# Patient Record
Sex: MALE | Race: WHITE | NOT HISPANIC OR LATINO | Employment: UNEMPLOYED | URBAN - METROPOLITAN AREA
[De-identification: names, ages, dates, MRNs, and addresses within clinical notes are randomized per-mention and may not be internally consistent; named-entity substitution may affect disease eponyms.]

---

## 2017-01-04 ENCOUNTER — OFFICE VISIT (OUTPATIENT)
Dept: HEMATOLOGY/ONCOLOGY | Facility: CLINIC | Age: 67
End: 2017-01-04
Payer: MEDICARE

## 2017-01-04 ENCOUNTER — INFUSION (OUTPATIENT)
Dept: INFUSION THERAPY | Facility: HOSPITAL | Age: 67
End: 2017-01-04
Attending: INTERNAL MEDICINE
Payer: MEDICARE

## 2017-01-04 ENCOUNTER — LAB VISIT (OUTPATIENT)
Dept: LAB | Facility: HOSPITAL | Age: 67
End: 2017-01-04
Attending: INTERNAL MEDICINE
Payer: MEDICARE

## 2017-01-04 VITALS
BODY MASS INDEX: 20.21 KG/M2 | TEMPERATURE: 97 F | OXYGEN SATURATION: 99 % | WEIGHT: 109.81 LBS | DIASTOLIC BLOOD PRESSURE: 88 MMHG | SYSTOLIC BLOOD PRESSURE: 110 MMHG | RESPIRATION RATE: 22 BRPM | HEIGHT: 62 IN | HEART RATE: 110 BPM

## 2017-01-04 VITALS — BODY MASS INDEX: 20.21 KG/M2 | HEIGHT: 62 IN | HEART RATE: 96 BPM | WEIGHT: 109.81 LBS | OXYGEN SATURATION: 98 %

## 2017-01-04 DIAGNOSIS — C34.01 MALIGNANT NEOPLASM OF HILUS OF RIGHT LUNG: ICD-10-CM

## 2017-01-04 DIAGNOSIS — C79.51 BONE METASTASES: Primary | ICD-10-CM

## 2017-01-04 DIAGNOSIS — G47.00 INSOMNIA, UNSPECIFIED TYPE: ICD-10-CM

## 2017-01-04 DIAGNOSIS — R64 CACHEXIA: ICD-10-CM

## 2017-01-04 DIAGNOSIS — C79.51 BONE METASTASES: ICD-10-CM

## 2017-01-04 DIAGNOSIS — C79.9 METASTATIC CANCER: ICD-10-CM

## 2017-01-04 DIAGNOSIS — C34.90 LUNG CANCER: ICD-10-CM

## 2017-01-04 DIAGNOSIS — C34.31 MALIGNANT NEOPLASM OF LOWER LOBE OF RIGHT LUNG: ICD-10-CM

## 2017-01-04 DIAGNOSIS — C34.01 MALIGNANT NEOPLASM OF HILUS OF RIGHT LUNG: Primary | ICD-10-CM

## 2017-01-04 DIAGNOSIS — C34.02 MALIGNANT NEOPLASM OF HILUS OF LEFT LUNG: ICD-10-CM

## 2017-01-04 LAB
ALBUMIN SERPL BCP-MCNC: 3.4 G/DL
ALP SERPL-CCNC: 106 U/L
ALT SERPL W/O P-5'-P-CCNC: 17 U/L
ANION GAP SERPL CALC-SCNC: 8 MMOL/L
AST SERPL-CCNC: 15 U/L
BASOPHILS # BLD AUTO: 0.01 K/UL
BASOPHILS NFR BLD: 0.3 %
BILIRUB SERPL-MCNC: 0.3 MG/DL
BUN SERPL-MCNC: 14 MG/DL
CALCIUM SERPL-MCNC: 9.5 MG/DL
CHLORIDE SERPL-SCNC: 104 MMOL/L
CO2 SERPL-SCNC: 28 MMOL/L
CREAT SERPL-MCNC: 0.8 MG/DL
DIFFERENTIAL METHOD: ABNORMAL
EOSINOPHIL # BLD AUTO: 0 K/UL
EOSINOPHIL NFR BLD: 1.1 %
ERYTHROCYTE [DISTWIDTH] IN BLOOD BY AUTOMATED COUNT: 18.9 %
EST. GFR  (AFRICAN AMERICAN): >60 ML/MIN/1.73 M^2
EST. GFR  (NON AFRICAN AMERICAN): >60 ML/MIN/1.73 M^2
GLUCOSE SERPL-MCNC: 124 MG/DL
HCT VFR BLD AUTO: 31.3 %
HGB BLD-MCNC: 10.2 G/DL
LYMPHOCYTES # BLD AUTO: 0.6 K/UL
LYMPHOCYTES NFR BLD: 15.6 %
MCH RBC QN AUTO: 33.1 PG
MCHC RBC AUTO-ENTMCNC: 32.6 %
MCV RBC AUTO: 102 FL
MONOCYTES # BLD AUTO: 0.6 K/UL
MONOCYTES NFR BLD: 15.9 %
NEUTROPHILS # BLD AUTO: 2.5 K/UL
NEUTROPHILS NFR BLD: 67.1 %
PLATELET # BLD AUTO: 294 K/UL
PMV BLD AUTO: 9.1 FL
POTASSIUM SERPL-SCNC: 4.1 MMOL/L
PROT SERPL-MCNC: 7 G/DL
RBC # BLD AUTO: 3.08 M/UL
SODIUM SERPL-SCNC: 140 MMOL/L
WBC # BLD AUTO: 3.72 K/UL

## 2017-01-04 PROCEDURE — 96367 TX/PROPH/DG ADDL SEQ IV INF: CPT

## 2017-01-04 PROCEDURE — 36415 COLL VENOUS BLD VENIPUNCTURE: CPT

## 2017-01-04 PROCEDURE — 96413 CHEMO IV INFUSION 1 HR: CPT

## 2017-01-04 PROCEDURE — 63600175 PHARM REV CODE 636 W HCPCS: Performed by: INTERNAL MEDICINE

## 2017-01-04 PROCEDURE — 85025 COMPLETE CBC W/AUTO DIFF WBC: CPT

## 2017-01-04 PROCEDURE — 80053 COMPREHEN METABOLIC PANEL: CPT

## 2017-01-04 PROCEDURE — 99999 PR PBB SHADOW E&M-EST. PATIENT-LVL IV: CPT | Mod: PBBFAC,,, | Performed by: INTERNAL MEDICINE

## 2017-01-04 PROCEDURE — 99215 OFFICE O/P EST HI 40 MIN: CPT | Mod: 25,S$PBB,, | Performed by: INTERNAL MEDICINE

## 2017-01-04 PROCEDURE — 96417 CHEMO IV INFUS EACH ADDL SEQ: CPT

## 2017-01-04 PROCEDURE — 25000003 PHARM REV CODE 250: Performed by: INTERNAL MEDICINE

## 2017-01-04 RX ORDER — ALPRAZOLAM 0.25 MG/1
0.5 TABLET ORAL 2 TIMES DAILY PRN
Qty: 60 TABLET | Refills: 2 | Status: ON HOLD | OUTPATIENT
Start: 2017-01-04 | End: 2017-03-17

## 2017-01-04 RX ORDER — FAMOTIDINE 20 MG/50ML
20 INJECTION, SOLUTION INTRAVENOUS
Status: COMPLETED | OUTPATIENT
Start: 2017-01-04 | End: 2017-01-04

## 2017-01-04 RX ORDER — FAMOTIDINE 20 MG/50ML
20 INJECTION, SOLUTION INTRAVENOUS
Status: CANCELLED
Start: 2017-01-04

## 2017-01-04 RX ORDER — SODIUM CHLORIDE 0.9 % (FLUSH) 0.9 %
10 SYRINGE (ML) INJECTION
Status: CANCELLED | OUTPATIENT
Start: 2017-01-04

## 2017-01-04 RX ORDER — HEPARIN 100 UNIT/ML
500 SYRINGE INTRAVENOUS
Status: CANCELLED | OUTPATIENT
Start: 2017-01-04

## 2017-01-04 RX ORDER — HEPARIN 100 UNIT/ML
500 SYRINGE INTRAVENOUS
Status: DISCONTINUED | OUTPATIENT
Start: 2017-01-04 | End: 2017-01-04 | Stop reason: HOSPADM

## 2017-01-04 RX ORDER — PALONOSETRON 0.05 MG/ML
0.25 INJECTION, SOLUTION INTRAVENOUS
Status: DISCONTINUED | OUTPATIENT
Start: 2017-01-04 | End: 2017-01-04

## 2017-01-04 RX ORDER — PALONOSETRON 0.05 MG/ML
0.25 INJECTION, SOLUTION INTRAVENOUS
Status: CANCELLED
Start: 2017-01-04

## 2017-01-04 RX ADMIN — FAMOTIDINE 20 MG: 20 INJECTION, SOLUTION INTRAVENOUS at 09:01

## 2017-01-04 RX ADMIN — CARBOPLATIN 180 MG: 10 INJECTION, SOLUTION INTRAVENOUS at 12:01

## 2017-01-04 RX ADMIN — DEXAMETHASONE SODIUM PHOSPHATE: 4 INJECTION, SOLUTION INTRAMUSCULAR; INTRAVENOUS at 10:01

## 2017-01-04 RX ADMIN — HEPARIN 500 UNITS: 100 SYRINGE at 12:01

## 2017-01-04 RX ADMIN — SODIUM CHLORIDE 1000 ML: 0.9 INJECTION, SOLUTION INTRAVENOUS at 09:01

## 2017-01-04 RX ADMIN — PACLITAXEL 66 MG: 6 INJECTION, SOLUTION INTRAVENOUS at 11:01

## 2017-01-04 RX ADMIN — SODIUM CHLORIDE: 9 INJECTION, SOLUTION INTRAVENOUS at 09:01

## 2017-01-04 RX ADMIN — DIPHENHYDRAMINE HYDROCHLORIDE: 50 INJECTION, SOLUTION INTRAMUSCULAR; INTRAVENOUS at 10:01

## 2017-01-04 NOTE — PLAN OF CARE
Problem: Patient Care Overview  Goal: Plan of Care Review  Outcome: Ongoing (interventions implemented as appropriate)  Pt states that he is feeling better today than he was last week.

## 2017-01-04 NOTE — PROGRESS NOTES
Subjective:       Patient ID: Frederick J Lejeune is a 66 y.o. male.    Chief Complaint: Results; Lung Cancer; and Chemotherapy    HPI 66-year-old male with metastatic non-small cell squamous subtype PD1 +70% expression presents for cycle 6 day 1 of carboplatinum Taxol weekly ECOG status 2    Past Medical History   Diagnosis Date    Cancer      lung    COPD (chronic obstructive pulmonary disease)     GERD (gastroesophageal reflux disease)     Lumbar vertebral fracture      Family History   Problem Relation Age of Onset    Emphysema Mother     Cancer Father      mesothelioma     Social History     Social History    Marital status:      Spouse name: ganesh    Number of children: 2    Years of education: N/A     Occupational History     [employer]     Social History Main Topics    Smoking status: Former Smoker     Packs/day: 1.00     Years: 50.00     Types: Cigarettes     Quit date: 5/10/2016    Smokeless tobacco: Former User     Types: Snuff     Quit date: 5/10/2016    Alcohol use No    Drug use: No    Sexual activity: Yes     Partners: Female     Other Topics Concern    Not on file     Social History Narrative     Past Surgical History   Procedure Laterality Date    Appendectomy      Abscess drainage      Hand surgery Left     Back surgery         Labs:  Lab Results   Component Value Date    WBC 3.72 (L) 01/04/2017    HGB 10.2 (L) 01/04/2017    HCT 31.3 (L) 01/04/2017     (H) 01/04/2017     01/04/2017     BMP  Lab Results   Component Value Date     12/28/2016    K 4.2 12/28/2016     12/28/2016    CO2 28 12/28/2016    BUN 15 12/28/2016    CREATININE 0.7 12/28/2016    CALCIUM 9.6 12/28/2016    ANIONGAP 9 12/28/2016    ESTGFRAFRICA >60 12/28/2016    EGFRNONAA >60 12/28/2016     Lab Results   Component Value Date    ALT 15 12/28/2016    AST 14 12/28/2016    ALKPHOS 91 12/28/2016    BILITOT 0.3 12/28/2016       No results found for: IRON, TIBC, FERRITIN,  SATURATEDIRO  Lab Results   Component Value Date    OBGPJWDW57 256 12/12/2016     No results found for: FOLATE  Lab Results   Component Value Date    TSH 0.236 (L) 09/28/2016         Review of Systems   Constitutional: Positive for fatigue and unexpected weight change. Negative for activity change, appetite change, chills, diaphoresis and fever.   HENT: Negative for congestion, dental problem, drooling, ear discharge, ear pain, facial swelling, hearing loss, mouth sores, nosebleeds, postnasal drip, rhinorrhea, sinus pressure, sneezing, sore throat, tinnitus, trouble swallowing and voice change.    Eyes: Negative for photophobia, pain, discharge, redness, itching and visual disturbance.   Respiratory: Positive for shortness of breath. Negative for apnea, cough, choking, chest tightness, wheezing and stridor.    Cardiovascular: Negative for chest pain, palpitations and leg swelling.   Gastrointestinal: Negative for abdominal distention, abdominal pain, anal bleeding, blood in stool, constipation, diarrhea, nausea, rectal pain and vomiting.   Endocrine: Negative for cold intolerance, heat intolerance, polydipsia, polyphagia and polyuria.   Genitourinary: Negative for decreased urine volume, difficulty urinating, discharge, dysuria, enuresis, flank pain, frequency, genital sores, hematuria, penile pain, penile swelling, scrotal swelling, testicular pain and urgency.   Musculoskeletal: Negative for arthralgias, back pain, gait problem, joint swelling, myalgias, neck pain and neck stiffness.   Skin: Negative for color change, pallor, rash and wound.   Allergic/Immunologic: Negative for environmental allergies, food allergies and immunocompromised state.   Neurological: Positive for weakness. Negative for dizziness, tremors, seizures, syncope, facial asymmetry, speech difficulty, light-headedness, numbness and headaches.   Hematological: Negative for adenopathy. Does not bruise/bleed easily.   Psychiatric/Behavioral:  Positive for dysphoric mood. Negative for agitation, behavioral problems, confusion, decreased concentration, hallucinations, self-injury, sleep disturbance and suicidal ideas. The patient is nervous/anxious. The patient is not hyperactive.        Objective:      Physical Exam   Constitutional: He is oriented to person, place, and time. He appears well-developed and well-nourished. No distress.   HENT:   Head: Normocephalic.   Right Ear: External ear normal.   Left Ear: External ear normal.   Nose: Nose normal. Right sinus exhibits no maxillary sinus tenderness and no frontal sinus tenderness. Left sinus exhibits no maxillary sinus tenderness and no frontal sinus tenderness.   Mouth/Throat: Oropharynx is clear and moist. No oropharyngeal exudate.   Nasal O2 in place   Eyes: EOM and lids are normal. Pupils are equal, round, and reactive to light. Right eye exhibits no discharge. Left eye exhibits no discharge. Right conjunctiva is not injected. Right conjunctiva has no hemorrhage. Left conjunctiva is not injected. Left conjunctiva has no hemorrhage. No scleral icterus. Right eye exhibits normal extraocular motion. Left eye exhibits normal extraocular motion.   Neck: Normal range of motion. Neck supple. No JVD present. No tracheal deviation present. No thyromegaly present.   Cardiovascular: Normal rate, regular rhythm and normal heart sounds.    Pulmonary/Chest: Effort normal and breath sounds normal. No stridor. No respiratory distress.   Abdominal: Soft. Bowel sounds are normal. He exhibits no mass. There is no hepatosplenomegaly, splenomegaly or hepatomegaly. There is no tenderness.   Musculoskeletal: Normal range of motion. He exhibits no edema or tenderness.   Lymphadenopathy:        Head (right side): No posterior auricular and no occipital adenopathy present.        Head (left side): No posterior auricular and no occipital adenopathy present.     He has no cervical adenopathy.        Right cervical: No  superficial cervical, no deep cervical and no posterior cervical adenopathy present.       Left cervical: No superficial cervical, no deep cervical and no posterior cervical adenopathy present.     He has no axillary adenopathy.        Right: No supraclavicular adenopathy present.        Left: No supraclavicular adenopathy present.   Neurological: He is alert and oriented to person, place, and time. He has normal strength. No cranial nerve deficit. Coordination normal.   Skin: Skin is dry. No rash noted. He is not diaphoretic. No cyanosis or erythema. Nails show no clubbing.   Psychiatric: He has a normal mood and affect. His behavior is normal. Judgment and thought content normal. Cognition and memory are normal.   Vitals reviewed.          Assessment:       1. Malignant neoplasm of hilus of right lung    2. Malignant neoplasm of hilus of left lung    3. Bone metastases    4. Metastatic cancer    5. Cachexia    6. Insomnia, unspecified type            Plan:     extensive conversation with patient at this point will treat with cycle 6 day 1 of weekly carboplatin and Taxol ECOG status 2 patient will have repeat PET scan compared to 10/31/16 PET scan for response to therapy and treatment recommendations CBC except overtreatment today orders have been written for treatment with infusional chemotherapy

## 2017-01-04 NOTE — MR AVS SNAPSHOT
Patient Information     Patient Name Sex DOB Lejeune, Frederick J Male 1950      Visit Information        Provider Department Dept Phone Center    2017 9:00 AM [facility] [phone] [facility]      Patient Instructions      [facility]  [address]  [address] [address]  [phone] phone     [phone] fax  Hours of Operation: Monday- Friday 8:00am- 5:00pm  After hours phone  [phone]  Hematology / Oncology Physicians on call      Dr. Madi Espinosa    Please call with any concerns regarding your appointment today.    FALL PREVENTION   Falls often occur due to slipping, tripping or losing your balance. Here are ways to reduce your risk of falling again.   Was there anything that caused your fall that can be fixed, removed or replaced?   Make your home safe by keeping walkways clear of objects you may trip over.   Use non-slip pads under rugs.   Do not walk in poorly lit areas.   Do not stand on chairs or wobbly ladders.   Use caution when reaching overhead or looking upward. This position can cause a loss of balance.   Be sure your shoes fit properly, have non-slip bottoms and are in good condition.   Be cautious when going up and down stairs, curbs, and when walking on uneven sidewalks.   If your balance is poor, consider using a cane or walker.   If your fall was related to alcohol use, stop or limit alcohol intake.   If your fall was related to use of sleeping medicines, talk to your doctor about this. You may need to reduce your dosage at bedtime if you awaken during the night to go to the bathroom.   To reduce the need for nighttime bathroom trips:   Avoid drinking fluids for several hours before going to bed   Empty your bladder before going to bed   Men can keep a urinal at the bedside   © 9319-3285 Alban Calvillo, [address] [place] [address], [place] [address]. All  rights reserved. This information is not intended as a substitute for professional medical care. Always follow your healthcare professional's instructions.    HOME CARE AFTER CHEMOTHERAPY   Meals   Many patients feel sick and lose their appetites during treatment. Eat small meals several times a day. Choose bland foods with little taste or smell if you have problems with nausea. Be sure to cook all food thoroughly. This kills bacteria and helps you avoid intestinal infection. Soft foods are easier to swallow and digest.   Activity   Exercise keeps you strong and keeps your heart and lungs active. Talk to your doctor about an appropriate exercise program for you.   Skin Care   To prevent a skin infection, bathe or shower once a day. Use a moisturizing soap and wash with warm water. Avoid very hot or cold water. Chemotherapy can make your skin dry . Apply moisturizing lotion to help relieve dry skin. Some drugs used in high doses can cause slight burns to appear (like sunburn). Ask for a special cream to help relieve the burn and protect your skin.   Prevent Mouth Sores   During chemotherapy, many people get mouth sores. Do the following to help prevent mouth sores or to ease discomfort.   Brush your teeth with a soft-bristle toothbrush after every meal.  Don't use dental floss if your platelet count is below 50,000. Your doctor or nurse will tell you if this is the case.  Use an oral swab or special soft toothbrush if your gums bleed during regular brushing.  Use mouthwash as directed. If you can't tolerate commercial mouthwash, use salt and baking soda to clean your mouth. Mix 1 teaspoon of salt and 1 teaspoon of baking soda into a glass of water. Swish and spit.  Call your doctor or return to this facility if you develop any of the following:   Sore throat   White patches in the mouth or throat   Fever of 100.4ºF (38ºC) or higher, or as directed by your healthcare provider  © 0683-9449 [employer] Calvillo, [address] [place] [address]  [place] [address]. All rights reserved. This information is not intended as a substitute for professional medical care. Always follow your healthcare professional's     YOU HAVE STARTED ON CHEMOTHERAPY. IF YOU EXPERIENCE ANY OF THE FOLLOWING PROBLEMS, CALL THE OFFICE IMMEDIATELY.    *FEVER .0 OR GREATER    *CHILLS, ESPECIALLY SHAKING CHILLS, OR SWEATING    *A SEVERE COUGH OR SORE THROAT, OR SINUS PAIN/     PRESSURE    *REDNESS, SWELLING, OR TENDERNESS AROUND A WOUND,     SORE, PIMPLE, RECTAL AREA, OR IV SITE    *SORES OR ULCERS IN THE MOUTH    *BLISTERS ON THE LIPS OR SKIN    *FREQUENT URGENCY TO URINATE OR A BURNING FEELING   WHEN YOU URINATE    *BLOOD IN THE URINE OR STOOL    *ANY UNEXPLAINED BRUISING OR PROLONGED BLEEDING,     (NOSEBLEEDS OR BLEEDING GUMS)    *LOOSE BOWEL MOVEMENTS THAT DO NOT RESPOND TO     IMODIUM OR MORE THAN THREE TIMES A DAY    *VOMITING UNRESPONSIVE TO ANTINAUSEA MEDICINE    *ANY UNUSUAL PHYSICAL SYMPTOMS THAT BEGAN AFTER     CHEMOTHERAPY    DURING WEEKDAYS, CALL AND ASK TO SPEAK DIRECTLY TO A NURSE.  AT OTHER TIMES, CALL THE OFFICE PHONE NUMBER; THE ANSWERING SERVICE WILL CONTACT THE ON-CALL PHYSICIAN.  SOMEONE IS AVAILABLE 24 HOURS A DAY, SEVEN DAYS A WEEK.       Your Current Medications Are     albuterol-ipratropium 2.5mg-0.5mg/3mL (DUO-NEB) 0.5 mg-3 mg(2.5 mg base)/3 mL nebulizer solution    alprazolam (XANAX) 0.25 MG tablet    atorvastatin (LIPITOR) 40 MG tablet    benzonatate (TESSALON) 100 MG capsule    docusate sodium (COLACE) 100 MG capsule    esomeprazole (NEXIUM) 40 MG capsule    fish oil-omega-3 fatty acids 300-1,000 mg capsule    hydrocodone-acetaminophen 5-325mg (NORCO) 5-325 mg per tablet    meclizine (ANTIVERT) 25 mg tablet    metoprolol tartrate (LOPRESSOR) 25 MG tablet    ondansetron (ZOFRAN-ODT) 4 MG TbDL    oxycodone (ROXICODONE) 5 MG immediate release tablet    oxycodone-acetaminophen (PERCOCET)  mg per tablet    OXYGEN-AIR DELIVERY SYSTEMS [facility]     polyethylene glycol (GLYCOLAX) 17 gram PwPk    prochlorperazine (COMPAZINE) 10 MG tablet    sucralfate (CARAFATE) 1 gram tablet    alprazolam (XANAX) 0.25 MG tablet (Discontinued)      Facility-Administered Medications     carboplatin (PARAPLATIN) 180 mg in sodium chloride 0.9% 268 mL chemo infusion    dexamethasone (DECADRON) 20 mg, ondansetron 16 mg in sodium chloride 0.9% IVPB    diphenhydrAMINE (BENADRYL) 50 mg in sodium chloride 0.9% 100 mL IVPB    famotidine IVPB 20 mg    heparin, porcine (PF) 100 unit/mL injection flush 500 Units    paclitaxel (TAXOL) 45 mg/m2 = 66 mg in sodium chloride 0.9% 220 mL chemo infusion    sodium chloride 0.9% 250 mL flush bag    sodium chloride 0.9% bolus 1,000 mL    ondansetron 16 mg in sodium chloride 0.9% 50 mL IVPB (Discontinued)    palonosetron injection 0.25 mg (Discontinued)      Appointments for Next Year     1/6/2017  8:15 AM INFUSION 060 MIN (60 min.) [facility] CHAIR 01 ONLH    Arrive at check-in approximately 15 minutes before your scheduled appointment time. Bring all outside medical records and imaging, along with a list of your current medications and insurance card.    (off [facility]) 1st floor    1/11/2017  7:30 AM PET CT  (30 min.) [facility] PETCT1 LIMIT 400 LBS    You have been scheduled for a PET scan. Do not eat or drink anything (except non-flavored  water) 6 hours prior to your appointment. After you receive an intravenous injection of a PET tracer, you will sit in a quiet area for one hour. This gives time for the tracer to circulate. The actual scan take approximately 45 minutes.    (off [facility]) 2nd floor    1/11/2017  9:20 AM ESTABLISHED PATIENT (20 min.) [facility] Oncology Zoila Espinosa MD    Arrive at check-in approximately 15 minutes before your scheduled appointment time. Bring all outside medical records and imaging, along with a list of your current medications and insurance card.     "(off [address]) 3rd Floor    5/2/2017  9:30 AM DIAGNOSTIC XRAY (15 min.) [facility] XR1-DR    Arrive at check-in approximately 15 minutes before your scheduled appointment time. Bring all outside medical records and imaging, along with a list of your current medications and insurance card.    (off [facility]) 1st floor    5/2/2017 10:00 AM ESTABLISHED PATIENT (20 min.) [facility] Zack Taylor MD    Arrive at check-in approximately 15 minutes before your scheduled appointment time. Bring all outside medical records and imaging, along with a list of your current medications and insurance card.    (off [facility]) 2nd floor         Default Flowsheet Data (last 24 hours)      Amb Complex Vitals Sebastian        01/04/17 0902 01/04/17 0838 01/04/17 0832          Measurements    Weight 49.8 kg (109 lb 12.6 oz)  49.8 kg (109 lb 12.6 oz)      Height 5' 2" (1.575 m)  5' 2" (1.575 m)      BSA (Calculated - sq m) 1.48 sq meters  1.48 sq meters      BMI (Calculated) 20.1  20.1      BP  110/88 110/88      Temp   97 °F (36.1 °C)      Pulse  110 (!)  170      Resp   (!)  22      SpO2  99 %   on 3 liters of 02 N/C (!)  82 %   patient is on o@ but did not bring to clinic .      Pain Assessment    Pain Score Zero  Zero              Allergies     Ambien [Zolpidem] Other (See Comments)    Makes me extremely hyped up like im going crazy.    Zoloft [Sertraline] Other (See Comments)    unknown      Medications You Received from 01/03/2017 1223 to 01/04/2017 1223        Date/Time Order Dose Route Action     01/04/2017 1218 carboplatin (PARAPLATIN) 180 mg in sodium chloride 0.9% 268 mL chemo infusion 180 mg Intravenous New Bag     01/04/2017 1003 dexamethasone (DECADRON) 20 mg, ondansetron 16 mg in sodium chloride 0.9% IVPB   Intravenous New Bag     01/04/2017 1035 diphenhydrAMINE (BENADRYL) 50 mg in sodium chloride 0.9% 100 mL IVPB   Intravenous New Bag     01/04/2017 0925 famotidine IVPB 20 mg 20 mg " Intravenous New Bag     01/04/2017 1109 paclitaxel (TAXOL) 45 mg/m2 = 66 mg in sodium chloride 0.9% 220 mL chemo infusion 66 mg Intravenous New Bag     01/04/2017 0915 sodium chloride 0.9% 250 mL flush bag   Intravenous New Bag     01/04/2017 0918 sodium chloride 0.9% bolus 1,000 mL 1,000 mL Intravenous New Bag      Current Discharge Medication List     Cannot display discharge medications since this is not an admission.

## 2017-01-04 NOTE — PATIENT INSTRUCTIONS
[facility]  [address]  [address] [address]  [phone] phone     [phone] fax  Hours of Operation: Monday- Friday 8:00am- 5:00pm  After hours phone  [phone]  Hematology / Oncology Physicians on call      Dr. Madi Espinosa    Please call with any concerns regarding your appointment today.    FALL PREVENTION   Falls often occur due to slipping, tripping or losing your balance. Here are ways to reduce your risk of falling again.   Was there anything that caused your fall that can be fixed, removed or replaced?   Make your home safe by keeping walkways clear of objects you may trip over.   Use non-slip pads under rugs.   Do not walk in poorly lit areas.   Do not stand on chairs or wobbly ladders.   Use caution when reaching overhead or looking upward. This position can cause a loss of balance.   Be sure your shoes fit properly, have non-slip bottoms and are in good condition.   Be cautious when going up and down stairs, curbs, and when walking on uneven sidewalks.   If your balance is poor, consider using a cane or walker.   If your fall was related to alcohol use, stop or limit alcohol intake.   If your fall was related to use of sleeping medicines, talk to your doctor about this. You may need to reduce your dosage at bedtime if you awaken during the night to go to the bathroom.   To reduce the need for nighttime bathroom trips:   Avoid drinking fluids for several hours before going to bed   Empty your bladder before going to bed   Men can keep a urinal at the bedside   © 6638-8832 Alban Calvillo, [address] [place] [address]. All rights reserved. This information is not intended as a substitute for professional medical care. Always follow your healthcare professional's instructions.    HOME CARE AFTER CHEMOTHERAPY   Meals   Many patients feel sick and lose their appetites during treatment. Eat small meals  several times a day. Choose bland foods with little taste or smell if you have problems with nausea. Be sure to cook all food thoroughly. This kills bacteria and helps you avoid intestinal infection. Soft foods are easier to swallow and digest.   Activity   Exercise keeps you strong and keeps your heart and lungs active. Talk to your doctor about an appropriate exercise program for you.   Skin Care   To prevent a skin infection, bathe or shower once a day. Use a moisturizing soap and wash with warm water. Avoid very hot or cold water. Chemotherapy can make your skin dry . Apply moisturizing lotion to help relieve dry skin. Some drugs used in high doses can cause slight burns to appear (like sunburn). Ask for a special cream to help relieve the burn and protect your skin.   Prevent Mouth Sores   During chemotherapy, many people get mouth sores. Do the following to help prevent mouth sores or to ease discomfort.   Brush your teeth with a soft-bristle toothbrush after every meal.  Don't use dental floss if your platelet count is below 50,000. Your doctor or nurse will tell you if this is the case.  Use an oral swab or special soft toothbrush if your gums bleed during regular brushing.  Use mouthwash as directed. If you can't tolerate commercial mouthwash, use salt and baking soda to clean your mouth. Mix 1 teaspoon of salt and 1 teaspoon of baking soda into a glass of water. Swish and spit.  Call your doctor or return to this facility if you develop any of the following:   Sore throat   White patches in the mouth or throat   Fever of 100.4ºF (38ºC) or higher, or as directed by your healthcare provider  © 1240-9810 Alban Calvillo, [address] [place] [address], [place] [address]. All rights reserved. This information is not intended as a substitute for professional medical care. Always follow your healthcare professional's     YOU HAVE STARTED ON CHEMOTHERAPY. IF YOU EXPERIENCE ANY OF THE FOLLOWING PROBLEMS, CALL THE OFFICE  IMMEDIATELY.    *FEVER .0 OR GREATER    *CHILLS, ESPECIALLY SHAKING CHILLS, OR SWEATING    *A SEVERE COUGH OR SORE THROAT, OR SINUS PAIN/     PRESSURE    *REDNESS, SWELLING, OR TENDERNESS AROUND A WOUND,     SORE, PIMPLE, RECTAL AREA, OR IV SITE    *SORES OR ULCERS IN THE MOUTH    *BLISTERS ON THE LIPS OR SKIN    *FREQUENT URGENCY TO URINATE OR A BURNING FEELING   WHEN YOU URINATE    *BLOOD IN THE URINE OR STOOL    *ANY UNEXPLAINED BRUISING OR PROLONGED BLEEDING,     (NOSEBLEEDS OR BLEEDING GUMS)    *LOOSE BOWEL MOVEMENTS THAT DO NOT RESPOND TO     IMODIUM OR MORE THAN THREE TIMES A DAY    *VOMITING UNRESPONSIVE TO ANTINAUSEA MEDICINE    *ANY UNUSUAL PHYSICAL SYMPTOMS THAT BEGAN AFTER     CHEMOTHERAPY    DURING WEEKDAYS, CALL AND ASK TO SPEAK DIRECTLY TO A NURSE.  AT OTHER TIMES, CALL THE OFFICE PHONE NUMBER; THE ANSWERING SERVICE WILL CONTACT THE ON-CALL PHYSICIAN.  SOMEONE IS AVAILABLE 24 HOURS A DAY, SEVEN DAYS A WEEK.

## 2017-01-06 ENCOUNTER — INFUSION (OUTPATIENT)
Dept: INFUSION THERAPY | Facility: HOSPITAL | Age: 67
End: 2017-01-06
Attending: INTERNAL MEDICINE
Payer: MEDICARE

## 2017-01-06 VITALS
SYSTOLIC BLOOD PRESSURE: 105 MMHG | TEMPERATURE: 97 F | HEART RATE: 99 BPM | RESPIRATION RATE: 18 BRPM | DIASTOLIC BLOOD PRESSURE: 74 MMHG

## 2017-01-06 DIAGNOSIS — C79.9 METASTATIC CANCER: ICD-10-CM

## 2017-01-06 DIAGNOSIS — C79.51 BONE METASTASES: Primary | ICD-10-CM

## 2017-01-06 DIAGNOSIS — C34.01 MALIGNANT NEOPLASM OF HILUS OF RIGHT LUNG: ICD-10-CM

## 2017-01-06 PROCEDURE — 25000003 PHARM REV CODE 250: Performed by: INTERNAL MEDICINE

## 2017-01-06 PROCEDURE — 63600175 PHARM REV CODE 636 W HCPCS: Performed by: INTERNAL MEDICINE

## 2017-01-06 PROCEDURE — 96375 TX/PRO/DX INJ NEW DRUG ADDON: CPT

## 2017-01-06 PROCEDURE — 96360 HYDRATION IV INFUSION INIT: CPT

## 2017-01-06 RX ORDER — SODIUM CHLORIDE 0.9 % (FLUSH) 0.9 %
10 SYRINGE (ML) INJECTION
Status: CANCELLED | OUTPATIENT
Start: 2017-01-06

## 2017-01-06 RX ORDER — ONDANSETRON 2 MG/ML
8 INJECTION INTRAMUSCULAR; INTRAVENOUS ONCE
Status: COMPLETED | OUTPATIENT
Start: 2017-01-06 | End: 2017-01-06

## 2017-01-06 RX ORDER — SODIUM CHLORIDE 0.9 % (FLUSH) 0.9 %
10 SYRINGE (ML) INJECTION
Status: DISCONTINUED | OUTPATIENT
Start: 2017-01-06 | End: 2017-01-06 | Stop reason: HOSPADM

## 2017-01-06 RX ORDER — HEPARIN 100 UNIT/ML
500 SYRINGE INTRAVENOUS
Status: COMPLETED | OUTPATIENT
Start: 2017-01-06 | End: 2017-01-06

## 2017-01-06 RX ORDER — HEPARIN 100 UNIT/ML
500 SYRINGE INTRAVENOUS
Status: CANCELLED | OUTPATIENT
Start: 2017-01-06

## 2017-01-06 RX ADMIN — ONDANSETRON 8 MG: 2 INJECTION INTRAMUSCULAR; INTRAVENOUS at 09:01

## 2017-01-06 RX ADMIN — HEPARIN 500 UNITS: 100 SYRINGE at 09:01

## 2017-01-06 RX ADMIN — SODIUM CHLORIDE 1000 ML: 0.9 INJECTION, SOLUTION INTRAVENOUS at 08:01

## 2017-01-06 NOTE — MR AVS SNAPSHOT
Patient Information     Patient Name Sex     Lejeune, Frederick J Male 1950      Visit Information        Provider Department Dept Phone Center    2017 8:15 AM [facility] [phone] [facility]      Patient Instructions      [facility]  [address]  [address]  [phone] phone     [phone] fax  Hours of Operation: Monday- Friday 8:00am- 5:00pm  After hours phone  [phone]  Hematology / Oncology Physicians on call      Dr. Madi Espinosa    Please call with any concerns regarding your appointment today.      HOME CARE AFTER CHEMOTHERAPY   Meals   Many patients feel sick and lose their appetites during treatment. Eat small meals several times a day. Choose bland foods with little taste or smell if you have problems with nausea. Be sure to cook all food thoroughly. This kills bacteria and helps you avoid intestinal infection. Soft foods are easier to swallow and digest.   Activity   Exercise keeps you strong and keeps your heart and lungs active. Talk to your doctor about an appropriate exercise program for you.   Skin Care   To prevent a skin infection, bathe or shower once a day. Use a moisturizing soap and wash with warm water. Avoid very hot or cold water. Chemotherapy can make your skin dry . Apply moisturizing lotion to help relieve dry skin. Some drugs used in high doses can cause slight burns to appear (like sunburn). Ask for a special cream to help relieve the burn and protect your skin.   Prevent Mouth Sores   During chemotherapy, many people get mouth sores. Do the following to help prevent mouth sores or to ease discomfort.   Brush your teeth with a soft-bristle toothbrush after every meal.  Don't use dental floss if your platelet count is below 50,000. Your doctor or nurse will tell you if this is the case.  Use an oral swab or special soft toothbrush  if your gums bleed during regular brushing.  Use mouthwash as directed. If you can't tolerate commercial mouthwash, use salt and baking soda to clean your mouth. Mix 1 teaspoon of salt and 1 teaspoon of baking soda into a glass of water. Swish and spit.  Call your doctor or return to this facility if you develop any of the following:   Sore throat   White patches in the mouth or throat   Fever of 100.4ºF (38ºC) or higher, or as directed by your healthcare provider  © 7996-2741 [employer] Calvillo, [address] [place] [address]. All rights reserved. This information is not intended as a substitute for professional medical care. Always follow your healthcare professional's          Your Current Medications Are     albuterol-ipratropium 2.5mg-0.5mg/3mL (DUO-NEB) 0.5 mg-3 mg(2.5 mg base)/3 mL nebulizer solution    alprazolam (XANAX) 0.25 MG tablet    atorvastatin (LIPITOR) 40 MG tablet    benzonatate (TESSALON) 100 MG capsule    docusate sodium (COLACE) 100 MG capsule    esomeprazole (NEXIUM) 40 MG capsule    fish oil-omega-3 fatty acids 300-1,000 mg capsule    hydrocodone-acetaminophen 5-325mg (NORCO) 5-325 mg per tablet    meclizine (ANTIVERT) 25 mg tablet    metoprolol tartrate (LOPRESSOR) 25 MG tablet    ondansetron (ZOFRAN-ODT) 4 MG TbDL    oxycodone (ROXICODONE) 5 MG immediate release tablet    oxycodone-acetaminophen (PERCOCET)  mg per tablet    OXYGEN-AIR DELIVERY SYSTEMS MISC    polyethylene glycol (GLYCOLAX) 17 gram PwPk    prochlorperazine (COMPAZINE) 10 MG tablet    sucralfate (CARAFATE) 1 gram tablet (Discontinued)      Facility-Administered Medications     heparin, porcine (PF) 100 unit/mL injection flush 500 Units    ondansetron injection 8 mg    sodium chloride 0.9% bolus 1,000 mL    sodium chloride 0.9% flush 10 mL    ondansetron 8 mg in sodium chloride 0.9% 50 mL IVPB (Discontinued)      Appointments for Next Year     1/11/2017  7:30 AM PET CT  (30 min.) [facility]  PETCT1 LIMIT 400 LBS    You have been scheduled for a PET scan. Do not eat or drink anything (except non-flavored  water) 6 hours prior to your appointment. After you receive an intravenous injection of a PET tracer, you will sit in a quiet area for one hour. This gives time for the tracer to circulate. The actual scan take approximately 45 minutes.    (off [address]) 2nd floor    1/11/2017  9:20 AM ESTABLISHED PATIENT (20 min.) [facility] Hemotology Oncology Zoila Espinosa MD    Arrive at check-in approximately 15 minutes before your scheduled appointment time. Bring all outside medical records and imaging, along with a list of your current medications and insurance card.    (off [address]) 3rd Floor    5/2/2017  9:30 AM DIAGNOSTIC XRAY (15 min.) [facility] [id number]    Arrive at check-in approximately 15 minutes before your scheduled appointment time. Bring all outside medical records and imaging, along with a list of your current medications and insurance card.    (off [facility]) 1st floor    5/2/2017 10:00 AM ESTABLISHED PATIENT (20 min.) [facility] Zack Taylor MD    Arrive at check-in approximately 15 minutes before your scheduled appointment time. Bring all outside medical records and imaging, along with a list of your current medications and insurance card.    (off [facility]) 2nd floor         Default Flowsheet Data (last 24 hours)      Amb Complex Vitals Sebastian        01/06/17 0826                Measurements    /74        Temp 97.2 °F (36.2 °C)        Pulse 99        Resp 18        Pain Assessment    Pain Score Five        Pain Frequency 2 Continuous        Pain Loc BACK                Allergies     Ambien [Zolpidem] Other (See Comments)    Makes me extremely hyped up like im going crazy.    Zoloft [Sertraline] Other (See Comments)    unknown      Medications You Received from 01/05/2017 0930 to 01/06/2017 0930        Date/Time Order Dose Route Action      01/06/2017 0902 ondansetron injection 8 mg 8 mg Intravenous Given     01/06/2017 0838 sodium chloride 0.9% bolus 1,000 mL 1,000 mL Intravenous New Bag      Current Discharge Medication List     Cannot display discharge medications since this is not an admission.      Follow-up and Disposition     Return in 5 days (on 1/11/2017).

## 2017-01-06 NOTE — PATIENT INSTRUCTIONS
[facility]  [address]  [address] [address]  [phone] phone     [phone] fax  Hours of Operation: Monday- Friday 8:00am- 5:00pm  After hours phone  [phone]  Hematology / Oncology Physicians on call      Dr. Madi Espinosa    Please call with any concerns regarding your appointment today.      HOME CARE AFTER CHEMOTHERAPY   Meals   Many patients feel sick and lose their appetites during treatment. Eat small meals several times a day. Choose bland foods with little taste or smell if you have problems with nausea. Be sure to cook all food thoroughly. This kills bacteria and helps you avoid intestinal infection. Soft foods are easier to swallow and digest.   Activity   Exercise keeps you strong and keeps your heart and lungs active. Talk to your doctor about an appropriate exercise program for you.   Skin Care   To prevent a skin infection, bathe or shower once a day. Use a moisturizing soap and wash with warm water. Avoid very hot or cold water. Chemotherapy can make your skin dry . Apply moisturizing lotion to help relieve dry skin. Some drugs used in high doses can cause slight burns to appear (like sunburn). Ask for a special cream to help relieve the burn and protect your skin.   Prevent Mouth Sores   During chemotherapy, many people get mouth sores. Do the following to help prevent mouth sores or to ease discomfort.   Brush your teeth with a soft-bristle toothbrush after every meal.  Don't use dental floss if your platelet count is below 50,000. Your doctor or nurse will tell you if this is the case.  Use an oral swab or special soft toothbrush if your gums bleed during regular brushing.  Use mouthwash as directed. If you can't tolerate commercial mouthwash, use salt and baking soda to clean your mouth. Mix 1 teaspoon of salt and 1 teaspoon of baking soda into a glass of water. Swish and spit.  Call your doctor or  return to this facility if you develop any of the following:   Sore throat   White patches in the mouth or throat   Fever of 100.4ºF (38ºC) or higher, or as directed by your healthcare provider  © 0902-9886 [employer] Calvillo, [address] [place] [address], [place] [address]. All rights reserved. This information is not intended as a substitute for professional medical care. Always follow your healthcare professional's

## 2017-01-06 NOTE — PLAN OF CARE
Problem: Patient Care Overview  Goal: Plan of Care Review  Outcome: Ongoing (interventions implemented as appropriate)  States he feels great today, no side effects.

## 2017-01-10 ENCOUNTER — TELEPHONE (OUTPATIENT)
Dept: RADIOLOGY | Facility: HOSPITAL | Age: 67
End: 2017-01-10

## 2017-01-11 ENCOUNTER — HOSPITAL ENCOUNTER (OUTPATIENT)
Dept: RADIOLOGY | Facility: HOSPITAL | Age: 67
Discharge: HOME | End: 2017-01-11
Attending: INTERNAL MEDICINE
Payer: MEDICARE

## 2017-01-11 ENCOUNTER — OFFICE VISIT (OUTPATIENT)
Dept: HEMATOLOGY/ONCOLOGY | Facility: CLINIC | Age: 67
End: 2017-01-11
Payer: MEDICARE

## 2017-01-11 VITALS
BODY MASS INDEX: 20.24 KG/M2 | SYSTOLIC BLOOD PRESSURE: 120 MMHG | RESPIRATION RATE: 18 BRPM | TEMPERATURE: 98 F | WEIGHT: 110 LBS | HEART RATE: 100 BPM | OXYGEN SATURATION: 95 % | HEIGHT: 62 IN | DIASTOLIC BLOOD PRESSURE: 70 MMHG

## 2017-01-11 DIAGNOSIS — G89.3 CHRONIC NEOPLASM-RELATED PAIN: ICD-10-CM

## 2017-01-11 DIAGNOSIS — G47.00 INSOMNIA, UNSPECIFIED TYPE: ICD-10-CM

## 2017-01-11 DIAGNOSIS — C79.9 METASTATIC CANCER: ICD-10-CM

## 2017-01-11 DIAGNOSIS — T45.1X5A ANTINEOPLASTIC CHEMOTHERAPY INDUCED ANEMIA: ICD-10-CM

## 2017-01-11 DIAGNOSIS — C34.01 MALIGNANT NEOPLASM OF HILUS OF RIGHT LUNG: Primary | ICD-10-CM

## 2017-01-11 DIAGNOSIS — D64.81 ANTINEOPLASTIC CHEMOTHERAPY INDUCED ANEMIA: ICD-10-CM

## 2017-01-11 DIAGNOSIS — C34.02 MALIGNANT NEOPLASM OF HILUS OF LEFT LUNG: ICD-10-CM

## 2017-01-11 DIAGNOSIS — C79.51 BONE METASTASES: ICD-10-CM

## 2017-01-11 DIAGNOSIS — R64 CACHEXIA: ICD-10-CM

## 2017-01-11 DIAGNOSIS — J44.9 COPD, VERY SEVERE: Chronic | ICD-10-CM

## 2017-01-11 PROCEDURE — 99214 OFFICE O/P EST MOD 30 MIN: CPT | Mod: PBBFAC,PO | Performed by: INTERNAL MEDICINE

## 2017-01-11 PROCEDURE — 99999 PR PBB SHADOW E&M-EST. PATIENT-LVL IV: CPT | Mod: PBBFAC,,, | Performed by: INTERNAL MEDICINE

## 2017-01-11 PROCEDURE — 99214 OFFICE O/P EST MOD 30 MIN: CPT | Mod: S$PBB,,, | Performed by: INTERNAL MEDICINE

## 2017-01-11 PROCEDURE — 78815 PET IMAGE W/CT SKULL-THIGH: CPT | Mod: 26,PS,, | Performed by: RADIOLOGY

## 2017-01-11 NOTE — PROGRESS NOTES
Hematology/Oncology Established Visit    [facility] Diagnosis: Squamous cell carcinoma of lung    Stage: Stage IV (bilateral pulmonary nodules, R supraclavic lad, R mainstem bronchus obstruction, LLL mass)    Pathology:  - EGD 10/19/16: 1. Gastric biopsy: Normal gastric mucosa. No Helicobacter identified on routine stain. 2. Irregular Z line, biopsy: Squamoglandular mucosa with mild chronic inflammation and intestinal metaplasia (Palma's esophagus). Negative for dysplasia.    - Bronchoscopy with biopsy of R mainstem bronchus mass 10/21/16: Non-small cell carcinoma, see note.  Immunohistochemical staining with satisfactory control material shows the tumor to be positive for p63 and CK 5/6 and negative for TTF-1 and CK 7. This immonophenotype is consistent with squamous cell carcinoma. PD-L1 expression 70%.    Prior Treatment: Palliative radiation to the R mainstem bronchus    Current Treatment: Carbo (AUC 2) -Taxol 45mg/m2 weekly    History of Present Illness:  Frederick J Lejeune is a 66 y.o. male with GERD p/w 1 month history of chest pain, dysphagia, odynophagia found to have esophageal narrowing and RLL lung mass. Patient states he has lost over 20-30 lbs in the past 2 months. He denies n/v/d, abdominal pain, but states he has had difficulty eating due to odynophagia for the past month. He also states he has L groin pain due to a hernia, although he states an ultrasound of this region in the past did not show a hernia. Patient underwent EGD 10/19, which was notable for extrinsic compression in middle third of the esophagus as well as findings suggestive of Palma's esophagus, which were biopsied. 10/20, patient underwent EUS notable for lymphadenopathy in the paratracheal, mediastinal, and subcarinal region. FNA performed. Patient underwent bronchoscopy on 10/21 which was notable for a mass in the right mainstem bronchus, biopsy revealed lung cancer, squamous cell carcinoma. Patient was also found to have  Pseudomonas PNA and has completed treatment with Ciprofloxacin. Patient also completed palliative radiation to relieve right mainstem bronchus obstruction.     Today, patient comes in for management of lung cancer. He has been feeling better after we gave him IVF twice last week. No more dizziness. He states the pain in his R neck is increasing and pain in left groin is decreased. No fevers, chills, cough.    ROS:  Constitutional: Negative for fever, chills, malaise/fatigue and diaphoresis. +weight loss  HENT: Negative for hearing loss, ear pain, nosebleeds, congestion, sore throat, neck pain, tinnitus and ear discharge.    Eyes: Negative for blurred vision, double vision, photophobia, pain, discharge and redness.    Respiratory: Negative for hemoptysis, sputum production, shortness of breath, wheezing and stridor.  Cardiovascular: Negative for CP, palpitations, orthopnea, claudication, leg swelling and PND.   Gastrointestinal: Negative for heartburn, vomiting, abdominal pain, diarrhea, constipation, blood in stool and melena.   Genitourinary: Negative for dysuria, urgency, frequency, hematuria and flank pain.    Musculoskeletal: Negative for myalgias, back pain, joint pain and falls.    Skin: Negative for itching and rash.    Neurological: Negative for tingling, tremors, sensory change, speech change, focal weakness, seizures, loss of consciousness, weakness and headaches.    Endo/Heme/Allergies: Negative for environmental allergies and polydipsia. Does not bruise/bleed easily.    Psychiatric/Behavioral: Negative for depression, suicidal ideas, hallucinations, memory loss and substance abuse. The patient is not nervous/anxious and does not have insomnia.    All 14 systems reviewed and negative except as noted above.      Past Medical History:  COPD, severe  GERD  Lumbar vertebral fracture s/p surgery in 1/2016 / Chronic low back pain  H/o intracranial hemorrhage    Social History:  Social History     Social History     Marital status:      Spouse name: ganesh    Number of children: 2    Years of education: N/A     Occupational History     [employer]     Social History Main Topics    Smoking status: Former Smoker     Packs/day: 1.00     Years: 50.00     Types: Cigarettes     Quit date: 5/10/2016    Smokeless tobacco: Former User     Types: Snuff     Quit date: 5/10/2016    Alcohol use No    Drug use: No    Sexual activity: Yes     Partners: Female     Other Topics Concern    Not on file     Social History Narrative       Family History: family history includes Cancer in his father; Emphysema in his mother.    Physical Exam:  Vitals:    01/11/17 1004   BP: 120/70   Pulse: 100   Resp: 18   Temp: 97.8 °F (36.6 °C)     Body mass index is 20.12 kg/(m^2).  General:  AAOx4, no acute distress, mildly cachectic  HEENT: EOMI. Normocephalic and atraumatic. No maxillary sinus tenderness. External auditory canals clear and TMs intact without lesions. Nasal and oral mucosal membranes moist. Normal dentition and gums.   Neck: no LAD, thyromegaly, normal ROM  Pulmonary: Bilaterally clear to auscultation, Normal effort with no accessory muscle use, no wheezes/rales/rhonchi  CV: Normal rate, regular rhythm, no murmurs/rubs/gallops, no edema  ABD:  Soft, nontender, nondistended, no mass, and without hepatosplenomegaly   Ext: No clubbing, cyanosis, or edema, normal ROM  Skin: No rashes, lesions, bruising or petechiae  Neurological: No focal deficits, CN II to XII grossly intact, normal coordination    Psychiatric:  Normal mood, affect and judgement  Hem/Lymph:  Left groin with 5x6 cm enlarged LN, TTP. Also R supraclavicular LAD present, now more firm and TTP. No submandibular, cervical, axillary LAD.    Labs:    Lab Results   Component Value Date    WBC 3.72 (L) 01/04/2017    HGB 10.2 (L) 01/04/2017    HCT 31.3 (L) 01/04/2017     (H) 01/04/2017     01/04/2017     Lab Results   Component Value Date      01/04/2017    K 4.1 01/04/2017     01/04/2017    CO2 28 01/04/2017    BUN 14 01/04/2017    CREATININE 0.8 01/04/2017    CALCIUM 9.5 01/04/2017    ANIONGAP 8 01/04/2017    ESTGFRAFRICA >60 01/04/2017    EGFRNONAA >60 01/04/2017     Lab Results   Component Value Date    ALT 17 01/04/2017    AST 15 01/04/2017    ALKPHOS 106 01/04/2017    BILITOT 0.3 01/04/2017       No results found for: IRON, TIBC, FERRITIN, SATURATEDIRO  Lab Results   Component Value Date    HDBLCDQS40 256 12/12/2016     No results found for: FOLATE  Lab Results   Component Value Date    TSH 0.236 (L) 09/28/2016       Imaging:  Chest CT 10/19/16:   7.2 cm in diameter necrotic right lower lobe mass with evidence of mediastinal and subcarinal adenopathy.   Subcarinal lymph node which is necrotic measuring up to 3.6 cm in diameter.  Extensive emphysematous changes.  Paratracheal adenopathy.  Bilateral supraclavicular lymphadenopathy larger on the right measuring up to 2.5 cm in diameter.  Diffuse mural thickening of the esophagus cannot exclude infiltration of the mass into the esophagus.  Diffuse pulmonary fibrotic disease.          Procedures:     EGD 10/19/16:    Extrinsic compression in the middle third of the esophagus. Z-line irregular, 35 cm from the incisors. Esophageal mucosal changes suspicious for short-segment Palma's esophagus. Biopsied. Medium-sized hiatus hernia. Normal stomach. Normal examined duodenum. Several biopsies were obtained in the gastric antrum.     EUS 10/20/16:    - A few lymph nodes were visualized and measured in the left lower paratracheal region (level 4L), anterior mediastinum (level 6) and subcarinal mediastinum (level 7). Fine needle aspiration performed.     Bronchoscopy 10/21/16:  - Right lower lobe mass. The left lung was normal. A bloody and circumferential mass was found in the right mainstem bronchus. This lesion is malignant. Brushings were obtained. An endobronchial biopsy was performed. Washings were  obtained. Lung cancer was identified in the right mainstem bronchus.    PET 10/31/16:  1. Large necrotic right lower lobe mass with multiple presumed metastatic bilateral pulmonary nodules along with extensive mediastinal bilateral hilar, right supraclavicular adenopathy and bilateral lower neck adenopathy.  Metastatic lesions involving the left humeral head and left inguinal canal as well.  2.  Remaining findings as discussed above.     Brain MRI 11/16/16:  No acute intracranial disease identified.  No evidence of intracranial metastases.    PET 1/11/17:  1.  Interval improvement in bibasilar posterior medial lung masses and decrease in FDG avid mediastinal lymphadenopathy consistent with positive therapy response to radiation therapy.  2.  Interval stability or increase in size and FDG avidity of bilateral peripheral lung nodules and worsening bilateral supraclavicular lymphadenopathy consistent with disease progression.  3.  Mild enlargement of FDG avid lytic intraosseous lesion in left humeral head.  4.  Neutral developed small FDG avid gastrohepatic lymph node.    Assessment / Plan:  Frederick J Lejeune is a 66 y.o. male who was diagnosed with lung cancer.     66-year-old gentleman admitted with right lower lobe lung mass and extrinsic compression of esophagus likely from metastatic disease. Status post EGD/EUS with biopsy and bronchoscopy with biopsy and awaiting pathology results. F/u with Dr Espinosa as outpatient to review pathology and treatment this week. Patient verbalizes understanding of plan to follow up in clinic to discuss pathology and treatment.     1. Non-small cell lung cancer, right: Bronchoscopy on 10/21 identified a circumferential mass in the right mainstem bronchus. PET demonstrated bilateral pulmonary nodules and adenopathy. Patient completed palliative radiation to the R mainstem bronchus. He has missed several follow up appointments with us, and eventually started systemic chemotherapy  after radiation was complete. PDL1 testing - high expression 70% and therefore will plan to use Pembrolizumab (Keytruda) in 2nd line.   -- Will discontinue Carbo-Taxol given mild disease progression including new PET avidity at gastrohepatic LN.  -- Refer for palliative radiation to R supraclavicular LAD given pain and discomfort with swallowing (5 fractions will be completed by 1/22/17)  -- Return in 1 week to start cycle 1 of Pembrolizumab, which will be given every 3 weeks.     2. Bone metastases: 2/2 lung cancer. No pain in left humeral head currently.     3. Palma's esophagus: Diagnosed on EGD 10/19/16. Repeat EGD recommended in 3 yrs, due approx 19/2019.    4. Neoplasm-related pain: Pt takes Norco prn for chronic back pain and he  sees Dr. Mayen for pain management. He has a pain contract with him. However, patient now has new neoplasm related pain due to large left inguinal LAD. I discussed with patient that he needs to discuss with pain management in order to not violate his contract, but I suggest Oxycodone 5-10mg twice a day prn for the left inguinal pain.   -- Norco prn for his chronic back pain and Oxycodone 5mg twice daily prn for left inguinal neoplasm pain. Pt to discuss further with his pain management doctor.     5 Chemotherapy induced anemia: No transfusions needed and should improve while on Pembrolizumab    6. Severe COPD: Using home O2.    7. Anxiety/insomnia: Xanax 0.5 prn. He will also try Ambien at times instead of Xanax to see if he has a response.      Zoila Espinosa M.D.  Hematology Oncology    Distress Screening Results: Psychosocial Distress screening score of   noted and reviewed. No intervention indicated.

## 2017-01-16 ENCOUNTER — TELEPHONE (OUTPATIENT)
Dept: HEMATOLOGY/ONCOLOGY | Facility: CLINIC | Age: 67
End: 2017-01-16

## 2017-01-16 NOTE — TELEPHONE ENCOUNTER
----- Message from Maureen Mosley sent at 1/15/2017  2:47 PM CST -----  Please enter in a chemotherapy treatment plan for the upcoming infusion scheduled 1/18/17.  The current plan is inactive .    Thanks

## 2017-01-18 ENCOUNTER — OFFICE VISIT (OUTPATIENT)
Dept: HEMATOLOGY/ONCOLOGY | Facility: CLINIC | Age: 67
End: 2017-01-18
Payer: MEDICARE

## 2017-01-18 ENCOUNTER — INFUSION (OUTPATIENT)
Dept: INFUSION THERAPY | Facility: HOSPITAL | Age: 67
End: 2017-01-18
Attending: INTERNAL MEDICINE
Payer: MEDICARE

## 2017-01-18 VITALS
SYSTOLIC BLOOD PRESSURE: 115 MMHG | WEIGHT: 118.06 LBS | RESPIRATION RATE: 20 BRPM | HEART RATE: 79 BPM | OXYGEN SATURATION: 96 % | HEIGHT: 62 IN | BODY MASS INDEX: 21.73 KG/M2 | DIASTOLIC BLOOD PRESSURE: 74 MMHG

## 2017-01-18 VITALS
WEIGHT: 118.06 LBS | RESPIRATION RATE: 20 BRPM | OXYGEN SATURATION: 85 % | HEART RATE: 63 BPM | SYSTOLIC BLOOD PRESSURE: 115 MMHG | DIASTOLIC BLOOD PRESSURE: 76 MMHG | TEMPERATURE: 96 F | BODY MASS INDEX: 21.73 KG/M2 | HEIGHT: 62 IN

## 2017-01-18 DIAGNOSIS — C79.51 BONE METASTASES: ICD-10-CM

## 2017-01-18 DIAGNOSIS — J44.9 COPD, VERY SEVERE: Chronic | ICD-10-CM

## 2017-01-18 DIAGNOSIS — C34.01 LUNG CANCER, MAIN BRONCHUS, RIGHT: Primary | ICD-10-CM

## 2017-01-18 DIAGNOSIS — Z51.11 CHEMOTHERAPY MANAGEMENT, ENCOUNTER FOR: ICD-10-CM

## 2017-01-18 DIAGNOSIS — C34.01 LUNG CANCER, MAIN BRONCHUS, RIGHT: ICD-10-CM

## 2017-01-18 DIAGNOSIS — G89.3 CHRONIC NEOPLASM-RELATED PAIN: ICD-10-CM

## 2017-01-18 DIAGNOSIS — C79.51 BONE METASTASES: Primary | ICD-10-CM

## 2017-01-18 PROCEDURE — 63600175 PHARM REV CODE 636 W HCPCS: Performed by: INTERNAL MEDICINE

## 2017-01-18 PROCEDURE — 96413 CHEMO IV INFUSION 1 HR: CPT

## 2017-01-18 PROCEDURE — 99999 PR PBB SHADOW E&M-EST. PATIENT-LVL IV: CPT | Mod: PBBFAC,,, | Performed by: INTERNAL MEDICINE

## 2017-01-18 PROCEDURE — 96375 TX/PRO/DX INJ NEW DRUG ADDON: CPT

## 2017-01-18 PROCEDURE — 99215 OFFICE O/P EST HI 40 MIN: CPT | Mod: S$PBB,,, | Performed by: INTERNAL MEDICINE

## 2017-01-18 PROCEDURE — 25000003 PHARM REV CODE 250: Performed by: INTERNAL MEDICINE

## 2017-01-18 RX ORDER — SODIUM CHLORIDE 0.9 % (FLUSH) 0.9 %
10 SYRINGE (ML) INJECTION
Status: CANCELLED | OUTPATIENT
Start: 2017-01-18

## 2017-01-18 RX ORDER — ONDANSETRON 2 MG/ML
8 INJECTION INTRAMUSCULAR; INTRAVENOUS
Status: COMPLETED | OUTPATIENT
Start: 2017-01-18 | End: 2017-01-18

## 2017-01-18 RX ORDER — HEPARIN 100 UNIT/ML
500 SYRINGE INTRAVENOUS
Status: CANCELLED | OUTPATIENT
Start: 2017-01-18

## 2017-01-18 RX ORDER — HEPARIN 100 UNIT/ML
500 SYRINGE INTRAVENOUS
Status: DISCONTINUED | OUTPATIENT
Start: 2017-01-18 | End: 2017-01-18 | Stop reason: HOSPADM

## 2017-01-18 RX ADMIN — HEPARIN 500 UNITS: 100 SYRINGE at 10:01

## 2017-01-18 RX ADMIN — ONDANSETRON 8 MG: 2 INJECTION INTRAMUSCULAR; INTRAVENOUS at 09:01

## 2017-01-18 RX ADMIN — SODIUM CHLORIDE: 0.9 INJECTION, SOLUTION INTRAVENOUS at 09:01

## 2017-01-18 RX ADMIN — SODIUM CHLORIDE 200 MG: 9 INJECTION, SOLUTION INTRAVENOUS at 09:01

## 2017-01-18 NOTE — PLAN OF CARE
Problem: Patient Care Overview  Goal: Plan of Care Review  Outcome: Ongoing (interventions implemented as appropriate)  Pt states that he is happy to be starting this new drug today.

## 2017-01-18 NOTE — PROGRESS NOTES
Hematology/Oncology Established Visit    [facility] Diagnosis: Squamous cell carcinoma of lung    Stage: Stage IV (bilateral pulmonary nodules, R supraclavic lad, R mainstem bronchus obstruction, LLL mass)    Pathology:  - EGD 10/19/16: 1. Gastric biopsy: Normal gastric mucosa. No Helicobacter identified on routine stain. 2. Irregular Z line, biopsy: Squamoglandular mucosa with mild chronic inflammation and intestinal metaplasia (Palma's esophagus). Negative for dysplasia.    - Bronchoscopy with biopsy of R mainstem bronchus mass 10/21/16: Non-small cell carcinoma, see note.  Immunohistochemical staining with satisfactory control material shows the tumor to be positive for p63 and CK 5/6 and negative for TTF-1 and CK 7. This immonophenotype is consistent with squamous cell carcinoma. PD-L1 expression 70%.    Prior Treatment: Palliative radiation to the R mainstem bronchus    Current Treatment: Carbo (AUC 2) -Taxol 45mg/m2 weekly    History of Present Illness:  Frederick J Lejeune is a 66 y.o. male with GERD p/w 1 month history of chest pain, dysphagia, odynophagia found to have esophageal narrowing and RLL lung mass. Patient states he has lost over 20-30 lbs in the past 2 months. He denies n/v/d, abdominal pain, but states he has had difficulty eating due to odynophagia for the past month. He also states he has L groin pain due to a hernia, although he states an ultrasound of this region in the past did not show a hernia. Patient underwent EGD 10/19, which was notable for extrinsic compression in middle third of the esophagus as well as findings suggestive of Palma's esophagus, which were biopsied. 10/20, patient underwent EUS notable for lymphadenopathy in the paratracheal, mediastinal, and subcarinal region. FNA performed. Patient underwent bronchoscopy on 10/21 which was notable for a mass in the right mainstem bronchus, biopsy revealed lung cancer, squamous cell carcinoma. Patient was also found to have  Pseudomonas PNA and has completed treatment with Ciprofloxacin. Patient also completed palliative radiation to relieve right mainstem bronchus obstruction.     Today, patient comes in for management of lung cancer. Today, he feels mildly nauseated but otherwise doing well. He continues to have pain in his R neck. He has received 3 days of radiation thus far out of 5 planned. The left groin mass does not cause pain any longer. No fevers, chills, cough. No dizziness.     ROS:  Constitutional: Negative for fever, chills, malaise/fatigue and diaphoresis.  HENT: Negative for hearing loss, ear pain, nosebleeds, congestion, sore throat, neck pain, tinnitus and ear discharge.    Eyes: Negative for blurred vision, double vision, photophobia, pain, discharge and redness.    Respiratory: Negative for hemoptysis, sputum production, shortness of breath, wheezing and stridor.  Cardiovascular: Negative for CP, palpitations, orthopnea, claudication, leg swelling and PND.   Gastrointestinal: Negative for heartburn, vomiting, abdominal pain, diarrhea, constipation, blood in stool and melena.   Genitourinary: Negative for dysuria, urgency, frequency, hematuria and flank pain.    Musculoskeletal: Negative for myalgias, back pain, joint pain and falls.    Skin: Negative for itching and rash.    Neurological: Negative for tingling, tremors, sensory change, speech change, focal weakness, seizures, loss of consciousness, weakness and headaches.    Endo/Heme/Allergies: Negative for environmental allergies and polydipsia. Does not bruise/bleed easily.    Psychiatric/Behavioral: Negative for depression, suicidal ideas, hallucinations, memory loss and substance abuse. The patient is not nervous/anxious and does not have insomnia.    All 14 systems reviewed and negative except as noted above.      Past Medical History:  COPD, severe  GERD  Lumbar vertebral fracture s/p surgery in 1/2016 / Chronic low back pain  H/o intracranial  hemorrhage    Social History:  Social History     Social History    Marital status:      Spouse name: ganesh    Number of children: 2    Years of education: N/A     Occupational History     [employer]     Social History Main Topics    Smoking status: Former Smoker     Packs/day: 1.00     Years: 50.00     Types: Cigarettes     Quit date: 5/10/2016    Smokeless tobacco: Former User     Types: Snuff     Quit date: 5/10/2016    Alcohol use No    Drug use: No    Sexual activity: Yes     Partners: Female     Other Topics Concern    None     Social History Narrative       Family History: family history includes Cancer in his father; Emphysema in his mother.    Physical Exam:  Vitals:    01/18/17 0821   BP: 115/76   Pulse: 63   Resp: 20   Temp: (!) 95.7 °F (35.4 °C)     Body mass index is 21.59 kg/(m^2).  General:  AAOx4, no acute distress, mildly cachectic  HEENT: EOMI. Normocephalic and atraumatic. No maxillary sinus tenderness. External auditory canals clear and TMs intact without lesions. Nasal and oral mucosal membranes moist. Normal dentition and gums.   Neck: no LAD, thyromegaly, normal ROM  Pulmonary: Bilaterally clear to auscultation, Normal effort with no accessory muscle use, no wheezes/rales/rhonchi  CV: Normal rate, regular rhythm, no murmurs/rubs/gallops, no edema  ABD:  Soft, nontender, nondistended, no mass, and without hepatosplenomegaly   Ext: No clubbing, cyanosis, or edema, normal ROM  Skin: No rashes, lesions, bruising or petechiae  Neurological: No focal deficits, CN II to XII grossly intact, normal coordination    Psychiatric:  Normal mood, affect and judgement  Hem/Lymph:  Left groin with 5x6 cm enlarged LN, TTP. Also R supraclavicular LAD present, now less firm, it is TTP. No submandibular, cervical, axillary LAD.    Labs:    Lab Results   Component Value Date    WBC 7.36 01/18/2017    HGB 9.5 (L) 01/18/2017    HCT 28.6 (L) 01/18/2017     (H) 01/18/2017      01/18/2017     Lab Results   Component Value Date     01/04/2017    K 4.1 01/04/2017     01/04/2017    CO2 28 01/04/2017    BUN 14 01/04/2017    CREATININE 0.8 01/04/2017    CALCIUM 9.5 01/04/2017    ANIONGAP 8 01/04/2017    ESTGFRAFRICA >60 01/04/2017    EGFRNONAA >60 01/04/2017     Lab Results   Component Value Date    ALT 17 01/04/2017    AST 15 01/04/2017    ALKPHOS 106 01/04/2017    BILITOT 0.3 01/04/2017       No results found for: IRON, TIBC, FERRITIN, SATURATEDIRO  Lab Results   Component Value Date    LMFPAUUF53 256 12/12/2016     No results found for: FOLATE  Lab Results   Component Value Date    TSH 0.236 (L) 09/28/2016       Imaging:  Chest CT 10/19/16:   7.2 cm in diameter necrotic right lower lobe mass with evidence of mediastinal and subcarinal adenopathy.   Subcarinal lymph node which is necrotic measuring up to 3.6 cm in diameter.  Extensive emphysematous changes.  Paratracheal adenopathy.  Bilateral supraclavicular lymphadenopathy larger on the right measuring up to 2.5 cm in diameter.  Diffuse mural thickening of the esophagus cannot exclude infiltration of the mass into the esophagus.  Diffuse pulmonary fibrotic disease.          Procedures:     EGD 10/19/16:    Extrinsic compression in the middle third of the esophagus. Z-line irregular, 35 cm from the incisors. Esophageal mucosal changes suspicious for short-segment Palma's esophagus. Biopsied. Medium-sized hiatus hernia. Normal stomach. Normal examined duodenum. Several biopsies were obtained in the gastric antrum.     EUS 10/20/16:    - A few lymph nodes were visualized and measured in the left lower paratracheal region (level 4L), anterior mediastinum (level 6) and subcarinal mediastinum (level 7). Fine needle aspiration performed.     Bronchoscopy 10/21/16:  - Right lower lobe mass. The left lung was normal. A bloody and circumferential mass was found in the right mainstem bronchus. This lesion is malignant. Brushings were  obtained. An endobronchial biopsy was performed. Washings were obtained. Lung cancer was identified in the right mainstem bronchus.    PET 10/31/16:  1. Large necrotic right lower lobe mass with multiple presumed metastatic bilateral pulmonary nodules along with extensive mediastinal bilateral hilar, right supraclavicular adenopathy and bilateral lower neck adenopathy.  Metastatic lesions involving the left humeral head and left inguinal canal as well.  2.  Remaining findings as discussed above.     Brain MRI 11/16/16:  No acute intracranial disease identified.  No evidence of intracranial metastases.    PET 1/11/17:  1.  Interval improvement in bibasilar posterior medial lung masses and decrease in FDG avid mediastinal lymphadenopathy consistent with positive therapy response to radiation therapy.  2.  Interval stability or increase in size and FDG avidity of bilateral peripheral lung nodules and worsening bilateral supraclavicular lymphadenopathy consistent with disease progression.  3.  Mild enlargement of FDG avid lytic intraosseous lesion in left humeral head.  4.  Neutral developed small FDG avid gastrohepatic lymph node.    Assessment / Plan:  Frederick J Lejeune is a 66 y.o. male who was diagnosed with lung cancer.     66-year-old gentleman admitted with right lower lobe lung mass and extrinsic compression of esophagus likely from metastatic disease. Status post EGD/EUS with biopsy and bronchoscopy with biopsy and awaiting pathology results. F/u with Dr Espinosa as outpatient to review pathology and treatment this week. Patient verbalizes understanding of plan to follow up in clinic to discuss pathology and treatment.     1. Non-small cell lung cancer, right: Bronchoscopy on 10/21 identified a circumferential mass in the right mainstem bronchus. PET demonstrated bilateral pulmonary nodules and adenopathy. Patient completed palliative radiation to the R mainstem bronchus. He has missed several follow up  appointments with us, and eventually started systemic chemotherapy after radiation was complete. PDL1 testing - high expression 70%.  -- Discontinued Carbo-Taxol given mild disease progression including new PET avidity at gastrohepatic LN.  -- Continue palliative radiation to R supraclavicular LAD given pain and discomfort with swallowing (5 fractions will be completed by 1/22/17)  -- Proceed with cycle 1 day 1 of Pembrolizumab, which will be given every 3 weeks.   -- Return in 1-2 weeks for follow up of hydration, n/v.  -- Return in 3 weeks for next treatment.    2. Bone metastases: 2/2 lung cancer. No pain in left humeral head currently.     3. Palma's esophagus: Diagnosed on EGD 10/19/16. Repeat EGD recommended in 3 yrs, due approx 19/2019.    4. Neoplasm-related pain: Pt takes Norco prn for chronic back pain and he  sees Dr. Mayen for pain management. He has a pain contract with him. However, patient now has new neoplasm related pain due to large left inguinal LAD. I discussed with patient that he needs to discuss with pain management in order to not violate his contract, but I suggest Oxycodone 5-10mg twice a day prn for the left inguinal pain.   -- Norco prn for his chronic back pain and Oxycodone 5mg twice daily prn for left inguinal neoplasm pain. Pt to discuss further with his pain management doctor.     5 Chemotherapy induced anemia: No transfusions needed and should improve while on Pembrolizumab    6. Severe COPD: Using home O2.    7. Anxiety/insomnia: Xanax 0.5 prn. He will also try Ambien at times instead of Xanax to see if he has a response.      Zoila Espinosa M.D.  Hematology Oncology    Distress Screening Results: Psychosocial Distress screening score of   noted and reviewed. No intervention indicated.

## 2017-01-18 NOTE — MR AVS SNAPSHOT
Patient Information     Patient Name Sex DOB Lejeune, Frederick J Male 1950      Visit Information        Provider Department Dept Phone Center    2017 8:30 AM [facility] On [facility] [phone] [place]      Patient Instructions      [facility]  [address]  [address] [address]  [phone] phone     [phone] fax  Hours of Operation: Monday- Friday 8:00am- 5:00pm  After hours phone  [phone]  Hematology / Oncology Physicians on call      Dr. Madi Espinosa    Please call with any concerns regarding your appointment today.    FALL PREVENTION   Falls often occur due to slipping, tripping or losing your balance. Here are ways to reduce your risk of falling again.   Was there anything that caused your fall that can be fixed, removed or replaced?   Make your home safe by keeping walkways clear of objects you may trip over.   Use non-slip pads under rugs.   Do not walk in poorly lit areas.   Do not stand on chairs or wobbly ladders.   Use caution when reaching overhead or looking upward. This position can cause a loss of balance.   Be sure your shoes fit properly, have non-slip bottoms and are in good condition.   Be cautious when going up and down stairs, curbs, and when walking on uneven sidewalks.   If your balance is poor, consider using a cane or walker.   If your fall was related to alcohol use, stop or limit alcohol intake.   If your fall was related to use of sleeping medicines, talk to your doctor about this. You may need to reduce your dosage at bedtime if you awaken during the night to go to the bathroom.   To reduce the need for nighttime bathroom trips:   Avoid drinking fluids for several hours before going to bed   Empty your bladder before going to bed   Men can keep a urinal at the bedside   © 5496-8326 [employer], [address] [place] [address], [place] [address]. All  rights reserved. This information is not intended as a substitute for professional medical care. Always follow your healthcare professional's instructions.    HOME CARE AFTER CHEMOTHERAPY   Meals   Many patients feel sick and lose their appetites during treatment. Eat small meals several times a day. Choose bland foods with little taste or smell if you have problems with nausea. Be sure to cook all food thoroughly. This kills bacteria and helps you avoid intestinal infection. Soft foods are easier to swallow and digest.   Activity   Exercise keeps you strong and keeps your heart and lungs active. Talk to your doctor about an appropriate exercise program for you.   Skin Care   To prevent a skin infection, bathe or shower once a day. Use a moisturizing soap and wash with warm water. Avoid very hot or cold water. Chemotherapy can make your skin dry . Apply moisturizing lotion to help relieve dry skin. Some drugs used in high doses can cause slight burns to appear (like sunburn). Ask for a special cream to help relieve the burn and protect your skin.   Prevent Mouth Sores   During chemotherapy, many people get mouth sores. Do the following to help prevent mouth sores or to ease discomfort.   Brush your teeth with a soft-bristle toothbrush after every meal.  Don't use dental floss if your platelet count is below 50,000. Your doctor or nurse will tell you if this is the case.  Use an oral swab or special soft toothbrush if your gums bleed during regular brushing.  Use mouthwash as directed. If you can't tolerate commercial mouthwash, use salt and baking soda to clean your mouth. Mix 1 teaspoon of salt and 1 teaspoon of baking soda into a glass of water. Swish and spit.  Call your doctor or return to this facility if you develop any of the following:   Sore throat   White patches in the mouth or throat   Fever of 100.4ºF (38ºC) or higher, or as directed by your healthcare provider  © 4391-8062 [employer], [address] [place] [address]  [place] [address]. All rights reserved. This information is not intended as a substitute for professional medical care. Always follow your healthcare professional's     YOU HAVE STARTED ON CHEMOTHERAPY. IF YOU EXPERIENCE ANY OF THE FOLLOWING PROBLEMS, CALL THE OFFICE IMMEDIATELY.    *FEVER .0 OR GREATER    *CHILLS, ESPECIALLY SHAKING CHILLS, OR SWEATING    *A SEVERE COUGH OR SORE THROAT, OR SINUS PAIN/     PRESSURE    *REDNESS, SWELLING, OR TENDERNESS AROUND A WOUND,     SORE, PIMPLE, RECTAL AREA, OR IV SITE    *SORES OR ULCERS IN THE MOUTH    *BLISTERS ON THE LIPS OR SKIN    *FREQUENT URGENCY TO URINATE OR A BURNING FEELING   WHEN YOU URINATE    *BLOOD IN THE URINE OR STOOL    *ANY UNEXPLAINED BRUISING OR PROLONGED BLEEDING,     (NOSEBLEEDS OR BLEEDING GUMS)    *LOOSE BOWEL MOVEMENTS THAT DO NOT RESPOND TO     IMODIUM OR MORE THAN THREE TIMES A DAY    *VOMITING UNRESPONSIVE TO ANTINAUSEA MEDICINE    *ANY UNUSUAL PHYSICAL SYMPTOMS THAT BEGAN AFTER     CHEMOTHERAPY    DURING WEEKDAYS, CALL AND ASK TO SPEAK DIRECTLY TO A NURSE.  AT OTHER TIMES, CALL THE OFFICE PHONE NUMBER; THE ANSWERING SERVICE WILL CONTACT THE ON-CALL PHYSICIAN.  SOMEONE IS AVAILABLE 24 HOURS A DAY, SEVEN DAYS A WEEK.       Your Current Medications Are     alprazolam (XANAX) 0.25 MG tablet    esomeprazole (NEXIUM) 40 MG capsule    fish oil-omega-3 fatty acids 300-1,000 mg capsule    hydrocodone-acetaminophen 5-325mg (NORCO) 5-325 mg per tablet    oxycodone-acetaminophen (PERCOCET)  mg per tablet    albuterol-ipratropium 2.5mg-0.5mg/3mL (DUO-NEB) 0.5 mg-3 mg(2.5 mg base)/3 mL nebulizer solution    atorvastatin (LIPITOR) 40 MG tablet    benzonatate (TESSALON) 100 MG capsule    docusate sodium (COLACE) 100 MG capsule    meclizine (ANTIVERT) 25 mg tablet    metoprolol tartrate (LOPRESSOR) 25 MG tablet    ondansetron (ZOFRAN-ODT) 4 MG TbDL    oxycodone (ROXICODONE) 5 MG immediate release tablet    OXYGEN-AIR DELIVERY SYSTEMS [facility]     polyethylene glycol (GLYCOLAX) 17 gram PwPk    prochlorperazine (COMPAZINE) 10 MG tablet      Facility-Administered Medications     heparin, porcine (PF) 100 unit/mL injection flush 500 Units    ondansetron injection 8 mg    pembrolizumab (KEYTRUDA) 200 mg in sodium chloride 0.9% 108 mL chemo infusion    sodium chloride 0.9% 100 mL flush bag    ondansetron 8 mg in sodium chloride 0.9% 50 mL IVPB (Discontinued)      Appointments for Next Year     2/1/2017 10:30 AM NON FASTING LAB (10 min.) [facility]    Arrive at check-in approximately 15 minutes before your scheduled appointment time. Bring all outside medical records and imaging, along with a list of your current medications and insurance card.    2/1/2017 11:00 AM ESTABLISHED PATIENT (20 min.) [facility] - Hematology Oncology Zoila Espinosa MD    Arrive at check-in approximately 15 minutes before your scheduled appointment time. Bring all outside medical records and imaging, along with a list of your current medications and insurance card.    (off [place]) 1st Floor Appointments with [address]    2/7/2017  8:10 AM NON FASTING LAB (10 min.) [facility]    Arrive at check-in approximately 15 minutes before your scheduled appointment time. Bring all outside medical records and imaging, along with a list of your current medications and insurance card.    2/7/2017  8:40 AM ESTABLISHED PATIENT (20 min.) [facility] - Hematology Oncology Adam Barraza MD    Arrive at check-in approximately 15 minutes before your scheduled appointment time. Bring all outside medical records and imaging, along with a list of your current medications and insurance card.    (off [place]) 1st Floor Appointments with [address]    2/7/2017  9:00 AM INFUSION 060 MIN (60 min.) [facility] CHAIR 02 ONLH    Arrive at check-in approximately 15 minutes before your  "scheduled appointment time. Bring all outside medical records and imaging, along with a list of your current medications and insurance card.    (off [place]) 1st floor    5/2/2017  9:30 AM DIAGNOSTIC XRAY (15 min.) [facility] ON XR1-DR    Arrive at check-in approximately 15 minutes before your scheduled appointment time. Bring all outside medical records and imaging, along with a list of your current medications and insurance card.    (off [place]) 1st floor    5/2/2017 10:00 AM ESTABLISHED PATIENT (20 min.) [facility] - Pulmonary Services Zack Taylor MD    Arrive at check-in approximately 15 minutes before your scheduled appointment time. Bring all outside medical records and imaging, along with a list of your current medications and insurance card.    (off [place]) 2nd floor         Default Flowsheet Data (last 24 hours)      Amb Complex Vitals Sebastian        01/18/17 0906 01/18/17 0821             Measurements    Weight 53.5 kg (118 lb 0.9 oz) 53.5 kg (118 lb 0.9 oz)       Height 5' 2" (1.575 m) 5' 2" (1.575 m)       BSA (Calculated - sq m) 1.53 sq meters 1.53 sq meters       BMI (Calculated) 21.6 21.6       BP  115/76       Temp  (!)  95.7 °F (35.4 °C)       Pulse  63       Resp  20       SpO2  (!)  85 %       Pain Assessment    Pain Score Six Zero       Pain Frequency 2 Continuous        Pain Loc NECK   right                Allergies     Ambien [Zolpidem] Other (See Comments)    Makes me extremely hyped up like im going crazy.    Zoloft [Sertraline] Other (See Comments)    unknown      Medications You Received from 01/17/2017 1016 to 01/18/2017 1016        Date/Time Order Dose Route Action     01/18/2017 0923 ondansetron injection 8 mg 8 mg Intravenous Given     01/18/2017 0946 pembrolizumab (KEYTRUDA) 200 mg in sodium chloride 0.9% 108 mL chemo infusion 200 mg Intravenous New Bag     01/18/2017 0916 sodium chloride 0.9% 100 mL flush bag   Intravenous New Bag      Current Discharge Medication List "     Cannot display discharge medications since this is not an admission.

## 2017-01-18 NOTE — PATIENT INSTRUCTIONS
[facility]  [address]  [address] [address]  [phone] phone     [phone] fax  Hours of Operation: Monday- Friday 8:00am- 5:00pm  After hours phone  [phone]  Hematology / Oncology Physicians on call      Dr. Madi Espinosa    Please call with any concerns regarding your appointment today.    FALL PREVENTION   Falls often occur due to slipping, tripping or losing your balance. Here are ways to reduce your risk of falling again.   Was there anything that caused your fall that can be fixed, removed or replaced?   Make your home safe by keeping walkways clear of objects you may trip over.   Use non-slip pads under rugs.   Do not walk in poorly lit areas.   Do not stand on chairs or wobbly ladders.   Use caution when reaching overhead or looking upward. This position can cause a loss of balance.   Be sure your shoes fit properly, have non-slip bottoms and are in good condition.   Be cautious when going up and down stairs, curbs, and when walking on uneven sidewalks.   If your balance is poor, consider using a cane or walker.   If your fall was related to alcohol use, stop or limit alcohol intake.   If your fall was related to use of sleeping medicines, talk to your doctor about this. You may need to reduce your dosage at bedtime if you awaken during the night to go to the bathroom.   To reduce the need for nighttime bathroom trips:   Avoid drinking fluids for several hours before going to bed   Empty your bladder before going to bed   Men can keep a urinal at the bedside   © 7730-1121 [employer] Calvillo, [address] [place] [address]. All rights reserved. This information is not intended as a substitute for professional medical care. Always follow your healthcare professional's instructions.    HOME CARE AFTER CHEMOTHERAPY   Meals   Many patients feel sick and lose their appetites during treatment. Eat small meals  several times a day. Choose bland foods with little taste or smell if you have problems with nausea. Be sure to cook all food thoroughly. This kills bacteria and helps you avoid intestinal infection. Soft foods are easier to swallow and digest.   Activity   Exercise keeps you strong and keeps your heart and lungs active. Talk to your doctor about an appropriate exercise program for you.   Skin Care   To prevent a skin infection, bathe or shower once a day. Use a moisturizing soap and wash with warm water. Avoid very hot or cold water. Chemotherapy can make your skin dry . Apply moisturizing lotion to help relieve dry skin. Some drugs used in high doses can cause slight burns to appear (like sunburn). Ask for a special cream to help relieve the burn and protect your skin.   Prevent Mouth Sores   During chemotherapy, many people get mouth sores. Do the following to help prevent mouth sores or to ease discomfort.   Brush your teeth with a soft-bristle toothbrush after every meal.  Don't use dental floss if your platelet count is below 50,000. Your doctor or nurse will tell you if this is the case.  Use an oral swab or special soft toothbrush if your gums bleed during regular brushing.  Use mouthwash as directed. If you can't tolerate commercial mouthwash, use salt and baking soda to clean your mouth. Mix 1 teaspoon of salt and 1 teaspoon of baking soda into a glass of water. Swish and spit.  Call your doctor or return to this facility if you develop any of the following:   Sore throat   White patches in the mouth or throat   Fever of 100.4ºF (38ºC) or higher, or as directed by your healthcare provider  © 2934-3488 Alban Calvillo, [address] [place] [address], [place] [address]. All rights reserved. This information is not intended as a substitute for professional medical care. Always follow your healthcare professional's     YOU HAVE STARTED ON CHEMOTHERAPY. IF YOU EXPERIENCE ANY OF THE FOLLOWING PROBLEMS, CALL THE OFFICE  IMMEDIATELY.    *FEVER .0 OR GREATER    *CHILLS, ESPECIALLY SHAKING CHILLS, OR SWEATING    *A SEVERE COUGH OR SORE THROAT, OR SINUS PAIN/     PRESSURE    *REDNESS, SWELLING, OR TENDERNESS AROUND A WOUND,     SORE, PIMPLE, RECTAL AREA, OR IV SITE    *SORES OR ULCERS IN THE MOUTH    *BLISTERS ON THE LIPS OR SKIN    *FREQUENT URGENCY TO URINATE OR A BURNING FEELING   WHEN YOU URINATE    *BLOOD IN THE URINE OR STOOL    *ANY UNEXPLAINED BRUISING OR PROLONGED BLEEDING,     (NOSEBLEEDS OR BLEEDING GUMS)    *LOOSE BOWEL MOVEMENTS THAT DO NOT RESPOND TO     IMODIUM OR MORE THAN THREE TIMES A DAY    *VOMITING UNRESPONSIVE TO ANTINAUSEA MEDICINE    *ANY UNUSUAL PHYSICAL SYMPTOMS THAT BEGAN AFTER     CHEMOTHERAPY    DURING WEEKDAYS, CALL AND ASK TO SPEAK DIRECTLY TO A NURSE.  AT OTHER TIMES, CALL THE OFFICE PHONE NUMBER; THE ANSWERING SERVICE WILL CONTACT THE ON-CALL PHYSICIAN.  SOMEONE IS AVAILABLE 24 HOURS A DAY, SEVEN DAYS A WEEK.

## 2017-01-18 NOTE — PROGRESS NOTES
Reviewed side effects with the patient and mechanism of action for Keytruda.  Encouraged patient to inform providers he is taking this medication if he goes to the ER or is hospitalized for any issues. Explained that some patients can have autoimmune issues (nephritis, hepatitis, uveitis, pneumonitis, changes in blood sugar or thyroid hormones, etc.) that need to be treated with corticosteroids, not typical treatments for those symptoms.    Estrellita Mccullough, ArielD, BCPS

## 2017-01-25 ENCOUNTER — TELEPHONE (OUTPATIENT)
Dept: HEMATOLOGY/ONCOLOGY | Facility: CLINIC | Age: 67
End: 2017-01-25

## 2017-01-25 ENCOUNTER — INFUSION (OUTPATIENT)
Dept: INFUSION THERAPY | Facility: HOSPITAL | Age: 67
End: 2017-01-25
Attending: INTERNAL MEDICINE
Payer: MEDICARE

## 2017-01-25 VITALS
TEMPERATURE: 99 F | SYSTOLIC BLOOD PRESSURE: 121 MMHG | HEART RATE: 103 BPM | DIASTOLIC BLOOD PRESSURE: 77 MMHG | RESPIRATION RATE: 18 BRPM

## 2017-01-25 DIAGNOSIS — C34.01 LUNG CANCER, MAIN BRONCHUS, RIGHT: ICD-10-CM

## 2017-01-25 DIAGNOSIS — E86.0 DEHYDRATION, MILD: Primary | ICD-10-CM

## 2017-01-25 PROCEDURE — 96361 HYDRATE IV INFUSION ADD-ON: CPT | Mod: PO

## 2017-01-25 PROCEDURE — 96360 HYDRATION IV INFUSION INIT: CPT | Mod: PO

## 2017-01-25 PROCEDURE — 25000003 PHARM REV CODE 250: Mod: PO | Performed by: INTERNAL MEDICINE

## 2017-01-25 RX ADMIN — SODIUM CHLORIDE: 900 INJECTION, SOLUTION INTRAVENOUS at 09:01

## 2017-01-25 NOTE — PATIENT INSTRUCTIONS
.  [facility]  [address]  [address] [address]  [phone] phone     [phone] fax  Hours of Operation: Monday- Friday 8:00am- 5:00pm  After hours phone  [phone]  Hematology / Oncology Physicians on call      Dr. Madi Espinosa    Please call with any concerns regarding your appointment today.

## 2017-01-25 NOTE — MR AVS SNAPSHOT
Patient Information     Patient Name Sex DOB Lejeune, Frederick J Male 1950      Visit Information        Provider Department Dept Phone Center    2017 8:30 AM [facility]fusion [facility] [phone] [facility]      Patient Instructions    .  [facility]  [address]  [address] [address]  [phone] phone     [phone] fax  Hours of Operation: Monday- Friday 8:00am- 5:00pm  After hours phone  [phone]  Hematology / Oncology Physicians on call      Dr. Madi Espinosa    Please call with any concerns regarding your appointment today.         Your Current Medications Are     albuterol-ipratropium 2.5mg-0.5mg/3mL (DUO-NEB) 0.5 mg-3 mg(2.5 mg base)/3 mL nebulizer solution    alprazolam (XANAX) 0.25 MG tablet    atorvastatin (LIPITOR) 40 MG tablet    benzonatate (TESSALON) 100 MG capsule    docusate sodium (COLACE) 100 MG capsule    esomeprazole (NEXIUM) 40 MG capsule    fish oil-omega-3 fatty acids 300-1,000 mg capsule    hydrocodone-acetaminophen 5-325mg (NORCO) 5-325 mg per tablet    meclizine (ANTIVERT) 25 mg tablet    metoprolol tartrate (LOPRESSOR) 25 MG tablet    ondansetron (ZOFRAN-ODT) 4 MG TbDL    oxycodone (ROXICODONE) 5 MG immediate release tablet    oxycodone-acetaminophen (PERCOCET)  mg per tablet    OXYGEN-AIR DELIVERY SYSTEMS MISC    polyethylene glycol (GLYCOLAX) 17 gram PwPk    prochlorperazine (COMPAZINE) 10 MG tablet      Facility-Administered Medications     sodium chloride 0.9% 1,000 mL infusion      Appointments for Next Year     2017 10:30 AM NON FASTING LAB (10 min.) [facility], [place]    Arrive at check-in approximately 15 minutes before your scheduled appointment time. Bring all outside medical records and imaging, along with a list of your current medications and insurance card.    2017 11:00 AM ESTABLISHED PATIENT (20 min.)  [place] - Hematology Oncology Zoila Espinosa MD    Arrive at check-in approximately 15 minutes before your scheduled appointment time. Bring all outside medical records and imaging, along with a list of your current medications and insurance card.    (off [place]) 1st Floor Appointments with [address]    2/7/2017  8:10 AM NON FASTING LAB (10 min.) [facility], [place]    Arrive at check-in approximately 15 minutes before your scheduled appointment time. Bring all outside medical records and imaging, along with a list of your current medications and insurance card.    2/7/2017  8:40 AM ESTABLISHED PATIENT (20 min.) [place] [facility] H[facility]ology Oncology Adam Barraza MD    Arrive at check-in approximately 15 minutes before your scheduled appointment time. Bring all outside medical records and imaging, along with a list of your current medications and insurance card.    (off [place]) 1st Floor Appointments with [address]    2/7/2017  9:00 AM INFUSION 060 MIN (60 min.) [facility] [place] [address] [facility]    Arrive at check-in approximately 15 minutes before your scheduled appointment time. Bring all outside medical records and imaging, along with a list of your current medications and insurance card.    (off [place]) [address] [place] [address]    5/2/2017  9:30 AM DIAGNOSTIC XRAY (15 min.) [facility]    Arrive at check-in approximately 15 minutes before your scheduled appointment time. Bring all outside medical records and imaging, along with a list of your current medications and insurance card.    (off [place]) [address] [place] [address]    5/2/2017 10:00 AM ESTABLISHED PATIENT (20 min.) [place] - Pulmonary Services Zack Taylor MD    Arrive at check-in approximately 15 minutes before your scheduled appointment time. Bring all outside medical records and imaging, along with a list of your current medications and insurance card.    (off [place]) [facility]  floor         Default Flowsheet Data (last 24 hours)      Amb Complex Vitals Sebastian        01/25/17 0922                Measurements    /77        Temp 98.7 °F (37.1 °C)        Pulse 103        Resp 18        Pain Assessment    Pain Score Five        Pain Loc BACK                Allergies     Ambien [Zolpidem] Other (See Comments)    Makes me extremely hyped up like im going crazy.    Zoloft [Sertraline] Other (See Comments)    unknown      Medications You Received from 01/24/2017 1139 to 01/25/2017 1139        Date/Time Order Dose Route Action     01/25/2017 0935 sodium chloride 0.9% 1,000 mL infusion   Intravenous New Bag      Current Discharge Medication List     Cannot display discharge medications since this is not an admission.

## 2017-01-25 NOTE — TELEPHONE ENCOUNTER
----- Message from Julisa San sent at 1/25/2017  6:47 AM CST -----  Contact: Pt  Pt request call from nurse regarding an apt he is suppose to have to be given some saline, please contact pt at [phone]

## 2017-01-25 NOTE — PLAN OF CARE
Problem: Patient Care Overview  Goal: Plan of Care Review  Outcome: Ongoing (interventions implemented as appropriate)  Pt reports feeling tired from radiation

## 2017-01-26 ENCOUNTER — TELEPHONE (OUTPATIENT)
Dept: INTERNAL MEDICINE | Facility: CLINIC | Age: 67
End: 2017-01-26

## 2017-01-26 NOTE — TELEPHONE ENCOUNTER
----- Message from Yoon Snyder LPN sent at 1/26/2017  1:23 PM CST -----  Contact: [facility]-[phone]       ----- Message -----     From: Jasmyn Pickett     Sent: 1/26/2017   1:18 PM       To: Rod [facility]EN[facility] Staff    Would like to verify a testing date on patient.  Please call back @[phone].  Thanks-[facility]

## 2017-01-27 ENCOUNTER — TELEPHONE (OUTPATIENT)
Dept: HEMATOLOGY/ONCOLOGY | Facility: CLINIC | Age: 67
End: 2017-01-27

## 2017-01-27 NOTE — TELEPHONE ENCOUNTER
----- Message from Meli Ruiz sent at 1/27/2017 12:09 PM CST -----  Call pt at  [phone]///pt say he having a little problems breathing but he is on oxygen it happen when he take it off/and want to get your opinion///keely bhagat

## 2017-01-27 NOTE — TELEPHONE ENCOUNTER
Patient verbalized to keep the oxygen on until reevaluation. Advised him the lung cancer and COPD will cause the SOB when he is not wearing the oxygen.

## 2017-02-01 ENCOUNTER — OFFICE VISIT (OUTPATIENT)
Dept: HEMATOLOGY/ONCOLOGY | Facility: CLINIC | Age: 67
End: 2017-02-01
Payer: MEDICARE

## 2017-02-01 ENCOUNTER — LAB VISIT (OUTPATIENT)
Dept: LAB | Facility: HOSPITAL | Age: 67
End: 2017-02-01
Attending: INTERNAL MEDICINE
Payer: MEDICARE

## 2017-02-01 VITALS
OXYGEN SATURATION: 96 % | SYSTOLIC BLOOD PRESSURE: 102 MMHG | BODY MASS INDEX: 21.34 KG/M2 | WEIGHT: 115.94 LBS | RESPIRATION RATE: 18 BRPM | HEIGHT: 62 IN | TEMPERATURE: 97 F | DIASTOLIC BLOOD PRESSURE: 62 MMHG | HEART RATE: 88 BPM

## 2017-02-01 DIAGNOSIS — C79.51 BONE METASTASES: ICD-10-CM

## 2017-02-01 DIAGNOSIS — C34.01 LUNG CANCER, MAIN BRONCHUS, RIGHT: Primary | ICD-10-CM

## 2017-02-01 DIAGNOSIS — J96.11 CHRONIC RESPIRATORY FAILURE WITH HYPOXIA: Chronic | ICD-10-CM

## 2017-02-01 DIAGNOSIS — J44.9 COPD, VERY SEVERE: Chronic | ICD-10-CM

## 2017-02-01 DIAGNOSIS — G89.3 CHRONIC NEOPLASM-RELATED PAIN: ICD-10-CM

## 2017-02-01 DIAGNOSIS — C34.01 LUNG CANCER, MAIN BRONCHUS, RIGHT: ICD-10-CM

## 2017-02-01 LAB
ALBUMIN SERPL BCP-MCNC: 3.3 G/DL
ALP SERPL-CCNC: 93 U/L
ALT SERPL W/O P-5'-P-CCNC: 9 U/L
ANION GAP SERPL CALC-SCNC: 10 MMOL/L
AST SERPL-CCNC: 10 U/L
BASOPHILS # BLD AUTO: 0.02 K/UL
BASOPHILS NFR BLD: 0.3 %
BILIRUB SERPL-MCNC: 0.2 MG/DL
BUN SERPL-MCNC: 11 MG/DL
CALCIUM SERPL-MCNC: 9.4 MG/DL
CHLORIDE SERPL-SCNC: 103 MMOL/L
CO2 SERPL-SCNC: 26 MMOL/L
CREAT SERPL-MCNC: 0.8 MG/DL
DIFFERENTIAL METHOD: ABNORMAL
EOSINOPHIL # BLD AUTO: 0.1 K/UL
EOSINOPHIL NFR BLD: 1 %
ERYTHROCYTE [DISTWIDTH] IN BLOOD BY AUTOMATED COUNT: 17.6 %
EST. GFR  (AFRICAN AMERICAN): >60 ML/MIN/1.73 M^2
EST. GFR  (NON AFRICAN AMERICAN): >60 ML/MIN/1.73 M^2
GLUCOSE SERPL-MCNC: 115 MG/DL
HCT VFR BLD AUTO: 30.3 %
HGB BLD-MCNC: 10.2 G/DL
LYMPHOCYTES # BLD AUTO: 0.5 K/UL
LYMPHOCYTES NFR BLD: 7.4 %
MCH RBC QN AUTO: 34.8 PG
MCHC RBC AUTO-ENTMCNC: 33.7 %
MCV RBC AUTO: 103 FL
MONOCYTES # BLD AUTO: 0.9 K/UL
MONOCYTES NFR BLD: 12.5 %
NEUTROPHILS # BLD AUTO: 5.5 K/UL
NEUTROPHILS NFR BLD: 78.8 %
PLATELET # BLD AUTO: 187 K/UL
PMV BLD AUTO: 9.8 FL
POTASSIUM SERPL-SCNC: 4.2 MMOL/L
PROT SERPL-MCNC: 7 G/DL
RBC # BLD AUTO: 2.93 M/UL
SODIUM SERPL-SCNC: 139 MMOL/L
WBC # BLD AUTO: 7.03 K/UL

## 2017-02-01 PROCEDURE — 99214 OFFICE O/P EST MOD 30 MIN: CPT | Mod: PBBFAC | Performed by: INTERNAL MEDICINE

## 2017-02-01 PROCEDURE — 99999 PR PBB SHADOW E&M-EST. PATIENT-LVL IV: CPT | Mod: PBBFAC,,, | Performed by: INTERNAL MEDICINE

## 2017-02-01 PROCEDURE — 99214 OFFICE O/P EST MOD 30 MIN: CPT | Mod: S$PBB,,, | Performed by: INTERNAL MEDICINE

## 2017-02-01 RX ORDER — SODIUM CHLORIDE 0.9 % (FLUSH) 0.9 %
10 SYRINGE (ML) INJECTION
Status: CANCELLED | OUTPATIENT
Start: 2017-02-08

## 2017-02-01 RX ORDER — HEPARIN 100 UNIT/ML
500 SYRINGE INTRAVENOUS
Status: CANCELLED | OUTPATIENT
Start: 2017-02-08

## 2017-02-01 NOTE — PROGRESS NOTES
Hematology/Oncology Established Visit    [facility] Diagnosis: Squamous cell carcinoma of lung    Stage: Stage IV (bilateral pulmonary nodules, R supraclavic lad, R mainstem bronchus obstruction, LLL mass)    Pathology:  - EGD 10/19/16: 1. Gastric biopsy: Normal gastric mucosa. No Helicobacter identified on routine stain. 2. Irregular Z line, biopsy: Squamoglandular mucosa with mild chronic inflammation and intestinal metaplasia (Palma's esophagus). Negative for dysplasia.    - Bronchoscopy with biopsy of R mainstem bronchus mass 10/21/16: Non-small cell carcinoma, see note.  Immunohistochemical staining with satisfactory control material shows the tumor to be positive for p63 and CK 5/6 and negative for TTF-1 and CK 7. This immonophenotype is consistent with squamous cell carcinoma. PD-L1 expression 70%.    Prior Treatment:   1. Palliative radiation to the R mainstem bronchus  2. Carbo (AUC 2) -Taxol 45mg/m2 weekly x 6-7 weeks discontinued early 1/2017 due to disease progression  3. Palliative radiation to R supraclavicular LAD given pain and discomfort with swallowing, 5 fractions will be completed 1/22/17    Current Treatment: Pembrolizumab started 1/2017    History of Present Illness:  Frederick J Lejeune is a 66 y.o. male with GERD p/w 1 month history of chest pain, dysphagia, odynophagia found to have esophageal narrowing and RLL lung mass. Patient states he has lost over 20-30 lbs in the past 2 months. He denies n/v/d, abdominal pain, but states he has had difficulty eating due to odynophagia for the past month. He also states he has L groin pain due to a hernia, although he states an ultrasound of this region in the past did not show a hernia. Patient underwent EGD 10/19, which was notable for extrinsic compression in middle third of the esophagus as well as findings suggestive of Palma's esophagus, which were biopsied. 10/20, patient underwent EUS notable for lymphadenopathy in the paratracheal,  mediastinal, and subcarinal region. FNA performed. Patient underwent bronchoscopy on 10/21 which was notable for a mass in the right mainstem bronchus, biopsy revealed lung cancer, squamous cell carcinoma. Patient was also found to have Pseudomonas PNA and has completed treatment with Ciprofloxacin. Patient also completed palliative radiation to relieve right mainstem bronchus obstruction.     Today, patient comes in for management of lung cancer. He recently progressed on Carbo-Taxol and started on 2nd line treatment with Pembrolizumab (Keytruda). He also received palliative radiation to his R supraclavicular region due to bulkly LAD and associated pain. He continues to have mild problems with swallowing some food. The left groin mass does not cause pain any longer. No fevers, chills, cough. No dizziness.     ROS:  Constitutional: Negative for fever, chills, malaise/fatigue and diaphoresis.  HENT: Negative for hearing loss, ear pain, nosebleeds, congestion, sore throat, neck pain, tinnitus and ear discharge.    Eyes: Negative for blurred vision, double vision, photophobia, pain, discharge and redness.    Respiratory: Negative for hemoptysis, sputum production, shortness of breath, wheezing and stridor.  Cardiovascular: Negative for CP, palpitations, orthopnea, claudication, leg swelling and PND.   Gastrointestinal: Negative for heartburn, vomiting, abdominal pain, diarrhea, constipation, blood in stool and melena.   Genitourinary: Negative for dysuria, urgency, frequency, hematuria and flank pain.    Musculoskeletal: Negative for myalgias, back pain, joint pain and falls.    Skin: Negative for itching and rash.    Neurological: Negative for tingling, tremors, sensory change, speech change, focal weakness, seizures, loss of consciousness, weakness and headaches.    Endo/Heme/Allergies: Negative for environmental allergies and polydipsia. Does not bruise/bleed easily.    Psychiatric/Behavioral: Negative for  depression, suicidal ideas, hallucinations, memory loss and substance abuse. The patient is not nervous/anxious and does not have insomnia.    All 14 systems reviewed and negative except as noted above.      Past Medical History:  COPD, severe  GERD  Lumbar vertebral fracture s/p surgery in 1/2016 / Chronic low back pain  H/o intracranial hemorrhage    Social History:  Social History     Social History    Marital status:      Spouse name: ganesh    Number of children: 2    Years of education: N/A     Occupational History     [employer]     Social History Main Topics    Smoking status: Former Smoker     Packs/day: 1.00     Years: 50.00     Types: Cigarettes     Quit date: 5/10/2016    Smokeless tobacco: Former User     Types: Snuff     Quit date: 5/10/2016    Alcohol use No    Drug use: No    Sexual activity: Yes     Partners: Female     Other Topics Concern    None     Social History Narrative       Family History: family history includes Cancer in his father; Emphysema in his mother.    Physical Exam:  Vitals:    02/01/17 0846   BP: 102/62   Pulse: 88   Resp: 18   Temp: 97.2 °F (36.2 °C)     Body mass index is 21.21 kg/(m^2).  General:  AAOx4, no acute distress, mildly cachectic  HEENT: EOMI. Normocephalic and atraumatic. No maxillary sinus tenderness. External auditory canals clear and TMs intact without lesions. Nasal and oral mucosal membranes moist. Normal dentition and gums.   Neck: no LAD, thyromegaly, normal ROM  Pulmonary: Bilaterally clear to auscultation, Normal effort with no accessory muscle use, no wheezes/rales/rhonchi  CV: Normal rate, regular rhythm, no murmurs/rubs/gallops, no edema  ABD:  Soft, nontender, nondistended, no mass, and without hepatosplenomegaly   Ext: No clubbing, cyanosis, or edema, normal ROM  Skin: No rashes, lesions, bruising or petechiae  Neurological: No focal deficits, CN II to XII grossly intact, normal coordination    Psychiatric:  Normal mood, affect  and judgement  Hem/Lymph:  Left groin with 5x6 cm enlarged LN, TTP. Also R supraclavicular LAD present, now less firm, it is TTP. No submandibular, cervical, axillary LAD.    Labs:    Lab Results   Component Value Date    WBC 7.03 02/01/2017    HGB 10.2 (L) 02/01/2017    HCT 30.3 (L) 02/01/2017     (H) 02/01/2017     02/01/2017     Lab Results   Component Value Date     02/01/2017    K 4.2 02/01/2017     02/01/2017    CO2 26 02/01/2017    BUN 11 02/01/2017    CREATININE 0.8 02/01/2017    CALCIUM 9.4 02/01/2017    ANIONGAP 10 02/01/2017    ESTGFRAFRICA >60 02/01/2017    EGFRNONAA >60 02/01/2017     Lab Results   Component Value Date    ALT 9 (L) 02/01/2017    AST 10 02/01/2017    ALKPHOS 93 02/01/2017    BILITOT 0.2 02/01/2017       No results found for: IRON, TIBC, FERRITIN, SATURATEDIRO  Lab Results   Component Value Date    QLXLRPRO83 256 12/12/2016     No results found for: FOLATE  Lab Results   Component Value Date    TSH 0.236 (L) 09/28/2016       Imaging:  Chest CT 10/19/16:   7.2 cm in diameter necrotic right lower lobe mass with evidence of mediastinal and subcarinal adenopathy.   Subcarinal lymph node which is necrotic measuring up to 3.6 cm in diameter.  Extensive emphysematous changes.  Paratracheal adenopathy.  Bilateral supraclavicular lymphadenopathy larger on the right measuring up to 2.5 cm in diameter.  Diffuse mural thickening of the esophagus cannot exclude infiltration of the mass into the esophagus.  Diffuse pulmonary fibrotic disease.          Procedures:     EGD 10/19/16:    Extrinsic compression in the middle third of the esophagus. Z-line irregular, 35 cm from the incisors. Esophageal mucosal changes suspicious for short-segment Palma's esophagus. Biopsied. Medium-sized hiatus hernia. Normal stomach. Normal examined duodenum. Several biopsies were obtained in the gastric antrum.     EUS 10/20/16:    - A few lymph nodes were visualized and measured in the left  lower paratracheal region (level 4L), anterior mediastinum (level 6) and subcarinal mediastinum (level 7). Fine needle aspiration performed.     Bronchoscopy 10/21/16:  - Right lower lobe mass. The left lung was normal. A bloody and circumferential mass was found in the right mainstem bronchus. This lesion is malignant. Brushings were obtained. An endobronchial biopsy was performed. Washings were obtained. Lung cancer was identified in the right mainstem bronchus.    PET 10/31/16:  1. Large necrotic right lower lobe mass with multiple presumed metastatic bilateral pulmonary nodules along with extensive mediastinal bilateral hilar, right supraclavicular adenopathy and bilateral lower neck adenopathy.  Metastatic lesions involving the left humeral head and left inguinal canal as well.  2.  Remaining findings as discussed above.     Brain MRI 11/16/16:  No acute intracranial disease identified.  No evidence of intracranial metastases.    PET 1/11/17:  1.  Interval improvement in bibasilar posterior medial lung masses and decrease in FDG avid mediastinal lymphadenopathy consistent with positive therapy response to radiation therapy.  2.  Interval stability or increase in size and FDG avidity of bilateral peripheral lung nodules and worsening bilateral supraclavicular lymphadenopathy consistent with disease progression.  3.  Mild enlargement of FDG avid lytic intraosseous lesion in left humeral head.  4.  Neutral developed small FDG avid gastrohepatic lymph node.    Assessment / Plan:  Frederick J Lejeune is a 66 y.o. male who was diagnosed with lung cancer.     66-year-old gentleman admitted with right lower lobe lung mass and extrinsic compression of esophagus likely from metastatic disease. Status post EGD/EUS with biopsy and bronchoscopy with biopsy and awaiting pathology results. F/u with Dr Espinosa as outpatient to review pathology and treatment this week. Patient verbalizes understanding of plan to follow up in  clinic to discuss pathology and treatment.     1. Non-small cell lung cancer, right: Bronchoscopy on 10/21 identified a circumferential mass in the right mainstem bronchus. PET demonstrated bilateral pulmonary nodules and adenopathy. Patient completed palliative radiation to the R mainstem bronchus. He has missed several follow up appointments with us, and eventually started systemic chemotherapy after radiation was complete. PDL1 testing - high expression 70%.  -- Discontinued Carbo-Taxol given mild disease progression including new PET avidity at gastrohepatic LN.  -- Will set patient up for IVF on Friday per his request.   -- Return on 2/7/17 for cycle 2, day 1 of Pembrolizumab, which will be given every 3 weeks.     2. Bone metastases: 2/2 lung cancer. No pain in left humeral head currently.     3. Palma's esophagus: Diagnosed on EGD 10/19/16. Repeat EGD recommended in 3 yrs, due approx 19/2019.    4. Neoplasm-related pain: Pt takes Norco prn for chronic back pain and he  sees Dr. Mayen for pain management. He has a pain contract with him. However, patient now has new neoplasm related pain due to large left inguinal LAD. I discussed with patient that he needs to discuss with pain management in order to not violate his contract, but I suggest Oxycodone 5-10mg twice a day prn for the left inguinal pain.   -- Norco prn for his chronic back pain and Oxycodone 5mg twice daily prn for left inguinal neoplasm pain. Pt to discuss further with his pain management doctor.     5 Chemotherapy induced anemia: No transfusions needed and should improve while on Pembrolizumab    6. Severe COPD: Using home O2 and nebulizers.    7. Anxiety/insomnia: Xanax 0.5 prn. He will also try Ambien at times instead of Xanax to see if he has a response.      Zoila Espinosa M.D.  Hematology Oncology

## 2017-02-02 ENCOUNTER — INFUSION (OUTPATIENT)
Dept: INFUSION THERAPY | Facility: HOSPITAL | Age: 67
End: 2017-02-02
Attending: INTERNAL MEDICINE
Payer: MEDICARE

## 2017-02-02 VITALS
HEART RATE: 95 BPM | RESPIRATION RATE: 18 BRPM | SYSTOLIC BLOOD PRESSURE: 107 MMHG | TEMPERATURE: 98 F | DIASTOLIC BLOOD PRESSURE: 70 MMHG | OXYGEN SATURATION: 99 %

## 2017-02-02 DIAGNOSIS — C79.9 METASTATIC CANCER: ICD-10-CM

## 2017-02-02 DIAGNOSIS — C34.01 LUNG CANCER, MAIN BRONCHUS, RIGHT: ICD-10-CM

## 2017-02-02 DIAGNOSIS — E86.0 DEHYDRATION, MILD: Primary | ICD-10-CM

## 2017-02-02 PROCEDURE — 96361 HYDRATE IV INFUSION ADD-ON: CPT

## 2017-02-02 PROCEDURE — 25000003 PHARM REV CODE 250: Performed by: INTERNAL MEDICINE

## 2017-02-02 PROCEDURE — 96360 HYDRATION IV INFUSION INIT: CPT

## 2017-02-02 RX ORDER — SODIUM CHLORIDE 0.9 % (FLUSH) 0.9 %
10 SYRINGE (ML) INJECTION
Status: CANCELLED | OUTPATIENT
Start: 2017-02-02

## 2017-02-02 RX ORDER — HEPARIN 100 UNIT/ML
500 SYRINGE INTRAVENOUS
Status: CANCELLED | OUTPATIENT
Start: 2017-02-02

## 2017-02-02 RX ORDER — HEPARIN 100 UNIT/ML
500 SYRINGE INTRAVENOUS
Status: COMPLETED | OUTPATIENT
Start: 2017-02-02 | End: 2017-02-02

## 2017-02-02 RX ADMIN — HEPARIN 500 UNITS: 100 SYRINGE at 10:02

## 2017-02-02 RX ADMIN — SODIUM CHLORIDE: 0.9 INJECTION, SOLUTION INTRAVENOUS at 08:02

## 2017-02-02 NOTE — PLAN OF CARE
Problem: Patient Care Overview  Goal: Plan of Care Review  Outcome: Ongoing (interventions implemented as appropriate)  i feel really good, she just wants me to have some fluids so i keep feeling good

## 2017-02-02 NOTE — PATIENT INSTRUCTIONS
[facility]  [address]  [address] [address]  [phone] phone     [phone] fax  Hours of Operation: Monday- Friday 8:00am- 5:00pm  After hours phone  [phone]  Hematology / Oncology Physicians on call      Dr. Madi Espinosa    Please call with any concerns regarding your appointment today.

## 2017-02-02 NOTE — MR AVS SNAPSHOT
Patient Information     Patient Name Sex DOB Lejeune, Frederick J Male 1950      Visit Information        Provider Department Dept Phone Center    2017 8:30 AM Bill Abdi [facility] Chem[facility] On [facility]fusion [phone] [place]      Patient Instructions      [facility]  [address]  [address]  [phone] phone     [phone] fax  Hours of Operation: Monday- Friday 8:00am- 5:00pm  After hours phone  [phone]  Hematology / Oncology Physicians on call      Dr. Madi Espinosa    Please call with any concerns regarding your appointment today.       Your Current Medications Are     albuterol-ipratropium 2.5mg-0.5mg/3mL (DUO-NEB) 0.5 mg-3 mg(2.5 mg base)/3 mL nebulizer solution    alprazolam (XANAX) 0.25 MG tablet    atorvastatin (LIPITOR) 40 MG tablet    benzonatate (TESSALON) 100 MG capsule    docusate sodium (COLACE) 100 MG capsule    esomeprazole (NEXIUM) 40 MG capsule    fish oil-omega-3 fatty acids 300-1,000 mg capsule    hydrocodone-acetaminophen 5-325mg (NORCO) 5-325 mg per tablet    meclizine (ANTIVERT) 25 mg tablet    metoprolol tartrate (LOPRESSOR) 25 MG tablet    ondansetron (ZOFRAN-ODT) 4 MG TbDL    oxycodone (ROXICODONE) 5 MG immediate release tablet    oxycodone-acetaminophen (PERCOCET)  mg per tablet    OXYGEN-AIR DELIVERY SYSTEMS MISC    polyethylene glycol (GLYCOLAX) 17 gram PwPk    prochlorperazine (COMPAZINE) 10 MG tablet      Facility-Administered Medications     sodium chloride 0.9% 1,000 mL infusion      Appointments for Next Year     2017  8:10 AM NON FASTING LAB (10 min.) [facility], [place] [facility]    Arrive at check-in approximately 15 minutes before your scheduled appointment time. Bring all outside medical records and imaging, along with a list of your current medications and insurance card.    2017  8:40 AM ESTABLISHED PATIENT (20  min.) [place] - Hematology Oncology Adam Barraza MD    Arrive at check-in approximately 15 minutes before your scheduled appointment time. Bring all outside medical records and imaging, along with a list of your current medications and insurance card.    (off [place]) 1st Floor Appointments with Jaky Blakely - 2nd Floor    2/7/2017  9:00 AM INFUSION 060 MIN (60 min.) [facility]    Arrive at check-in approximately 15 minutes before your scheduled appointment time. Bring all outside medical records and imaging, along with a list of your current medications and insurance card.    (off [place]) 1st floor    5/2/2017  9:30 AM DIAGNOSTIC XRAY (15 min.) [facility] ON XR1-DR    Arrive at check-in approximately 15 minutes before your scheduled appointment time. Bring all outside medical records and imaging, along with a list of your current medications and insurance card.    (off [place]) 1st floor    5/2/2017 10:00 AM ESTABLISHED PATIENT (20 min.) [place] - Pulmonary Services Zack Taylor MD    Arrive at check-in approximately 15 minutes before your scheduled appointment time. Bring all outside medical records and imaging, along with a list of your current medications and insurance card.    (off [place]) 2nd floor         Default Flowsheet Data (last 24 hours)      Amb Complex Vitals Sebastian        02/02/17 0826                Measurements    /70        Temp 97.9 °F (36.6 °C)        Pulse 95        Resp 18        SpO2 99 %   nasal o2 flowing at 3 liters/ min        Pain Assessment    Pain Score Zero                Allergies     Ambien [Zolpidem] Other (See Comments)    Makes me extremely hyped up like im going crazy.    Zoloft [Sertraline] Other (See Comments)    unknown      Medications You Received from 02/01/2017 1029 to 02/02/2017 1029        Date/Time Order Dose Route Action     02/02/2017 0839 sodium chloride 0.9% 1,000 mL infusion   Intravenous New Bag       Current Discharge Medication List     Cannot display discharge medications since this is not an admission.

## 2017-02-03 DIAGNOSIS — G89.3 CHRONIC NEOPLASM-RELATED PAIN: ICD-10-CM

## 2017-02-03 RX ORDER — OXYCODONE HYDROCHLORIDE 5 MG/1
5 TABLET ORAL EVERY 8 HOURS PRN
Qty: 60 TABLET | Refills: 0 | Status: ON HOLD | OUTPATIENT
Start: 2017-02-03 | End: 2017-02-28 | Stop reason: HOSPADM

## 2017-02-03 NOTE — TELEPHONE ENCOUNTER
----- Message from Yolette Ridley sent at 2/3/2017  9:37 AM CST -----  Patient needs a refill on Oxycodone HCL 10 mgs.  He would like to pick it up today because he is out.   Call him at [phone].                                                         [facility]

## 2017-02-07 ENCOUNTER — OFFICE VISIT (OUTPATIENT)
Dept: HEMATOLOGY/ONCOLOGY | Facility: CLINIC | Age: 67
End: 2017-02-07
Payer: MEDICARE

## 2017-02-07 ENCOUNTER — INFUSION (OUTPATIENT)
Dept: INFUSION THERAPY | Facility: HOSPITAL | Age: 67
End: 2017-02-07
Attending: INTERNAL MEDICINE
Payer: MEDICARE

## 2017-02-07 VITALS
SYSTOLIC BLOOD PRESSURE: 102 MMHG | RESPIRATION RATE: 18 BRPM | HEIGHT: 62 IN | DIASTOLIC BLOOD PRESSURE: 60 MMHG | OXYGEN SATURATION: 98 % | TEMPERATURE: 98 F | HEART RATE: 96 BPM | BODY MASS INDEX: 21.1 KG/M2 | WEIGHT: 114.63 LBS

## 2017-02-07 DIAGNOSIS — C79.51 BONE METASTASES: Primary | ICD-10-CM

## 2017-02-07 DIAGNOSIS — R64 CACHEXIA: ICD-10-CM

## 2017-02-07 DIAGNOSIS — K22.2 ESOPHAGEAL STRICTURE: ICD-10-CM

## 2017-02-07 DIAGNOSIS — C34.01 MALIGNANT NEOPLASM OF HILUS OF RIGHT LUNG: Primary | ICD-10-CM

## 2017-02-07 DIAGNOSIS — C34.01 LUNG CANCER, MAIN BRONCHUS, RIGHT: ICD-10-CM

## 2017-02-07 DIAGNOSIS — C34.90 LUNG CANCER: ICD-10-CM

## 2017-02-07 DIAGNOSIS — E03.9 ACQUIRED HYPOTHYROIDISM: ICD-10-CM

## 2017-02-07 DIAGNOSIS — C79.51 BONE METASTASES: ICD-10-CM

## 2017-02-07 PROCEDURE — 96413 CHEMO IV INFUSION 1 HR: CPT

## 2017-02-07 PROCEDURE — 63600175 PHARM REV CODE 636 W HCPCS: Performed by: INTERNAL MEDICINE

## 2017-02-07 PROCEDURE — 25000003 PHARM REV CODE 250: Performed by: INTERNAL MEDICINE

## 2017-02-07 PROCEDURE — 99999 PR PBB SHADOW E&M-EST. PATIENT-LVL III: CPT | Mod: PBBFAC,,, | Performed by: INTERNAL MEDICINE

## 2017-02-07 PROCEDURE — 99215 OFFICE O/P EST HI 40 MIN: CPT | Mod: 25,S$PBB,, | Performed by: INTERNAL MEDICINE

## 2017-02-07 RX ORDER — HEPARIN 100 UNIT/ML
500 SYRINGE INTRAVENOUS
Status: DISCONTINUED | OUTPATIENT
Start: 2017-02-07 | End: 2017-02-07 | Stop reason: HOSPADM

## 2017-02-07 RX ADMIN — SODIUM CHLORIDE 200 MG: 9 INJECTION, SOLUTION INTRAVENOUS at 10:02

## 2017-02-07 RX ADMIN — HEPARIN 500 UNITS: 100 SYRINGE at 10:02

## 2017-02-07 NOTE — PATIENT INSTRUCTIONS
[facility]  [address]  [address] [address]  [phone] phone     [phone] fax  Hours of Operation: Monday- Friday 8:00am- 5:00pm  After hours phone  [phone]  Hematology / Oncology Physicians on call      Dr. Madi Espinosa    Please call with any concerns regarding your appointment today.FALL PREVENTION   Falls often occur due to slipping, tripping or losing your balance. Here are ways to reduce your risk of falling again.   Was there anything that caused your fall that can be fixed, removed or replaced?   Make your home safe by keeping walkways clear of objects you may trip over.   Use non-slip pads under rugs.   Do not walk in poorly lit areas.   Do not stand on chairs or wobbly ladders.   Use caution when reaching overhead or looking upward. This position can cause a loss of balance.   Be sure your shoes fit properly, have non-slip bottoms and are in good condition.   Be cautious when going up and down stairs, curbs, and when walking on uneven sidewalks.   If your balance is poor, consider using a cane or walker.   If your fall was related to alcohol use, stop or limit alcohol intake.   If your fall was related to use of sleeping medicines, talk to your doctor about this. You may need to reduce your dosage at bedtime if you awaken during the night to go to the bathroom.   To reduce the need for nighttime bathroom trips:   Avoid drinking fluids for several hours before going to bed   Empty your bladder before going to bed   Men can keep a urinal at the bedside   © 2712-8824 Alban Calvillo, [address] [place] [address], [place] [address]. All rights reserved. This information is not intended as a substitute for professional medical care. Always follow your healthcare professional's instructions.  HOME CARE AFTER CHEMOTHERAPY   Meals   Many patients feel sick and lose their appetites during treatment. Eat small meals several  times a day. Choose bland foods with little taste or smell if you have problems with nausea. Be sure to cook all food thoroughly. This kills bacteria and helps you avoid intestinal infection. Soft foods are easier to swallow and digest.   Activity   Exercise keeps you strong and keeps your heart and lungs active. Talk to your doctor about an appropriate exercise program for you.   Skin Care   To prevent a skin infection, bathe or shower once a day. Use a moisturizing soap and wash with warm water. Avoid very hot or cold water. Chemotherapy can make your skin dry . Apply moisturizing lotion to help relieve dry skin. Some drugs used in high doses can cause slight burns to appear (like sunburn). Ask for a special cream to help relieve the burn and protect your skin.   Prevent Mouth Sores   During chemotherapy, many people get mouth sores. Do the following to help prevent mouth sores or to ease discomfort.   Brush your teeth with a soft-bristle toothbrush after every meal.  Don't use dental floss if your platelet count is below 50,000. Your doctor or nurse will tell you if this is the case.  Use an oral swab or special soft toothbrush if your gums bleed during regular brushing.  Use mouthwash as directed. If you can't tolerate commercial mouthwash, use salt and baking soda to clean your mouth. Mix 1 teaspoon of salt and 1 teaspoon of baking soda into a glass of water. Swish and spit.  Call your doctor or return to this facility if you develop any of the following:   Sore throat   White patches in the mouth or throat   Fever of 100.4ºF (38ºC) or higher, or as directed by your healthcare provider  © 7241-6038 Alban Calvillo, [address] [place] [address], [place] [address]. All rights reserved. This information is not intended as a substitute for professional medical care. Always follow your healthcare professional's   WAYS TO HELP PREVENT INFECTION         WASH YOUR HANDS OFTEN DURING THE DAY, ESPECIALLY BEFORE YOU EAT, AFTER  USING THE BATHROOM, AND AFTER TOUCHING ANIMALS     STAY AWAY FROM PEOPLE WHO HAVE ILLNESSES YOU CAN CATCH; SUCH AS COLDS, FLU, CHICKEN POX     TRY TO AVOID CROWDS     STAY AWAY FROM CHILDREN WHO RECENTLY HAVE RECEIVED LIVE VIRUS VACCINES     MAINTAIN GOOD MOUTH CARE     DO NOT SQUEEZE OR SCRATCH PIMPLES     CLEAN CUTS & SCRAPES RIGHT AWAY AND DAILY UNTIL HEALED WITH WARM WATER, SOAP & AN ANTISEPTIC     AVOID CONTACT WITH LITTER BOXES, BIRD CAGES, & FISH TANKS     AVOID STANDING WATER, IE., BIRD BATHS, FLOWER POTS/VASES, OR HUMIDIFIERS     WEAR GLOVES WHEN GARDENING OR CLEANING UP AFTER OTHERS, ESPECIALLY BABIES & SMALL CHILDREN     DO NOT EAT RAW FISH, SEAFOOD, MEAT, OR EGGS

## 2017-02-07 NOTE — MR AVS SNAPSHOT
Patient Information     Patient Name Sex DOB Lejeune, Frederick J Male 1950      Visit Information        Provider Department Dept Phone Center    2017 9:00 AM Bill Abdi I Chemo Infusion On [facility]fusion [phone] [place]      Patient Instructions      [facility]  [address]  [address] [address]  [phone] phone     [phone] fax  Hours of Operation: Monday- Friday 8:00am- 5:00pm  After hours phone  [phone]  Hematology / Oncology Physicians on call      Dr. Madi Espinosa    Please call with any concerns regarding your appointment today.FALL PREVENTION   Falls often occur due to slipping, tripping or losing your balance. Here are ways to reduce your risk of falling again.   Was there anything that caused your fall that can be fixed, removed or replaced?   Make your home safe by keeping walkways clear of objects you may trip over.   Use non-slip pads under rugs.   Do not walk in poorly lit areas.   Do not stand on chairs or wobbly ladders.   Use caution when reaching overhead or looking upward. This position can cause a loss of balance.   Be sure your shoes fit properly, have non-slip bottoms and are in good condition.   Be cautious when going up and down stairs, curbs, and when walking on uneven sidewalks.   If your balance is poor, consider using a cane or walker.   If your fall was related to alcohol use, stop or limit alcohol intake.   If your fall was related to use of sleeping medicines, talk to your doctor about this. You may need to reduce your dosage at bedtime if you awaken during the night to go to the bathroom.   To reduce the need for nighttime bathroom trips:   Avoid drinking fluids for several hours before going to bed   Empty your bladder before going to bed   Men can keep a urinal at the bedside   © 7395-0437 Alban Calvillo, [address] [place] [address], [place] [address]. All rights  reserved. This information is not intended as a substitute for professional medical care. Always follow your healthcare professional's instructions.  HOME CARE AFTER CHEMOTHERAPY   Meals   Many patients feel sick and lose their appetites during treatment. Eat small meals several times a day. Choose bland foods with little taste or smell if you have problems with nausea. Be sure to cook all food thoroughly. This kills bacteria and helps you avoid intestinal infection. Soft foods are easier to swallow and digest.   Activity   Exercise keeps you strong and keeps your heart and lungs active. Talk to your doctor about an appropriate exercise program for you.   Skin Care   To prevent a skin infection, bathe or shower once a day. Use a moisturizing soap and wash with warm water. Avoid very hot or cold water. Chemotherapy can make your skin dry . Apply moisturizing lotion to help relieve dry skin. Some drugs used in high doses can cause slight burns to appear (like sunburn). Ask for a special cream to help relieve the burn and protect your skin.   Prevent Mouth Sores   During chemotherapy, many people get mouth sores. Do the following to help prevent mouth sores or to ease discomfort.   Brush your teeth with a soft-bristle toothbrush after every meal.  Don't use dental floss if your platelet count is below 50,000. Your doctor or nurse will tell you if this is the case.  Use an oral swab or special soft toothbrush if your gums bleed during regular brushing.  Use mouthwash as directed. If you can't tolerate commercial mouthwash, use salt and baking soda to clean your mouth. Mix 1 teaspoon of salt and 1 teaspoon of baking soda into a glass of water. Swish and spit.  Call your doctor or return to this facility if you develop any of the following:   Sore throat   White patches in the mouth or throat   Fever of 100.4ºF (38ºC) or higher, or as directed by your healthcare provider  © 9014-4926 Alban Calvillo, [address] [place] [address]  Road, [place] [address]. All rights reserved. This information is not intended as a substitute for professional medical care. Always follow your healthcare professional's   WAYS TO HELP PREVENT INFECTION         WASH YOUR HANDS OFTEN DURING THE DAY, ESPECIALLY BEFORE YOU EAT, AFTER USING THE BATHROOM, AND AFTER TOUCHING ANIMALS     STAY AWAY FROM PEOPLE WHO HAVE ILLNESSES YOU CAN CATCH; SUCH AS COLDS, FLU, CHICKEN POX     TRY TO AVOID CROWDS     STAY AWAY FROM CHILDREN WHO RECENTLY HAVE RECEIVED LIVE VIRUS VACCINES     MAINTAIN GOOD MOUTH CARE     DO NOT SQUEEZE OR SCRATCH PIMPLES     CLEAN CUTS & SCRAPES RIGHT AWAY AND DAILY UNTIL HEALED WITH WARM WATER, SOAP & AN ANTISEPTIC     AVOID CONTACT WITH LITTER BOXES, BIRD CAGES, & FISH TANKS     AVOID STANDING WATER, IE., BIRD BATHS, FLOWER POTS/VASES, OR HUMIDIFIERS     WEAR GLOVES WHEN GARDENING OR CLEANING UP AFTER OTHERS, ESPECIALLY BABIES & SMALL CHILDREN     DO NOT EAT RAW FISH, SEAFOOD, MEAT, OR EGGS       Your Current Medications Are     albuterol-ipratropium 2.5mg-0.5mg/3mL (DUO-NEB) 0.5 mg-3 mg(2.5 mg base)/3 mL nebulizer solution    alprazolam (XANAX) 0.25 MG tablet    atorvastatin (LIPITOR) 40 MG tablet    benzonatate (TESSALON) 100 MG capsule    docusate sodium (COLACE) 100 MG capsule    esomeprazole (NEXIUM) 40 MG capsule    fish oil-omega-3 fatty acids 300-1,000 mg capsule    hydrocodone-acetaminophen 5-325mg (NORCO) 5-325 mg per tablet    meclizine (ANTIVERT) 25 mg tablet    metoprolol tartrate (LOPRESSOR) 25 MG tablet    ondansetron (ZOFRAN-ODT) 4 MG TbDL    oxycodone (ROXICODONE) 5 MG immediate release tablet    oxycodone-acetaminophen (PERCOCET)  mg per tablet    OXYGEN-AIR DELIVERY SYSTEMS MISC    polyethylene glycol (GLYCOLAX) 17 gram PwPk    prochlorperazine (COMPAZINE) 10 MG tablet      Facility-Administered Medications     heparin, porcine (PF) 100 unit/mL injection flush 500 Units    pembrolizumab (KEYTRUDA) 200 mg in sodium  chloride 0.9% 108 mL chemo infusion    sodium chloride 0.9% 100 mL flush bag      Appointments for Next Year     2/28/2017  8:00 AM NON FASTING LAB (10 min.) [facility], [facility]    Arrive at check-in approximately 15 minutes before your scheduled appointment time. Bring all outside medical records and imaging, along with a list of your current medications and insurance card.    2/28/2017  8:10 AM URINE (10 min.) [facility]    Arrive at check-in approximately 15 minutes before your scheduled appointment time. Bring all outside medical records and imaging, along with a list of your current medications and insurance card.    2/28/2017  8:20 AM ESTABLISHED PATIENT (20 min.) [facility] - Hematology Oncology Eren Willett Jr., MD    Arrive at check-in approximately 15 minutes before your scheduled appointment time. Bring all outside medical records and imaging, along with a list of your current medications and insurance card.    (off [place]) 1st Floor Appointments with Jaky Blakely - 2nd Floor    2/28/2017  8:45 AM INFUSION 060 MIN (60 min.) [facility] CHAIR 03 ONLH    Arrive at check-in approximately 15 minutes before your scheduled appointment time. Bring all outside medical records and imaging, along with a list of your current medications and insurance card.    (off [place]) 1st floor    5/2/2017  9:30 AM DIAGNOSTIC XRAY (15 min.) [facility] ON XR1-DR    Arrive at check-in approximately 15 minutes before your scheduled appointment time. Bring all outside medical records and imaging, along with a list of your current medications and insurance card.    (off [place]) 1st floor    5/2/2017 10:00 AM ESTABLISHED PATIENT (20 min.) [facility] - Pulmonary Services Zack Taylor MD    Arrive at check-in approximately 15 minutes before your scheduled appointment time. Bring all outside medical records and imaging, along  "with a list of your current medications and insurance card.    (off [place]) 2nd floor         Default Flowsheet Data (last 24 hours)      Amb Complex Vitals Sebastian        02/07/17 0834 02/07/17 0819             Measurements    Weight  52 kg (114 lb 10.2 oz)       Height  5' 2" (1.575 m)       BSA (Calculated - sq m)  1.51 sq meters       BMI (Calculated)  21       BP  102/60       Temp  98.2 °F (36.8 °C)       Pulse  96       Resp  18       SpO2  98 %       Pain Assessment    Pain Score Zero Zero               Allergies     Ambien [Zolpidem] Other (See Comments)    Makes me extremely hyped up like im going crazy.    Zoloft [Sertraline] Other (See Comments)    unknown      Medications You Received from 02/06/2017 1027 to 02/07/2017 1027        Date/Time Order Dose Route Action     02/07/2017 1002 pembrolizumab (KEYTRUDA) 200 mg in sodium chloride 0.9% 108 mL chemo infusion 200 mg Intravenous New Bag      Current Discharge Medication List     Cannot display discharge medications since this is not an admission.      "

## 2017-02-07 NOTE — PROGRESS NOTES
Subjective:       Patient ID: Frederick J Lejeune is a 66 y.o. male.    Chief Complaint: Follow-up; Lung Cancer; Results (labs); and Chemotherapy    HPI 66-year-old male history of metastatic non-small cell lung carcinoma PD1 positive greater than 70% cycle 2 of Keytruda.  Tolerating therapy well denies nausea vomiting fever chills ECOG status to give it does still have persistent problems with swallowing    Past Medical History   Diagnosis Date    Cancer      lung    COPD (chronic obstructive pulmonary disease)     GERD (gastroesophageal reflux disease)     Lumbar vertebral fracture      Family History   Problem Relation Age of Onset    Emphysema Mother     Cancer Father      mesothelioma     Social History     Social History    Marital status:      Spouse name: ganesh    Number of children: 2    Years of education: N/A     Occupational History     [employer]     Social History Main Topics    Smoking status: Former Smoker     Packs/day: 1.00     Years: 50.00     Types: Cigarettes     Quit date: 5/10/2016    Smokeless tobacco: Former User     Types: Snuff     Quit date: 5/10/2016    Alcohol use No    Drug use: No    Sexual activity: Yes     Partners: Female     Other Topics Concern    Not on file     Social History Narrative     Past Surgical History   Procedure Laterality Date    Appendectomy      Abscess drainage      Hand surgery Left     Back surgery         Labs:  Lab Results   Component Value Date    WBC 7.40 02/07/2017    HGB 10.8 (L) 02/07/2017    HCT 32.6 (L) 02/07/2017     (H) 02/07/2017     02/07/2017     BMP  Lab Results   Component Value Date     02/01/2017    K 4.2 02/01/2017     02/01/2017    CO2 26 02/01/2017    BUN 11 02/01/2017    CREATININE 0.8 02/01/2017    CALCIUM 9.4 02/01/2017    ANIONGAP 10 02/01/2017    ESTGFRAFRICA >60 02/01/2017    EGFRNONAA >60 02/01/2017     Lab Results   Component Value Date    ALT 9 (L) 02/01/2017    AST 10  02/01/2017    ALKPHOS 93 02/01/2017    BILITOT 0.2 02/01/2017       No results found for: IRON, TIBC, FERRITIN, SATURATEDIRO  Lab Results   Component Value Date    [id number] 256 12/12/2016     No results found for: FOLATE  Lab Results   Component Value Date    TSH 0.236 (L) 09/28/2016         Review of Systems   Constitutional: Positive for activity change, appetite change, fatigue and unexpected weight change. Negative for chills, diaphoresis and fever.   HENT: Negative for congestion, dental problem, drooling, ear discharge, ear pain, facial swelling, hearing loss, mouth sores, nosebleeds, postnasal drip, rhinorrhea, sinus pressure, sneezing, sore throat, tinnitus, trouble swallowing and voice change.    Eyes: Negative for photophobia, pain, discharge, redness, itching and visual disturbance.   Respiratory: Negative for apnea, cough, choking, chest tightness, shortness of breath, wheezing and stridor.    Cardiovascular: Negative for chest pain, palpitations and leg swelling.   Gastrointestinal: Negative for abdominal distention, abdominal pain, anal bleeding, blood in stool, constipation, diarrhea, nausea, rectal pain and vomiting.   Endocrine: Negative for cold intolerance, heat intolerance, polydipsia, polyphagia and polyuria.   Genitourinary: Negative for decreased urine volume, difficulty urinating, discharge, dysuria, enuresis, flank pain, frequency, genital sores, hematuria, penile pain, penile swelling, scrotal swelling, testicular pain and urgency.   Musculoskeletal: Negative for arthralgias, back pain, gait problem, joint swelling, myalgias, neck pain and neck stiffness.   Skin: Negative for color change, pallor, rash and wound.   Allergic/Immunologic: Negative for environmental allergies, food allergies and immunocompromised state.   Neurological: Positive for weakness. Negative for dizziness, tremors, seizures, syncope, facial asymmetry, speech difficulty, light-headedness, numbness and headaches.    Hematological: Negative for adenopathy. Does not bruise/bleed easily.   Psychiatric/Behavioral: Negative for agitation, behavioral problems, confusion, decreased concentration, dysphoric mood, hallucinations, self-injury, sleep disturbance and suicidal ideas. The patient is not nervous/anxious and is not hyperactive.        Objective:      Physical Exam   Constitutional: He is oriented to person, place, and time. He appears cachectic. No distress.   HENT:   Head: Normocephalic.   Right Ear: External ear normal.   Left Ear: External ear normal.   Nose: Nose normal. Right sinus exhibits no maxillary sinus tenderness and no frontal sinus tenderness. Left sinus exhibits no maxillary sinus tenderness and no frontal sinus tenderness.   Mouth/Throat: Oropharynx is clear and moist. No oropharyngeal exudate.   Eyes: EOM and lids are normal. Pupils are equal, round, and reactive to light. Right eye exhibits no discharge. Left eye exhibits no discharge. Right conjunctiva is not injected. Right conjunctiva has no hemorrhage. Left conjunctiva is not injected. Left conjunctiva has no hemorrhage. No scleral icterus. Right eye exhibits normal extraocular motion. Left eye exhibits normal extraocular motion.   Neck: Normal range of motion. Neck supple. No JVD present. No tracheal deviation present. No thyromegaly present.   Cardiovascular: Normal rate, regular rhythm and normal heart sounds.    Pulmonary/Chest: Effort normal and breath sounds normal. No stridor. No respiratory distress.   Abdominal: Soft. Bowel sounds are normal. He exhibits no mass. There is no hepatosplenomegaly, splenomegaly or hepatomegaly. There is no tenderness.   Musculoskeletal: Normal range of motion. He exhibits no edema or tenderness.   Lymphadenopathy:        Head (right side): No posterior auricular and no occipital adenopathy present.        Head (left side): No posterior auricular and no occipital adenopathy present.     He has no cervical  adenopathy.        Right cervical: No superficial cervical, no deep cervical and no posterior cervical adenopathy present.       Left cervical: No superficial cervical, no deep cervical and no posterior cervical adenopathy present.     He has no axillary adenopathy.        Right: No supraclavicular adenopathy present.        Left: No supraclavicular adenopathy present.   Neurological: He is alert and oriented to person, place, and time. He has normal strength. No cranial nerve deficit. Coordination normal.   Skin: Skin is dry. No rash noted. He is not diaphoretic. No cyanosis or erythema. Nails show no clubbing.   Psychiatric: He has a normal mood and affect. His behavior is normal. Judgment and thought content normal. Cognition and memory are normal.   Vitals reviewed.          Assessment:       1. Malignant neoplasm of hilus of right lung    2. Lung cancer, main bronchus, right    3. Bone metastases    4. Cachexia    5. Esophageal stricture            Plan:     reviewed information with him explaining the nature of immunomodulating drugs such as Keytruda need a longer period of time for response will proceed with treatment as outlined above; patient returns to return to clinic with CBC CMP TSH LDH and urinalysis in 3 weeks for next cycle of therapy

## 2017-02-07 NOTE — PLAN OF CARE
Problem: Patient Care Overview  Goal: Plan of Care Review  Outcome: Ongoing (interventions implemented as appropriate)  i feel pretty good today . i had a little nausea last night but im good

## 2017-02-08 ENCOUNTER — TELEPHONE (OUTPATIENT)
Dept: HEMATOLOGY/ONCOLOGY | Facility: CLINIC | Age: 67
End: 2017-02-08

## 2017-02-08 ENCOUNTER — HOSPITAL ENCOUNTER (EMERGENCY)
Facility: HOSPITAL | Age: 67
Discharge: HOME | End: 2017-02-08
Attending: EMERGENCY MEDICINE
Payer: MEDICARE

## 2017-02-08 VITALS
TEMPERATURE: 98 F | WEIGHT: 116 LBS | HEART RATE: 81 BPM | DIASTOLIC BLOOD PRESSURE: 88 MMHG | RESPIRATION RATE: 16 BRPM | OXYGEN SATURATION: 100 % | SYSTOLIC BLOOD PRESSURE: 137 MMHG | BODY MASS INDEX: 21.35 KG/M2 | HEIGHT: 62 IN

## 2017-02-08 DIAGNOSIS — R13.19 ESOPHAGEAL DYSPHAGIA: Primary | ICD-10-CM

## 2017-02-08 LAB
ALBUMIN SERPL BCP-MCNC: 3.3 G/DL
ALP SERPL-CCNC: 94 U/L
ALT SERPL W/O P-5'-P-CCNC: 8 U/L
ANION GAP SERPL CALC-SCNC: 9 MMOL/L
AST SERPL-CCNC: 12 U/L
BASOPHILS # BLD AUTO: 0.01 K/UL
BASOPHILS NFR BLD: 0.2 %
BILIRUB SERPL-MCNC: 0.2 MG/DL
BILIRUB UR QL STRIP: NEGATIVE
BUN SERPL-MCNC: 12 MG/DL
CALCIUM SERPL-MCNC: 9.7 MG/DL
CHLORIDE SERPL-SCNC: 106 MMOL/L
CLARITY UR: CLEAR
CO2 SERPL-SCNC: 26 MMOL/L
COLOR UR: YELLOW
CREAT SERPL-MCNC: 0.8 MG/DL
DIFFERENTIAL METHOD: ABNORMAL
EOSINOPHIL # BLD AUTO: 0.1 K/UL
EOSINOPHIL NFR BLD: 1.4 %
ERYTHROCYTE [DISTWIDTH] IN BLOOD BY AUTOMATED COUNT: 17.1 %
EST. GFR  (AFRICAN AMERICAN): >60 ML/MIN/1.73 M^2
EST. GFR  (NON AFRICAN AMERICAN): >60 ML/MIN/1.73 M^2
GLUCOSE SERPL-MCNC: 84 MG/DL
GLUCOSE UR QL STRIP: NEGATIVE
HCT VFR BLD AUTO: 32.1 %
HGB BLD-MCNC: 10.9 G/DL
HGB UR QL STRIP: NEGATIVE
KETONES UR QL STRIP: NEGATIVE
LEUKOCYTE ESTERASE UR QL STRIP: NEGATIVE
LYMPHOCYTES # BLD AUTO: 0.5 K/UL
LYMPHOCYTES NFR BLD: 7.8 %
MCH RBC QN AUTO: 35 PG
MCHC RBC AUTO-ENTMCNC: 34 %
MCV RBC AUTO: 103 FL
MONOCYTES # BLD AUTO: 0.8 K/UL
MONOCYTES NFR BLD: 11.7 %
NEUTROPHILS # BLD AUTO: 5.1 K/UL
NEUTROPHILS NFR BLD: 78.9 %
NITRITE UR QL STRIP: NEGATIVE
PH UR STRIP: 7 [PH] (ref 5–8)
PLATELET # BLD AUTO: 185 K/UL
PMV BLD AUTO: 9.6 FL
POTASSIUM SERPL-SCNC: 3.9 MMOL/L
PROT SERPL-MCNC: 7 G/DL
PROT UR QL STRIP: NEGATIVE
RBC # BLD AUTO: 3.11 M/UL
SODIUM SERPL-SCNC: 141 MMOL/L
SP GR UR STRIP: 1.01 (ref 1–1.03)
URN SPEC COLLECT METH UR: NORMAL
UROBILINOGEN UR STRIP-ACNC: NEGATIVE EU/DL
WBC # BLD AUTO: 6.4 K/UL

## 2017-02-08 PROCEDURE — 85025 COMPLETE CBC W/AUTO DIFF WBC: CPT

## 2017-02-08 PROCEDURE — 96374 THER/PROPH/DIAG INJ IV PUSH: CPT

## 2017-02-08 PROCEDURE — 99284 EMERGENCY DEPT VISIT MOD MDM: CPT | Mod: 25

## 2017-02-08 PROCEDURE — 80053 COMPREHEN METABOLIC PANEL: CPT

## 2017-02-08 PROCEDURE — 81003 URINALYSIS AUTO W/O SCOPE: CPT

## 2017-02-08 PROCEDURE — 25000003 PHARM REV CODE 250: Performed by: EMERGENCY MEDICINE

## 2017-02-08 PROCEDURE — 63600175 PHARM REV CODE 636 W HCPCS: Performed by: EMERGENCY MEDICINE

## 2017-02-08 RX ORDER — MORPHINE SULFATE 4 MG/ML
4 INJECTION, SOLUTION INTRAMUSCULAR; INTRAVENOUS
Status: COMPLETED | OUTPATIENT
Start: 2017-02-08 | End: 2017-02-08

## 2017-02-08 RX ORDER — ESOMEPRAZOLE MAGNESIUM 40 MG/1
40 CAPSULE, DELAYED RELEASE ORAL 2 TIMES DAILY
Qty: 60 CAPSULE | Refills: 0 | Status: SHIPPED | OUTPATIENT
Start: 2017-02-08

## 2017-02-08 RX ADMIN — MORPHINE SULFATE 4 MG: 4 INJECTION, SOLUTION INTRAMUSCULAR; INTRAVENOUS at 05:02

## 2017-02-08 RX ADMIN — DICYCLOMINE HYDROCHLORIDE 50 ML: 10 SOLUTION ORAL at 04:02

## 2017-02-08 NOTE — ED PROVIDER NOTES
"SCRIBE #1 NOTE: I, Inocencia Pedro Luis, am scribing for, and in the presence of, Keith Villeda MD. I have scribed the entire note.      History      Chief Complaint   Patient presents with    dizziness     pt reports weakness and dizziness. pt reports feeling like food is getting stuck in his chest and causing him to vomit.        Review of patient's allergies indicates:   Allergen Reactions    Ambien [zolpidem] Other (See Comments)     Makes me extremely hyped up like im going crazy.    Zoloft [sertraline] Other (See Comments)     unknown        HPI   HPI    2/8/2017, 3:28 PM   History obtained from the patient      History of Present Illness: Frederick J Lejeune is a 66 y.o. male patient, with a PMHx of GERD, who presents to the Emergency Department for trouble swallowing which onset gradually a few months ago, but began gradually worsening a few weeks ago. Pt states that he has been having trouble swallowing food and feels like it "gets stuck in the chest." Sx have been constant and moderate in severity. No modifying factors noted. Associated sx include epigastric pain, dizziness, and weight loss (20 lbs in 1 month) due to not being able to eat. Pt denies any fever, chills, diaphoresis, SOB, nausea, diarrhea, dysuria, or weakness/numbness. No further complaints at this time.       Arrival mode: Personal vehicle      PCP: Kenrick Muñoz MD       Past Medical History:  Past Medical History   Diagnosis Date    Cancer      lung    COPD (chronic obstructive pulmonary disease)     GERD (gastroesophageal reflux disease)     Lumbar vertebral fracture        Past Surgical History:  Past Surgical History   Procedure Laterality Date    Appendectomy      Abscess drainage      Hand surgery Left     Back surgery           Family History:  Family History   Problem Relation Age of Onset    Emphysema Mother     Cancer Father      mesothelioma       Social History:  Social History     Social History Main Topics    " Smoking status: Former Smoker     Packs/day: 1.00     Years: 50.00     Types: Cigarettes     Quit date: 5/10/2016    Smokeless tobacco: Former User     Types: Snuff     Quit date: 5/10/2016    Alcohol use No    Drug use: No    Sexual activity: Yes     Partners: Female       ROS   Review of Systems   Constitutional: Negative for chills, diaphoresis and fever.        (+) weight loss    HENT: Positive for trouble swallowing. Negative for sore throat.    Respiratory: Negative for shortness of breath.    Cardiovascular: Negative for chest pain.   Gastrointestinal: Positive for abdominal pain (epigastric). Negative for blood in stool, constipation, diarrhea, nausea and vomiting.   Genitourinary: Negative for dysuria.   Musculoskeletal: Negative for back pain.   Skin: Negative for rash.   Neurological: Positive for dizziness. Negative for weakness, light-headedness, numbness and headaches.   Hematological: Does not bruise/bleed easily.       Physical Exam    Initial Vitals   BP Pulse Resp Temp SpO2   02/08/17 1507 02/08/17 1507 02/08/17 1507 02/08/17 1507 02/08/17 1507   128/75 100 20 98.1 °F (36.7 °C) 96 %      Physical Exam  Nursing Notes and Vital Signs Reviewed.  Constitutional: Patient is in no acute distress. Awake and alert. Frail. Cachectic.   Head: Atraumatic. Normocephalic.  Eyes: PERRL. EOM intact. Conjunctivae are not pale. No scleral icterus.  ENT: Mucous membranes are moist. Oropharynx is clear and symmetric.    Neck: Supple. Full ROM. No lymphadenopathy.  Cardiovascular: Regular rate. Regular rhythm. No murmurs, rubs, or gallops. Distal pulses are 2+ and symmetric.  Pulmonary/Chest: No respiratory distress. Clear to auscultation bilaterally. No wheezing, rales, or rhonchi.  Abdominal: Soft and non-distended.  There is no tenderness.  No rebound, guarding, or rigidity.    Musculoskeletal: Moves all extremities. No obvious deformities. No edema. No calf tenderness.  Skin: Warm and dry.  Neurological:   "Alert, awake, and appropriate.  Normal speech.  No acute focal neurological deficits are appreciated.  Psychiatric: Normal affect. Good eye contact. Appropriate in content.    ED Course    Procedures  ED Vital Signs:  Vitals:    02/08/17 1507 02/08/17 1516 02/08/17 1517 02/08/17 1620   BP: 128/75  119/81 125/79   Pulse: 100  94 89   Resp: 20  18 18   Temp: 98.1 °F (36.7 °C)      TempSrc: Oral      SpO2: 96% 100% 100% 100%   Weight: 52.6 kg (116 lb)      Height: 5' 2" (1.575 m)       02/08/17 1635 02/08/17 1658 02/08/17 1659 02/08/17 1701   BP: 117/87 135/84 134/85 135/86   Pulse: 90 85 83 89   Resp: 18      Temp:       TempSrc:       SpO2: 100% 100% 100% 100%   Weight:       Height:        02/08/17 1755 02/08/17 1836   BP: 133/83 137/88   Pulse: 88 81   Resp: 18 16   Temp:     TempSrc:     SpO2: 100% 100%   Weight:     Height:         Abnormal Lab Results:  Labs Reviewed   CBC W/ AUTO DIFFERENTIAL - Abnormal; Notable for the following:        Result Value    RBC 3.11 (*)     Hemoglobin 10.9 (*)     Hematocrit 32.1 (*)      (*)     MCH 35.0 (*)     RDW 17.1 (*)     Lymph # 0.5 (*)     Gran% 78.9 (*)     Lymph% 7.8 (*)     All other components within normal limits   COMPREHENSIVE METABOLIC PANEL - Abnormal; Notable for the following:     Albumin 3.3 (*)     ALT 8 (*)     All other components within normal limits   URINALYSIS        All Lab Results:  Results for orders placed or performed during the hospital encounter of 02/08/17   CBC auto differential   Result Value Ref Range    WBC 6.40 3.90 - 12.70 K/uL    RBC 3.11 (L) 4.60 - 6.20 M/uL    Hemoglobin 10.9 (L) 14.0 - 18.0 g/dL    Hematocrit 32.1 (L) 40.0 - 54.0 %     (H) 82 - 98 fL    MCH 35.0 (H) 27.0 - 31.0 pg    MCHC 34.0 32.0 - 36.0 %    RDW 17.1 (H) 11.5 - 14.5 %    Platelets 185 150 - 350 K/uL    MPV 9.6 9.2 - 12.9 fL    Gran # 5.1 1.8 - 7.7 K/uL    Lymph # 0.5 (L) 1.0 - 4.8 K/uL    Mono # 0.8 0.3 - 1.0 K/uL    Eos # 0.1 0.0 - 0.5 K/uL    Baso # " 0.01 0.00 - 0.20 K/uL    Gran% 78.9 (H) 38.0 - 73.0 %    Lymph% 7.8 (L) 18.0 - 48.0 %    Mono% 11.7 4.0 - 15.0 %    Eosinophil% 1.4 0.0 - 8.0 %    Basophil% 0.2 0.0 - 1.9 %    Differential Method Automated    Comprehensive metabolic panel   Result Value Ref Range    Sodium 141 136 - 145 mmol/L    Potassium 3.9 3.5 - 5.1 mmol/L    Chloride 106 95 - 110 mmol/L    CO2 26 23 - 29 mmol/L    Glucose 84 70 - 110 mg/dL    BUN, Bld 12 8 - 23 mg/dL    Creatinine 0.8 0.5 - 1.4 mg/dL    Calcium 9.7 8.7 - 10.5 mg/dL    Total Protein 7.0 6.0 - 8.4 g/dL    Albumin 3.3 (L) 3.5 - 5.2 g/dL    Total Bilirubin 0.2 0.1 - 1.0 mg/dL    Alkaline Phosphatase 94 55 - 135 U/L    AST 12 10 - 40 U/L    ALT 8 (L) 10 - 44 U/L    Anion Gap 9 8 - 16 mmol/L    eGFR if African American >60 >60 mL/min/1.73 m^2    eGFR if non African American >60 >60 mL/min/1.73 m^2         Imaging Results:  Imaging Results     None                  The Emergency Provider reviewed the vital signs and test results, which are outlined above.    ED Discussion     6:12 PM: Reassessed pt at this time.  Discussed with pt all pertinent ED information and results. Discussed pt dx  and plan of tx. Gave pt all f/u and return to the ED instructions. Instructed to pt to f/u with GI for EGD because of his dysphagia. All questions and concerns were addressed at this time. Pt expresses understanding of information and instructions, and is comfortable with plan to discharge. Pt is stable for discharge.        ED Medication(s):  Medications   GI cocktail (mylanta 30 mL, lidocaine 2 % viscous 10 mL, dicyclomine 10 mL) 50 mL (50 mLs Oral Given 2/8/17 9786)   morphine injection 4 mg (4 mg Intravenous Given 2/8/17 1702)       New Prescriptions    No medications on file       Follow-up Information     Follow up with GI. Call in 1 day.    Contact information:    [phone]            Medical Decision Making    Medical Decision Making:   Clinical Tests:   Lab Tests: Ordered and Reviewed            Scribe Attestation:   Scribe #1: I performed the above scribed service and the documentation accurately describes the services I performed. I attest to the accuracy of the note.    Attending:   Physician Attestation Statement for Scribe #1: I, Keith Villeda MD, personally performed the services described in this documentation, as scribed by Inocencia Cloud, in my presence, and it is both accurate and complete.          Clinical Impression       ICD-10-CM ICD-9-CM   1. Esophageal dysphagia R13.14 787.24       Disposition:   Disposition: Discharged  Condition: Stable         Keith Villeda MD  02/08/17 1845

## 2017-02-08 NOTE — TELEPHONE ENCOUNTER
Offered patient fluids for tomorrow morning. Patient states he is weak and dizzy and has a horrible cough. Patient states he has never experienced such dizziness. Patient states he felt the ED is best for him. Patient is heading there now.

## 2017-02-08 NOTE — TELEPHONE ENCOUNTER
----- Message from Sadia Moon sent at 2/8/2017 12:47 PM CST -----  Contact: Patient   Patient stated that he is coughing and weak and very dizzy when he stands, Please call him at [phone].    Thanks  Td

## 2017-02-08 NOTE — ED AVS SNAPSHOT
[facility]  [address] [facility] [address]               Frederick J Lejeune   2017  3:10 PM   ED    Description:  Male : 1950   Department:  [facility]            Your Care was Coordinated By:     Provider Role From To    Keith Villeda MD Attending Provider 17 1527 --      Reason for Visit     dizziness           Diagnoses this Visit        Comments    Esophageal dysphagia    -  Primary       ED Disposition     ED Disposition Condition Comment    Discharge             To Do List           Follow-up Information     Follow up with GI. Call in 1 day.    Contact information:    [phone]       These Medications        Disp Refills Start End    esomeprazole (NEXIUM) 40 MG capsule 60 capsule 0 2017     Take 1 capsule (40 mg total) by mouth 2 (two) times daily. - Oral    Pharmacy: [facility] [address] [facility] #: [phone]         [facility]     [facility] -  Assistance  Registered nurses in the [facility] provide clinical advisement, health education, appointment booking, and other advisory services.  Call for this free service at [phone].             Medications           Message regarding Medications     Verify the changes and/or additions to your medication regime listed below are the same as discussed with your clinician today.  If any of these changes or additions are incorrect, please notify your healthcare provider.        These medications were administered today        Dose Freq    GI cocktail (mylanta 30 mL, lidocaine 2 % viscous 10 mL, dicyclomine 10 mL) 50 mL  ED 1 Time    Sig: Take by mouth ED 1 Time.    Class: Normal    Route: Oral    morphine injection 4 mg 4 mg ED 1 Time    Sig: Inject 1 mL (4 mg total) into the vein ED 1 Time.    Class: Normal    Route: Intravenous      CHANGE how you are taking these  medications     Start Taking Instead of    esomeprazole (NEXIUM) 40 MG capsule esomeprazole (NEXIUM) 40 MG capsule    Dosage:  Take 1 capsule (40 mg total) by mouth 2 (two) times daily. Dosage:  Take 40 mg by mouth before breakfast.           Verify that the below list of medications is an accurate representation of the medications you are currently taking.  If none reported, the list may be blank. If incorrect, please contact your healthcare provider. Carry this list with you in case of emergency.           Current Medications     albuterol-ipratropium 2.5mg-0.5mg/3mL (DUO-NEB) 0.5 mg-3 mg(2.5 mg base)/3 mL nebulizer solution Take 3 mLs by nebulization every 6 (six) hours as needed for Wheezing or Shortness of Breath.    alprazolam (XANAX) 0.25 MG tablet Take 2 tablets (0.5 mg total) by mouth 2 (two) times daily as needed for Insomnia or Anxiety.    atorvastatin (LIPITOR) 40 MG tablet Take 40 mg by mouth every evening.    benzonatate (TESSALON) 100 MG capsule Take 1 capsule (100 mg total) by mouth 3 (three) times daily as needed for Cough.    docusate sodium (COLACE) 100 MG capsule Take 1 capsule (100 mg total) by mouth 2 (two) times daily.    fish oil-omega-3 fatty acids 300-1,000 mg capsule Take 2 g by mouth once daily.    hydrocodone-acetaminophen 5-325mg (NORCO) 5-325 mg per tablet Take 1 tablet by mouth every 4 (four) hours as needed for Pain.    meclizine (ANTIVERT) 25 mg tablet Take 1 tablet (25 mg total) by mouth 3 (three) times daily as needed for Dizziness.    metoprolol tartrate (LOPRESSOR) 25 MG tablet Take 1 tablet (25 mg total) by mouth 2 (two) times daily.    ondansetron (ZOFRAN-ODT) 4 MG TbDL Take 2 tablets (8 mg total) by mouth every 8 (eight) hours as needed.    oxycodone-acetaminophen (PERCOCET)  mg per tablet Take 0.5-1 tablets by mouth every 4 (four) hours as needed for Pain.    OXYGEN-AIR DELIVERY SYSTEMS MISC by Misc.(Non-Drug; Combo Route) route.    polyethylene glycol (GLYCOLAX) 17  "gram PwPk Take 17 g by mouth once daily.    prochlorperazine (COMPAZINE) 10 MG tablet Take 1 tablet (10 mg total) by mouth every 6 (six) hours as needed.    esomeprazole (NEXIUM) 40 MG capsule Take 1 capsule (40 mg total) by mouth 2 (two) times daily.    oxycodone (ROXICODONE) 5 MG immediate release tablet Take 1 tablet (5 mg total) by mouth every 8 (eight) hours as needed for Pain.           Clinical Reference Information           Your Vitals Were     BP Pulse Temp Resp Height Weight    133/83 88 98.1 °F (36.7 °C) (Oral) 18 5' 2" (1.575 m) 52.6 kg (116 lb)    SpO2 BMI             100% 21.22 kg/m2         Allergies as of 2/8/2017        Reactions    Ambien [Zolpidem] Other (See Comments)    Makes me extremely hyped up like im going crazy.    Zoloft [Sertraline] Other (See Comments)    unknown      Immunizations Administered on Date of Encounter - 2/8/2017     None      ED Micro, Lab, POCT     Start Ordered       Status Ordering Provider    02/08/17 1533 02/08/17 1532  CBC auto differential  STAT      Final result     02/08/17 1533 02/08/17 1532  Comprehensive metabolic panel  STAT      Final result     02/08/17 1533 02/08/17 1532  Urinalysis  STAT      Acknowledged       ED Imaging Orders     None        Discharge Instructions         Soft Diet  Your healthcare provider has prescribed a soft diet (also called gastrointestinal soft diet). This means eating foods that are soft, low in fiber, and easy to digest. This diet is for people with digestive problems. A soft diet provides foods that are easy to chew and swallow. Foods should be bite-size and very soft or moist. Follow your healthcare providers specific instructions about what foods and drinks you may have. The general guidelines below can help you get started on this diet.       Beverages  OK: Milk, tea, coffee, fruit juices, carbonated beverages, nutrition shakes, and drinks (Note: Thin liquids may be hard to swallow. They may need to be thickened.)  Avoid: " None, unless they need to be thickened  Breads and crackers  OK: Refined white, wheat, or seedless rye bread; leo or soda crackers that have been moistened; plain rolls or bagels; very soft tortillas  Avoid: Whole-grain breads, rolls, or bagels with nuts, raisins, or seeds; crackers, croutons, taco shells  Cereals and grains  OK: Cooked cereals, plain dry cereals that have been moistened, plain macaroni, spaghetti, noodles, rice  Avoid: Whole-grain cereals and granola, or cereals containing bran, raisins, seeds or nuts; coconut; brown or wild rice  Desserts and sweets  OK: Moist cake; soft fruit pie with bottom crust only; soft cookies moistened in milk or other liquid; gelatin, custard, pudding, plain ice cream, plain sherbet, sugar, honey, clear jelly  Avoid: Pastries, desserts, and ice cream that have nuts, coconut, seeds, or dried fruit; popcorn; chips of any kind including potato chips and tortilla chips; jam, marmalade  Eggs and cheese  OK: Poached, soft boiled, or scrambled eggs; cottage cheese, ricotta cheese, cream cheese, cheese sauces, or cheese melted in other dishes  Avoid: Crisp fried eggs, cheese slices and cubes  Fruits  OK: Avocado, banana, baked peeled apple, applesauce, peeled ripe peaches or pears, canned fruit (apricots, cherries, peaches, pears), melons  Avoid: Raw apple, dried fruits, coconut, pineapple, grapes, fruit maria victoria, fruit snacks  Meat and fish  OK: All fresh meat, poultry, or fish that is cooked until tender  Avoid: Meat, fish, or poultry that is fried; tough or stringy meat including marx, sausage, bratwurst, jerky, corned beef  Other protein foods  OK: Tofu, baked beans, smooth peanut butter or other nut or seed butters  Avoid: Deep-fried tofu; crunchy peanut or other nut or seed butters; nuts or seeds that are whole or chopped  Soups  OK: All soups, but they may need to be thickened. Thin liquid may be too hard to swallow.  Avoid: Soups made with stringy meat pieces or  chunky vegetables  Vegetables  OK: Peeled and well-cooked potatoes or sweet potatoes; fresh, cooked, canned, or frozen vegetables without seeds, skin, or coarse fiber  Avoid: Raw vegetables, deep-fried vegetables (such as tempura), and corn  Date Last Reviewed: 8/1/2016  © 2909-8053 [employer]. [address] [place] [address]. All rights reserved. This information is not intended as a substitute for professional medical care. Always follow your healthcare professional's instructions.          Your Scheduled Appointments     Feb 28, 2017  8:00 AM CST   Non-Fasting Lab with LABORATORY, JIMY MORA   [facility]    [address] [place] [id number] [phone]            Feb 28, 2017  8:10 AM CST   Urine with SPECIMEN, JIMY   [facility]    [address] [place] [address]   [phone]            Feb 28, 2017  8:20 AM CST   Established Patient Visit with MD Jimy Chakraborty Jr. - Hematology Oncology ([facility])    [address] [place] [address]   [phone]            Feb 28, 2017  8:45 AM CST   Infusion 060 Min with CHAIR 03 ON[facility]    [address] [place] [address]   [phone]            May 02, 2017  9:30 AM CDT   Diagnostic Xray with [facility] XR1-   [facility]    [address] [place] [id number] [phone]              Smoking Cessation     If you would like to quit smoking:   You may be eligible for free services if you are a [place] resident and started smoking cigarettes before September 1, 1988.  Call the Smoking Cessation Trust (SCT) toll free at (448) 635-2986 or (843) 123-4527.   Call 1-800-QUIT-NOW if you do not meet the above criteria.             [facility]  complies with applicable Federal civil rights laws and does  not discriminate on the basis of race, color, national origin, age, disability, or sex.        Language Assistance Services     ATTENTION: Language assistance services are available, free of charge. Please call [phone].      ATENCIÓN: Si mike campbell, tiene a marsh disposición servicios gratuitos de asistencia lingüística. Llame al [phone].     CHÚ Ý: N?u b?n nói Ti?ng Vi?t, có các d?ch v? h? tr? ngôn ng? mi?n phí dành cho b?n. G?i s? [phone].

## 2017-02-08 NOTE — ED NOTES
Pt came in for dizziness, pt also states he feels like his food gets stuck when he eats - states feels like his food needs to be liquid. Also reports upset stomach.   Pt is on 3lpm - currently using oxygen at home and is being treated for lung cancer

## 2017-02-09 NOTE — ED NOTES
Pt resting in ER stretcher, aaox4, rr e/u, NAD noted. VSS. Bed low and locked, call light in reach, side rails up x2. Pt verbalized understanding of status and POC; denies further needs. Will continue to monitor.

## 2017-02-09 NOTE — DISCHARGE INSTRUCTIONS
Soft Diet  Your healthcare provider has prescribed a soft diet (also called gastrointestinal soft diet). This means eating foods that are soft, low in fiber, and easy to digest. This diet is for people with digestive problems. A soft diet provides foods that are easy to chew and swallow. Foods should be bite-size and very soft or moist. Follow your healthcare providers specific instructions about what foods and drinks you may have. The general guidelines below can help you get started on this diet.       Beverages  OK: Milk, tea, coffee, fruit juices, carbonated beverages, nutrition shakes, and drinks (Note: Thin liquids may be hard to swallow. They may need to be thickened.)  Avoid: None, unless they need to be thickened  Breads and crackers  OK: Refined white, wheat, or seedless rye bread; leo or soda crackers that have been moistened; plain rolls or bagels; very soft tortillas  Avoid: Whole-grain breads, rolls, or bagels with nuts, raisins, or seeds; crackers, croutons, taco shells  Cereals and grains  OK: Cooked cereals, plain dry cereals that have been moistened, plain macaroni, spaghetti, noodles, rice  Avoid: Whole-grain cereals and granola, or cereals containing bran, raisins, seeds or nuts; coconut; brown or wild rice  Desserts and sweets  OK: Moist cake; soft fruit pie with bottom crust only; soft cookies moistened in milk or other liquid; gelatin, custard, pudding, plain ice cream, plain sherbet, sugar, honey, clear jelly  Avoid: Pastries, desserts, and ice cream that have nuts, coconut, seeds, or dried fruit; popcorn; chips of any kind including potato chips and tortilla chips; jam, marmalade  Eggs and cheese  OK: Poached, soft boiled, or scrambled eggs; cottage cheese, ricotta cheese, cream cheese, cheese sauces, or cheese melted in other dishes  Avoid: Crisp fried eggs, cheese slices and cubes  Fruits  OK: Avocado, banana, baked peeled apple, applesauce, peeled ripe peaches or pears, canned fruit  (apricots, cherries, peaches, pears), melons  Avoid: Raw apple, dried fruits, coconut, pineapple, grapes, fruit maria victoria, fruit snacks  Meat and fish  OK: All fresh meat, poultry, or fish that is cooked until tender  Avoid: Meat, fish, or poultry that is fried; tough or stringy meat including marx, sausage, bratwurst, jerky, corned beef  Other protein foods  OK: Tofu, baked beans, smooth peanut butter or other nut or seed butters  Avoid: Deep-fried tofu; crunchy peanut or other nut or seed butters; nuts or seeds that are whole or chopped  Soups  OK: All soups, but they may need to be thickened. Thin liquid may be too hard to swallow.  Avoid: Soups made with stringy meat pieces or chunky vegetables  Vegetables  OK: Peeled and well-cooked potatoes or sweet potatoes; fresh, cooked, canned, or frozen vegetables without seeds, skin, or coarse fiber  Avoid: Raw vegetables, deep-fried vegetables (such as tempura), and corn  Date Last Reviewed: 8/1/2016  © 5174-4238 [employer]. [address] [place] [address], [place] [address]. All rights reserved. This information is not intended as a substitute for professional medical care. Always follow your healthcare professional's instructions.

## 2017-02-13 ENCOUNTER — OFFICE VISIT (OUTPATIENT)
Dept: GASTROENTEROLOGY | Facility: CLINIC | Age: 67
End: 2017-02-13
Payer: MEDICARE

## 2017-02-13 VITALS
WEIGHT: 118.81 LBS | SYSTOLIC BLOOD PRESSURE: 130 MMHG | HEIGHT: 62 IN | BODY MASS INDEX: 21.86 KG/M2 | DIASTOLIC BLOOD PRESSURE: 72 MMHG | HEART RATE: 100 BPM

## 2017-02-13 DIAGNOSIS — C34.91 SQUAMOUS CELL LUNG CANCER, RIGHT: ICD-10-CM

## 2017-02-13 DIAGNOSIS — R13.19 ESOPHAGEAL DYSPHAGIA: Primary | ICD-10-CM

## 2017-02-13 DIAGNOSIS — K22.70 BARRETT'S ESOPHAGUS WITHOUT DYSPLASIA: ICD-10-CM

## 2017-02-13 DIAGNOSIS — K22.2 ESOPHAGEAL STRICTURE: ICD-10-CM

## 2017-02-13 PROCEDURE — 99999 PR PBB SHADOW E&M-EST. PATIENT-LVL III: CPT | Mod: PBBFAC,,, | Performed by: INTERNAL MEDICINE

## 2017-02-13 PROCEDURE — 99213 OFFICE O/P EST LOW 20 MIN: CPT | Mod: PBBFAC | Performed by: INTERNAL MEDICINE

## 2017-02-13 PROCEDURE — 99214 OFFICE O/P EST MOD 30 MIN: CPT | Mod: S$PBB,,, | Performed by: INTERNAL MEDICINE

## 2017-02-13 RX ORDER — HYDROCODONE BITARTRATE AND ACETAMINOPHEN 10; 325 MG/1; MG/1
1 TABLET ORAL 4 TIMES DAILY
Refills: 0 | COMMUNITY
Start: 2017-02-01 | End: 2017-02-13 | Stop reason: SDUPTHER

## 2017-02-13 NOTE — MR AVS SNAPSHOT
[facility]  [address] [facility] [address]  Phone: [phone]  Fax: [phone]                  Frederick J Lejeune   2017 8:00 AM   Office Visit    Description:  Male : 1950   Provider:  Ashok Cheng III, MD   [facility]           Reason for Visit     Dysphagia           Diagnoses this Visit        Comments    Esophageal dysphagia    -  Primary     Esophageal stricture         Squamous cell lung cancer, right                To Do List           Future Appointments        Provider Department Dept Phone    2017 9:30 AM [facility] [id number] [facility] - [phone]    2017 8:00 AM LABORATORY, [address] [facility] [phone]    2017 8:10 AM SPECIMEN, [facility] [phone]    2017 8:20 AM Eren Willett Jr., MD [facility] Hematology Oncology [phone]    2017 8:45 AM CHAIR 03 ONLH [facility] [phone]      Goals (5 Years of Data)     None      [facility]     [facility] -  Assistance  Registered nurses in the [facility] provide clinical advisement, health education, appointment booking, and other advisory services.  Call for this free service at [phone].             Medications           Message regarding Medications     Verify the changes and/or additions to your medication regime listed below are the same as discussed with your clinician today.  If any of these changes or additions are incorrect, please notify your healthcare provider.        STOP taking these medications     polyethylene glycol (GLYCOLAX) 17 gram PwPk Take 17 g by mouth once daily.    hydrocodone-acetaminophen 10-325mg (NORCO)  mg Tab Take 1 tablet by mouth 4 (four) times daily.           Verify that the below list of medications is an accurate representation of the medications you are currently  taking.  If none reported, the list may be blank. If incorrect, please contact your healthcare provider. Carry this list with you in case of emergency.           Current Medications     albuterol-ipratropium 2.5mg-0.5mg/3mL (DUO-NEB) 0.5 mg-3 mg(2.5 mg base)/3 mL nebulizer solution Take 3 mLs by nebulization every 6 (six) hours as needed for Wheezing or Shortness of Breath.    alprazolam (XANAX) 0.25 MG tablet Take 2 tablets (0.5 mg total) by mouth 2 (two) times daily as needed for Insomnia or Anxiety.    atorvastatin (LIPITOR) 40 MG tablet Take 40 mg by mouth every evening.    benzonatate (TESSALON) 100 MG capsule Take 1 capsule (100 mg total) by mouth 3 (three) times daily as needed for Cough.    esomeprazole (NEXIUM) 40 MG capsule Take 1 capsule (40 mg total) by mouth 2 (two) times daily.    fish oil-omega-3 fatty acids 300-1,000 mg capsule Take 2 g by mouth once daily.    hydrocodone-acetaminophen 5-325mg (NORCO) 5-325 mg per tablet Take 1 tablet by mouth every 4 (four) hours as needed for Pain.    meclizine (ANTIVERT) 25 mg tablet Take 1 tablet (25 mg total) by mouth 3 (three) times daily as needed for Dizziness.    ondansetron (ZOFRAN-ODT) 4 MG TbDL Take 2 tablets (8 mg total) by mouth every 8 (eight) hours as needed.    OXYGEN-AIR DELIVERY SYSTEMS MISC by [facility].(Non-Drug; Combo Route) route.    prochlorperazine (COMPAZINE) 10 MG tablet Take 1 tablet (10 mg total) by mouth every 6 (six) hours as needed.    DICYCLOMINE HCL (GI COCKTAIL SIMPLE RECORD) Take 15 mLs by mouth daily as needed.    docusate sodium (COLACE) 100 MG capsule Take 1 capsule (100 mg total) by mouth 2 (two) times daily.    metoprolol tartrate (LOPRESSOR) 25 MG tablet Take 1 tablet (25 mg total) by mouth 2 (two) times daily.    oxycodone (ROXICODONE) 5 MG immediate release tablet Take 1 tablet (5 mg total) by mouth every 8 (eight) hours as needed for Pain.    oxycodone-acetaminophen (PERCOCET)  mg per tablet Take 0.5-1 tablets by mouth  "every 4 (four) hours as needed for Pain.           Clinical Reference Information           Your Vitals Were     BP Pulse Height Weight BMI    130/72 100 5' 2" (1.575 m) 53.9 kg (118 lb 13.3 oz) 21.73 kg/m2      Blood Pressure          Most Recent Value    BP  130/72      Allergies as of 2/13/2017     Ambien [Zolpidem]    Zoloft [Sertraline]      Immunizations Administered on Date of Encounter - 2/13/2017     None      Orders Placed During Today's Visit     Future Labs/Procedures Expected by Expires    FL Esophagram Complete  2/13/2017 2/13/2018      Language Assistance Services     ATTENTION: Language assistance services are available, free of charge. Please call [phone].      ATENCIÓN: Si habla adrian, tiene a marsh disposición servicios gratuitos de asistencia lingüística. Llame al [phone].     CHÚ Ý: N?u b?n nói Ti?ng Vi?t, có các d?ch v? h? tr? ngôn ng? mi?n phí dành cho b?n. G?i s? [phone].         [facility] complies with applicable Federal civil rights laws and does not discriminate on the basis of race, color, national origin, age, disability, or sex.        "

## 2017-02-13 NOTE — PROGRESS NOTES
Subjective:       Patient ID: Frederick J Lejeune is a 66 y.o. male.    Chief Complaint: Dysphagia    HPI Comments: Patient with history of Malignant neoplasm of the hilus of the right lung involving thr right main stem bronchus was noted on EGD in October of last year to have extrinsic compression form this lesion on EGD. He was suffering from dysphagia and weight loss at the time. He had been noted on imaging studies to have a mid to distal esophageal stricture and and EUS with biopsies following the EGD confirmed the diagnosis of malignancy. He has undergone radiation treatment and his Chemo therapy is still ongoing. EGD also documented Palma's Esophagus.    He has started to have worsening of his dysphagia over the last 2 months or so. He states he can eat Chicken Noodle soup, Jello, Nutritional supplements, but anything beyond that even purreed is still difficult. He also has some trouble with meds.      Review of Systems   Constitutional: Positive for unexpected weight change. Negative for activity change, appetite change, chills, diaphoresis, fatigue and fever.   HENT: Negative for congestion, ear discharge, facial swelling, hearing loss, nosebleeds, postnasal drip, sinus pressure, sneezing, tinnitus, trouble swallowing and voice change.    Eyes: Negative for photophobia, redness and visual disturbance.   Respiratory: Positive for cough. Negative for chest tightness, shortness of breath and wheezing.    Cardiovascular: Negative for chest pain and palpitations.   Gastrointestinal: Negative for abdominal distention, abdominal pain, blood in stool, constipation, diarrhea, nausea, rectal pain and vomiting.        Dysphagia   Genitourinary: Negative for difficulty urinating, discharge, dysuria, flank pain, frequency, hematuria, scrotal swelling, testicular pain and urgency.   Musculoskeletal: Positive for back pain. Negative for arthralgias, gait problem, joint swelling, myalgias and neck stiffness.   Skin:  Negative for color change, pallor, rash and wound.   Neurological: Negative for dizziness, tremors, seizures, syncope, facial asymmetry, speech difficulty, weakness, light-headedness, numbness and headaches.   Hematological: Negative for adenopathy. Does not bruise/bleed easily.   Psychiatric/Behavioral: Negative for agitation, confusion, hallucinations, sleep disturbance and suicidal ideas.       Objective:      Physical Exam   Constitutional: He is oriented to person, place, and time. He appears well-developed and well-nourished. No distress.   On portable oxygen   HENT:   Head: Normocephalic and atraumatic.   Nose: Nose normal.   Mouth/Throat: Oropharynx is clear and moist. No oropharyngeal exudate.   Eyes: Conjunctivae are normal. Pupils are equal, round, and reactive to light. No scleral icterus.   Neck: Normal range of motion. Neck supple. No thyromegaly present.   Area at base on neck on the right is tender where node said to be. I cannot clearly palpate.   Cardiovascular: Normal rate and regular rhythm.  Exam reveals no gallop and no friction rub.    No murmur heard.  Pulmonary/Chest: Effort normal and breath sounds normal. No respiratory distress. He has no wheezes. He has no rales.   Abdominal: Soft. Bowel sounds are normal. He exhibits no distension and no mass. There is no tenderness. There is no rebound and no guarding.   Musculoskeletal: He exhibits no edema or tenderness.   Lymphadenopathy:     He has no cervical adenopathy.   Neurological: He is alert and oriented to person, place, and time. He exhibits normal muscle tone. Coordination normal.   Skin: Skin is warm. No rash noted. He is not diaphoretic.   Psychiatric: He has a normal mood and affect. His behavior is normal. Judgment and thought content normal.   Vitals reviewed.      Assessment:    Metastatic Squamous Cell Ca of right Lung   Dysphagia due to extrinsic compression of the esophagus    Palma's Esophagus   Weight Loss due to above    COPD  No diagnosis found.    Plan:       Will repeat his Esophagram and see if worth considering Stent placement. High risk for procedure due to severe COPD. Will also need to consider perforation risk.

## 2017-02-22 ENCOUNTER — OFFICE VISIT (OUTPATIENT)
Dept: HEMATOLOGY/ONCOLOGY | Facility: CLINIC | Age: 67
DRG: 841 | End: 2017-02-22
Payer: MEDICARE

## 2017-02-22 ENCOUNTER — HOSPITAL ENCOUNTER (INPATIENT)
Facility: HOSPITAL | Age: 67
LOS: 6 days | Discharge: HOME | DRG: 841 | End: 2017-02-28
Attending: EMERGENCY MEDICINE | Admitting: FAMILY MEDICINE
Payer: MEDICARE

## 2017-02-22 VITALS
BODY MASS INDEX: 20.3 KG/M2 | HEART RATE: 100 BPM | TEMPERATURE: 98 F | SYSTOLIC BLOOD PRESSURE: 111 MMHG | OXYGEN SATURATION: 93 % | WEIGHT: 110.31 LBS | RESPIRATION RATE: 18 BRPM | HEIGHT: 62 IN | DIASTOLIC BLOOD PRESSURE: 78 MMHG

## 2017-02-22 DIAGNOSIS — J90 BILATERAL PLEURAL EFFUSION: ICD-10-CM

## 2017-02-22 DIAGNOSIS — K22.70 BARRETT'S ESOPHAGUS WITHOUT DYSPLASIA: Chronic | ICD-10-CM

## 2017-02-22 DIAGNOSIS — R10.32 LEFT GROIN PAIN: ICD-10-CM

## 2017-02-22 DIAGNOSIS — G89.3 CHRONIC NEOPLASM-RELATED PAIN: ICD-10-CM

## 2017-02-22 DIAGNOSIS — C34.01 LUNG CANCER, MAIN BRONCHUS, RIGHT: Primary | ICD-10-CM

## 2017-02-22 DIAGNOSIS — C34.2 MALIGNANT NEOPLASM OF MIDDLE LOBE OF RIGHT LUNG: ICD-10-CM

## 2017-02-22 DIAGNOSIS — R13.12 OROPHARYNGEAL DYSPHAGIA: ICD-10-CM

## 2017-02-22 DIAGNOSIS — R13.19 ESOPHAGEAL DYSPHAGIA: ICD-10-CM

## 2017-02-22 DIAGNOSIS — R42 DIZZINESS: ICD-10-CM

## 2017-02-22 DIAGNOSIS — K22.2 ESOPHAGEAL STRICTURE: ICD-10-CM

## 2017-02-22 DIAGNOSIS — R64 CACHEXIA: ICD-10-CM

## 2017-02-22 DIAGNOSIS — K44.9 HIATAL HERNIA: Chronic | ICD-10-CM

## 2017-02-22 DIAGNOSIS — R45.89 ANXIETY ABOUT HEALTH: ICD-10-CM

## 2017-02-22 DIAGNOSIS — J96.11 CHRONIC RESPIRATORY FAILURE WITH HYPOXIA: Chronic | ICD-10-CM

## 2017-02-22 DIAGNOSIS — J44.1 COPD EXACERBATION: ICD-10-CM

## 2017-02-22 DIAGNOSIS — C79.51 BONE METASTASES: ICD-10-CM

## 2017-02-22 DIAGNOSIS — R07.89 OTHER CHEST PAIN: ICD-10-CM

## 2017-02-22 DIAGNOSIS — E86.0 DEHYDRATION, MILD: ICD-10-CM

## 2017-02-22 DIAGNOSIS — K22.2 ESOPHAGEAL OBSTRUCTION: Primary | ICD-10-CM

## 2017-02-22 DIAGNOSIS — J44.9 COPD, VERY SEVERE: Chronic | ICD-10-CM

## 2017-02-22 DIAGNOSIS — E78.5 DYSLIPIDEMIA: Chronic | ICD-10-CM

## 2017-02-22 DIAGNOSIS — J42 CHRONIC BRONCHITIS, UNSPECIFIED CHRONIC BRONCHITIS TYPE: ICD-10-CM

## 2017-02-22 DIAGNOSIS — C34.01 LUNG CANCER, MAIN BRONCHUS, RIGHT: ICD-10-CM

## 2017-02-22 DIAGNOSIS — C34.90 MALIGNANT NEOPLASM OF LUNG, UNSPECIFIED LATERALITY, UNSPECIFIED PART OF LUNG: ICD-10-CM

## 2017-02-22 DIAGNOSIS — I24.9 ACS (ACUTE CORONARY SYNDROME): ICD-10-CM

## 2017-02-22 LAB
ALBUMIN SERPL BCP-MCNC: 3.3 G/DL
ALP SERPL-CCNC: 114 U/L
ALT SERPL W/O P-5'-P-CCNC: 10 U/L
ANION GAP SERPL CALC-SCNC: 8 MMOL/L
APTT BLDCRRT: 27.8 SEC
AST SERPL-CCNC: 13 U/L
BASOPHILS # BLD AUTO: 0.02 K/UL
BASOPHILS NFR BLD: 0.2 %
BILIRUB SERPL-MCNC: 0.2 MG/DL
BUN SERPL-MCNC: 17 MG/DL
CALCIUM SERPL-MCNC: 10.1 MG/DL
CHLORIDE SERPL-SCNC: 101 MMOL/L
CO2 SERPL-SCNC: 31 MMOL/L
CREAT SERPL-MCNC: 0.8 MG/DL
DIFFERENTIAL METHOD: ABNORMAL
EOSINOPHIL # BLD AUTO: 0.2 K/UL
EOSINOPHIL NFR BLD: 2.1 %
ERYTHROCYTE [DISTWIDTH] IN BLOOD BY AUTOMATED COUNT: 14.6 %
EST. GFR  (AFRICAN AMERICAN): >60 ML/MIN/1.73 M^2
EST. GFR  (NON AFRICAN AMERICAN): >60 ML/MIN/1.73 M^2
GLUCOSE SERPL-MCNC: 105 MG/DL
HCT VFR BLD AUTO: 36.7 %
HGB BLD-MCNC: 12.5 G/DL
INR PPP: 1
LYMPHOCYTES # BLD AUTO: 1.2 K/UL
LYMPHOCYTES NFR BLD: 12.7 %
MAGNESIUM SERPL-MCNC: 2.3 MG/DL
MCH RBC QN AUTO: 35.3 PG
MCHC RBC AUTO-ENTMCNC: 34.1 %
MCV RBC AUTO: 104 FL
MONOCYTES # BLD AUTO: 0.6 K/UL
MONOCYTES NFR BLD: 6.6 %
NEUTROPHILS # BLD AUTO: 7.6 K/UL
NEUTROPHILS NFR BLD: 78.4 %
PLATELET # BLD AUTO: 220 K/UL
PMV BLD AUTO: 9.7 FL
POTASSIUM SERPL-SCNC: 4.4 MMOL/L
PROT SERPL-MCNC: 7.4 G/DL
PROTHROMBIN TIME: 10.1 SEC
RBC # BLD AUTO: 3.54 M/UL
SODIUM SERPL-SCNC: 140 MMOL/L
WBC # BLD AUTO: 9.66 K/UL

## 2017-02-22 PROCEDURE — 85025 COMPLETE CBC W/AUTO DIFF WBC: CPT

## 2017-02-22 PROCEDURE — 96376 TX/PRO/DX INJ SAME DRUG ADON: CPT

## 2017-02-22 PROCEDURE — 99222 1ST HOSP IP/OBS MODERATE 55: CPT | Mod: ,,, | Performed by: INTERNAL MEDICINE

## 2017-02-22 PROCEDURE — 25000003 PHARM REV CODE 250: Performed by: NURSE PRACTITIONER

## 2017-02-22 PROCEDURE — 63600175 PHARM REV CODE 636 W HCPCS: Performed by: NURSE PRACTITIONER

## 2017-02-22 PROCEDURE — 25500020 PHARM REV CODE 255: Performed by: INTERNAL MEDICINE

## 2017-02-22 PROCEDURE — 96361 HYDRATE IV INFUSION ADD-ON: CPT

## 2017-02-22 PROCEDURE — 96374 THER/PROPH/DIAG INJ IV PUSH: CPT

## 2017-02-22 PROCEDURE — 85730 THROMBOPLASTIN TIME PARTIAL: CPT

## 2017-02-22 PROCEDURE — 99999 PR PBB SHADOW E&M-EST. PATIENT-LVL III: CPT | Mod: PBBFAC,,, | Performed by: INTERNAL MEDICINE

## 2017-02-22 PROCEDURE — C9113 INJ PANTOPRAZOLE SODIUM, VIA: HCPCS | Performed by: EMERGENCY MEDICINE

## 2017-02-22 PROCEDURE — 63600175 PHARM REV CODE 636 W HCPCS: Performed by: FAMILY MEDICINE

## 2017-02-22 PROCEDURE — 83735 ASSAY OF MAGNESIUM: CPT

## 2017-02-22 PROCEDURE — 96372 THER/PROPH/DIAG INJ SC/IM: CPT

## 2017-02-22 PROCEDURE — 11000001 HC ACUTE MED/SURG PRIVATE ROOM

## 2017-02-22 PROCEDURE — 99214 OFFICE O/P EST MOD 30 MIN: CPT | Mod: S$PBB,,, | Performed by: INTERNAL MEDICINE

## 2017-02-22 PROCEDURE — 80053 COMPREHEN METABOLIC PANEL: CPT

## 2017-02-22 PROCEDURE — 99285 EMERGENCY DEPT VISIT HI MDM: CPT | Mod: 25,27

## 2017-02-22 PROCEDURE — 63600175 PHARM REV CODE 636 W HCPCS: Performed by: EMERGENCY MEDICINE

## 2017-02-22 PROCEDURE — 85610 PROTHROMBIN TIME: CPT

## 2017-02-22 PROCEDURE — 96375 TX/PRO/DX INJ NEW DRUG ADDON: CPT

## 2017-02-22 PROCEDURE — 25000003 PHARM REV CODE 250: Performed by: EMERGENCY MEDICINE

## 2017-02-22 PROCEDURE — 25500020 PHARM REV CODE 255: Performed by: EMERGENCY MEDICINE

## 2017-02-22 RX ORDER — MORPHINE SULFATE 2 MG/ML
2 INJECTION, SOLUTION INTRAMUSCULAR; INTRAVENOUS
Status: COMPLETED | OUTPATIENT
Start: 2017-02-22 | End: 2017-02-22

## 2017-02-22 RX ORDER — SODIUM CHLORIDE 9 MG/ML
INJECTION, SOLUTION INTRAVENOUS CONTINUOUS
Status: DISCONTINUED | OUTPATIENT
Start: 2017-02-22 | End: 2017-02-28 | Stop reason: HOSPADM

## 2017-02-22 RX ORDER — HYDROCODONE BITARTRATE AND ACETAMINOPHEN 7.5; 325 MG/15ML; MG/15ML
15 SOLUTION ORAL EVERY 4 HOURS PRN
Status: DISCONTINUED | OUTPATIENT
Start: 2017-02-22 | End: 2017-02-24

## 2017-02-22 RX ORDER — POLYETHYLENE GLYCOL 3350 17 G/17G
17 POWDER, FOR SOLUTION ORAL DAILY
Status: DISCONTINUED | OUTPATIENT
Start: 2017-02-23 | End: 2017-02-28 | Stop reason: HOSPADM

## 2017-02-22 RX ORDER — HYDROCODONE BITARTRATE AND ACETAMINOPHEN 7.5; 325 MG/15ML; MG/15ML
10 SOLUTION ORAL EVERY 4 HOURS PRN
Status: DISCONTINUED | OUTPATIENT
Start: 2017-02-22 | End: 2017-02-24

## 2017-02-22 RX ORDER — ONDANSETRON 2 MG/ML
4 INJECTION INTRAMUSCULAR; INTRAVENOUS
Status: COMPLETED | OUTPATIENT
Start: 2017-02-22 | End: 2017-02-22

## 2017-02-22 RX ORDER — IPRATROPIUM BROMIDE AND ALBUTEROL SULFATE 2.5; .5 MG/3ML; MG/3ML
3 SOLUTION RESPIRATORY (INHALATION) EVERY 6 HOURS PRN
Status: DISCONTINUED | OUTPATIENT
Start: 2017-02-22 | End: 2017-02-28 | Stop reason: HOSPADM

## 2017-02-22 RX ORDER — ONDANSETRON 8 MG/1
8 TABLET, ORALLY DISINTEGRATING ORAL EVERY 8 HOURS PRN
Status: DISCONTINUED | OUTPATIENT
Start: 2017-02-22 | End: 2017-02-28 | Stop reason: HOSPADM

## 2017-02-22 RX ORDER — DIPHENHYDRAMINE HYDROCHLORIDE 50 MG/ML
25 INJECTION INTRAMUSCULAR; INTRAVENOUS EVERY 6 HOURS PRN
Status: DISCONTINUED | OUTPATIENT
Start: 2017-02-22 | End: 2017-02-28 | Stop reason: HOSPADM

## 2017-02-22 RX ORDER — PANTOPRAZOLE SODIUM 40 MG/10ML
40 INJECTION, POWDER, LYOPHILIZED, FOR SOLUTION INTRAVENOUS DAILY
Status: DISCONTINUED | OUTPATIENT
Start: 2017-02-22 | End: 2017-02-28 | Stop reason: HOSPADM

## 2017-02-22 RX ORDER — MORPHINE SULFATE 2 MG/ML
2 INJECTION, SOLUTION INTRAMUSCULAR; INTRAVENOUS EVERY 4 HOURS PRN
Status: DISCONTINUED | OUTPATIENT
Start: 2017-02-22 | End: 2017-02-24

## 2017-02-22 RX ORDER — SODIUM CHLORIDE 9 MG/ML
1000 INJECTION, SOLUTION INTRAVENOUS
Status: COMPLETED | OUTPATIENT
Start: 2017-02-22 | End: 2017-02-22

## 2017-02-22 RX ORDER — MORPHINE SULFATE 4 MG/ML
4 INJECTION, SOLUTION INTRAMUSCULAR; INTRAVENOUS
Status: COMPLETED | OUTPATIENT
Start: 2017-02-22 | End: 2017-02-22

## 2017-02-22 RX ORDER — ENOXAPARIN SODIUM 100 MG/ML
40 INJECTION SUBCUTANEOUS EVERY 24 HOURS
Status: DISCONTINUED | OUTPATIENT
Start: 2017-02-22 | End: 2017-02-28 | Stop reason: HOSPADM

## 2017-02-22 RX ADMIN — SODIUM CHLORIDE 500 ML: 0.9 INJECTION, SOLUTION INTRAVENOUS at 08:02

## 2017-02-22 RX ADMIN — ONDANSETRON 4 MG: 2 INJECTION INTRAMUSCULAR; INTRAVENOUS at 08:02

## 2017-02-22 RX ADMIN — MORPHINE SULFATE 4 MG: 4 INJECTION, SOLUTION INTRAMUSCULAR; INTRAVENOUS at 10:02

## 2017-02-22 RX ADMIN — PANTOPRAZOLE SODIUM 40 MG: 40 INJECTION, POWDER, FOR SOLUTION INTRAVENOUS at 11:02

## 2017-02-22 RX ADMIN — SODIUM CHLORIDE: 0.9 INJECTION, SOLUTION INTRAVENOUS at 01:02

## 2017-02-22 RX ADMIN — DIPHENHYDRAMINE HYDROCHLORIDE 25 MG: 50 INJECTION, SOLUTION INTRAMUSCULAR; INTRAVENOUS at 09:02

## 2017-02-22 RX ADMIN — MORPHINE SULFATE 2 MG: 2 INJECTION, SOLUTION INTRAMUSCULAR; INTRAVENOUS at 09:02

## 2017-02-22 RX ADMIN — SODIUM CHLORIDE 1000 ML: 0.9 INJECTION, SOLUTION INTRAVENOUS at 09:02

## 2017-02-22 RX ADMIN — DIATRIZOATE MEGLUMINE AND DIATRIZOATE SODIUM 65 ML: 600; 100 SOLUTION ORAL; RECTAL at 05:02

## 2017-02-22 RX ADMIN — MORPHINE SULFATE 2 MG: 2 INJECTION, SOLUTION INTRAMUSCULAR; INTRAVENOUS at 07:02

## 2017-02-22 RX ADMIN — MORPHINE SULFATE 2 MG: 2 INJECTION, SOLUTION INTRAMUSCULAR; INTRAVENOUS at 02:02

## 2017-02-22 RX ADMIN — FOLIC ACID: 5 INJECTION, SOLUTION INTRAMUSCULAR; INTRAVENOUS; SUBCUTANEOUS at 07:02

## 2017-02-22 RX ADMIN — IOHEXOL 75 ML: 350 INJECTION, SOLUTION INTRAVENOUS at 10:02

## 2017-02-22 RX ADMIN — ENOXAPARIN SODIUM 40 MG: 100 INJECTION SUBCUTANEOUS at 09:02

## 2017-02-22 NOTE — PROGRESS NOTES
Hematology/Oncology Established Visit    [facility] Diagnosis: Squamous cell carcinoma of lung    Stage: Stage IV (bilateral pulmonary nodules, R supraclavic lad, R mainstem bronchus obstruction, LLL mass)    Pathology:  - EGD 10/19/16: 1. Gastric biopsy: Normal gastric mucosa. No Helicobacter identified on routine stain. 2. Irregular Z line, biopsy: Squamoglandular mucosa with mild chronic inflammation and intestinal metaplasia (Palma's esophagus). Negative for dysplasia.    - Bronchoscopy with biopsy of R mainstem bronchus mass 10/21/16: Non-small cell carcinoma, see note.  Immunohistochemical staining with satisfactory control material shows the tumor to be positive for p63 and CK 5/6 and negative for TTF-1 and CK 7. This immonophenotype is consistent with squamous cell carcinoma. PD-L1 expression 70%.    Prior Treatment:   1. Palliative radiation to the R mainstem bronchus  2. Carbo (AUC 2) -Taxol 45mg/m2 weekly x 6-7 weeks discontinued early 1/2017 due to disease progression  3. Palliative radiation to R supraclavicular LAD given pain and discomfort with swallowing, 5 fractions will be completed 1/22/17    Current Treatment: Pembrolizumab started 1/2017    History of Present Illness:  Frederick J Lejeune is a 66 y.o. male with GERD who initially p/w 1 month history of weight loss, chest pain, dysphagia, odynophagia found to have esophageal narrowing and RLL lung mass.He has had difficulty eating due to odynophagia for the past month. He also states he has L groin pain due to what he thought was a hernia, which has since been found to be firm lymphadenopathy. Patient underwent EGD 10/19, which was notable for extrinsic compression in middle third of the esophagus as well as findings suggestive of Palma's esophagus, which were biopsied. 10/20, patient underwent EUS notable for lymphadenopathy in the paratracheal, mediastinal, and subcarinal region. FNA performed. Patient underwent bronchoscopy on 10/21 which was  notable for a mass in the right mainstem bronchus, biopsy revealed lung cancer, squamous cell carcinoma. Patient was also found to have Pseudomonas PNA and has completed treatment with Ciprofloxacin. Patient also completed palliative radiation to relieve right mainstem bronchus obstruction. He then was treated with weekly Carbo-Taxol x 6-7 weeks, which was discontinued due to disease progression on imaging in early 1/2017. He is currently receiving Pembrolizumab every 3 weeks.     Today, patient comes in for urgent visit regarding his trouble swallowing and inability to eat. He is also dizzy, weak, SOB and has pain in his chest. No cough, fevers, chills. His left groin mass is stable per patient, but he still has some mild pain in the right supraclavicular region due to lymphadenopathy. He was recently evaluated by Dr. Cheng who recommended further evaluation with esophagram, but pt was too sick to attend appointment.    ROS:  Constitutional: Negative for fever, chills, malaise and diaphoresis. +fatigue  HENT: Negative for hearing loss, ear pain, nosebleeds, congestion, sore throat, neck pain, tinnitus and ear discharge.    Eyes: Negative for blurred vision, double vision, photophobia, pain, discharge and redness.    Respiratory: Negative for hemoptysis, sputum production,  wheezing and stridor. +acute on chronic shortness of breath  Cardiovascular: Negative for palpitations, orthopnea, claudication, leg swelling and PND. +chest pain  Gastrointestinal: Negative for heartburn, vomiting, abdominal pain, diarrhea, constipation, blood in stool and melena. +dysphagia  Genitourinary: Negative for dysuria, urgency, frequency, hematuria and flank pain.    Musculoskeletal: Negative for myalgias, back pain, joint pain and falls.  +pain in right neck and left groin  Skin: Negative for itching and rash.    Neurological: Negative for tingling, tremors, sensory change, speech change, focal weakness, seizures, loss of  consciousness, weakness and headaches.  +dizziness  Endo/Heme/Allergies: Negative for environmental allergies and polydipsia. Does not bruise/bleed easily.    Psychiatric/Behavioral: Negative for depression, suicidal ideas, hallucinations, memory loss and substance abuse. The patient is not nervous/anxious and does not have insomnia.    All 14 systems reviewed and negative except as noted above.      Past Medical History:  COPD, severe  GERD  Lumbar vertebral fracture s/p surgery in 1/2016 / Chronic low back pain  H/o intracranial hemorrhage    Social History:  Social History     Social History    Marital status:      Spouse name: ganesh    Number of children: 2    Years of education: N/A     Occupational History     [employer]     Social History Main Topics    Smoking status: Former Smoker     Packs/day: 1.00     Years: 50.00     Types: Cigarettes     Quit date: 5/10/2016    Smokeless tobacco: Former User     Types: Snuff     Quit date: 5/10/2016    Alcohol use No    Drug use: No    Sexual activity: Yes     Partners: Female     Other Topics Concern    Not on file     Social History Narrative       Family History: family history includes Cancer in his father; Emphysema in his mother.    Physical Exam:  Vitals:    02/22/17 0746   BP: 111/78   Pulse: 100   Resp: 18   Temp: 98.1 °F (36.7 °C)     Body mass index is 20.18 kg/(m^2).  General:  AAOx4, no acute distress, cachectic  HEENT: EOMI. Normocephalic and atraumatic. No maxillary sinus tenderness. External auditory canals clear and TMs intact without lesions. Nasal and oral mucosal membranes moist. Normal dentition and gums.   Neck: no LAD, thyromegaly, normal ROM  Pulmonary: Bilaterally clear to auscultation with good air movement, on portable home O2, Normal effort with no accessory muscle use, no wheezes/rales/rhonchi  CV: Normal rate, regular rhythm, no murmurs/rubs/gallops, no edema  ABD:  Soft, nontender, nondistended, no mass, and  without hepatosplenomegaly   Ext: No clubbing, cyanosis, or edema, normal ROM  Skin: No rashes, lesions, bruising or petechiae  Neurological: No focal deficits, CN II to XII grossly intact, normal coordination    Psychiatric:  Normal mood, affect and judgement  Hem/Lymph:  Left groin with 5x6 cm enlarged LN, TTP. Also R supraclavicular LAD present, firm and TTP. No submandibular, cervical, axillary LAD.    Labs:    Lab Results   Component Value Date    WBC 6.40 02/08/2017    HGB 10.9 (L) 02/08/2017    HCT 32.1 (L) 02/08/2017     (H) 02/08/2017     02/08/2017     Lab Results   Component Value Date     02/08/2017    K 3.9 02/08/2017     02/08/2017    CO2 26 02/08/2017    BUN 12 02/08/2017    CREATININE 0.8 02/08/2017    CALCIUM 9.7 02/08/2017    ANIONGAP 9 02/08/2017    ESTGFRAFRICA >60 02/08/2017    EGFRNONAA >60 02/08/2017     Lab Results   Component Value Date    ALT 8 (L) 02/08/2017    AST 12 02/08/2017    ALKPHOS 94 02/08/2017    BILITOT 0.2 02/08/2017       No results found for: IRON, TIBC, FERRITIN, SATURATEDIRO  Lab Results   Component Value Date    AUVBQZGC14 256 12/12/2016     No results found for: FOLATE  Lab Results   Component Value Date    TSH 0.236 (L) 09/28/2016       Imaging:  Chest CT 10/19/16:   7.2 cm in diameter necrotic right lower lobe mass with evidence of mediastinal and subcarinal adenopathy.   Subcarinal lymph node which is necrotic measuring up to 3.6 cm in diameter.  Extensive emphysematous changes.  Paratracheal adenopathy.  Bilateral supraclavicular lymphadenopathy larger on the right measuring up to 2.5 cm in diameter.  Diffuse mural thickening of the esophagus cannot exclude infiltration of the mass into the esophagus.  Diffuse pulmonary fibrotic disease.        Procedures:     EGD 10/19/16:    Extrinsic compression in the middle third of the esophagus. Z-line irregular, 35 cm from the incisors. Esophageal mucosal changes suspicious for short-segment Palma's  esophagus. Biopsied. Medium-sized hiatus hernia. Normal stomach. Normal examined duodenum. Several biopsies were obtained in the gastric antrum.     EUS 10/20/16:    - A few lymph nodes were visualized and measured in the left lower paratracheal region (level 4L), anterior mediastinum (level 6) and subcarinal mediastinum (level 7). Fine needle aspiration performed.     Bronchoscopy 10/21/16:  - Right lower lobe mass. The left lung was normal. A bloody and circumferential mass was found in the right mainstem bronchus. This lesion is malignant. Brushings were obtained. An endobronchial biopsy was performed. Washings were obtained. Lung cancer was identified in the right mainstem bronchus.    PET 10/31/16:  1. Large necrotic right lower lobe mass with multiple presumed metastatic bilateral pulmonary nodules along with extensive mediastinal bilateral hilar, right supraclavicular adenopathy and bilateral lower neck adenopathy.  Metastatic lesions involving the left humeral head and left inguinal canal as well.  2.  Remaining findings as discussed above.     Brain MRI 11/16/16:  No acute intracranial disease identified.  No evidence of intracranial metastases.    PET 1/11/17:  1.  Interval improvement in bibasilar posterior medial lung masses and decrease in FDG avid mediastinal lymphadenopathy consistent with positive therapy response to radiation therapy.  2.  Interval stability or increase in size and FDG avidity of bilateral peripheral lung nodules and worsening bilateral supraclavicular lymphadenopathy consistent with disease progression.  3.  Mild enlargement of FDG avid lytic intraosseous lesion in left humeral head.  4.  Neutral developed small FDG avid gastrohepatic lymph node.    Assessment / Plan:  Frederick J Lejeune is a 66 y.o. male who was diagnosed with lung cancer.     66-year-old gentleman admitted with right lower lobe lung mass and extrinsic compression of esophagus likely from metastatic disease.  Status post EGD/EUS with biopsy and bronchoscopy with biopsy and awaiting pathology results. F/u with Dr Espinosa as outpatient to review pathology and treatment this week. Patient verbalizes understanding of plan to follow up in clinic to discuss pathology and treatment.     1. Non-small cell lung cancer, right: Bronchoscopy on 10/21 identified a circumferential mass in the right mainstem bronchus. PET demonstrated bilateral pulmonary nodules and adenopathy. Patient completed palliative radiation to the R mainstem bronchus. He has missed several follow up appointments with us, and eventually started systemic chemotherapy after radiation was complete. PDL1 testing - high expression 70%.  -- Discontinued Carbo-Taxol given mild disease progression including new PET avidity at gastrohepatic LN.  -- Due for cycle 3 of Pembrolizumab on 2/28/17, which is given every 3 weeks. This treatment can take several months to work and I am concerned that his disease will progress too quickly in the meantime.     2. Dysphagia: Likely due to extrinsic compression from enlarging mediastinal LAD. I recommend evaluation in the ER and admission for further workup as this gentleman is unable to eat any solids and even pureed foods.   -- Please obtain CBC, CMP, LDH  -- Chest CT with contrast   -- Esophagram and GI consult for consideration of palliative measures such as esophageal stent placement if appropriate.  -- Recommend IV pain medication as pt is having trouble swallowing pills. He also takes chronic opioids for chronic back pain.    3. Neoplasm-related pain: Pt takes Norco prn for chronic back pain and he sees Dr. Mayen for pain management. He has a pain contract with him. However, patient now has new neoplasm related pain due to lymphadenopathy in his neck and groin.     4. Dizziness: Likely due to dehydration. Recommend IVF.    5. Severe COPD: Using home O2 and nebulizers.    6. Goals of care: Patient has incurable cancer.  Purpose of treatment is palliative to extend duration and quality of life, but no cure.    Zoila Espinosa M.D.  Hematology Oncology

## 2017-02-22 NOTE — ED NOTES
Patient sitting up in bed, no acute distress noted, awake, alert, and oriented x 3, calm, respirations even and unlabored, and skin is warm and dry. Patient verbalized understanding of status and plan of care. Patient states pain level is lower after medication given. Side rails up x 2, call light in reach, bed low and locked.  Will continue to monitor.

## 2017-02-22 NOTE — ED PROVIDER NOTES
SCRIBE #1 NOTE: I, Palmer Farley, am scribing for, and in the presence of, Sheri Cool DO. I have scribed the entire note.      History      Chief Complaint   Patient presents with    Dysphagia     pt. sent over from PCP for difficulties swallowing and hypotension        Review of patient's allergies indicates:   Allergen Reactions    Ambien [zolpidem] Other (See Comments)     Makes me extremely hyped up like im going crazy.    Zoloft [sertraline] Other (See Comments)     unknown        HPI   HPI    2/22/2017, 8:17 AM   History obtained from the Dr. Espinosa and patient      History of Present Illness: Frederick J Lejeune is a 66 y.o. male patient who presents to the Emergency Department for further evaluation of dysphagia which onset gradually one month ago worsening a week ago. Pt states it has gotten to the point to where is choking on water. Symptoms are constant and moderate in severity. Sx are exacerbated by nothing and relieved by nothing. Pt reports pain to upper chest and back, which is where he usually feels pain. Associated sxs include productive cough. Dr. Espinosa called Dr. Cool in the ED who stated pt has metastatic lung cancer and he is having difficulty swallowing. Dr. Espinosa is requesting a CT and admission to hospital medicine. Dr. Espinosa states pt has an esophogram scheduled. Pt states he last received chemotherapy 2 weeks ago. Patient denies any fever, N/V/D, abd pain, chest tightness, SOB, difficulty breathing, dysuria, difficulty urinating, facial swelling, rhinorrhea, congestion, drooling, palpitations, lightheadedness, dizziness, weakness/numbness and all other sxs at this time. Hypotension was also reported during his office visit PTA. No further complaints or concerns at this time.     Arrival mode: Personal vehicle     PCP: Kenrick Muñoz MD       Past Medical History:  Past Medical History   Diagnosis Date    Cancer      lung    COPD (chronic obstructive pulmonary disease)      GERD (gastroesophageal reflux disease)     Lumbar vertebral fracture        Past Surgical History:  Past Surgical History   Procedure Laterality Date    Appendectomy      Abscess drainage      Hand surgery Left     Back surgery           Family History:  Family History   Problem Relation Age of Onset    Emphysema Mother     Cancer Father      mesothelioma       Social History:  Social History     Social History Main Topics    Smoking status: Former Smoker     Packs/day: 1.00     Years: 50.00     Types: Cigarettes     Quit date: 5/10/2016    Smokeless tobacco: Former User     Types: Snuff     Quit date: 5/10/2016    Alcohol use No    Drug use: No    Sexual activity: Yes     Partners: Female       ROS   Review of Systems   Constitutional: Negative for chills and fever.   HENT: Positive for trouble swallowing. Negative for congestion, drooling, ear pain, facial swelling, rhinorrhea, sinus pressure and sore throat.    Respiratory: Positive for cough. Negative for chest tightness and shortness of breath.    Cardiovascular: Negative for palpitations and leg swelling.   Gastrointestinal: Negative for abdominal pain, nausea and vomiting.   Genitourinary: Negative for difficulty urinating and dysuria.   Musculoskeletal: Negative for neck pain.        (+)upper chest and back pain   Skin: Negative for rash.   Neurological: Negative for dizziness, weakness, light-headedness, numbness and headaches.   Psychiatric/Behavioral: Negative for agitation and confusion.   All other systems reviewed and are negative.      Physical Exam    Initial Vitals   BP Pulse Resp Temp SpO2   02/22/17 0814 02/22/17 0814 02/22/17 0814 02/22/17 0814 02/22/17 0814   155/77 101 20 97.8 °F (36.6 °C) 97 %      Physical Exam  Nursing Notes and Vital Signs Reviewed.  Constitutional: Patient is in no acute distress. Awake and alert. Well-developed. Thin appearing.   Head: Atraumatic. Normocephalic.  Eyes: PERRL. EOM intact. Conjunctivae are not  "pale. No scleral icterus.  ENT: Mucous membranes are dry. Oropharynx is clear and symmetric. Trachea is intact.     Neck: Supple. Full ROM. No lymphadenopathy.  Cardiovascular: Regular rate. Regular rhythm. No murmurs, rubs, or gallops. Distal pulses are 2+ and symmetric.  Pulmonary/Chest: No respiratory distress. Clear to auscultation bilaterally. No wheezing, rales, or rhonchi.  Abdominal: Soft and non-distended.  There is no tenderness.  No rebound, guarding, or rigidity. Good bowel sounds.  Musculoskeletal: Moves all extremities. No obvious deformities. No edema. No calf tenderness.  Skin: Warm and dry.  Neurological:  Alert, awake, and appropriate.  Normal speech.  No acute focal neurological deficits are appreciated.  Psychiatric: Normal affect. Good eye contact. Appropriate in content.    ED Course    Procedures  ED Vital Signs:  Vitals:    02/22/17 0814 02/22/17 0900 02/22/17 0917 02/22/17 0947   BP: (!) 155/77 115/67 112/75 127/69   Pulse: 101 91 86 89   Resp: 20 19 16   Temp: 97.8 °F (36.6 °C)      TempSrc: Oral      SpO2: 97% 99% 100% 99%   Weight: 49.9 kg (110 lb)      Height: 5' 2" (1.575 m)       02/22/17 1044 02/22/17 1114 02/22/17 1301 02/22/17 1504   BP: 110/72 110/70 106/66 105/64   Pulse: 88 89 87 85   Resp: 18 20 19    Temp:       TempSrc:       SpO2: 100% 99% 100% 98%   Weight:       Height:        02/22/17 1506 02/22/17 1508 02/22/17 1517   BP: 112/77 119/75 108/66   Pulse: 97 90 88   Resp:   (!) 23   Temp:   98.5 °F (36.9 °C)   TempSrc:      SpO2: 100% 100% 100%   Weight:      Height:          Abnormal Lab Results:  Labs Reviewed   CBC W/ AUTO DIFFERENTIAL - Abnormal; Notable for the following:        Result Value    RBC 3.54 (*)     Hemoglobin 12.5 (*)     Hematocrit 36.7 (*)      (*)     MCH 35.3 (*)     RDW 14.6 (*)     Gran% 78.4 (*)     Lymph% 12.7 (*)     All other components within normal limits   COMPREHENSIVE METABOLIC PANEL - Abnormal; Notable for the following:     CO2 31 " (*)     Albumin 3.3 (*)     All other components within normal limits   MAGNESIUM   PROTIME-INR   APTT   LACTATE DEHYDROGENASE        All Lab Results:  Results for orders placed or performed during the hospital encounter of 02/22/17   CBC auto differential   Result Value Ref Range    WBC 9.66 3.90 - 12.70 K/uL    RBC 3.54 (L) 4.60 - 6.20 M/uL    Hemoglobin 12.5 (L) 14.0 - 18.0 g/dL    Hematocrit 36.7 (L) 40.0 - 54.0 %     (H) 82 - 98 fL    MCH 35.3 (H) 27.0 - 31.0 pg    MCHC 34.1 32.0 - 36.0 %    RDW 14.6 (H) 11.5 - 14.5 %    Platelets 220 150 - 350 K/uL    MPV 9.7 9.2 - 12.9 fL    Gran # 7.6 1.8 - 7.7 K/uL    Lymph # 1.2 1.0 - 4.8 K/uL    Mono # 0.6 0.3 - 1.0 K/uL    Eos # 0.2 0.0 - 0.5 K/uL    Baso # 0.02 0.00 - 0.20 K/uL    Gran% 78.4 (H) 38.0 - 73.0 %    Lymph% 12.7 (L) 18.0 - 48.0 %    Mono% 6.6 4.0 - 15.0 %    Eosinophil% 2.1 0.0 - 8.0 %    Basophil% 0.2 0.0 - 1.9 %    Differential Method Automated    Comprehensive metabolic panel   Result Value Ref Range    Sodium 140 136 - 145 mmol/L    Potassium 4.4 3.5 - 5.1 mmol/L    Chloride 101 95 - 110 mmol/L    CO2 31 (H) 23 - 29 mmol/L    Glucose 105 70 - 110 mg/dL    BUN, Bld 17 8 - 23 mg/dL    Creatinine 0.8 0.5 - 1.4 mg/dL    Calcium 10.1 8.7 - 10.5 mg/dL    Total Protein 7.4 6.0 - 8.4 g/dL    Albumin 3.3 (L) 3.5 - 5.2 g/dL    Total Bilirubin 0.2 0.1 - 1.0 mg/dL    Alkaline Phosphatase 114 55 - 135 U/L    AST 13 10 - 40 U/L    ALT 10 10 - 44 U/L    Anion Gap 8 8 - 16 mmol/L    eGFR if African American >60 >60 mL/min/1.73 m^2    eGFR if non African American >60 >60 mL/min/1.73 m^2   Magnesium   Result Value Ref Range    Magnesium 2.3 1.6 - 2.6 mg/dL   Protime-INR   Result Value Ref Range    Prothrombin Time 10.1 9.0 - 12.5 sec    INR 1.0 0.8 - 1.2   APTT   Result Value Ref Range    aPTT 27.8 21.0 - 32.0 sec         Imaging Results:  Imaging Results         CT Chest With Contrast (Final result) Result time:  02/22/17 10:51:06    Final result by Raheel GOOD  MD Elli (02/22/17 10:51:06)    Impression:     Marked air distention of the proximal thoracic esophagus down to the region of unchanged subcarinal lymphadenopathy. The esophageal obstruction may be on the basis of lymphadenopathy but esophageal mass at this level is a definite consideration. Development of bilateral pleural effusions. Otherwise no significant change from 1/11/17 PET/CT.    All CT scans at this facility use dose modulation, iterative reconstruction and/or weight based dosing when appropriate to reduce radiation dose to as low as reasonably achievable.       Electronically signed by: TOPHER YI MD  Date:     02/22/17  Time:    10:51     Narrative:    History: Dysphagia, onset gradually one month ago, worsening a week ago. History of metastatic lung cancer    Comparison 1/11/17 PET/CT, 12/20/16 CT chest    There is marked air distention of the proximal esophagus down to the subcarinal region where there is prominent subcarinal lymphadenopathy as before. The degree of air distention of the proximal esophagus is worse. There is no air in the esophagus at the subcarinal region and then air is demonstrated in the distal esophagus. It would be difficult to exclude an esophageal mass at this level, versus extrinsic mass effect on the esophagus by the adenopathy. There has been interval development of small to moderate sized bilateral pleural effusions. The mass in the right lower lobe is essentially unchanged. There are several left lower lobe masses which are essentially unchanged. A few scattered small pulmonary metastases bilaterally. There is emphysematous change and marked diffuse interstitial fibrosis. There is nodular thickening of the pericardium. Right supraclavicular lymphadenopathy again noted.                      The Emergency Provider reviewed the vital signs and test results, which are outlined above.    ED Discussion     10:47 AM: Re-evaluated pt. I have discussed test results,  shared treatment plan, and the need for admission with patient and family at bedside. Pt and family express understanding at this time and agree with all information. All questions answered. Pt and family have no further questions or concerns at this time. Pt is ready for admit.    10:58 AM: Discussed case with Marli ([facility]) who will call back. [facility] is suggesting admission for observation at this time.    11:04 AM: Discussed case with Marli ([facility]). Dr. Welsh agrees with current care and management of pt and accepts admission.   Admitting Service: [facility]   Admitting Physician: Dr. Welsh  Admit to: Med Surg    12:22 PM: Dr. Cool discussed the pt's case with Dr. Barraza (Oncology) who agrees with plan of care and tx.    12:31 PM: Dr. Cool discussed the pt's case with Dr. Cheng (GI) who agrees with plan of care and tx.    ED Medication(s):  Medications   0.9%  NaCl infusion ( Intravenous New Bag 2/22/17 1307)   pantoprazole injection 40 mg (40 mg Intravenous Given 2/22/17 1159)   morphine injection 2 mg (2 mg Intravenous Given 2/22/17 1443)   sodium chloride 0.9% 1,000 mL with mvi, adult no.4 with vit K 3,300 unit- 150 mcg/10 mL 10 mL, thiamine 100 mg, folic acid 1 mg infusion (not administered)   hydrocodone-apap 2.5-108 MG/5 ML oral solution 15 mL (not administered)   hydrocodone-apap 2.5-108 MG/5 ML oral solution 10 mL (not administered)   sodium chloride 0.9% bolus 500 mL (0 mLs Intravenous Stopped 2/22/17 0920)   0.9%  NaCl infusion (0 mLs Intravenous Stopped 2/22/17 1308)   ondansetron injection 4 mg (4 mg Intravenous Given 2/22/17 0859)   morphine injection 2 mg (2 mg Intravenous Given 2/22/17 0900)   morphine injection 4 mg (4 mg Intravenous Given 2/22/17 1001)   omnipaque 350 iohexol 75 mL (75 mLs Intravenous Given 2/22/17 1022)       New Prescriptions    No medications on file             Medical Decision Making    Medical Decision Making:   Clinical  Tests:   Lab Tests: Reviewed and Ordered  Radiological Study: Reviewed and Ordered           Scribe Attestation:   Scribe #1: I performed the above scribed service and the documentation accurately describes the services I performed. I attest to the accuracy of the note.    Attending:   Physician Attestation Statement for Scribe #1: I, Sheri Cool DO, personally performed the services described in this documentation, as scribed by Palmer Farley, in my presence, and it is both accurate and complete.          Clinical Impression       ICD-10-CM ICD-9-CM   1. Esophageal obstruction K22.2 530.3   2. Bilateral pleural effusion J90 511.9   3. Malignant neoplasm of lung, unspecified laterality, unspecified part of lung C34.90 162.9       Disposition:   Disposition: Admitted (Med surg)  Condition: Fair         Sheri Cool DO  02/22/17 1533

## 2017-02-22 NOTE — H&P
[facility] Hospital Medicine  History & Physical    Patient Name: Frederick J Lejeune  MRN: [id number]  Admission Date: 2/22/2017  Attending Physician: Antonio Welsh MD   Primary Care Provider: Kenrick Muñoz MD         Patient information was obtained from patient, past medical records and ER records.     Subjective:     Principal Problem:Esophageal obstruction    Chief Complaint:   Chief Complaint   Patient presents with    Dysphagia     pt. sent over from PCP for difficulties swallowing and hypotension         HPI: Frederick J Lejeune is a 66 y.o. male with PMHx of GERD, weight loss, dysphagia, odynophagia, RLL lung mass squamous cell carcinoma, who presented to ER from Dr. Espinosa's office with c/o dysphagia, weakness, dizziness, chest pain. He has had difficulty eating due to odynophagia for the past month. Patient has completed palliative radiation to relieve right mainstem bronchus obstruction. He then was treated with weekly Carbo-Taxol x 6-7 weeks, which was discontinued due to disease progression on imaging in early 1/2017. He is currently receiving Pembrolizumab every 3 weeks. Today, he presented to Dr. Espinosa's office on an urgent visit regarding his trouble swallowing and inability to eat. He is also dizzy, weak, SOB and has pain in his chest. No cough, fevers, chills. His left groin mass is stable per patient, but he still has some mild pain in the right supraclavicular region due to lymphadenopathy. He was recently evaluated by Dr. Cheng who recommended further evaluation with esophagram, but pt was too sick to attend appointment. GI has been consulted for esophagram for pallative treatment.    Past Medical History   Diagnosis Date    Cancer      lung    COPD (chronic obstructive pulmonary disease)     GERD (gastroesophageal reflux disease)     Lumbar vertebral fracture        Past Surgical History   Procedure Laterality Date    Appendectomy      Abscess drainage      Hand surgery  Left     Back surgery         Review of patient's allergies indicates:   Allergen Reactions    Ambien [zolpidem] Other (See Comments)     Makes me extremely hyped up like im going crazy.    Zoloft [sertraline] Other (See Comments)     unknown       No current facility-administered medications on file prior to encounter.      Current Outpatient Prescriptions on File Prior to Encounter   Medication Sig    albuterol-ipratropium 2.5mg-0.5mg/3mL (DUO-NEB) 0.5 mg-3 mg(2.5 mg base)/3 mL nebulizer solution Take 3 mLs by nebulization every 6 (six) hours as needed for Wheezing or Shortness of Breath.    alprazolam (XANAX) 0.25 MG tablet Take 2 tablets (0.5 mg total) by mouth 2 (two) times daily as needed for Insomnia or Anxiety.    atorvastatin (LIPITOR) 40 MG tablet Take 40 mg by mouth every evening.    benzonatate (TESSALON) 100 MG capsule Take 1 capsule (100 mg total) by mouth 3 (three) times daily as needed for Cough.    DICYCLOMINE HCL (GI COCKTAIL SIMPLE RECORD) Take 15 mLs by mouth daily as needed.    docusate sodium (COLACE) 100 MG capsule Take 1 capsule (100 mg total) by mouth 2 (two) times daily.    esomeprazole (NEXIUM) 40 MG capsule Take 1 capsule (40 mg total) by mouth 2 (two) times daily. (Patient taking differently: Take 20 mg by mouth once daily. )    hydrocodone-acetaminophen 5-325mg (NORCO) 5-325 mg per tablet Take 1 tablet by mouth every 4 (four) hours as needed for Pain.    meclizine (ANTIVERT) 25 mg tablet Take 1 tablet (25 mg total) by mouth 3 (three) times daily as needed for Dizziness. (Patient taking differently: Take 25 mg by mouth 3 (three) times daily. )    ondansetron (ZOFRAN-ODT) 4 MG TbDL Take 2 tablets (8 mg total) by mouth every 8 (eight) hours as needed.    oxycodone (ROXICODONE) 5 MG immediate release tablet Take 1 tablet (5 mg total) by mouth every 8 (eight) hours as needed for Pain.    OXYGEN-AIR DELIVERY SYSTEMS MISC by Misc.(Non-Drug; Combo Route) route.     prochlorperazine (COMPAZINE) 10 MG tablet Take 1 tablet (10 mg total) by mouth every 6 (six) hours as needed.    fish oil-omega-3 fatty acids 300-1,000 mg capsule Take 2 g by mouth once daily.    metoprolol tartrate (LOPRESSOR) 25 MG tablet Take 1 tablet (25 mg total) by mouth 2 (two) times daily.    oxycodone-acetaminophen (PERCOCET)  mg per tablet Take 0.5-1 tablets by mouth every 4 (four) hours as needed for Pain.     Family History     Problem Relation (Age of Onset)    Cancer Father    Emphysema Mother        Social History Main Topics    Smoking status: Former Smoker     Packs/day: 1.00     Years: 50.00     Types: Cigarettes     Quit date: 5/10/2016    Smokeless tobacco: Former User     Types: Snuff     Quit date: 5/10/2016    Alcohol use No    Drug use: No    Sexual activity: Yes     Partners: Female     Review of Systems   Constitutional: Positive for appetite change and fatigue. Negative for activity change, chills, diaphoresis, fever and unexpected weight change.   HENT: Positive for trouble swallowing. Negative for congestion, ear discharge, ear pain, facial swelling, hearing loss, postnasal drip, sore throat, tinnitus and voice change.    Eyes: Negative for photophobia, pain, discharge, redness, itching and visual disturbance.   Respiratory: Positive for shortness of breath. Negative for cough, choking, chest tightness, wheezing and stridor.    Cardiovascular: Positive for chest pain. Negative for palpitations and leg swelling.   Gastrointestinal: Negative for abdominal distention, abdominal pain, blood in stool, constipation, diarrhea, nausea and vomiting.        Dysphagia   Endocrine: Negative for cold intolerance, heat intolerance, polydipsia, polyphagia and polyuria.   Genitourinary: Negative for difficulty urinating, flank pain, frequency, hematuria and urgency.   Musculoskeletal: Negative for arthralgias, back pain, gait problem and myalgias.        Pain in right groin and right neck    Skin: Negative for color change, pallor, rash and wound.   Neurological: Positive for dizziness and weakness. Negative for tremors, seizures, syncope, facial asymmetry, speech difficulty, light-headedness, numbness and headaches.   Hematological: Negative for adenopathy. Does not bruise/bleed easily.   Psychiatric/Behavioral: Negative for agitation, confusion, hallucinations and suicidal ideas. The patient is not nervous/anxious.    All other systems reviewed and are negative.    Objective:     Vital Signs (Most Recent):  Temp: 98.5 °F (36.9 °C) (02/22/17 1517)  Pulse: 88 (02/22/17 1517)  Resp: (!) 23 (02/22/17 1517)  BP: 108/66 (02/22/17 1517)  SpO2: 100 % (02/22/17 1517) Vital Signs (24h Range):  Temp:  [97.8 °F (36.6 °C)-98.5 °F (36.9 °C)] 98.5 °F (36.9 °C)  Pulse:  [] 88  Resp:  [16-23] 23  SpO2:  [93 %-100 %] 100 %  BP: (105-155)/(64-78) 108/66     Weight: 49.9 kg (110 lb)  Body mass index is 20.12 kg/(m^2).    Physical Exam   Constitutional: He is oriented to person, place, and time. He appears well-developed.   Cachectic   HENT:   Head: Normocephalic and atraumatic.   Nose: Nose normal.   Eyes: Conjunctivae and EOM are normal. Pupils are equal, round, and reactive to light.   Neck: Normal range of motion. Neck supple. No JVD present. No tracheal deviation present. No thyromegaly present.   Cardiovascular: Normal rate, regular rhythm, normal heart sounds and intact distal pulses.  Exam reveals no gallop and no friction rub.    No murmur heard.  Pulmonary/Chest: Effort normal. No stridor. No respiratory distress. He has no wheezes. He has no rales. He exhibits no tenderness.   Abdominal: Soft. Bowel sounds are normal. He exhibits no distension. There is no tenderness. There is no rebound and no guarding.   Left groin with 5x6 cm enlarged Lymph node, TTP and right supraclavicular LAD present, firm and TTP.   Musculoskeletal: Normal range of motion. He exhibits no edema, tenderness or deformity.    Neurological: He is alert and oriented to person, place, and time. He has normal reflexes. No cranial nerve deficit. Coordination normal.   Skin: Skin is warm and dry. No rash noted. No erythema. No pallor.   Psychiatric: He has a normal mood and affect. His behavior is normal. Judgment and thought content normal.   Nursing note and vitals reviewed.       Significant Labs:   BMP:   Recent Labs  Lab 02/22/17  0837         K 4.4      CO2 31*   BUN 17   CREATININE 0.8   CALCIUM 10.1   MG 2.3     CBC:   Recent Labs  Lab 02/22/17  0837   WBC 9.66   HGB 12.5*   HCT 36.7*          Significant Imaging: I have reviewed all pertinent imaging results/findings within the past 24 hours.   Imaging Results         CT Chest With Contrast (Final result) Result time:  02/22/17 10:51:06    Final result by Raheel Calloway MD (02/22/17 10:51:06)    Impression:     Marked air distention of the proximal thoracic esophagus down to the region of unchanged subcarinal lymphadenopathy. The esophageal obstruction may be on the basis of lymphadenopathy but esophageal mass at this level is a definite consideration. Development of bilateral pleural effusions. Otherwise no significant change from 1/11/17 PET/CT.    All CT scans at this facility use dose modulation, iterative reconstruction and/or weight based dosing when appropriate to reduce radiation dose to as low as reasonably achievable.       Electronically signed by: RAHEEL CALLOWAY MD  Date:     02/22/17  Time:    10:51     Narrative:    History: Dysphagia, onset gradually one month ago, worsening a week ago. History of metastatic lung cancer    Comparison 1/11/17 PET/CT, 12/20/16 CT chest    There is marked air distention of the proximal esophagus down to the subcarinal region where there is prominent subcarinal lymphadenopathy as before. The degree of air distention of the proximal esophagus is worse. There is no air in the esophagus at the subcarinal region  and then air is demonstrated in the distal esophagus. It would be difficult to exclude an esophageal mass at this level, versus extrinsic mass effect on the esophagus by the adenopathy. There has been interval development of small to moderate sized bilateral pleural effusions. The mass in the right lower lobe is essentially unchanged. There are several left lower lobe masses which are essentially unchanged. A few scattered small pulmonary metastases bilaterally. There is emphysematous change and marked diffuse interstitial fibrosis. There is nodular thickening of the pericardium. Right supraclavicular lymphadenopathy again noted.            Assessment/Plan:     * Esophageal obstruction  -GI consult: Dr. Cheng will see this afternoon  -full liquid diet      Oropharyngeal dysphagia  -esophagram on hold until seen by       Lung cancer  -outpatient followup      Bone metastases  Outpatient followup      COPD, very severe  nebulizers PRN      Left groin pain  -4-5 cm lymph node - known to Dr. Espinosa      VTE Risk Mitigation         Ordered     enoxaparin injection 40 mg  Daily     Route:  Subcutaneous        02/22/17 1549     Medium Risk of VTE  Once      02/22/17 1549     Place SUKHI hose  Until discontinued      02/22/17 1549     Place sequential compression device  Until discontinued      02/22/17 1549     Reason for No Pharmacological VTE Prophylaxis  Once      02/22/17 1549        Kathleen Campos NP  Department of Hospital Medicine   [facility]

## 2017-02-22 NOTE — SUBJECTIVE & OBJECTIVE
Past Medical History   Diagnosis Date    Cancer      lung    COPD (chronic obstructive pulmonary disease)     GERD (gastroesophageal reflux disease)     Lumbar vertebral fracture        Past Surgical History   Procedure Laterality Date    Appendectomy      Abscess drainage      Hand surgery Left     Back surgery         Review of patient's allergies indicates:   Allergen Reactions    Ambien [zolpidem] Other (See Comments)     Makes me extremely hyped up like im going crazy.    Zoloft [sertraline] Other (See Comments)     unknown       No current facility-administered medications on file prior to encounter.      Current Outpatient Prescriptions on File Prior to Encounter   Medication Sig    albuterol-ipratropium 2.5mg-0.5mg/3mL (DUO-NEB) 0.5 mg-3 mg(2.5 mg base)/3 mL nebulizer solution Take 3 mLs by nebulization every 6 (six) hours as needed for Wheezing or Shortness of Breath.    alprazolam (XANAX) 0.25 MG tablet Take 2 tablets (0.5 mg total) by mouth 2 (two) times daily as needed for Insomnia or Anxiety.    atorvastatin (LIPITOR) 40 MG tablet Take 40 mg by mouth every evening.    benzonatate (TESSALON) 100 MG capsule Take 1 capsule (100 mg total) by mouth 3 (three) times daily as needed for Cough.    DICYCLOMINE HCL (GI COCKTAIL SIMPLE RECORD) Take 15 mLs by mouth daily as needed.    docusate sodium (COLACE) 100 MG capsule Take 1 capsule (100 mg total) by mouth 2 (two) times daily.    esomeprazole (NEXIUM) 40 MG capsule Take 1 capsule (40 mg total) by mouth 2 (two) times daily. (Patient taking differently: Take 20 mg by mouth once daily. )    hydrocodone-acetaminophen 5-325mg (NORCO) 5-325 mg per tablet Take 1 tablet by mouth every 4 (four) hours as needed for Pain.    meclizine (ANTIVERT) 25 mg tablet Take 1 tablet (25 mg total) by mouth 3 (three) times daily as needed for Dizziness. (Patient taking differently: Take 25 mg by mouth 3 (three) times daily. )    ondansetron (ZOFRAN-ODT) 4 MG TbDL  Take 2 tablets (8 mg total) by mouth every 8 (eight) hours as needed.    oxycodone (ROXICODONE) 5 MG immediate release tablet Take 1 tablet (5 mg total) by mouth every 8 (eight) hours as needed for Pain.    OXYGEN-AIR DELIVERY SYSTEMS MISC by Misc.(Non-Drug; Combo Route) route.    prochlorperazine (COMPAZINE) 10 MG tablet Take 1 tablet (10 mg total) by mouth every 6 (six) hours as needed.    fish oil-omega-3 fatty acids 300-1,000 mg capsule Take 2 g by mouth once daily.    metoprolol tartrate (LOPRESSOR) 25 MG tablet Take 1 tablet (25 mg total) by mouth 2 (two) times daily.    oxycodone-acetaminophen (PERCOCET)  mg per tablet Take 0.5-1 tablets by mouth every 4 (four) hours as needed for Pain.     Family History     Problem Relation (Age of Onset)    Cancer Father    Emphysema Mother        Social History Main Topics    Smoking status: Former Smoker     Packs/day: 1.00     Years: 50.00     Types: Cigarettes     Quit date: 5/10/2016    Smokeless tobacco: Former User     Types: Snuff     Quit date: 5/10/2016    Alcohol use No    Drug use: No    Sexual activity: Yes     Partners: Female     Review of Systems   Constitutional: Positive for appetite change and fatigue. Negative for activity change, chills, diaphoresis, fever and unexpected weight change.   HENT: Positive for trouble swallowing. Negative for congestion, ear discharge, ear pain, facial swelling, hearing loss, postnasal drip, sore throat, tinnitus and voice change.    Eyes: Negative for photophobia, pain, discharge, redness, itching and visual disturbance.   Respiratory: Positive for shortness of breath. Negative for cough, choking, chest tightness, wheezing and stridor.    Cardiovascular: Positive for chest pain. Negative for palpitations and leg swelling.   Gastrointestinal: Negative for abdominal distention, abdominal pain, blood in stool, constipation, diarrhea, nausea and vomiting.        Dysphagia   Endocrine: Negative for cold  intolerance, heat intolerance, polydipsia, polyphagia and polyuria.   Genitourinary: Negative for difficulty urinating, flank pain, frequency, hematuria and urgency.   Musculoskeletal: Negative for arthralgias, back pain, gait problem and myalgias.        Pain in right groin and right neck   Skin: Negative for color change, pallor, rash and wound.   Neurological: Positive for dizziness and weakness. Negative for tremors, seizures, syncope, facial asymmetry, speech difficulty, light-headedness, numbness and headaches.   Hematological: Negative for adenopathy. Does not bruise/bleed easily.   Psychiatric/Behavioral: Negative for agitation, confusion, hallucinations and suicidal ideas. The patient is not nervous/anxious.    All other systems reviewed and are negative.    Objective:     Vital Signs (Most Recent):  Temp: 98.5 °F (36.9 °C) (02/22/17 1517)  Pulse: 88 (02/22/17 1517)  Resp: (!) 23 (02/22/17 1517)  BP: 108/66 (02/22/17 1517)  SpO2: 100 % (02/22/17 1517) Vital Signs (24h Range):  Temp:  [97.8 °F (36.6 °C)-98.5 °F (36.9 °C)] 98.5 °F (36.9 °C)  Pulse:  [] 88  Resp:  [16-23] 23  SpO2:  [93 %-100 %] 100 %  BP: (105-155)/(64-78) 108/66     Weight: 49.9 kg (110 lb)  Body mass index is 20.12 kg/(m^2).    Physical Exam   Constitutional: He is oriented to person, place, and time. He appears well-developed.   Cachectic   HENT:   Head: Normocephalic and atraumatic.   Nose: Nose normal.   Eyes: Conjunctivae and EOM are normal. Pupils are equal, round, and reactive to light.   Neck: Normal range of motion. Neck supple. No JVD present. No tracheal deviation present. No thyromegaly present.   Cardiovascular: Normal rate, regular rhythm, normal heart sounds and intact distal pulses.  Exam reveals no gallop and no friction rub.    No murmur heard.  Pulmonary/Chest: Effort normal. No stridor. No respiratory distress. He has no wheezes. He has no rales. He exhibits no tenderness.   Abdominal: Soft. Bowel sounds are normal.  He exhibits no distension. There is no tenderness. There is no rebound and no guarding.   Left groin with 5x6 cm enlarged Lymph node, TTP and right supraclavicular LAD present, firm and TTP.   Musculoskeletal: Normal range of motion. He exhibits no edema, tenderness or deformity.   Neurological: He is alert and oriented to person, place, and time. He has normal reflexes. No cranial nerve deficit. Coordination normal.   Skin: Skin is warm and dry. No rash noted. No erythema. No pallor.   Psychiatric: He has a normal mood and affect. His behavior is normal. Judgment and thought content normal.   Nursing note and vitals reviewed.       Significant Labs:   BMP:   Recent Labs  Lab 02/22/17  0837         K 4.4      CO2 31*   BUN 17   CREATININE 0.8   CALCIUM 10.1   MG 2.3     CBC:   Recent Labs  Lab 02/22/17  0837   WBC 9.66   HGB 12.5*   HCT 36.7*          Significant Imaging: I have reviewed all pertinent imaging results/findings within the past 24 hours.   Imaging Results         CT Chest With Contrast (Final result) Result time:  02/22/17 10:51:06    Final result by Raheel Calloway MD (02/22/17 10:51:06)    Impression:     Marked air distention of the proximal thoracic esophagus down to the region of unchanged subcarinal lymphadenopathy. The esophageal obstruction may be on the basis of lymphadenopathy but esophageal mass at this level is a definite consideration. Development of bilateral pleural effusions. Otherwise no significant change from 1/11/17 PET/CT.    All CT scans at this facility use dose modulation, iterative reconstruction and/or weight based dosing when appropriate to reduce radiation dose to as low as reasonably achievable.       Electronically signed by: RAHEEL CALLOWAY MD  Date:     02/22/17  Time:    10:51     Narrative:    History: Dysphagia, onset gradually one month ago, worsening a week ago. History of metastatic lung cancer    Comparison 1/11/17 PET/CT, 12/20/16 CT  chest    There is marked air distention of the proximal esophagus down to the subcarinal region where there is prominent subcarinal lymphadenopathy as before. The degree of air distention of the proximal esophagus is worse. There is no air in the esophagus at the subcarinal region and then air is demonstrated in the distal esophagus. It would be difficult to exclude an esophageal mass at this level, versus extrinsic mass effect on the esophagus by the adenopathy. There has been interval development of small to moderate sized bilateral pleural effusions. The mass in the right lower lobe is essentially unchanged. There are several left lower lobe masses which are essentially unchanged. A few scattered small pulmonary metastases bilaterally. There is emphysematous change and marked diffuse interstitial fibrosis. There is nodular thickening of the pericardium. Right supraclavicular lymphadenopathy again noted.

## 2017-02-22 NOTE — IP AVS SNAPSHOT
[facility]  [id number] [place] [address] [address] [place] FU[place] [address]           Patient Discharge Instructions     Our goal is to set you up for success. This packet includes information on your condition, medications, and your home care. It will help you to care for yourself so you don't get sicker and need to go back to the hospital.     Please ask your nurse if you have any questions.        There are many details to remember when preparing to leave the hospital. Here is what you will need to do:    1. Take your medicine. If you are prescribed medications, review your Medication List in the following pages. You may have new medications to  at the pharmacy and others that you'll need to stop taking. Review the instructions for how and when to take your medications. Talk with your doctor or nurses if you are unsure of what to do.     2. Go to your follow-up appointments. Specific follow-up information is listed in the following pages. Your may be contacted by a transition nurse or clinical provider about future appointments. Be sure we have all of the phone numbers to reach you, if needed. Please contact your provider's office if you are unable to make an appointment.     3. Watch for warning signs. Your doctor or nurse will give you detailed warning signs to watch for and when to call for assistance. These instructions may also include educational information about your condition. If you experience any of warning signs to your health, call your doctor.               ** Verify the list of medication(s) below is accurate and up to date. Carry this with you in case of emergency. If your medications have changed, please notify your healthcare provider.             Medication List      START taking these medications        Additional Info                      HYDROmorphone 2 MG tablet   Commonly known as:  DILAUDID   Quantity:  15 tablet   Refills:  0   Dose:  4 mg    Last time this was given:  2 mg on  2/28/2017  2:17 PM   Instructions:  Take 2 tablets (4 mg total) by mouth every 4 (four) hours as needed for Pain.     Begin Date    AM    Noon    PM    Bedtime       polyethylene glycol 17 gram Pwpk   Commonly known as:  GLYCOLAX   Quantity:  30 packet   Refills:  0   Dose:  17 g    Last time this was given:  17 g on 2/28/2017  8:52 AM   Instructions:  Take 17 g by mouth daily as needed (constipation).     Begin Date    AM    Noon    PM    Bedtime         CHANGE how you take these medications        Additional Info                      esomeprazole 40 MG capsule   Commonly known as:  NEXIUM   Quantity:  60 capsule   Refills:  0   Dose:  40 mg   What changed:    - how much to take  - when to take this    Instructions:  Take 1 capsule (40 mg total) by mouth 2 (two) times daily.     Begin Date    AM    Noon    PM    Bedtime       meclizine 25 mg tablet   Commonly known as:  ANTIVERT   Quantity:  15 tablet   Refills:  0   Dose:  25 mg   What changed:  when to take this    Instructions:  Take 1 tablet (25 mg total) by mouth 3 (three) times daily as needed for Dizziness.     Begin Date    AM    Noon    PM    Bedtime         CONTINUE taking these medications        Additional Info                      albuterol-ipratropium 2.5mg-0.5mg/3mL 0.5 mg-3 mg(2.5 mg base)/3 mL nebulizer solution   Commonly known as:  DUO-NEB   Quantity:  30 vial   Refills:  0   Dose:  3 mL    Last time this was given:  3 mLs on 2/28/2017 12:13 AM   Instructions:  Take 3 mLs by nebulization every 6 (six) hours as needed for Wheezing or Shortness of Breath.     Begin Date    AM    Noon    PM    Bedtime       alprazolam 0.25 MG tablet   Commonly known as:  XANAX   Quantity:  60 tablet   Refills:  2   Dose:  0.5 mg    Instructions:  Take 2 tablets (0.5 mg total) by mouth 2 (two) times daily as needed for Insomnia or Anxiety.     Begin Date    AM    Noon    PM    Bedtime       atorvastatin 40 MG tablet   Commonly known as:  LIPITOR   Refills:  0   Dose:   40 mg    Instructions:  Take 40 mg by mouth every evening.     Begin Date    AM    Noon    PM    Bedtime       benzonatate 100 MG capsule   Commonly known as:  TESSALON   Quantity:  90 capsule   Refills:  5   Dose:  100 mg    Last time this was given:  100 mg on 2/23/2017  8:20 PM   Instructions:  Take 1 capsule (100 mg total) by mouth 3 (three) times daily as needed for Cough.     Begin Date    AM    Noon    PM    Bedtime       docusate sodium 100 MG capsule   Commonly known as:  COLACE   Refills:  0   Dose:  100 mg    Instructions:  Take 1 capsule (100 mg total) by mouth 2 (two) times daily.     Begin Date    AM    Noon    PM    Bedtime       fish oil-omega-3 fatty acids 300-1,000 mg capsule   Refills:  0   Dose:  2 g    Instructions:  Take 2 g by mouth once daily.     Begin Date    AM    Noon    PM    Bedtime       GI COCKTAIL SIMPLE RECORD   Quantity:  1 Bottle   Refills:  3   Dose:  15 mL    Instructions:  Take 15 mLs by mouth daily as needed.     Begin Date    AM    Noon    PM    Bedtime       ondansetron 4 MG Tbdl   Commonly known as:  ZOFRAN-ODT   Quantity:  60 tablet   Refills:  1   Dose:  8 mg    Last time this was given:  8 mg on 2/28/2017  6:09 AM   Instructions:  Take 2 tablets (8 mg total) by mouth every 8 (eight) hours as needed.     Begin Date    AM    Noon    PM    Bedtime       OXYGEN-AIR DELIVERY SYSTEMS MISC   Refills:  0    Instructions:  by Misc.(Non-Drug; Combo Route) route.     Begin Date    AM    Noon    PM    Bedtime       prochlorperazine 10 MG tablet   Commonly known as:  COMPAZINE   Quantity:  30 tablet   Refills:  1   Dose:  10 mg    Instructions:  Take 1 tablet (10 mg total) by mouth every 6 (six) hours as needed.     Begin Date    AM    Noon    PM    Bedtime         STOP taking these medications     hydrocodone-acetaminophen 5-325mg 5-325 mg per tablet   Commonly known as:  NORCO       metoprolol tartrate 25 MG tablet   Commonly known as:  LOPRESSOR       oxycodone 5 MG immediate  release tablet   Commonly known as:  ROXICODONE       oxycodone-acetaminophen  mg per tablet   Commonly known as:  PERCOCET            Where to Get Your Medications      These medications were sent to [facility] [place] [place] - [place] - [address] [facility]  [address] [facility] [address]     Phone:  [phone]     ondansetron 4 MG Tbdl    polyethylene glycol 17 gram Pwpk         You can get these medications from any pharmacy     Bring a paper prescription for each of these medications     HYDROmorphone 2 MG tablet                  Please bring to all follow up appointments:    1. A copy of your discharge instructions.  2. All medicines you are currently taking in their original bottles.  3. Identification and insurance card.    Please arrive 15 minutes ahead of scheduled appointment time.    Please call 24 hours in advance if you must reschedule your appointment and/or time.        Your Scheduled Appointments     Mar 01, 2017  1:00 PM CST   Established Patient Visit with Zoila Espinosa MD   [facility] - Hemotology [facility]cology ([facility])    [address] [facility] [address] [phone]            May 02, 2017  9:30 AM CDT   Diagnostic Xray with ON XR1-   [facility]    [address] [place] [address] [phone]            May 02, 2017 10:00 AM CDT   Established Patient Visit with Zack Taylor MD   [facility] ([facility]    [address] [place] [address] [phone]              Follow-up Information     Follow up with Kenrick Muñoz MD. Schedule an appointment as soon as possible for a visit in 3 days.    Specialty:  Family Medicine    Contact information:    [address] [address] [address] [phone]          Follow up with Zoila Espinosa MD. Schedule an appointment as soon as possible for a visit in 1 day.    Specialty:  Hematology and Oncology    Contact  information:    [id number] [place] [address] [address] Robin [place] [address]  [phone]          Discharge Instructions     Future Orders    Activity as tolerated     Call MD for:  difficulty breathing or increased cough     Call MD for:  increased confusion or weakness     Call MD for:  persistent dizziness, light-headedness, or visual disturbances     Call MD for:  persistent nausea and vomiting or diarrhea     Call MD for:  redness, tenderness, or signs of infection (pain, swelling, redness, odor or green/yellow discharge around incision site)     Call MD for:  severe persistent headache     Call MD for:  severe uncontrolled pain     Call MD for:  temperature >100.4     Call MD for:  worsening rash     Diet general     Scheduling Instructions:    Full liquid with boost and water    Questions:    Total calories:      Fat restriction, if any:      Protein restriction, if any:      Na restriction, if any:      Fluid restriction:      Additional restrictions:      No dressing needed     Scheduling Instructions:    Warm compresses to Right arm 20 minutes 4 x day        Discharge Instructions         Ondansetron tablets  What is this medicine?  ONDANSETRON (on DAN se july) is used to treat nausea and vomiting caused by chemotherapy. It is also used to prevent or treat nausea and vomiting after surgery.  How should I use this medicine?  Take this medicine by mouth with a glass of water. Follow the directions on your prescription label. Take your doses at regular intervals. Do not take your medicine more often than directed.  Talk to your pediatrician regarding the use of this medicine in children. Special care may be needed.  What side effects may I notice from receiving this medicine?  Side effects that you should report to your doctor or health care professional as soon as possible:  · allergic reactions like skin rash, itching or hives, swelling of the face, lips or tongue  · breathing problems  · confusion  · dizziness  · fast  or irregular heartbeat  · feeling faint or lightheaded, falls  · fever and chills  · loss of balance or coordination  · seizures  · sweating  · swelling of the hands or feet  · tightness in the chest  · tremors  · unusually weak or tired  Side effects that usually do not require medical attention (report to your doctor or health care professional if they continue or are bothersome):  · constipation or diarrhea  · headache  What may interact with this medicine?  Do not take this medicine with any of the following medications:  · apomorphine  · certain medicines for fungal infections like fluconazole, itraconazole, ketoconazole, posaconazole, voriconazole  · cisapride  · dofetilide  · dronedarone  · pimozide  · thioridazine  · ziprasidone  This medicine may also interact with the following medications:  · carbamazepine  · certain medicines for depression, anxiety, or psychotic disturbances  · fentanyl  · linezolid  · MAOIs like Carbex, Eldepryl, Marplan, Nardil, and Parnate  · methylene blue (injected into a vein)  · other medicines that prolong the QT interval (cause an abnormal heart rhythm)  · phenytoin  · rifampicin  · tramadol  What if I miss a dose?  If you miss a dose, take it as soon as you can. If it is almost time for your next dose, take only that dose. Do not take double or extra doses.  Where should I keep my medicine?  Keep out of the reach of children.  Store between 2 and 30 degrees C (36 and 86 degrees F). Throw away any unused medicine after the expiration date.  What should I tell my health care provider before I take this medicine?  They need to know if you have any of these conditions:  · heart disease  · history of irregular heartbeat  · liver disease  · low levels of magnesium or potassium in the blood  · an unusual or allergic reaction to ondansetron, granisetron, other medicines, foods, dyes, or preservatives  · pregnant or trying to get pregnant  · breast-feeding  What should I watch for  while using this medicine?  Check with your doctor or health care professional right away if you have any sign of an allergic reaction.  Date Last Reviewed:   NOTE:This sheet is a summary. It may not cover all possible information. If you have questions about this medicine, talk to your doctor, pharmacist, or health care provider. Copyright© 2016 [employer]        Hydromorphone tablets  What is this medicine?  HYDROMORPHONE (larry droe MOR fone) is a pain reliever. It is used to treat moderate to severe pain.  How should I use this medicine?  Take this medicine by mouth with a glass of water. Follow the directions on the prescription label. You can take this medicine with or without food. If it upsets your stomach, take it with food. Take your medicine at regular intervals. Do not take it more often than directed. Do not stop taking except on your doctor's advice.  A special MedGuide will be given to you by the pharmacist with each prescription and refill. Be sure to read this information carefully each time.  Talk to your pediatrician regarding the use of this medicine in children. Special care may be needed.  What side effects may I notice from receiving this medicine?  Side effects that you should report to your doctor or health care professional as soon as possible:  · allergic reactions like skin rash, itching or hives, swelling of the face, lips, or tongue  · breathing problems  · confusion  · seizures  · signs and symptoms of low blood pressure like dizziness; feeling faint or lightheaded, falls; unusually weak or tired  · trouble passing urine or change in the amount of urine  Side effects that usually do not require medical attention (report to your doctor or health care professional if they continue or are bothersome):  · constipation  · dry mouth  · nausea, vomiting  · tiredness  What may interact with this medicine?  This medicine may interact with the following medications:  · alcohol  · antihistamines  for allergy, cough and cold  · certain medicines for anxiety or sleep  · certain medicines for depression like amitriptyline, fluoxetine, sertraline  · certain medicines for seizures like phenobarbital, primidone  · general anesthetics like halothane, isoflurane, methoxyflurane, propofol  · local anesthetics like lidocaine, pramoxine, tetracaine  · MAOIs like Carbex, Eldepryl, Marplan, Nardil, and Parnate  · medicines that relax muscles for surgery  · other narcotic medicines for pain or cough  · phenothiazines like chlorpromazine, mesoridazine, prochlorperazine, thioridazine  What if I miss a dose?  If you miss a dose, take it as soon as you can. If it is almost time for your next dose, take only that dose. Do not take double or extra doses.  Where should I keep my medicine?  Keep out of the reach of children. This medicine can be abused. Keep your medicine in a safe place to protect it from theft. Do not share this medicine with anyone. Selling or giving away this medicine is dangerous and against the law.  Store at room temperature between 15 and 30 degrees C (59 and 86 degrees F). Keep container tightly closed. Protect from light.  This medicine may cause accidental overdose and death if it is taken by other adults, children, or pets. Flush any unused medicine down the toilet to reduce the chance of harm. Do not use the medicine after the expiration date.  What should I tell my health care provider before I take this medicine?  They need to know if you have any of these conditions:  · brain tumor  · drug abuse or addiction  · head injury  · heart disease  · if you often drink alcohol  · kidney disease  · liver disease  · lung or breathing disease, like asthma  · problems urinating  · seizures  · stomach or intestine problems  · an unusual or allergic reaction to hydromorphone, other medicines, foods, dyes, or preservatives  · pregnant or trying to get pregnant  · breast-feeding  What should I watch for while  using this medicine?  Tell your doctor or health care professional if your pain does not go away, if it gets worse, or if you have new or a different type of pain. You may develop tolerance to the medicine. Tolerance means that you will need a higher dose of the medicine for pain relief. Tolerance is normal and is expected if you take this medicine for a long time.  Do not suddenly stop taking your medicine because you may develop a severe reaction. Your body becomes used to the medicine. This does NOT mean you are addicted. Addiction is a behavior related to getting and using a drug for a non-medical reason. If you have pain, you have a medical reason to take pain medicine. Your doctor will tell you how much medicine to take. If your doctor wants you to stop the medicine, the dose will be slowly lowered over time to avoid any side effects.  There are different types of narcotic medicines (opiates). If you take more than one type at the same time or if you are taking another medicine that also causes drowsiness, you may have more side effects. Give your health care provider a list of all medicines you use. Your doctor will tell you how much medicine to take. Do not take more medicine than directed. Call emergency for help if you have problems breathing or unusual sleepiness.  You may get drowsy or dizzy. Do not drive, use machinery, or do anything that needs mental alertness until you know how this medicine affects you. Do not stand or sit up quickly, especially if you are an older patient. This reduces the risk of dizzy or fainting spells. Alcohol may interfere with the effect of this medicine. Avoid alcoholic drinks.  This medicine will cause constipation. Try to have a bowel movement at least every 2 to 3 days. If you do not have a bowel movement for 3 days, call your doctor or health care professional.  Your mouth may get dry. Chewing sugarless gum or sucking hard candy, and drinking plenty of water may help.  Contact your doctor if the problem does not go away or is severe.  Date Last Reviewed:   NOTE:This sheet is a summary. It may not cover all possible information. If you have questions about this medicine, talk to your doctor, pharmacist, or health care provider. Copyright© 2016 [employer]        Esophageal Dilation     A balloon dilator may be used to widen a stricture in the esophagus.   An esophageal dilation is a procedure used to widen a narrowed section of your esophagus. This is the tube that leads from your throat to your stomach. Narrowing (stricture) of the esophagus can cause problems. These include trouble swallowing. This sheet explains what to expect with esophageal dilation.  Why esophageal dilation is needed  Several problems can be treated with esophageal dilation. They include:  · Peptic stricture. This is caused by reflux esophagitis. With this problem, the esophagus is irritated by acid reflux (heartburn). This occurs when acid from your stomach flows back up into the esophagus. Stomach acid damages the lining of the esophagus. This leads to a buildup of scar tissue. As a result, the esophagus is narrowed.  · Schatzkis ring. This is an abnormal ring of tissue. It forms where the esophagus meets the stomach. It can cause trouble swallowing. It can also cause food to get stuck in the esophagus. The cause of this condition is not known.  · Achalasia. This condition stops food and liquids from moving into your stomach from the esophagus. It affects the lower esophageal sphincter (LES). The LES is a muscular ring that opens (relaxes) when you swallow. With achalasia, the LES does not relax. This condition can also cause problems with peristalsis. This is the normal muscular action of the esophagus that moves food into the stomach.  · Eosinophilic esophagitis. This is a redness and swelling (inflammation) in the esophagus. It is caused by an environmental trigger such as a food allergy. It can lead  to pain, trouble swallowing, and strictures.  · Less common causes of stricture. Other causes of stricture include radiation treatment and cancer.  Before you have esophageal dilation  · Tell your provider about any medicines you take. This includes prescription medicines, over-the-counter medicines, herbs, vitamins, and other supplements. Be sure to mention aspirin or any blood thinners youre taking.  · Let your provider know if you need to take antibiotics before dental procedures. You may need to take them before esophageal dilation as well.  · Tell your provider about any health conditions you have, such as heart or lung disease. Also mention any allergies to medicines.  · Youll need to have an empty stomach for the procedure. Follow your providers instructions for not eating and drinking before the procedure.  · Arrange to have a family member or friend drive you home after the procedure.  During the procedure  · You may be given local anesthesia to numb your throat. Youll also likely be given medicine to relax you. The procedure takes about 15 minutes. It does not cause trouble breathing.  · A tube called an endoscope (scope) is used. This is a narrow tube with a tiny light and camera at the end. The scope is inserted through your mouth and into your esophagus. It lets your provider see inside your esophagus. To help guide your provider, an imaging method called fluoroscopy may also be used. This creates a moving X-ray image on a computer screen.   · Next, special tiny tools are carefully guided through your mouth and down into the esophagus. They widen the stricture and are then removed. Different types of instruments are used. The type used depends on the size and cause of the stricture. Types include:  ¨ Balloon dilator. A tiny empty balloon is put into the stricture using an endoscope. The balloon is slowly filled with air. The air is removed from the balloon when the stricture is widened to the right  size. Balloon dilators are used to treat many types of strictures.  ¨ Guided wire dilator. A thin wire is eased down the esophagus. A small tube thats wider on one end is guided down the wire. It is put into the stricture to stretch it. These dilators are used to treat all kinds of strictures.  ¨ Bougies. These are weighted, cone-shaped tubes. Starting with smaller cones, your provider uses increasingly larger cones until the stricture is stretched the right amount. Bougies are often used to treat strictures that are simple (short, straight, and not very narrow).  After the procedure  · Youll be watched closely until your provider says youre ready to go home. Youll need to have a friend or family member drive you home.  · You may have a sore throat for the rest of the day.  · You may have pain behind your breastbone for a short time afterwards.  · You can start drinking fluids again after the numbness in your throat goes away. You can resume eating the same day or the next day.  Risks and possible complications  Risks and possible complications for esophageal dilation include:  · Infection  · A tear or hole in the esophagus lining, causing bleeding and possibly needing surgery to fix  · Risks of anesthesia  Follow-up  You may need to have the procedure repeated one or more times. This depends on the cause and extent of the narrowing. Repeat procedures can allow the dilation to take place more slowly. This reduces the risks of the procedure.  If your stricture was caused by reflux esophagitis, youll likely need to take medicine to treat that condition. Your provider will tell you more.  When to call your provider  Call your healthcare provider right away if you have any of the following after the procedure:  · Fever of 100.4°F (38.0°C)  · Chest pain  · Trouble swallowing  · Vomiting blood or material that looks like coffee grounds  · Bleeding  · Black, tarry, or bloody stools   Date Last Reviewed: 7/1/2016  ©  2416-6924 [employer] [address] [place] [address]. All rights reserved. This information is not intended as a substitute for professional medical care. Always follow your healthcare professional's instructions.            Primary Diagnosis     Your primary diagnosis was:  Blockage Or Narrowing Of Esophagus      Admission Information     Date & Time Provider Department CSN    2/22/2017  8:16 AM Derek Bourgeois MD [facility] -  [id number]      Care Providers     Provider Role Specialty Primary office phone    Derek Bourgeois MD Attending Provider Internal Medicine [phone]    Adam Barraza MD Consulting Physician  Hematology and Oncology [phone]    Derek Bourgeois MD Team Attending  Internal Medicine [phone]      Your Vitals Were     BP                   112/65 (BP Location: Right arm, Patient Position: Lying, BP Method: Automatic)           Recent Lab Values     No lab values to display.      Pending Labs     Order Current Status    Comprehensive Metabolic Panel (CMP) Collected (02/27/17 0500)    Troponin I Collected (02/27/17 0500)      Allergies as of 2/28/2017        Reactions    Ambien [Zolpidem] Other (See Comments)    Makes me extremely hyped up like im going crazy.    Zoloft [Sertraline] Other (See Comments)    unknown      [facility]     [facility] Nurse Care Line - 24/7 Assistance  Unless otherwise directed by your provider, please contact [facility], our nurse care line that is available for 24/7 assistance.     Registered nurses in the [facility] provide clinical advisement, health education, appointment booking, and other advisory services.  Call for this free service at [phone].        Advance Directives     An advance directive is a document which, in the event you are no longer able to make decisions for yourself, tells your healthcare team what kind of treatment you do or do not want to receive,  or who you would like to make those decisions for you.  If you do not currently have an advance directive, [facility] encourages you to create one.  For more information call:  [phone]), [phone] ([phone]),  or log on to [email or web address].        Smoking Cessation     If you would like to quit smoking:   You may be eligible for free services if you are a [place] resident and started smoking cigarettes before September 1, 1988.  Call the Smoking [employer]essation [employer] (SCT) toll free at (726) 194-4401 or (773) 015-7100.   Call 4-801-QUIT-NOW if you do not meet the above criteria.            Language Assistance Services     ATTENTION: Language assistance services are available, free of charge. Please call [phone].      ATENCIÓN: Si habla español, tiene a marsh disposición servicios gratuitos de asistencia lingüística. Llame al [phone].     CHÚ Ý: N?u b?n nói Ti?ng Vi?t, có các d?ch v? h? tr? ngôn ng? mi?n phí dành cho b?n. G?i s? [phone].        Pneumonmia Discharge Instructions                 [facility] complies with applicable Federal civil rights laws and does not discriminate on the basis of race, color, national origin, age, disability, or sex.

## 2017-02-22 NOTE — MEDICAL/APP STUDENT
History     Chief Complaint   Patient presents with    Dysphagia     pt. sent over from PCP for difficulties swallowing and hypotension      HPI Comments: 67 yo male with a PMHx of metastatic lung CA, and Lymphadenopathy presenting with progressive dysphagia due to extrinsic compression. Pt was seen this morning by Dr. Espinosa and complained of chest pain, SOB, dizziness, weakness and inability to eat. She sent him to the ED to be admitted and for further workup. Pt was to perform an Esophagram 2 weeks ago but was too sick to attend appointment. Pt is admitted to hospital and GI consulted for dysphagia. Pt reports he has had progressive dysphagia for one month worsening in the last week. He reports drinking 4 Boost every day and has to take one sip then wait 10 minutes to take another. Pt reports feeling weak from not eating all day. Pt agitated because he is hungry and needs nutrition because he has CA. Pt denies abdominal pain, nausea, vomiting, diarrhea. No GI bleeding.       Past Medical History   Diagnosis Date    Cancer      lung    COPD (chronic obstructive pulmonary disease)     GERD (gastroesophageal reflux disease)     Lumbar vertebral fracture        Past Surgical History   Procedure Laterality Date    Appendectomy      Abscess drainage      Hand surgery Left     Back surgery         Family History   Problem Relation Age of Onset    Emphysema Mother     Cancer Father      mesothelioma       Social History   Substance Use Topics    Smoking status: Former Smoker     Packs/day: 1.00     Years: 50.00     Types: Cigarettes     Quit date: 5/10/2016    Smokeless tobacco: Former User     Types: Snuff     Quit date: 5/10/2016    Alcohol use No       Review of Systems   Constitutional: Negative for fatigue.   HENT: Positive for trouble swallowing. Negative for congestion and sore throat.    Eyes: Negative for pain.   Respiratory: Positive for cough. Negative for choking and shortness of breath.   "  Cardiovascular: Negative for chest pain and leg swelling.   Gastrointestinal: Negative for abdominal distention, abdominal pain, blood in stool, constipation, diarrhea, nausea and vomiting.   Genitourinary: Negative for dysuria and hematuria.   Musculoskeletal: Positive for back pain.   Skin: Negative for rash.   Neurological: Negative for dizziness and headaches.   Psychiatric/Behavioral: Positive for agitation.       Physical Exam     Visit Vitals    /66    Pulse 87    Temp 97.8 °F (36.6 °C) (Oral)    Resp 19    Ht 5' 2" (1.575 m)    Wt 49.9 kg (110 lb)    SpO2 100%    BMI 20.12 kg/m2       Physical Exam    Vitals reviewed.  Constitutional: No distress.   Cachetic.   HENT:   Head: Normocephalic and atraumatic.   Eyes: EOM are normal.   Neck: Neck supple.   Cardiovascular: Normal rate and regular rhythm.   No murmur heard.  Pulmonary/Chest: Breath sounds normal. No respiratory distress. He has no wheezes.   Abdominal: Soft. Bowel sounds are normal. He exhibits no distension. There is no tenderness. There is no guarding.   Musculoskeletal: He exhibits no edema.   Neurological: He is alert and oriented to person, place, and time.   Skin: Skin is warm and dry.   Psychiatric:   Agitated         ED Course   See ER provider note for clinical course.     Assessment and Plan    65 yo male with pmhx of metastatic lung cancer admitted with dysphagia secondary to extrinsic compression from enlarging mediastinal LAD. Dr. Cheng plans to review the images with radiology.   Recommendations could include Esophagram, Esophageal stent placement, PEG tube placement  Further recommendations after review of imaging  Continue hydration and supportive care.  "

## 2017-02-23 LAB
ALBUMIN SERPL BCP-MCNC: 2.8 G/DL
ALP SERPL-CCNC: 99 U/L
ALT SERPL W/O P-5'-P-CCNC: 9 U/L
ANION GAP SERPL CALC-SCNC: 8 MMOL/L
AST SERPL-CCNC: 11 U/L
BASOPHILS # BLD AUTO: 0.03 K/UL
BASOPHILS NFR BLD: 0.5 %
BILIRUB SERPL-MCNC: 0.3 MG/DL
BUN SERPL-MCNC: 10 MG/DL
CALCIUM SERPL-MCNC: 9.4 MG/DL
CHLORIDE SERPL-SCNC: 106 MMOL/L
CO2 SERPL-SCNC: 27 MMOL/L
CREAT SERPL-MCNC: 0.7 MG/DL
DIFFERENTIAL METHOD: ABNORMAL
EOSINOPHIL # BLD AUTO: 0.2 K/UL
EOSINOPHIL NFR BLD: 2.9 %
ERYTHROCYTE [DISTWIDTH] IN BLOOD BY AUTOMATED COUNT: 14.6 %
EST. GFR  (AFRICAN AMERICAN): >60 ML/MIN/1.73 M^2
EST. GFR  (NON AFRICAN AMERICAN): >60 ML/MIN/1.73 M^2
GLUCOSE SERPL-MCNC: 87 MG/DL
HCT VFR BLD AUTO: 33.5 %
HGB BLD-MCNC: 11.2 G/DL
LYMPHOCYTES # BLD AUTO: 1.1 K/UL
LYMPHOCYTES NFR BLD: 16.4 %
MCH RBC QN AUTO: 35 PG
MCHC RBC AUTO-ENTMCNC: 33.4 %
MCV RBC AUTO: 105 FL
MONOCYTES # BLD AUTO: 0.4 K/UL
MONOCYTES NFR BLD: 6 %
NEUTROPHILS # BLD AUTO: 4.9 K/UL
NEUTROPHILS NFR BLD: 74.4 %
PLATELET # BLD AUTO: 203 K/UL
PMV BLD AUTO: 9.7 FL
POTASSIUM SERPL-SCNC: 4.1 MMOL/L
PROT SERPL-MCNC: 6.3 G/DL
RBC # BLD AUTO: 3.2 M/UL
SODIUM SERPL-SCNC: 141 MMOL/L
WBC # BLD AUTO: 6.65 K/UL

## 2017-02-23 PROCEDURE — 11000001 HC ACUTE MED/SURG PRIVATE ROOM

## 2017-02-23 PROCEDURE — 25000003 PHARM REV CODE 250: Performed by: NURSE PRACTITIONER

## 2017-02-23 PROCEDURE — 63600175 PHARM REV CODE 636 W HCPCS: Performed by: FAMILY MEDICINE

## 2017-02-23 PROCEDURE — 63600175 PHARM REV CODE 636 W HCPCS: Performed by: EMERGENCY MEDICINE

## 2017-02-23 PROCEDURE — 80053 COMPREHEN METABOLIC PANEL: CPT

## 2017-02-23 PROCEDURE — 85025 COMPLETE CBC W/AUTO DIFF WBC: CPT

## 2017-02-23 PROCEDURE — 25000003 PHARM REV CODE 250: Performed by: FAMILY MEDICINE

## 2017-02-23 PROCEDURE — 63600175 PHARM REV CODE 636 W HCPCS: Performed by: NURSE PRACTITIONER

## 2017-02-23 PROCEDURE — 99223 1ST HOSP IP/OBS HIGH 75: CPT | Mod: ,,, | Performed by: INTERNAL MEDICINE

## 2017-02-23 PROCEDURE — 36415 COLL VENOUS BLD VENIPUNCTURE: CPT

## 2017-02-23 PROCEDURE — 96372 THER/PROPH/DIAG INJ SC/IM: CPT

## 2017-02-23 PROCEDURE — 97802 MEDICAL NUTRITION INDIV IN: CPT

## 2017-02-23 PROCEDURE — C9113 INJ PANTOPRAZOLE SODIUM, VIA: HCPCS | Performed by: EMERGENCY MEDICINE

## 2017-02-23 RX ORDER — BENZONATATE 100 MG/1
100 CAPSULE ORAL 3 TIMES DAILY PRN
Status: DISCONTINUED | OUTPATIENT
Start: 2017-02-23 | End: 2017-02-28 | Stop reason: HOSPADM

## 2017-02-23 RX ADMIN — DIPHENHYDRAMINE HYDROCHLORIDE 25 MG: 50 INJECTION, SOLUTION INTRAMUSCULAR; INTRAVENOUS at 07:02

## 2017-02-23 RX ADMIN — FOLIC ACID: 5 INJECTION, SOLUTION INTRAMUSCULAR; INTRAVENOUS; SUBCUTANEOUS at 10:02

## 2017-02-23 RX ADMIN — MORPHINE SULFATE 2 MG: 2 INJECTION, SOLUTION INTRAMUSCULAR; INTRAVENOUS at 04:02

## 2017-02-23 RX ADMIN — MORPHINE SULFATE 2 MG: 2 INJECTION, SOLUTION INTRAMUSCULAR; INTRAVENOUS at 02:02

## 2017-02-23 RX ADMIN — PANTOPRAZOLE SODIUM 40 MG: 40 INJECTION, POWDER, FOR SOLUTION INTRAVENOUS at 08:02

## 2017-02-23 RX ADMIN — BENZONATATE 100 MG: 100 CAPSULE ORAL at 08:02

## 2017-02-23 RX ADMIN — MORPHINE SULFATE 2 MG: 2 INJECTION, SOLUTION INTRAMUSCULAR; INTRAVENOUS at 11:02

## 2017-02-23 RX ADMIN — MORPHINE SULFATE 2 MG: 2 INJECTION, SOLUTION INTRAMUSCULAR; INTRAVENOUS at 07:02

## 2017-02-23 NOTE — CONSULTS
History           Chief Complaint   Patient presents with    Dysphagia       pt. sent over from PCP for difficulties swallowing and hypotension       HPI Comments: 65 yo male with a PMHx of metastatic lung CA, and Lymphadenopathy presenting with progressive dysphagia due to extrinsic compression. Pt was seen this morning by Dr. Espinosa and complained of chest pain, SOB, dizziness, weakness and inability to eat. She sent him to the ED to be admitted and for further workup. Pt was to perform an Esophagram 2 weeks ago but was too sick to attend appointment. Pt is admitted to hospital and GI consulted for dysphagia. Pt reports he has had progressive dysphagia for one month worsening in the last week. He reports drinking 4 Boost every day and has to take one sip then wait 10 minutes to take another. Pt reports feeling weak from not eating all day. Pt agitated because he is hungry and needs nutrition because he has CA. Pt denies abdominal pain, nausea, vomiting, diarrhea. No GI bleeding.               Past Medical History   Diagnosis Date    Cancer         lung    COPD (chronic obstructive pulmonary disease)      GERD (gastroesophageal reflux disease)      Lumbar vertebral fracture                 Past Surgical History   Procedure Laterality Date    Appendectomy        Abscess drainage        Hand surgery Left      Back surgery                    Family History   Problem Relation Age of Onset    Emphysema Mother      Cancer Father         mesothelioma               Social History   Substance Use Topics    Smoking status: Former Smoker       Packs/day: 1.00       Years: 50.00       Types: Cigarettes       Quit date: 5/10/2016    Smokeless tobacco: Former User       Types: Snuff       Quit date: 5/10/2016    Alcohol use No         Review of Systems   Constitutional: Negative for fatigue.   HENT: Positive for trouble swallowing. Negative for congestion and sore throat.   Eyes: Negative for pain.   Respiratory:  "Positive for cough. Negative for choking and shortness of breath.   Cardiovascular: Negative for chest pain and leg swelling.   Gastrointestinal: Negative for abdominal distention, abdominal pain, blood in stool, constipation, diarrhea, nausea and vomiting.   Genitourinary: Negative for dysuria and hematuria.   Musculoskeletal: Positive for back pain.   Skin: Negative for rash.   Neurological: Negative for dizziness and headaches.   Psychiatric/Behavioral: Positive for agitation.         Physical Exam           Visit Vitals    /66    Pulse 87    Temp 97.8 °F (36.6 °C) (Oral)    Resp 19    Ht 5' 2" (1.575 m)    Wt 49.9 kg (110 lb)    SpO2 100%    BMI 20.12 kg/m2         Physical Exam  Vitals reviewed.  Constitutional: No distress.   Cachetic.   HENT:   Head: Normocephalic and atraumatic.   Eyes: EOM are normal.   Neck: Neck supple.   Cardiovascular: Normal rate and regular rhythm.   No murmur heard.  Pulmonary/Chest: Breath sounds normal. No respiratory distress. He has no wheezes.   Abdominal: Soft. Bowel sounds are normal. He exhibits no distension. There is no tenderness. There is no guarding.   Musculoskeletal: He exhibits no edema.   Neurological: He is alert and oriented to person, place, and time.   Skin: Skin is warm and dry.   Psychiatric:   Agitated         ED Course   See ER provider note for clinical course.      Assessment and Plan     65 yo male with pmhx of metastatic lung cancer admitted with dysphagia secondary to extrinsic compression from enlarging mediastinal LAD. Dr. Cheng plans to review the images with radiology.   Recommendations could include Esophagram, Esophageal stent placement, PEG tube placement  Further recommendations after review of imaging  Continue hydration and supportive care.      Addendum. I have reviewed the patient's record and reviewed the note above with the PA and student. I have examined the patient at bedside and he is stable and in NAD. The plan was " discussed including the most concerning complication included namely stent migration. Bleeding and perforation possibility as a part of any endoscopic procedure also included here. Will review with radiology today. GH

## 2017-02-23 NOTE — PLAN OF CARE
Problem: Patient Care Overview  Goal: Plan of Care Review  Outcome: Ongoing (interventions implemented as appropriate)  Pt injury free this shift. Pain mildly controlled. Swallowing without difficulty. Chart reviewed. Will monitor.

## 2017-02-23 NOTE — PLAN OF CARE
Problem: Patient Care Overview  Goal: Plan of Care Review  Outcome: Ongoing (interventions implemented as appropriate)  Recommendation/Intervention: 1. Resume oral diet when medically able to Puree with Boost TID 2. Discussed with patient ways to increase calorie/protein and fruit/vegetable intake on liquid/puree diet.    3. Consider SLP evaluation   Goals: Oral diet advanced within 48 hours with intake greater than 75% of 3 meals daily.   Nutrition Goal Status: new

## 2017-02-23 NOTE — SUBJECTIVE & OBJECTIVE
Oncology Treatment Plan:   [facility] 3475 KEYNOTE 240    Medications:  Continuous Infusions:   sodium chloride 0.9% 100 mL/hr at 02/23/17 0000     Scheduled Meds:   enoxaparin  40 mg Subcutaneous Daily    pantoprazole  40 mg Intravenous Daily    polyethylene glycol  17 g Oral Daily    Banana bag   Intravenous Daily     PRN Meds:albuterol-ipratropium 2.5mg-0.5mg/3mL, diphenhydrAMINE, hydrocodone-apap 2.5-108 MG/5 ML, hydrocodone-apap 2.5-108 MG/5 ML, morphine, ondansetron     Review of patient's allergies indicates:   Allergen Reactions    Ambien [zolpidem] Other (See Comments)     Makes me extremely hyped up like im going crazy.    Zoloft [sertraline] Other (See Comments)     unknown        Past Medical History   Diagnosis Date    Cancer      lung    COPD (chronic obstructive pulmonary disease)     GERD (gastroesophageal reflux disease)     Lumbar vertebral fracture      Past Surgical History   Procedure Laterality Date    Appendectomy      Abscess drainage      Hand surgery Left     Back surgery       Family History     Problem Relation (Age of Onset)    Cancer Father    Emphysema Mother        Social History Main Topics    Smoking status: Former Smoker     Packs/day: 1.00     Years: 50.00     Types: Cigarettes     Quit date: 5/10/2016    Smokeless tobacco: Former User     Types: Snuff     Quit date: 5/10/2016    Alcohol use No    Drug use: No    Sexual activity: Yes     Partners: Female       Review of Systems   Constitutional: Positive for activity change, appetite change, fatigue and unexpected weight change. Negative for chills, diaphoresis and fever.   HENT: Negative for congestion, dental problem, drooling, ear discharge, ear pain, facial swelling, hearing loss, mouth sores, nosebleeds, postnasal drip, rhinorrhea, sinus pressure, sneezing, sore throat, tinnitus, trouble swallowing and voice change.    Eyes: Negative for photophobia, pain, discharge, redness, itching and visual disturbance.    Respiratory: Negative for apnea, cough, choking, chest tightness, shortness of breath, wheezing and stridor.    Cardiovascular: Negative for chest pain, palpitations and leg swelling.   Gastrointestinal: Positive for abdominal pain. Negative for abdominal distention, anal bleeding, blood in stool, constipation, diarrhea, nausea, rectal pain and vomiting.        Difficulty swallowing solid foods only able to tolerate liquids   Endocrine: Negative for cold intolerance, heat intolerance, polydipsia, polyphagia and polyuria.   Genitourinary: Negative for decreased urine volume, difficulty urinating, discharge, dysuria, enuresis, flank pain, frequency, genital sores, hematuria, penile pain, penile swelling, scrotal swelling, testicular pain and urgency.   Musculoskeletal: Negative for arthralgias, back pain, gait problem, joint swelling, myalgias, neck pain and neck stiffness.   Skin: Negative for color change, pallor, rash and wound.   Allergic/Immunologic: Negative for environmental allergies, food allergies and immunocompromised state.   Neurological: Negative for dizziness, tremors, seizures, syncope, facial asymmetry, speech difficulty, weakness, light-headedness, numbness and headaches.   Hematological: Negative for adenopathy. Does not bruise/bleed easily.   Psychiatric/Behavioral: Positive for dysphoric mood. Negative for agitation, behavioral problems, confusion, decreased concentration, hallucinations, self-injury, sleep disturbance and suicidal ideas. The patient is nervous/anxious. The patient is not hyperactive.      Objective:     Vital Signs (Most Recent):  Temp: 96.1 °F (35.6 °C) (02/23/17 0720)  Pulse: 93 (02/23/17 0720)  Resp: 18 (02/23/17 0720)  BP: 138/88 (02/23/17 0720)  SpO2: 98 % (02/23/17 0720) Vital Signs (24h Range):  Temp:  [96.1 °F (35.6 °C)-98.5 °F (36.9 °C)] 96.1 °F (35.6 °C)  Pulse:  [84-97] 93  Resp:  [16-23] 18  SpO2:  [95 %-100 %] 98 %  BP: (105-138)/(64-88) 138/88     Weight: 50.3 kg  (110 lb 14.4 oz)  Body mass index is 20.28 kg/(m^2).  Body surface area is 1.48 meters squared.      Intake/Output Summary (Last 24 hours) at 02/23/17 0831  Last data filed at 02/23/17 0515   Gross per 24 hour   Intake          3133.34 ml   Output              625 ml   Net          2508.34 ml       Physical Exam   Constitutional: He is oriented to person, place, and time. He appears cachectic. He has a sickly appearance. He appears ill. He appears distressed.   HENT:   Head: Normocephalic.   Right Ear: External ear normal.   Left Ear: External ear normal.   Nose: Nose normal. Right sinus exhibits no maxillary sinus tenderness and no frontal sinus tenderness. Left sinus exhibits no maxillary sinus tenderness and no frontal sinus tenderness.   Mouth/Throat: Oropharynx is clear and moist. No oropharyngeal exudate.   Eyes: EOM and lids are normal. Pupils are equal, round, and reactive to light. Right eye exhibits no discharge. Left eye exhibits no discharge. Right conjunctiva is not injected. Right conjunctiva has no hemorrhage. Left conjunctiva is not injected. Left conjunctiva has no hemorrhage. No scleral icterus. Right eye exhibits normal extraocular motion. Left eye exhibits normal extraocular motion.   Neck: Normal range of motion. Neck supple. No JVD present. No tracheal deviation present. No thyromegaly present.   Cardiovascular: Normal rate, regular rhythm and normal heart sounds.    Pulmonary/Chest: Effort normal and breath sounds normal. No stridor. No respiratory distress.   Abdominal: Soft. Bowel sounds are normal. He exhibits no mass. There is no hepatosplenomegaly, splenomegaly or hepatomegaly. There is no tenderness.   Musculoskeletal: Normal range of motion. He exhibits no edema or tenderness.   Lymphadenopathy:        Head (right side): No posterior auricular and no occipital adenopathy present.        Head (left side): No posterior auricular and no occipital adenopathy present.     He has no cervical  adenopathy.        Right cervical: No superficial cervical, no deep cervical and no posterior cervical adenopathy present.       Left cervical: No superficial cervical, no deep cervical and no posterior cervical adenopathy present.     He has no axillary adenopathy.        Right: No supraclavicular adenopathy present.        Left: No supraclavicular adenopathy present.   Neurological: He is alert and oriented to person, place, and time. He has normal strength. No cranial nerve deficit. Coordination normal.   Skin: Skin is dry. No rash noted. He is not diaphoretic. No cyanosis or erythema. Nails show no clubbing.   Psychiatric: He has a normal mood and affect. His behavior is normal. Judgment and thought content normal. Cognition and memory are normal.   Vitals reviewed.      Significant Labs:   All pertinent labs from the last 24 hours have been reviewed.    Diagnostic Results:  I have reviewed and interpreted all pertinent imaging results/findings within the past 24 hours.

## 2017-02-23 NOTE — PLAN OF CARE
Met with pt to complete d/c planning assessment. Pt states he is very independent and does not anticipate any d/c needs. Pt does have home oxygen provided by [facility].     02/23/17 1118   Discharge Assessment   Assessment Type Discharge Planning Assessment   Confirmed/corrected address and phone number on facesheet? Yes   Assessment information obtained from? Patient   If Healthcare Directive is received, is it scanned into Epic? no (comment)   Prior to hospitilization cognitive status: Alert/Oriented   Prior to hospitalization functional status: Independent   Current cognitive status: Alert/Oriented   Current Functional Status: Independent   Arrived From home or self-care   Lives With spouse   Able to Return to Prior Arrangements yes   Is patient able to care for self after discharge? Yes   How many people do you have in your home that can help with your care after discharge? 1   Who are your caregiver(s) and their phone number(s)? Soledad, wife [phone]   Patient's perception of discharge disposition admitted as an inpatient   Readmission Within The Last 30 Days no previous admission in last 30 days   Patient currently being followed by outpatient case management? No   Patient currently receives home health services? No   Does the patient currently use HME? Yes   Name and contact number for [facility] provider: Stacie   Patient currently receives private duty nursing? No   Patient currently receives any other outside agency services? No   Equipment Currently Used at Home oxygen   Do you have any problems affording any of your prescribed medications? No   Is the patient taking medications as prescribed? yes   Do you have any financial concerns preventing you from receiving the healthcare you need? No   Does the patient have transportation to healthcare appointments? Yes   Transportation Available car   On Dialysis? No   Discharge Plan A Home with family   Patient/Family In Agreement With Plan yes

## 2017-02-23 NOTE — CONSULTS
[facility] -   Adult Nutrition  Consult Note    SUMMARY     Recommendations  Recommendation/Intervention: 1. Resume oral diet when medically able to Puree with Boost TID      2. Discussed with patient ways to increase calorie/protein and fruit/vegetable intake on liquid/puree diet.        3. Consider SLP evaluation   Goals: Oral diet advanced within 48 hours with intake greater than 75% of 3 meals daily.   Nutrition Goal Status: new    Nutrition Discharge Plan  Home on Regular diet as tolerated (cosistency per SLP) with oral nutrition supplement as needed    Reason for Assessment  Reason for Assessment: identified at risk by screening criteria, nurse/nurse practitioner consult (CA - weight loss, can't eat solid foods, still losing weight)  Diagnosis:  (esophageal obstruction)  Relevent Medical History: Lung CA, COPD, GERD, Lumbar Vertebral Fracture   Interdisciplinary Rounds: attended  General Information Comments: Patient had severe weight loss over 4-6 months last year. His weight has been stable according to EPIC records since November 2016 at about 50kg    Nutrition Prescription Ordered  Current Diet Order: NPO  Nutrition Order Comments: Patient is NPO for procedure this afternoon.    Nutrition Risk Screen  Nutrition Risk Screen: unintentional loss of 10 lbs or more in the past 2 mos, dysphagia or difficulty swallowing, reduced oral intake over the last month    Nutrition/Diet History  Typical Food/Fluid Intake: Patient currently is consuming a mostly liquid diet due to esophageal obstruction    Labs/Tests/Procedures/Meds  Pertinent Labs Reviewed: reviewed  Pertinent Labs Comments: Alb 2.8  Pertinent Medications Reviewed: reviewed    Physical Findings  Overall Physical Appearance:  (thin)  Oral/Mouth Cavity: WDL  Skin:  (Rod 20)    Anthropometrics  Height (inches): 62.01 in  Weight Method: Stated  Weight (kg): 50.3 kg  Ideal Body Weight (IBW), Male: 118.06 lb  % Ideal Body Weight, Male (lb):  93.93 lb  BMI (kg/m2): 20.28  BMI Grade: 18.5-24.9 - normal  Usual Body Weight (UBW), k kg  % Usual Body Weight: 78.59  % Weight Change:  (21% weight loss in 6 months with stable wt since 2016)  Weight Loss: unintentional    Estimated/Assessed Needs  Weight Used For Calorie Calculations: 50.3 kg (110 lb 14.3 oz)   Height (cm): 157.5 cm  Energy Need Method: [place]-St Jeor (x1.3 = 1511)  30 kcal/kg (kcal): 1509   Weight Used For Protein Calculations: 50.3 kg (110 lb 14.3 oz)  1.2 gm Protein (gm): 60.49 and 1.5 gm Protein (gm): 75.61  Fluid Need Method: RDA Method (1ml/dee or as needed)    Monitor and Evaluation  Food and Nutrient Intake: food and beverage intake  Food and Nutrient Adminstration: diet order  Anthropometric Measurements: weight  Nutrition-Focused Physical Findings: overall appearance    Nutrition Risk  Level of Risk: high    Nutrition Follow-Up  RD Follow-up?: Yes      Assessment and Plan  * Esophageal obstruction  Nutrition Problem:   Swallowing difficulty    Etiology/Related to:   Per H&P patient with Oropharyngeal dysphagia    As evidenced by:  Reports of swallowing difficulty on primarily liquid diet and hx of weight loss, now stable.    Treatment Strategy:   1. Puree/Liquid diet if patient declines PEG  2. Provided tips for increasing fruit/vegetable consumption and adequate protein/calorie intake on Puree/Liquid diet    Nutrition Diagnosis Status:   New

## 2017-02-23 NOTE — CONSULTS
[facility] -   Hematology/Oncology  Consult Note    Patient Name: Frederick J Lejeune  MRN: [id number]  Admission Date: 2/22/2017  Hospital Length of Stay: 1 days  Code Status: Full Code   Attending Provider: Antonio Welsh MD  Consulting Provider: Adam Barraza MD  Primary Care Physician: Kenrick Muñoz MD  Principal Problem:Esophageal obstruction    Consults  Subjective:     HPI:  66-year-old male with progressive non-small cell lung carcinoma currently on immunomodulating agent NIVOLAMAB patient is admitted to the hospital difficulty swallowing    Oncology Treatment Plan:   [facility] [id number] KEYNOTE [id number]    Medications:  Continuous Infusions:   sodium chloride 0.9% 100 mL/hr at 02/23/17 0000     Scheduled Meds:   enoxaparin  40 mg Subcutaneous Daily    pantoprazole  40 mg Intravenous Daily    polyethylene glycol  17 g Oral Daily    Banana bag   Intravenous Daily     PRN Meds:albuterol-ipratropium 2.5mg-0.5mg/3mL, diphenhydrAMINE, hydrocodone-apap 2.5-108 MG/5 ML, hydrocodone-apap 2.5-108 MG/5 ML, morphine, ondansetron     Review of patient's allergies indicates:   Allergen Reactions    Ambien [zolpidem] Other (See Comments)     Makes me extremely hyped up like im going crazy.    Zoloft [sertraline] Other (See Comments)     unknown        Past Medical History   Diagnosis Date    Cancer      lung    COPD (chronic obstructive pulmonary disease)     GERD (gastroesophageal reflux disease)     Lumbar vertebral fracture      Past Surgical History   Procedure Laterality Date    Appendectomy      Abscess drainage      Hand surgery Left     Back surgery       Family History     Problem Relation (Age of Onset)    Cancer Father    Emphysema Mother        Social History Main Topics    Smoking status: Former Smoker     Packs/day: 1.00     Years: 50.00     Types: Cigarettes     Quit date: 5/10/2016    Smokeless tobacco: Former User     Types: Snuff     Quit date: 5/10/2016    Alcohol use No    Drug use:  No    Sexual activity: Yes     Partners: Female       Review of Systems   Constitutional: Positive for activity change, appetite change, fatigue and unexpected weight change. Negative for chills, diaphoresis and fever.   HENT: Negative for congestion, dental problem, drooling, ear discharge, ear pain, facial swelling, hearing loss, mouth sores, nosebleeds, postnasal drip, rhinorrhea, sinus pressure, sneezing, sore throat, tinnitus, trouble swallowing and voice change.    Eyes: Negative for photophobia, pain, discharge, redness, itching and visual disturbance.   Respiratory: Negative for apnea, cough, choking, chest tightness, shortness of breath, wheezing and stridor.    Cardiovascular: Negative for chest pain, palpitations and leg swelling.   Gastrointestinal: Positive for abdominal pain. Negative for abdominal distention, anal bleeding, blood in stool, constipation, diarrhea, nausea, rectal pain and vomiting.        Difficulty swallowing solid foods only able to tolerate liquids   Endocrine: Negative for cold intolerance, heat intolerance, polydipsia, polyphagia and polyuria.   Genitourinary: Negative for decreased urine volume, difficulty urinating, discharge, dysuria, enuresis, flank pain, frequency, genital sores, hematuria, penile pain, penile swelling, scrotal swelling, testicular pain and urgency.   Musculoskeletal: Negative for arthralgias, back pain, gait problem, joint swelling, myalgias, neck pain and neck stiffness.   Skin: Negative for color change, pallor, rash and wound.   Allergic/Immunologic: Negative for environmental allergies, food allergies and immunocompromised state.   Neurological: Negative for dizziness, tremors, seizures, syncope, facial asymmetry, speech difficulty, weakness, light-headedness, numbness and headaches.   Hematological: Negative for adenopathy. Does not bruise/bleed easily.   Psychiatric/Behavioral: Positive for dysphoric mood. Negative for agitation, behavioral problems,  confusion, decreased concentration, hallucinations, self-injury, sleep disturbance and suicidal ideas. The patient is nervous/anxious. The patient is not hyperactive.      Objective:     Vital Signs (Most Recent):  Temp: 96.1 °F (35.6 °C) (02/23/17 0720)  Pulse: 93 (02/23/17 0720)  Resp: 18 (02/23/17 0720)  BP: 138/88 (02/23/17 0720)  SpO2: 98 % (02/23/17 0720) Vital Signs (24h Range):  Temp:  [96.1 °F (35.6 °C)-98.5 °F (36.9 °C)] 96.1 °F (35.6 °C)  Pulse:  [84-97] 93  Resp:  [16-23] 18  SpO2:  [95 %-100 %] 98 %  BP: (105-138)/(64-88) 138/88     Weight: 50.3 kg (110 lb 14.4 oz)  Body mass index is 20.28 kg/(m^2).  Body surface area is 1.48 meters squared.      Intake/Output Summary (Last 24 hours) at 02/23/17 0831  Last data filed at 02/23/17 0515   Gross per 24 hour   Intake          3133.34 ml   Output              625 ml   Net          2508.34 ml       Physical Exam   Constitutional: He is oriented to person, place, and time. He appears cachectic. He has a sickly appearance. He appears ill. He appears distressed.   HENT:   Head: Normocephalic.   Right Ear: External ear normal.   Left Ear: External ear normal.   Nose: Nose normal. Right sinus exhibits no maxillary sinus tenderness and no frontal sinus tenderness. Left sinus exhibits no maxillary sinus tenderness and no frontal sinus tenderness.   Mouth/Throat: Oropharynx is clear and moist. No oropharyngeal exudate.   Eyes: EOM and lids are normal. Pupils are equal, round, and reactive to light. Right eye exhibits no discharge. Left eye exhibits no discharge. Right conjunctiva is not injected. Right conjunctiva has no hemorrhage. Left conjunctiva is not injected. Left conjunctiva has no hemorrhage. No scleral icterus. Right eye exhibits normal extraocular motion. Left eye exhibits normal extraocular motion.   Neck: Normal range of motion. Neck supple. No JVD present. No tracheal deviation present. No thyromegaly present.   Cardiovascular: Normal rate, regular  rhythm and normal heart sounds.    Pulmonary/Chest: Effort normal and breath sounds normal. No stridor. No respiratory distress.   Abdominal: Soft. Bowel sounds are normal. He exhibits no mass. There is no hepatosplenomegaly, splenomegaly or hepatomegaly. There is no tenderness.   Musculoskeletal: Normal range of motion. He exhibits no edema or tenderness.   Lymphadenopathy:        Head (right side): No posterior auricular and no occipital adenopathy present.        Head (left side): No posterior auricular and no occipital adenopathy present.     He has no cervical adenopathy.        Right cervical: No superficial cervical, no deep cervical and no posterior cervical adenopathy present.       Left cervical: No superficial cervical, no deep cervical and no posterior cervical adenopathy present.     He has no axillary adenopathy.        Right: No supraclavicular adenopathy present.        Left: No supraclavicular adenopathy present.   Neurological: He is alert and oriented to person, place, and time. He has normal strength. No cranial nerve deficit. Coordination normal.   Skin: Skin is dry. No rash noted. He is not diaphoretic. No cyanosis or erythema. Nails show no clubbing.   Psychiatric: He has a normal mood and affect. His behavior is normal. Judgment and thought content normal. Cognition and memory are normal.   Vitals reviewed.      Significant Labs:   All pertinent labs from the last 24 hours have been reviewed.    Diagnostic Results:  I have reviewed and interpreted all pertinent imaging results/findings within the past 24 hours.    Assessment/Plan:     Lung cancer  Patiently has progressive non-small cell lung carcinoma currently on NIVOLANAB immunomodulating agent for treatment which is a very slow response assessment stating possible esophageal stent versus PEG tube while agent begins to work patient has high PD1 staining which indicates a good prognostic feature with his agent      Thank you for your  consult. I will follow-up with patient. Please contact us if you have any additional questions.    Adam Barraza MD  Hematology/Oncology  [facility]

## 2017-02-23 NOTE — ASSESSMENT & PLAN NOTE
Patiently has progressive non-small cell lung carcinoma currently on NIVOLANAB immunomodulating agent for treatment which is a very slow response assessment stating possible esophageal stent versus PEG tube while agent begins to work patient has high PD1 staining which indicates a good prognostic feature with his agent

## 2017-02-23 NOTE — PLAN OF CARE
Problem: Patient Care Overview  Goal: Plan of Care Review  Outcome: Ongoing (interventions implemented as appropriate)  Fall prevention precautions maintained, pt remained free of falls throughout shift, call bell and personal items within reach, pt's pain adequately controlled by prn pain medication. 24 hour chart check completed. Will continue to monitor

## 2017-02-23 NOTE — PROGRESS NOTES
The patient is known to our service. He was seen in my office on February 13th with complaint of dysphagia. The details of that note or available in the record and reviewed with the patient today. I have also reviewed the notes from Oncology. The requested esophagram I ordered in the office but could not be done as the patient was too ill, was performed today and reviewed with the radiologist. He showed that the esophagus still has function despite the stenosis and the stricture lumen is smooth suggesting this is an extrinsic compression most likely. We discussed stent placement and will look to try and place tomorrow. Patient's tumor does not appear to be responding to therapy. GH

## 2017-02-23 NOTE — ASSESSMENT & PLAN NOTE
Nutrition Problem:   Swallowing difficulty    Etiology/Related to:   Per H&P patient with Oropharyngeal dysphagia    As evidenced by:  Reports of swallowing difficulty on primarily liquid diet and hx of weight loss, now stable.    Treatment Strategy:   1. Puree/Liquid diet if patient declines PEG  2. Provided tips for increasing fruit/vegetable consumption and adequate protein/calorie intake on Puree/Liquid diet    Nutrition Diagnosis Status:   New

## 2017-02-24 ENCOUNTER — ANESTHESIA EVENT (OUTPATIENT)
Dept: ENDOSCOPY | Facility: HOSPITAL | Age: 67
DRG: 841 | End: 2017-02-24
Payer: MEDICARE

## 2017-02-24 ENCOUNTER — SURGERY (OUTPATIENT)
Age: 67
End: 2017-02-24

## 2017-02-24 ENCOUNTER — ANESTHESIA (OUTPATIENT)
Dept: ENDOSCOPY | Facility: HOSPITAL | Age: 67
DRG: 841 | End: 2017-02-24
Payer: MEDICARE

## 2017-02-24 VITALS — RESPIRATION RATE: 24 BRPM

## 2017-02-24 LAB
ALBUMIN SERPL BCP-MCNC: 2.8 G/DL
ALP SERPL-CCNC: 98 U/L
ALT SERPL W/O P-5'-P-CCNC: 10 U/L
ANION GAP SERPL CALC-SCNC: 10 MMOL/L
AST SERPL-CCNC: 12 U/L
BILIRUB SERPL-MCNC: 0.4 MG/DL
BUN SERPL-MCNC: 7 MG/DL
CALCIUM SERPL-MCNC: 9.4 MG/DL
CHLORIDE SERPL-SCNC: 107 MMOL/L
CO2 SERPL-SCNC: 24 MMOL/L
CREAT SERPL-MCNC: 0.7 MG/DL
EST. GFR  (AFRICAN AMERICAN): >60 ML/MIN/1.73 M^2
EST. GFR  (NON AFRICAN AMERICAN): >60 ML/MIN/1.73 M^2
GLUCOSE SERPL-MCNC: 89 MG/DL
POTASSIUM SERPL-SCNC: 4.1 MMOL/L
PROT SERPL-MCNC: 6.2 G/DL
SODIUM SERPL-SCNC: 141 MMOL/L

## 2017-02-24 PROCEDURE — 63600175 PHARM REV CODE 636 W HCPCS: Performed by: NURSE PRACTITIONER

## 2017-02-24 PROCEDURE — 43212 ESOPHAGOSCOP STENT PLACEMENT: CPT | Performed by: INTERNAL MEDICINE

## 2017-02-24 PROCEDURE — 37000009 HC ANESTHESIA EA ADD 15 MINS: Performed by: INTERNAL MEDICINE

## 2017-02-24 PROCEDURE — 25000242 PHARM REV CODE 250 ALT 637 W/ HCPCS: Performed by: NURSE PRACTITIONER

## 2017-02-24 PROCEDURE — 43212 ESOPHAGOSCOP STENT PLACEMENT: CPT | Mod: ,,, | Performed by: INTERNAL MEDICINE

## 2017-02-24 PROCEDURE — 99232 SBSQ HOSP IP/OBS MODERATE 35: CPT | Mod: ,,, | Performed by: INTERNAL MEDICINE

## 2017-02-24 PROCEDURE — 0D718DZ DILATION OF UPPER ESOPHAGUS WITH INTRALUMINAL DEVICE, VIA NATURAL OR ARTIFICIAL OPENING ENDOSCOPIC: ICD-10-PCS | Performed by: INTERNAL MEDICINE

## 2017-02-24 PROCEDURE — 63600175 PHARM REV CODE 636 W HCPCS: Performed by: NURSE ANESTHETIST, CERTIFIED REGISTERED

## 2017-02-24 PROCEDURE — 37000008 HC ANESTHESIA 1ST 15 MINUTES: Performed by: INTERNAL MEDICINE

## 2017-02-24 PROCEDURE — 25000003 PHARM REV CODE 250: Performed by: NURSE ANESTHETIST, CERTIFIED REGISTERED

## 2017-02-24 PROCEDURE — 94761 N-INVAS EAR/PLS OXIMETRY MLT: CPT

## 2017-02-24 PROCEDURE — 63600175 PHARM REV CODE 636 W HCPCS: Performed by: EMERGENCY MEDICINE

## 2017-02-24 PROCEDURE — 63600175 PHARM REV CODE 636 W HCPCS: Performed by: FAMILY MEDICINE

## 2017-02-24 PROCEDURE — 11000001 HC ACUTE MED/SURG PRIVATE ROOM

## 2017-02-24 PROCEDURE — 80053 COMPREHEN METABOLIC PANEL: CPT

## 2017-02-24 PROCEDURE — 36415 COLL VENOUS BLD VENIPUNCTURE: CPT

## 2017-02-24 PROCEDURE — 0DJ08ZZ INSPECTION OF UPPER INTESTINAL TRACT, VIA NATURAL OR ARTIFICIAL OPENING ENDOSCOPIC: ICD-10-PCS | Performed by: INTERNAL MEDICINE

## 2017-02-24 PROCEDURE — 63600175 PHARM REV CODE 636 W HCPCS: Performed by: INTERNAL MEDICINE

## 2017-02-24 PROCEDURE — 96372 THER/PROPH/DIAG INJ SC/IM: CPT

## 2017-02-24 PROCEDURE — C9113 INJ PANTOPRAZOLE SODIUM, VIA: HCPCS | Performed by: EMERGENCY MEDICINE

## 2017-02-24 PROCEDURE — 27000221 HC OXYGEN, UP TO 24 HOURS

## 2017-02-24 PROCEDURE — 25000003 PHARM REV CODE 250: Performed by: FAMILY MEDICINE

## 2017-02-24 RX ORDER — MORPHINE SULFATE 4 MG/ML
4 INJECTION, SOLUTION INTRAMUSCULAR; INTRAVENOUS ONCE
Status: COMPLETED | OUTPATIENT
Start: 2017-02-24 | End: 2017-02-24

## 2017-02-24 RX ORDER — HYDROCODONE BITARTRATE AND ACETAMINOPHEN 7.5; 325 MG/15ML; MG/15ML
15 SOLUTION ORAL EVERY 4 HOURS PRN
Status: DISCONTINUED | OUTPATIENT
Start: 2017-02-24 | End: 2017-02-27

## 2017-02-24 RX ORDER — MORPHINE SULFATE 4 MG/ML
4 INJECTION, SOLUTION INTRAMUSCULAR; INTRAVENOUS EVERY 4 HOURS PRN
Status: DISCONTINUED | OUTPATIENT
Start: 2017-02-24 | End: 2017-02-24

## 2017-02-24 RX ORDER — HYDROMORPHONE HYDROCHLORIDE 2 MG/ML
1 INJECTION, SOLUTION INTRAMUSCULAR; INTRAVENOUS; SUBCUTANEOUS EVERY 4 HOURS PRN
Status: DISCONTINUED | OUTPATIENT
Start: 2017-02-24 | End: 2017-02-24

## 2017-02-24 RX ORDER — LIDOCAINE HYDROCHLORIDE 10 MG/ML
INJECTION INFILTRATION; PERINEURAL
Status: DISCONTINUED | OUTPATIENT
Start: 2017-02-24 | End: 2017-02-24

## 2017-02-24 RX ORDER — GLYCOPYRROLATE 0.2 MG/ML
INJECTION INTRAMUSCULAR; INTRAVENOUS
Status: DISCONTINUED | OUTPATIENT
Start: 2017-02-24 | End: 2017-02-24

## 2017-02-24 RX ORDER — MORPHINE SULFATE 2 MG/ML
2 INJECTION, SOLUTION INTRAMUSCULAR; INTRAVENOUS EVERY 4 HOURS PRN
Status: DISCONTINUED | OUTPATIENT
Start: 2017-02-24 | End: 2017-02-27

## 2017-02-24 RX ORDER — PROPOFOL 10 MG/ML
VIAL (ML) INTRAVENOUS
Status: DISCONTINUED | OUTPATIENT
Start: 2017-02-24 | End: 2017-02-24

## 2017-02-24 RX ORDER — MORPHINE SULFATE 2 MG/ML
2 INJECTION, SOLUTION INTRAMUSCULAR; INTRAVENOUS ONCE
Status: COMPLETED | OUTPATIENT
Start: 2017-02-24 | End: 2017-02-24

## 2017-02-24 RX ORDER — MORPHINE SULFATE 4 MG/ML
4 INJECTION, SOLUTION INTRAMUSCULAR; INTRAVENOUS EVERY 4 HOURS PRN
Status: DISCONTINUED | OUTPATIENT
Start: 2017-02-24 | End: 2017-02-27

## 2017-02-24 RX ORDER — HYDROMORPHONE HYDROCHLORIDE 2 MG/ML
1 INJECTION, SOLUTION INTRAMUSCULAR; INTRAVENOUS; SUBCUTANEOUS EVERY 4 HOURS PRN
Status: DISCONTINUED | OUTPATIENT
Start: 2017-02-24 | End: 2017-02-25

## 2017-02-24 RX ORDER — HYDROMORPHONE HYDROCHLORIDE 1 MG/ML
1 INJECTION, SOLUTION INTRAMUSCULAR; INTRAVENOUS; SUBCUTANEOUS EVERY 4 HOURS PRN
Status: DISCONTINUED | OUTPATIENT
Start: 2017-02-24 | End: 2017-02-24

## 2017-02-24 RX ORDER — HYDROCODONE BITARTRATE AND ACETAMINOPHEN 7.5; 325 MG/15ML; MG/15ML
15 SOLUTION ORAL EVERY 4 HOURS PRN
Status: DISCONTINUED | OUTPATIENT
Start: 2017-02-24 | End: 2017-02-24

## 2017-02-24 RX ORDER — SODIUM CHLORIDE, SODIUM LACTATE, POTASSIUM CHLORIDE, CALCIUM CHLORIDE 600; 310; 30; 20 MG/100ML; MG/100ML; MG/100ML; MG/100ML
INJECTION, SOLUTION INTRAVENOUS CONTINUOUS PRN
Status: DISCONTINUED | OUTPATIENT
Start: 2017-02-24 | End: 2017-02-24

## 2017-02-24 RX ADMIN — MORPHINE SULFATE 2 MG: 2 INJECTION, SOLUTION INTRAMUSCULAR; INTRAVENOUS at 01:02

## 2017-02-24 RX ADMIN — MORPHINE SULFATE 4 MG: 4 INJECTION, SOLUTION INTRAMUSCULAR; INTRAVENOUS at 04:02

## 2017-02-24 RX ADMIN — PROPOFOL 50 MG: 10 INJECTION, EMULSION INTRAVENOUS at 01:02

## 2017-02-24 RX ADMIN — HYDROMORPHONE HYDROCHLORIDE 1 MG: 2 INJECTION, SOLUTION INTRAMUSCULAR; INTRAVENOUS; SUBCUTANEOUS at 10:02

## 2017-02-24 RX ADMIN — FOLIC ACID: 5 INJECTION, SOLUTION INTRAMUSCULAR; INTRAVENOUS; SUBCUTANEOUS at 09:02

## 2017-02-24 RX ADMIN — PROPOFOL 20 MG: 10 INJECTION, EMULSION INTRAVENOUS at 01:02

## 2017-02-24 RX ADMIN — GLYCOPYRROLATE 0.2 MG: 0.2 INJECTION INTRAMUSCULAR; INTRAVENOUS at 01:02

## 2017-02-24 RX ADMIN — MORPHINE SULFATE 2 MG: 2 INJECTION, SOLUTION INTRAMUSCULAR; INTRAVENOUS at 10:02

## 2017-02-24 RX ADMIN — PANTOPRAZOLE SODIUM 40 MG: 40 INJECTION, POWDER, FOR SOLUTION INTRAVENOUS at 09:02

## 2017-02-24 RX ADMIN — DIPHENHYDRAMINE HYDROCHLORIDE 25 MG: 50 INJECTION, SOLUTION INTRAMUSCULAR; INTRAVENOUS at 09:02

## 2017-02-24 RX ADMIN — SODIUM CHLORIDE, SODIUM LACTATE, POTASSIUM CHLORIDE, AND CALCIUM CHLORIDE: 600; 310; 30; 20 INJECTION, SOLUTION INTRAVENOUS at 01:02

## 2017-02-24 RX ADMIN — PROPOFOL 10 MG: 10 INJECTION, EMULSION INTRAVENOUS at 01:02

## 2017-02-24 RX ADMIN — MORPHINE SULFATE 2 MG: 2 INJECTION, SOLUTION INTRAMUSCULAR; INTRAVENOUS at 03:02

## 2017-02-24 RX ADMIN — ENOXAPARIN SODIUM 40 MG: 100 INJECTION SUBCUTANEOUS at 09:02

## 2017-02-24 RX ADMIN — HYDROMORPHONE HYDROCHLORIDE 1 MG: 2 INJECTION, SOLUTION INTRAMUSCULAR; INTRAVENOUS; SUBCUTANEOUS at 05:02

## 2017-02-24 RX ADMIN — LIDOCAINE HYDROCHLORIDE 50 MG: 10 INJECTION, SOLUTION INFILTRATION; PERINEURAL at 01:02

## 2017-02-24 RX ADMIN — IPRATROPIUM BROMIDE AND ALBUTEROL SULFATE 3 ML: .5; 3 SOLUTION RESPIRATORY (INHALATION) at 02:02

## 2017-02-24 NOTE — PROGRESS NOTES
[facility] Hospital Medicine  Progress Note    Patient Name: Frederick J Lejeune  MRN: [id number]  Patient Class: IP- Inpatient   Admission Date: 2/22/2017  Length of Stay: 1 days  Attending Physician: Antonio Welsh MD  Primary Care Provider: Kenrick Muñoz MD        Subjective:     Principal Problem:Esophageal obstruction    HPI:  Frederick J Lejeune is a 66 y.o. male with PMHx of GERD, weight loss, dysphagia, odynophagia, RLL lung mass squamous cell carcinoma, who presented to ER from Dr. Espinosa's office with c/o dysphagia, weakness, dizziness, chest pain. He has had difficulty eating due to odynophagia for the past month. Patient has completed palliative radiation to relieve right mainstem bronchus obstruction. He then was treated with weekly Carbo-Taxol x 6-7 weeks, which was discontinued due to disease progression on imaging in early 1/2017. He is currently receiving Pembrolizumab every 3 weeks. Today, he presented to Dr. Espinosa's office on an urgent visit regarding his trouble swallowing and inability to eat. He is also dizzy, weak, SOB and has pain in his chest. No cough, fevers, chills. His left groin mass is stable per patient, but he still has some mild pain in the right supraclavicular region due to lymphadenopathy. He was recently evaluated by Dr. Cheng who recommended further evaluation with esophagram, but pt was too sick to attend appointment. GI has been consulted for esophagram for pallative treatment.    Hospital Course:  Esophagram completed and GI plans to place stent 2/24/2017.     Interval History: Stent placement planned for 2/24/17    Review of Systems   Constitutional: Positive for appetite change and fatigue. Negative for activity change, chills, diaphoresis, fever and unexpected weight change.   HENT: Positive for trouble swallowing. Negative for congestion, ear discharge, ear pain, facial swelling, hearing loss, postnasal drip, sore throat, tinnitus and voice change.    Eyes:  Negative for photophobia, pain, discharge, redness, itching and visual disturbance.   Respiratory: Negative for cough, choking, chest tightness, shortness of breath, wheezing and stridor.    Cardiovascular: Negative for chest pain, palpitations and leg swelling.   Gastrointestinal: Negative for abdominal distention, abdominal pain, blood in stool, constipation, diarrhea, nausea and vomiting.        Dysphagia   Endocrine: Negative for cold intolerance, heat intolerance, polydipsia, polyphagia and polyuria.   Genitourinary: Negative for difficulty urinating, flank pain, frequency, hematuria and urgency.   Musculoskeletal: Negative for arthralgias, back pain, gait problem and myalgias.        Pain in right groin and right neck   Skin: Negative for color change, pallor, rash and wound.   Neurological: Positive for dizziness and weakness. Negative for tremors, seizures, syncope, facial asymmetry, speech difficulty, light-headedness, numbness and headaches.   Hematological: Negative for adenopathy. Does not bruise/bleed easily.   Psychiatric/Behavioral: Negative for agitation, confusion, hallucinations and suicidal ideas. The patient is not nervous/anxious.    All other systems reviewed and are negative.    Objective:     Vital Signs (Most Recent):  Temp: 98.1 °F (36.7 °C) (02/23/17 1909)  Pulse: 85 (02/23/17 1909)  Resp: 17 (02/23/17 1909)  BP: 102/72 (02/23/17 1909)  SpO2: 100 % (02/23/17 1909) Vital Signs (24h Range):  Temp:  [96.1 °F (35.6 °C)-98.6 °F (37 °C)] 98.1 °F (36.7 °C)  Pulse:  [84-93] 85  Resp:  [17-18] 17  SpO2:  [95 %-100 %] 100 %  BP: (102-138)/(65-88) 102/72     Weight: 50.3 kg (110 lb 14.4 oz)  Body mass index is 20.28 kg/(m^2).    Intake/Output Summary (Last 24 hours) at 02/23/17 2034  Last data filed at 02/23/17 2006   Gross per 24 hour   Intake             4350 ml   Output             1175 ml   Net             3175 ml      Physical Exam   Constitutional: He is oriented to person, place, and time. He  appears well-developed.   Cachectic   HENT:   Head: Normocephalic and atraumatic.   Nose: Nose normal.   Eyes: Conjunctivae and EOM are normal. Pupils are equal, round, and reactive to light.   Neck: Normal range of motion. Neck supple. No JVD present. No tracheal deviation present. No thyromegaly present.   Cardiovascular: Normal rate, regular rhythm, normal heart sounds and intact distal pulses.  Exam reveals no gallop and no friction rub.    No murmur heard.  Pulmonary/Chest: Effort normal. No stridor. No respiratory distress. He has no wheezes. He has no rales. He exhibits no tenderness.   Abdominal: Soft. Bowel sounds are normal. He exhibits no distension. There is no tenderness. There is no rebound and no guarding.   Left groin with 5x6 cm enlarged Lymph node, TTP and right supraclavicular LAD present, firm and TTP.   Musculoskeletal: Normal range of motion. He exhibits no edema, tenderness or deformity.   Neurological: He is alert and oriented to person, place, and time. He has normal reflexes. No cranial nerve deficit. Coordination normal.   Skin: Skin is warm and dry. No rash noted. No erythema. No pallor.   Psychiatric: He has a normal mood and affect. His behavior is normal. Judgment and thought content normal.   Nursing note and vitals reviewed.      Significant Labs:   BMP:   Recent Labs  Lab 02/22/17  0837 02/23/17  0447    87    141   K 4.4 4.1    106   CO2 31* 27   BUN 17 10   CREATININE 0.8 0.7   CALCIUM 10.1 9.4   MG 2.3  --      CBC:   Recent Labs  Lab 02/22/17  0837 02/23/17  0447   WBC 9.66 6.65   HGB 12.5* 11.2*   HCT 36.7* 33.5*    203       Significant Imaging: I have reviewed all pertinent imaging results/findings within the past 24 hours.   Imaging Results         FL Esophagram Complete (Final result) Result time:  02/22/17 17:42:10    Final result by Raheel Calloway MD (02/22/17 17:42:10)    Impression:     Moderately severe smooth stricture of the distal  thoracic esophagus which may be produced by the subcarinal lymphadenopathy. Can't exclude primary esophageal neoplasm.      Electronically signed by: RAHEEL CALLOWAY MD  Date:     02/22/17  Time:    17:42     Narrative:    History: Metastatic lung cancer, worsening dysphagia, evidence of esophageal obstruction on CT    Gastrografin esophagram was performed. The patient swallowed Gastrografin without difficulty. There is a persistent 2.4 cm long moderately severe stricture below the twila. The stricture has fairly smooth margins, without mucosal irregularity. While a primary neoplasm of the esophagus is not excluded, this may be consistent with extrinsic compression from the adjacent subcarinal lymphadenopathy. There is moderate dilatation of the esophagus above the stricture. No abnormality demonstrated the at the GE junction.    1.3 minutes of fluoroscopy was utilized for the procedure.            CT Chest With Contrast (Final result) Result time:  02/22/17 10:51:06    Final result by Raheel Calloway MD (02/22/17 10:51:06)    Impression:     Marked air distention of the proximal thoracic esophagus down to the region of unchanged subcarinal lymphadenopathy. The esophageal obstruction may be on the basis of lymphadenopathy but esophageal mass at this level is a definite consideration. Development of bilateral pleural effusions. Otherwise no significant change from 1/11/17 PET/CT.    All CT scans at this facility use dose modulation, iterative reconstruction and/or weight based dosing when appropriate to reduce radiation dose to as low as reasonably achievable.       Electronically signed by: RAHEEL CALLOWAY MD  Date:     02/22/17  Time:    10:51     Narrative:    History: Dysphagia, onset gradually one month ago, worsening a week ago. History of metastatic lung cancer    Comparison 1/11/17 PET/CT, 12/20/16 CT chest    There is marked air distention of the proximal esophagus down to the subcarinal region where  there is prominent subcarinal lymphadenopathy as before. The degree of air distention of the proximal esophagus is worse. There is no air in the esophagus at the subcarinal region and then air is demonstrated in the distal esophagus. It would be difficult to exclude an esophageal mass at this level, versus extrinsic mass effect on the esophagus by the adenopathy. There has been interval development of small to moderate sized bilateral pleural effusions. The mass in the right lower lobe is essentially unchanged. There are several left lower lobe masses which are essentially unchanged. A few scattered small pulmonary metastases bilaterally. There is emphysematous change and marked diffuse interstitial fibrosis. There is nodular thickening of the pericardium. Right supraclavicular lymphadenopathy again noted.            Assessment/Plan:      * Esophageal obstruction  -GI consult: Dr. Cheng perform esophagram  -plans for Stent 2/24/17  -full liquid diet      Oropharyngeal dysphagia  -esophagram by   -nutritional consult      Lung cancer  -outpatient followup      Bone metastases  Outpatient followup      COPD, very severe  nebulizers PRN      Left groin pain  -4-5 cm lymph node - known to Dr. Espinosa      VTE Risk Mitigation         Ordered     enoxaparin injection 40 mg  Daily     Route:  Subcutaneous        02/22/17 1549     Medium Risk of VTE  Once      02/22/17 1549     Place SUKHI hose  Until discontinued      02/22/17 1549     Place sequential compression device  Until discontinued      02/22/17 1549     Reason for No Pharmacological VTE Prophylaxis  Once      02/22/17 1549          Kathleen Campos NP  Department of Hospital Medicine   [facility]

## 2017-02-24 NOTE — OR NURSING
Attempted with the  scope exchanged scope due to poor visualization.  Gastroscope # 050 is now in use

## 2017-02-24 NOTE — ANESTHESIA POSTPROCEDURE EVALUATION
"Anesthesia Post Evaluation    Patient: Frederick J Lejeune    Procedure(s) Performed: Procedure(s) (LRB):  ESOPHAGOGASTRODUODENOSCOPY (EGD) (N/A)    Final Anesthesia Type: MAC  Patient location during evaluation: PACU  Patient participation: Yes- Able to Participate  Level of consciousness: awake  Post-procedure vital signs: reviewed and stable  Pain management: adequate  Airway patency: patent  PONV status at discharge: No PONV  Anesthetic complications: no      Cardiovascular status: blood pressure returned to baseline and hemodynamically stable  Respiratory status: unassisted, spontaneous ventilation and nasal cannula  Hydration status: euvolemic  Follow-up not needed.        Visit Vitals    /78 (BP Location: Left arm, BP Method: Automatic)    Pulse 99    Temp 36.8 °C (98.2 °F) (Oral)    Resp 13    Ht 5' 2" (1.575 m)    Wt 50 kg (110 lb 4.8 oz)    SpO2 98%    BMI 20.17 kg/m2       Pain/Mickie Score: Pain Assessment Performed: Yes (2/24/2017 10:40 AM)  Presence of Pain: complains of pain/discomfort (2/24/2017 10:40 AM)  Pain Rating Prior to Med Admin: 6 (2/24/2017 10:56 AM)  Pain Rating Post Med Admin: 2 (2/24/2017  1:37 AM)      "

## 2017-02-24 NOTE — PLAN OF CARE
Problem: Patient Care Overview  Goal: Plan of Care Review  Outcome: Ongoing (interventions implemented as appropriate)  Fall prevention precautions maintained, pt remained free of falls throughout shift, call bell and personal items within reach, pt's pain adequately controlled by prn pain medication. 24 hour chart check completed. Will continue to monitor.

## 2017-02-24 NOTE — PLAN OF CARE
Wife at bedside. Continues to c/o of pain in esophageal area. Rates pain 8 on 1-10 scale. Report called to Christal ARGUETA.

## 2017-02-24 NOTE — PROGRESS NOTES
[facility] Hospital Medicine  Progress Note    Patient Name: Frederick J Lejeune  MRN: [id number]  Patient Class: IP- Inpatient   Admission Date: 2/22/2017  Length of Stay: 2 days  Attending Physician: Antonio Welsh MD  Primary Care Provider: Kenrick Muñoz MD        Subjective:     Principal Problem:Esophageal obstruction    HPI:  Frederick J Lejeune is a 66 y.o. male with PMHx of GERD, weight loss, dysphagia, odynophagia, RLL lung mass squamous cell carcinoma, who presented to ER from Dr. Espinosa's office with c/o dysphagia, weakness, dizziness, chest pain. He has had difficulty eating due to odynophagia for the past month. Patient has completed palliative radiation to relieve right mainstem bronchus obstruction. He then was treated with weekly Carbo-Taxol x 6-7 weeks, which was discontinued due to disease progression on imaging in early 1/2017. He is currently receiving Pembrolizumab every 3 weeks. Today, he presented to Dr. Espinosa's office on an urgent visit regarding his trouble swallowing and inability to eat. He is also dizzy, weak, SOB and has pain in his chest. No cough, fevers, chills. His left groin mass is stable per patient, but he still has some mild pain in the right supraclavicular region due to lymphadenopathy. He was recently evaluated by Dr. Cheng who recommended further evaluation with esophagram, but pt was too sick to attend appointment. GI has been consulted for esophagram for pallative treatment.    Hospital Course:  Esophagram completed. Esophageal stent placed 2/24/2017. Patient has severe pain even after 6mg morphine. Will add IV dilaudid. Patient seen and examined.     Interval History: Esophageal stent today.    Review of Systems   Constitutional: Positive for appetite change and fatigue. Negative for activity change, chills, diaphoresis, fever and unexpected weight change.   HENT: Positive for trouble swallowing. Negative for congestion, ear discharge, ear pain, facial  swelling, hearing loss, postnasal drip, sore throat, tinnitus and voice change.    Eyes: Negative for photophobia, pain, discharge, redness, itching and visual disturbance.   Respiratory: Negative for cough, choking, chest tightness, shortness of breath, wheezing and stridor.    Cardiovascular: Negative for chest pain, palpitations and leg swelling.   Gastrointestinal: Negative for abdominal distention, abdominal pain, blood in stool, constipation, diarrhea, nausea and vomiting.        Dysphagia   Endocrine: Negative for cold intolerance, heat intolerance, polydipsia, polyphagia and polyuria.   Genitourinary: Negative for difficulty urinating, flank pain, frequency, hematuria and urgency.   Musculoskeletal: Negative for arthralgias, back pain, gait problem and myalgias.        Pain in right groin and right neck   Skin: Negative for color change, pallor, rash and wound.   Neurological: Positive for dizziness and weakness. Negative for tremors, seizures, syncope, facial asymmetry, speech difficulty, light-headedness, numbness and headaches.   Hematological: Negative for adenopathy. Does not bruise/bleed easily.   Psychiatric/Behavioral: Negative for agitation, confusion, hallucinations and suicidal ideas. The patient is not nervous/anxious.    All other systems reviewed and are negative.    Objective:     Vital Signs (Most Recent):  Temp: 98.2 °F (36.8 °C) (02/24/17 1229)  Pulse: 100 (02/24/17 1519)  Resp: 18 (02/24/17 1519)  BP: (!) 155/87 (02/24/17 1519)  SpO2: 98 % (02/24/17 1452) Vital Signs (24h Range):  Temp:  [98.1 °F (36.7 °C)-98.7 °F (37.1 °C)] 98.2 °F (36.8 °C)  Pulse:  [] 100  Resp:  [13-73] 18  SpO2:  [93 %-100 %] 98 %  BP: (102-155)/(66-89) 155/87     Weight: 50 kg (110 lb 4.8 oz)  Body mass index is 20.17 kg/(m^2).    Intake/Output Summary (Last 24 hours) at 02/24/17 1659  Last data filed at 02/24/17 1406   Gross per 24 hour   Intake          4126.67 ml   Output             1000 ml   Net           3126.67 ml      Physical Exam   Constitutional: He is oriented to person, place, and time. He appears well-developed.   Cachectic   HENT:   Head: Normocephalic and atraumatic.   Nose: Nose normal.   Eyes: Conjunctivae and EOM are normal. Pupils are equal, round, and reactive to light.   Neck: Normal range of motion. Neck supple. No JVD present. No tracheal deviation present. No thyromegaly present.   Cardiovascular: Normal rate, regular rhythm, normal heart sounds and intact distal pulses.  Exam reveals no gallop and no friction rub.    No murmur heard.  Pulmonary/Chest: Effort normal. No stridor. No respiratory distress. He has no wheezes. He has no rales. He exhibits no tenderness.   Abdominal: Soft. Bowel sounds are normal. He exhibits no distension. There is no tenderness. There is no rebound and no guarding.   Left groin with 5x6 cm enlarged Lymph node, TTP and right supraclavicular LAD present, firm and TTP.   Musculoskeletal: Normal range of motion. He exhibits no edema, tenderness or deformity.   Neurological: He is alert and oriented to person, place, and time. He has normal reflexes. No cranial nerve deficit. Coordination normal.   Skin: Skin is warm and dry. No rash noted. No erythema. No pallor.   Psychiatric: He has a normal mood and affect. His behavior is normal. Judgment and thought content normal.   Nursing note and vitals reviewed.      Significant Labs:   CBC:   Recent Labs  Lab 02/23/17  0447   WBC 6.65   HGB 11.2*   HCT 33.5*        CMP:   Recent Labs  Lab 02/23/17  0447 02/24/17  0503    141   K 4.1 4.1    107   CO2 27 24   GLU 87 89   BUN 10 7*   CREATININE 0.7 0.7   CALCIUM 9.4 9.4   PROT 6.3 6.2   ALBUMIN 2.8* 2.8*   BILITOT 0.3 0.4   ALKPHOS 99 98   AST 11 12   ALT 9* 10   ANIONGAP 8 10   EGFRNONAA >60 >60       Significant Imaging: I have reviewed all pertinent imaging results/findings within the past 24 hours.   Imaging Results         X-Ray Chest 1 View (Final result)  Result time:  02/24/17 16:41:07    Final result by Almaz Donato MD (02/24/17 16:41:07)    Impression:      Fluoroscopic and endoscopic guided esophageal stent placement as described above.      Electronically signed by: ALMAZ DONATO MD  Date:     02/24/17  Time:    16:41     Narrative:    EXAM: Fluoroscopy for esophageal stent placement.    CLINICAL HISTORY:  Esophageal mass and stricture.    Comparison: CT chest and esophagram 02/22/2017.    FINDINGS:  This procedure was performed by Dr. Cheng.    Submitted images demonstrate an endoscope within the esophagus followed by deployment of an esophageal stent from the proximal to distal esophagus.  There is narrowing of the mid stent corresponding to the esophageal stricture with flared proximal and distal ends.  For further information please refer to Dr. Cheng's procedure note.    Fluoroscopy time: 223 seconds.    Fluoroscopic images: 5.            FL Less Than 1 Hour (In process)         FL Esophagram Complete (Final result) Result time:  02/22/17 17:42:10    Final result by Topher Yi MD (02/22/17 17:42:10)    Impression:     Moderately severe smooth stricture of the distal thoracic esophagus which may be produced by the subcarinal lymphadenopathy. Can't exclude primary esophageal neoplasm.      Electronically signed by: TOPHER YI MD  Date:     02/22/17  Time:    17:42     Narrative:    History: Metastatic lung cancer, worsening dysphagia, evidence of esophageal obstruction on CT    Gastrografin esophagram was performed. The patient swallowed Gastrografin without difficulty. There is a persistent 2.4 cm long moderately severe stricture below the twila. The stricture has fairly smooth margins, without mucosal irregularity. While a primary neoplasm of the esophagus is not excluded, this may be consistent with extrinsic compression from the adjacent subcarinal lymphadenopathy. There is moderate dilatation of the esophagus above the stricture.  No abnormality demonstrated the at the GE junction.    1.3 minutes of fluoroscopy was utilized for the procedure.            CT Chest With Contrast (Final result) Result time:  02/22/17 10:51:06    Final result by Raheel Calloway MD (02/22/17 10:51:06)    Impression:     Marked air distention of the proximal thoracic esophagus down to the region of unchanged subcarinal lymphadenopathy. The esophageal obstruction may be on the basis of lymphadenopathy but esophageal mass at this level is a definite consideration. Development of bilateral pleural effusions. Otherwise no significant change from 1/11/17 PET/CT.    All CT scans at this facility use dose modulation, iterative reconstruction and/or weight based dosing when appropriate to reduce radiation dose to as low as reasonably achievable.       Electronically signed by: RAHEEL CALLOWAY MD  Date:     02/22/17  Time:    10:51     Narrative:    History: Dysphagia, onset gradually one month ago, worsening a week ago. History of metastatic lung cancer    Comparison 1/11/17 PET/CT, 12/20/16 CT chest    There is marked air distention of the proximal esophagus down to the subcarinal region where there is prominent subcarinal lymphadenopathy as before. The degree of air distention of the proximal esophagus is worse. There is no air in the esophagus at the subcarinal region and then air is demonstrated in the distal esophagus. It would be difficult to exclude an esophageal mass at this level, versus extrinsic mass effect on the esophagus by the adenopathy. There has been interval development of small to moderate sized bilateral pleural effusions. The mass in the right lower lobe is essentially unchanged. There are several left lower lobe masses which are essentially unchanged. A few scattered small pulmonary metastases bilaterally. There is emphysematous change and marked diffuse interstitial fibrosis. There is nodular thickening of the pericardium. Right supraclavicular  lymphadenopathy again noted.            Assessment/Plan:      * Esophageal obstruction  -GI consult: Dr. Cheng performed esophagram  - Stent placed 2/24/17        Oropharyngeal dysphagia  -esophagram and stent by   -nutritional consult      Lung cancer  -outpatient followup      Bone metastases  Outpatient followup      COPD, very severe  nebulizers PRN      Left groin pain  -4-5 cm lymph node - known to Dr. Espinosa      VTE Risk Mitigation         Ordered     enoxaparin injection 40 mg  Daily     Route:  Subcutaneous        02/22/17 1549     Medium Risk of VTE  Once      02/22/17 1549     Place SUKHI hose  Until discontinued      02/22/17 1549     Place sequential compression device  Until discontinued      02/22/17 1549     Reason for No Pharmacological VTE Prophylaxis  Once      02/22/17 1549          Kathleen Campos NP  Department of Hospital Medicine   [facility]

## 2017-02-24 NOTE — SUBJECTIVE & OBJECTIVE
Interval History: Patient is awaiting esophageal stent placement    Oncology Treatment Plan:   [facility] MK 3475 KEYNOTE 240    Medications:  Continuous Infusions:   sodium chloride 0.9% 100 mL/hr at 02/24/17 0300     Scheduled Meds:   enoxaparin  40 mg Subcutaneous Daily    pantoprazole  40 mg Intravenous Daily    polyethylene glycol  17 g Oral Daily    Banana bag   Intravenous Daily     PRN Meds:albuterol-ipratropium 2.5mg-0.5mg/3mL, benzonatate, diphenhydrAMINE, hydrocodone-apap 2.5-108 MG/5 ML, hydrocodone-apap 2.5-108 MG/5 ML, morphine, ondansetron     Review of Systems   Constitutional: Positive for activity change, appetite change and fatigue. Negative for chills, diaphoresis, fever and unexpected weight change.   HENT: Negative for congestion, dental problem, drooling, ear discharge, ear pain, facial swelling, hearing loss, mouth sores, nosebleeds, postnasal drip, rhinorrhea, sinus pressure, sneezing, sore throat, tinnitus, trouble swallowing and voice change.    Eyes: Negative for photophobia, pain, discharge, redness, itching and visual disturbance.   Respiratory: Negative for apnea, cough, choking, chest tightness, shortness of breath, wheezing and stridor.    Cardiovascular: Negative for chest pain, palpitations and leg swelling.   Gastrointestinal: Negative for abdominal distention, abdominal pain, anal bleeding, blood in stool, constipation, diarrhea, nausea, rectal pain and vomiting.   Endocrine: Negative for cold intolerance, heat intolerance, polydipsia, polyphagia and polyuria.   Genitourinary: Negative for decreased urine volume, difficulty urinating, discharge, dysuria, enuresis, flank pain, frequency, genital sores, hematuria, penile pain, penile swelling, scrotal swelling, testicular pain and urgency.   Musculoskeletal: Negative for arthralgias, back pain, gait problem, joint swelling, myalgias, neck pain and neck stiffness.   Skin: Negative for color change, pallor, rash and wound.    Allergic/Immunologic: Negative for environmental allergies, food allergies and immunocompromised state.   Neurological: Positive for weakness. Negative for dizziness, tremors, seizures, syncope, facial asymmetry, speech difficulty, light-headedness, numbness and headaches.   Hematological: Negative for adenopathy. Does not bruise/bleed easily.   Psychiatric/Behavioral: Positive for dysphoric mood. Negative for agitation, behavioral problems, confusion, decreased concentration, hallucinations, self-injury, sleep disturbance and suicidal ideas. The patient is nervous/anxious. The patient is not hyperactive.      Objective:     Vital Signs (Most Recent):  Temp: 98.7 °F (37.1 °C) (02/24/17 0800)  Pulse: 83 (02/24/17 0800)  Resp: 18 (02/24/17 0800)  BP: 117/66 (02/24/17 0800)  SpO2: 97 % (02/24/17 0800) Vital Signs (24h Range):  Temp:  [98.1 °F (36.7 °C)-98.7 °F (37.1 °C)] 98.7 °F (37.1 °C)  Pulse:  [51-90] 83  Resp:  [17-18] 18  SpO2:  [95 %-100 %] 97 %  BP: (102-126)/(65-78) 117/66     Weight: 50 kg (110 lb 4.8 oz)  Body mass index is 20.17 kg/(m^2).  Body surface area is 1.48 meters squared.      Intake/Output Summary (Last 24 hours) at 02/24/17 0901  Last data filed at 02/24/17 0636   Gross per 24 hour   Intake          4486.67 ml   Output             1550 ml   Net          2936.67 ml       Physical Exam   Constitutional: He is oriented to person, place, and time. He has a sickly appearance. He appears ill. He appears distressed.   HENT:   Head: Normocephalic.   Right Ear: External ear normal.   Left Ear: External ear normal.   Nose: Nose normal. Right sinus exhibits no maxillary sinus tenderness and no frontal sinus tenderness. Left sinus exhibits no maxillary sinus tenderness and no frontal sinus tenderness.   Mouth/Throat: Oropharynx is clear and moist. No oropharyngeal exudate.   Eyes: EOM and lids are normal. Pupils are equal, round, and reactive to light. Right eye exhibits no discharge. Left eye exhibits no  discharge. Right conjunctiva is not injected. Right conjunctiva has no hemorrhage. Left conjunctiva is not injected. Left conjunctiva has no hemorrhage. No scleral icterus. Right eye exhibits normal extraocular motion. Left eye exhibits normal extraocular motion.   Neck: Normal range of motion. Neck supple. No JVD present. No tracheal deviation present. No thyromegaly present.   Cardiovascular: Normal rate, regular rhythm and normal heart sounds.    Pulmonary/Chest: Effort normal and breath sounds normal. No stridor. No respiratory distress.   Abdominal: Soft. Bowel sounds are normal. He exhibits no mass. There is no hepatosplenomegaly, splenomegaly or hepatomegaly. There is no tenderness.   Musculoskeletal: Normal range of motion. He exhibits no edema or tenderness.   Lymphadenopathy:        Head (right side): No posterior auricular and no occipital adenopathy present.        Head (left side): No posterior auricular and no occipital adenopathy present.     He has no cervical adenopathy.        Right cervical: No superficial cervical, no deep cervical and no posterior cervical adenopathy present.       Left cervical: No superficial cervical, no deep cervical and no posterior cervical adenopathy present.     He has no axillary adenopathy.        Right: No supraclavicular adenopathy present.        Left: No supraclavicular adenopathy present.   Neurological: He is alert and oriented to person, place, and time. He has normal strength. No cranial nerve deficit. Coordination normal.   Skin: Skin is dry. No rash noted. He is not diaphoretic. No cyanosis or erythema. Nails show no clubbing.   Psychiatric: He has a normal mood and affect. His behavior is normal. Judgment and thought content normal. Cognition and memory are normal.   Vitals reviewed.      Significant Labs:   All pertinent labs from the last 24 hours have been reviewed.    Diagnostic Results:  I have reviewed and interpreted all pertinent imaging  results/findings within the past 24 hours.

## 2017-02-24 NOTE — ANESTHESIA RELEASE NOTE
"Anesthesia Release from PACU Note    Patient: Frederick J Lejeune    Procedure(s) Performed: Procedure(s) (LRB):  ESOPHAGOGASTRODUODENOSCOPY (EGD) (N/A)    Anesthesia type: MAC    Post pain: Adequate analgesia    Post assessment: no apparent anesthetic complications, tolerated procedure well and no evidence of recall    Last Vitals:   Visit Vitals    /78 (BP Location: Left arm, BP Method: Automatic)    Pulse 99    Temp 36.8 °C (98.2 °F) (Oral)    Resp 13    Ht 5' 2" (1.575 m)    Wt 50 kg (110 lb 4.8 oz)    SpO2 98%    BMI 20.17 kg/m2       Post vital signs: stable    Level of consciousness: awake    Nausea/Vomiting: no nausea/no vomiting    Complications: none    Airway Patency: patent    Respiratory: unassisted, spontaneous ventilation, nasal cannula    Cardiovascular: stable and blood pressure at baseline    Hydration: euvolemic  "

## 2017-02-24 NOTE — ANESTHESIA PREPROCEDURE EVALUATION
02/24/2017  Frederick J Lejeune is a 66 y.o., male.    [facility] Anesthesia Evaluation    I have reviewed the Patient Summary Reports.    I have reviewed the Nursing Notes.   I have reviewed the Medications.     Review of Systems  Anesthesia Hx:  No problems with previous Anesthesia  Denies Family Hx of Anesthesia complications.   Denies Personal Hx of Anesthesia complications.   Social:  Former Smoker, No Alcohol Use    Hematology/Oncology:         -- Anemia: Oncology Comments: Lung ca with bone mets    Cardiovascular:   Denies Hypertension.  Denies MI.   Denies CABG/stent.      hyperlipidemia ECG has been reviewed. ekg 12/2016:  Normal sinus rhythm 88  Normal ECG  When compared with ECG of 26-DEC-2016 03:43,  Nonspecific T wave abnormality, improved in Inferior leads  Nonspecific T wave abnormality no longer evident in Lateral leads    Echo 10/2016:  1 - Moderate left atrial enlargement.     2 - Concentric remodeling.     3 - Normal left ventricular systolic function (EF 60-65%).     4 - Normal left ventricular diastolic function.     5 - Normal right ventricular systolic function    Pulmonary:   COPD, severe o2 at 2lpm nc  Easily short of breath with min exertion   Renal/:  Renal/ Normal     Hepatic/GI:   GERD Denies Liver Disease. Denies Hepatitis. Esophageal dysphagia and stricture   Musculoskeletal:  Musculoskeletal Normal    Neurological:   Denies CVA. Denies Seizures.    Endocrine:  Endocrine Normal    Psych:   anxiety          Physical Exam  General:  Cachexia    Airway/Jaw/Neck:  Airway Findings: Mouth Opening: Normal Tongue: Normal  General Airway Assessment: Adult  Mallampati: II      Dental:  Dental Findings: In tact   Chest/Lungs:  Chest/Lungs Findings: Clear to auscultation, Normal Respiratory Rate     Heart/Vascular:  Heart Findings: Rate: Normal  Rhythm: Regular Rhythm  Sounds: Normal              Anesthesia Plan  Type of Anesthesia, risks & benefits discussed:  Anesthesia Type:  MAC  Patient's Preference:   Intra-op Monitoring Plan: standard ASA monitors  Intra-op Monitoring Plan Comments:   Post Op Pain Control Plan:   Post Op Pain Control Plan Comments:   Induction:   IV  Beta Blocker:  Patient is on a Beta-Blocker and has received one dose within the past 24 hours (No further documentation required).       Informed Consent: Patient understands risks and agrees with Anesthesia plan.  Questions answered. Anesthesia consent signed with patient.  ASA Score: 3     Day of Surgery Review of History & Physical: I have interviewed and examined the patient. I have reviewed the patient's H&P dated:  There are no significant changes.  H&P update referred to the surgeon.         Ready For Surgery From Anesthesia Perspective.

## 2017-02-24 NOTE — TRANSFER OF CARE
"Anesthesia Transfer of Care Note    Patient: Frederick J Lejeune    Procedure(s) Performed: Procedure(s) (LRB):  ESOPHAGOGASTRODUODENOSCOPY (EGD) (N/A)    Patient location: PACU    Anesthesia Type: MAC    Transport from OR: Transported from OR on room air with adequate spontaneous ventilation    Post pain: adequate analgesia    Post assessment: no apparent anesthetic complications and tolerated procedure well    Post vital signs: stable    Level of consciousness: awake    Nausea/Vomiting: no nausea/vomiting    Complications: none          Last vitals:   Visit Vitals    /78 (BP Location: Left arm, BP Method: Automatic)    Pulse 99    Temp 36.8 °C (98.2 °F) (Oral)    Resp 13    Ht 5' 2" (1.575 m)    Wt 50 kg (110 lb 4.8 oz)    SpO2 98%    BMI 20.17 kg/m2     "

## 2017-02-24 NOTE — SUBJECTIVE & OBJECTIVE
Interval History: Esophageal stent today.    Review of Systems   Constitutional: Positive for appetite change and fatigue. Negative for activity change, chills, diaphoresis, fever and unexpected weight change.   HENT: Positive for trouble swallowing. Negative for congestion, ear discharge, ear pain, facial swelling, hearing loss, postnasal drip, sore throat, tinnitus and voice change.    Eyes: Negative for photophobia, pain, discharge, redness, itching and visual disturbance.   Respiratory: Negative for cough, choking, chest tightness, shortness of breath, wheezing and stridor.    Cardiovascular: Negative for chest pain, palpitations and leg swelling.   Gastrointestinal: Negative for abdominal distention, abdominal pain, blood in stool, constipation, diarrhea, nausea and vomiting.        Dysphagia   Endocrine: Negative for cold intolerance, heat intolerance, polydipsia, polyphagia and polyuria.   Genitourinary: Negative for difficulty urinating, flank pain, frequency, hematuria and urgency.   Musculoskeletal: Negative for arthralgias, back pain, gait problem and myalgias.        Pain in right groin and right neck   Skin: Negative for color change, pallor, rash and wound.   Neurological: Positive for dizziness and weakness. Negative for tremors, seizures, syncope, facial asymmetry, speech difficulty, light-headedness, numbness and headaches.   Hematological: Negative for adenopathy. Does not bruise/bleed easily.   Psychiatric/Behavioral: Negative for agitation, confusion, hallucinations and suicidal ideas. The patient is not nervous/anxious.    All other systems reviewed and are negative.    Objective:     Vital Signs (Most Recent):  Temp: 98.2 °F (36.8 °C) (02/24/17 1229)  Pulse: 100 (02/24/17 1519)  Resp: 18 (02/24/17 1519)  BP: (!) 155/87 (02/24/17 1519)  SpO2: 98 % (02/24/17 1452) Vital Signs (24h Range):  Temp:  [98.1 °F (36.7 °C)-98.7 °F (37.1 °C)] 98.2 °F (36.8 °C)  Pulse:  [] 100  Resp:  [13-73]  18  SpO2:  [93 %-100 %] 98 %  BP: (102-155)/(66-89) 155/87     Weight: 50 kg (110 lb 4.8 oz)  Body mass index is 20.17 kg/(m^2).    Intake/Output Summary (Last 24 hours) at 02/24/17 1659  Last data filed at 02/24/17 1406   Gross per 24 hour   Intake          4126.67 ml   Output             1000 ml   Net          3126.67 ml      Physical Exam   Constitutional: He is oriented to person, place, and time. He appears well-developed.   Cachectic   HENT:   Head: Normocephalic and atraumatic.   Nose: Nose normal.   Eyes: Conjunctivae and EOM are normal. Pupils are equal, round, and reactive to light.   Neck: Normal range of motion. Neck supple. No JVD present. No tracheal deviation present. No thyromegaly present.   Cardiovascular: Normal rate, regular rhythm, normal heart sounds and intact distal pulses.  Exam reveals no gallop and no friction rub.    No murmur heard.  Pulmonary/Chest: Effort normal. No stridor. No respiratory distress. He has no wheezes. He has no rales. He exhibits no tenderness.   Abdominal: Soft. Bowel sounds are normal. He exhibits no distension. There is no tenderness. There is no rebound and no guarding.   Left groin with 5x6 cm enlarged Lymph node, TTP and right supraclavicular LAD present, firm and TTP.   Musculoskeletal: Normal range of motion. He exhibits no edema, tenderness or deformity.   Neurological: He is alert and oriented to person, place, and time. He has normal reflexes. No cranial nerve deficit. Coordination normal.   Skin: Skin is warm and dry. No rash noted. No erythema. No pallor.   Psychiatric: He has a normal mood and affect. His behavior is normal. Judgment and thought content normal.   Nursing note and vitals reviewed.      Significant Labs:   CBC:   Recent Labs  Lab 02/23/17 0447   WBC 6.65   HGB 11.2*   HCT 33.5*        CMP:   Recent Labs  Lab 02/23/17 0447 02/24/17  0503    141   K 4.1 4.1    107   CO2 27 24   GLU 87 89   BUN 10 7*   CREATININE 0.7 0.7    CALCIUM 9.4 9.4   PROT 6.3 6.2   ALBUMIN 2.8* 2.8*   BILITOT 0.3 0.4   ALKPHOS 99 98   AST 11 12   ALT 9* 10   ANIONGAP 8 10   EGFRNONAA >60 >60       Significant Imaging: I have reviewed all pertinent imaging results/findings within the past 24 hours.   Imaging Results         X-Ray Chest 1 View (Final result) Result time:  02/24/17 16:41:07    Final result by Almaz Donato MD (02/24/17 16:41:07)    Impression:      Fluoroscopic and endoscopic guided esophageal stent placement as described above.      Electronically signed by: ALMAZ DONATO MD  Date:     02/24/17  Time:    16:41     Narrative:    EXAM: Fluoroscopy for esophageal stent placement.    CLINICAL HISTORY:  Esophageal mass and stricture.    Comparison: CT chest and esophagram 02/22/2017.    FINDINGS:  This procedure was performed by Dr. Cheng.    Submitted images demonstrate an endoscope within the esophagus followed by deployment of an esophageal stent from the proximal to distal esophagus.  There is narrowing of the mid stent corresponding to the esophageal stricture with flared proximal and distal ends.  For further information please refer to Dr. Cheng's procedure note.    Fluoroscopy time: 223 seconds.    Fluoroscopic images: 5.            FL Less Than 1 Hour (In process)         FL Esophagram Complete (Final result) Result time:  02/22/17 17:42:10    Final result by Raheel Yi MD (02/22/17 17:42:10)    Impression:     Moderately severe smooth stricture of the distal thoracic esophagus which may be produced by the subcarinal lymphadenopathy. Can't exclude primary esophageal neoplasm.      Electronically signed by: RAHEEL YI MD  Date:     02/22/17  Time:    17:42     Narrative:    History: Metastatic lung cancer, worsening dysphagia, evidence of esophageal obstruction on CT    Gastrografin esophagram was performed. The patient swallowed Gastrografin without difficulty. There is a persistent 2.4 cm long moderately severe  stricture below the twila. The stricture has fairly smooth margins, without mucosal irregularity. While a primary neoplasm of the esophagus is not excluded, this may be consistent with extrinsic compression from the adjacent subcarinal lymphadenopathy. There is moderate dilatation of the esophagus above the stricture. No abnormality demonstrated the at the GE junction.    1.3 minutes of fluoroscopy was utilized for the procedure.            CT Chest With Contrast (Final result) Result time:  02/22/17 10:51:06    Final result by Raheel Calloway MD (02/22/17 10:51:06)    Impression:     Marked air distention of the proximal thoracic esophagus down to the region of unchanged subcarinal lymphadenopathy. The esophageal obstruction may be on the basis of lymphadenopathy but esophageal mass at this level is a definite consideration. Development of bilateral pleural effusions. Otherwise no significant change from 1/11/17 PET/CT.    All CT scans at this facility use dose modulation, iterative reconstruction and/or weight based dosing when appropriate to reduce radiation dose to as low as reasonably achievable.       Electronically signed by: RAHEEL CALLOWAY MD  Date:     02/22/17  Time:    10:51     Narrative:    History: Dysphagia, onset gradually one month ago, worsening a week ago. History of metastatic lung cancer    Comparison 1/11/17 PET/CT, 12/20/16 CT chest    There is marked air distention of the proximal esophagus down to the subcarinal region where there is prominent subcarinal lymphadenopathy as before. The degree of air distention of the proximal esophagus is worse. There is no air in the esophagus at the subcarinal region and then air is demonstrated in the distal esophagus. It would be difficult to exclude an esophageal mass at this level, versus extrinsic mass effect on the esophagus by the adenopathy. There has been interval development of small to moderate sized bilateral pleural effusions. The mass in  the right lower lobe is essentially unchanged. There are several left lower lobe masses which are essentially unchanged. A few scattered small pulmonary metastases bilaterally. There is emphysematous change and marked diffuse interstitial fibrosis. There is nodular thickening of the pericardium. Right supraclavicular lymphadenopathy again noted.

## 2017-02-24 NOTE — SUBJECTIVE & OBJECTIVE
Interval History: Stent placement planned for 2/24/17    Review of Systems   Constitutional: Positive for appetite change and fatigue. Negative for activity change, chills, diaphoresis, fever and unexpected weight change.   HENT: Positive for trouble swallowing. Negative for congestion, ear discharge, ear pain, facial swelling, hearing loss, postnasal drip, sore throat, tinnitus and voice change.    Eyes: Negative for photophobia, pain, discharge, redness, itching and visual disturbance.   Respiratory: Negative for cough, choking, chest tightness, shortness of breath, wheezing and stridor.    Cardiovascular: Negative for chest pain, palpitations and leg swelling.   Gastrointestinal: Negative for abdominal distention, abdominal pain, blood in stool, constipation, diarrhea, nausea and vomiting.        Dysphagia   Endocrine: Negative for cold intolerance, heat intolerance, polydipsia, polyphagia and polyuria.   Genitourinary: Negative for difficulty urinating, flank pain, frequency, hematuria and urgency.   Musculoskeletal: Negative for arthralgias, back pain, gait problem and myalgias.        Pain in right groin and right neck   Skin: Negative for color change, pallor, rash and wound.   Neurological: Positive for dizziness and weakness. Negative for tremors, seizures, syncope, facial asymmetry, speech difficulty, light-headedness, numbness and headaches.   Hematological: Negative for adenopathy. Does not bruise/bleed easily.   Psychiatric/Behavioral: Negative for agitation, confusion, hallucinations and suicidal ideas. The patient is not nervous/anxious.    All other systems reviewed and are negative.    Objective:     Vital Signs (Most Recent):  Temp: 98.1 °F (36.7 °C) (02/23/17 1909)  Pulse: 85 (02/23/17 1909)  Resp: 17 (02/23/17 1909)  BP: 102/72 (02/23/17 1909)  SpO2: 100 % (02/23/17 1909) Vital Signs (24h Range):  Temp:  [96.1 °F (35.6 °C)-98.6 °F (37 °C)] 98.1 °F (36.7 °C)  Pulse:  [84-93] 85  Resp:  [17-18]  17  SpO2:  [95 %-100 %] 100 %  BP: (102-138)/(65-88) 102/72     Weight: 50.3 kg (110 lb 14.4 oz)  Body mass index is 20.28 kg/(m^2).    Intake/Output Summary (Last 24 hours) at 02/23/17 2034  Last data filed at 02/23/17 2006   Gross per 24 hour   Intake             4350 ml   Output             1175 ml   Net             3175 ml      Physical Exam   Constitutional: He is oriented to person, place, and time. He appears well-developed.   Cachectic   HENT:   Head: Normocephalic and atraumatic.   Nose: Nose normal.   Eyes: Conjunctivae and EOM are normal. Pupils are equal, round, and reactive to light.   Neck: Normal range of motion. Neck supple. No JVD present. No tracheal deviation present. No thyromegaly present.   Cardiovascular: Normal rate, regular rhythm, normal heart sounds and intact distal pulses.  Exam reveals no gallop and no friction rub.    No murmur heard.  Pulmonary/Chest: Effort normal. No stridor. No respiratory distress. He has no wheezes. He has no rales. He exhibits no tenderness.   Abdominal: Soft. Bowel sounds are normal. He exhibits no distension. There is no tenderness. There is no rebound and no guarding.   Left groin with 5x6 cm enlarged Lymph node, TTP and right supraclavicular LAD present, firm and TTP.   Musculoskeletal: Normal range of motion. He exhibits no edema, tenderness or deformity.   Neurological: He is alert and oriented to person, place, and time. He has normal reflexes. No cranial nerve deficit. Coordination normal.   Skin: Skin is warm and dry. No rash noted. No erythema. No pallor.   Psychiatric: He has a normal mood and affect. His behavior is normal. Judgment and thought content normal.   Nursing note and vitals reviewed.      Significant Labs:   BMP:   Recent Labs  Lab 02/22/17  0837 02/23/17  0447    87    141   K 4.4 4.1    106   CO2 31* 27   BUN 17 10   CREATININE 0.8 0.7   CALCIUM 10.1 9.4   MG 2.3  --      CBC:   Recent Labs  Lab 02/22/17  0837  02/23/17  0447   WBC 9.66 6.65   HGB 12.5* 11.2*   HCT 36.7* 33.5*    203       Significant Imaging: I have reviewed all pertinent imaging results/findings within the past 24 hours.   Imaging Results         FL Esophagram Complete (Final result) Result time:  02/22/17 17:42:10    Final result by Raheel Calloway MD (02/22/17 17:42:10)    Impression:     Moderately severe smooth stricture of the distal thoracic esophagus which may be produced by the subcarinal lymphadenopathy. Can't exclude primary esophageal neoplasm.      Electronically signed by: RAHEEL CALLOWAY MD  Date:     02/22/17  Time:    17:42     Narrative:    History: Metastatic lung cancer, worsening dysphagia, evidence of esophageal obstruction on CT    Gastrografin esophagram was performed. The patient swallowed Gastrografin without difficulty. There is a persistent 2.4 cm long moderately severe stricture below the twila. The stricture has fairly smooth margins, without mucosal irregularity. While a primary neoplasm of the esophagus is not excluded, this may be consistent with extrinsic compression from the adjacent subcarinal lymphadenopathy. There is moderate dilatation of the esophagus above the stricture. No abnormality demonstrated the at the GE junction.    1.3 minutes of fluoroscopy was utilized for the procedure.            CT Chest With Contrast (Final result) Result time:  02/22/17 10:51:06    Final result by Raheel Calloway MD (02/22/17 10:51:06)    Impression:     Marked air distention of the proximal thoracic esophagus down to the region of unchanged subcarinal lymphadenopathy. The esophageal obstruction may be on the basis of lymphadenopathy but esophageal mass at this level is a definite consideration. Development of bilateral pleural effusions. Otherwise no significant change from 1/11/17 PET/CT.    All CT scans at this facility use dose modulation, iterative reconstruction and/or weight based dosing when appropriate to  reduce radiation dose to as low as reasonably achievable.       Electronically signed by: TOPHER YI MD  Date:     02/22/17  Time:    10:51     Narrative:    History: Dysphagia, onset gradually one month ago, worsening a week ago. History of metastatic lung cancer    Comparison 1/11/17 PET/CT, 12/20/16 CT chest    There is marked air distention of the proximal esophagus down to the subcarinal region where there is prominent subcarinal lymphadenopathy as before. The degree of air distention of the proximal esophagus is worse. There is no air in the esophagus at the subcarinal region and then air is demonstrated in the distal esophagus. It would be difficult to exclude an esophageal mass at this level, versus extrinsic mass effect on the esophagus by the adenopathy. There has been interval development of small to moderate sized bilateral pleural effusions. The mass in the right lower lobe is essentially unchanged. There are several left lower lobe masses which are essentially unchanged. A few scattered small pulmonary metastases bilaterally. There is emphysematous change and marked diffuse interstitial fibrosis. There is nodular thickening of the pericardium. Right supraclavicular lymphadenopathy again noted.

## 2017-02-24 NOTE — PLAN OF CARE
Problem: Patient Care Overview  Goal: Plan of Care Review  Outcome: Ongoing (interventions implemented as appropriate)  Pt's pain is controlled this shift with prn morphine. Fluids infusing as ordered. Full liquid diet. NPO after midnight for stent placement tomorrow. Chart reviewed. Will monitor.

## 2017-02-24 NOTE — PROGRESS NOTES
[facility] -   Hematology/Oncology  Progress Note    Patient Name: Frederick J Lejeune  Admission Date: 2/22/2017  Hospital Length of Stay: 2 days  Code Status: Full Code     Subjective:     HPI:  66-year-old male with progressive non-small cell lung carcinoma currently on immunomodulating agent NIVOLAMAB patient is admitted to the hospital difficulty swallowing    Interval History: Patient is awaiting esophageal stent placement    Oncology Treatment Plan:   [facility] MK [id number] KEYNOTE [id number]    Medications:  Continuous Infusions:   sodium chloride 0.9% 100 mL/hr at 02/24/17 0300     Scheduled Meds:   enoxaparin  40 mg Subcutaneous Daily    pantoprazole  40 mg Intravenous Daily    polyethylene glycol  17 g Oral Daily    Banana bag   Intravenous Daily     PRN Meds:albuterol-ipratropium 2.5mg-0.5mg/3mL, benzonatate, diphenhydrAMINE, hydrocodone-apap 2.5-108 MG/5 ML, hydrocodone-apap 2.5-108 MG/5 ML, morphine, ondansetron     Review of Systems   Constitutional: Positive for activity change, appetite change and fatigue. Negative for chills, diaphoresis, fever and unexpected weight change.   HENT: Negative for congestion, dental problem, drooling, ear discharge, ear pain, facial swelling, hearing loss, mouth sores, nosebleeds, postnasal drip, rhinorrhea, sinus pressure, sneezing, sore throat, tinnitus, trouble swallowing and voice change.    Eyes: Negative for photophobia, pain, discharge, redness, itching and visual disturbance.   Respiratory: Negative for apnea, cough, choking, chest tightness, shortness of breath, wheezing and stridor.    Cardiovascular: Negative for chest pain, palpitations and leg swelling.   Gastrointestinal: Negative for abdominal distention, abdominal pain, anal bleeding, blood in stool, constipation, diarrhea, nausea, rectal pain and vomiting.   Endocrine: Negative for cold intolerance, heat intolerance, polydipsia, polyphagia and polyuria.   Genitourinary: Negative for decreased urine  volume, difficulty urinating, discharge, dysuria, enuresis, flank pain, frequency, genital sores, hematuria, penile pain, penile swelling, scrotal swelling, testicular pain and urgency.   Musculoskeletal: Negative for arthralgias, back pain, gait problem, joint swelling, myalgias, neck pain and neck stiffness.   Skin: Negative for color change, pallor, rash and wound.   Allergic/Immunologic: Negative for environmental allergies, food allergies and immunocompromised state.   Neurological: Positive for weakness. Negative for dizziness, tremors, seizures, syncope, facial asymmetry, speech difficulty, light-headedness, numbness and headaches.   Hematological: Negative for adenopathy. Does not bruise/bleed easily.   Psychiatric/Behavioral: Positive for dysphoric mood. Negative for agitation, behavioral problems, confusion, decreased concentration, hallucinations, self-injury, sleep disturbance and suicidal ideas. The patient is nervous/anxious. The patient is not hyperactive.      Objective:     Vital Signs (Most Recent):  Temp: 98.7 °F (37.1 °C) (02/24/17 0800)  Pulse: 83 (02/24/17 0800)  Resp: 18 (02/24/17 0800)  BP: 117/66 (02/24/17 0800)  SpO2: 97 % (02/24/17 0800) Vital Signs (24h Range):  Temp:  [98.1 °F (36.7 °C)-98.7 °F (37.1 °C)] 98.7 °F (37.1 °C)  Pulse:  [51-90] 83  Resp:  [17-18] 18  SpO2:  [95 %-100 %] 97 %  BP: (102-126)/(65-78) 117/66     Weight: 50 kg (110 lb 4.8 oz)  Body mass index is 20.17 kg/(m^2).  Body surface area is 1.48 meters squared.      Intake/Output Summary (Last 24 hours) at 02/24/17 0901  Last data filed at 02/24/17 0636   Gross per 24 hour   Intake          4486.67 ml   Output             1550 ml   Net          2936.67 ml       Physical Exam   Constitutional: He is oriented to person, place, and time. He has a sickly appearance. He appears ill. He appears distressed.   HENT:   Head: Normocephalic.   Right Ear: External ear normal.   Left Ear: External ear normal.   Nose: Nose normal.  Right sinus exhibits no maxillary sinus tenderness and no frontal sinus tenderness. Left sinus exhibits no maxillary sinus tenderness and no frontal sinus tenderness.   Mouth/Throat: Oropharynx is clear and moist. No oropharyngeal exudate.   Eyes: EOM and lids are normal. Pupils are equal, round, and reactive to light. Right eye exhibits no discharge. Left eye exhibits no discharge. Right conjunctiva is not injected. Right conjunctiva has no hemorrhage. Left conjunctiva is not injected. Left conjunctiva has no hemorrhage. No scleral icterus. Right eye exhibits normal extraocular motion. Left eye exhibits normal extraocular motion.   Neck: Normal range of motion. Neck supple. No JVD present. No tracheal deviation present. No thyromegaly present.   Cardiovascular: Normal rate, regular rhythm and normal heart sounds.    Pulmonary/Chest: Effort normal and breath sounds normal. No stridor. No respiratory distress.   Abdominal: Soft. Bowel sounds are normal. He exhibits no mass. There is no hepatosplenomegaly, splenomegaly or hepatomegaly. There is no tenderness.   Musculoskeletal: Normal range of motion. He exhibits no edema or tenderness.   Lymphadenopathy:        Head (right side): No posterior auricular and no occipital adenopathy present.        Head (left side): No posterior auricular and no occipital adenopathy present.     He has no cervical adenopathy.        Right cervical: No superficial cervical, no deep cervical and no posterior cervical adenopathy present.       Left cervical: No superficial cervical, no deep cervical and no posterior cervical adenopathy present.     He has no axillary adenopathy.        Right: No supraclavicular adenopathy present.        Left: No supraclavicular adenopathy present.   Neurological: He is alert and oriented to person, place, and time. He has normal strength. No cranial nerve deficit. Coordination normal.   Skin: Skin is dry. No rash noted. He is not diaphoretic. No cyanosis  or erythema. Nails show no clubbing.   Psychiatric: He has a normal mood and affect. His behavior is normal. Judgment and thought content normal. Cognition and memory are normal.   Vitals reviewed.      Significant Labs:   All pertinent labs from the last 24 hours have been reviewed.    Diagnostic Results:  I have reviewed and interpreted all pertinent imaging results/findings within the past 24 hours.    Assessment/Plan:     No new Assessment & Plan notes have been filed under this hospital service since the last note was generated.  Service: Hematology and Oncology      Thank you for your consult. I will follow-up with patient. Please contact us if you have any additional questions.     Adam Barraza MD  Hematology/Oncology  [facility] [place]

## 2017-02-25 LAB
ALBUMIN SERPL BCP-MCNC: 2.9 G/DL
ALP SERPL-CCNC: 106 U/L
ALT SERPL W/O P-5'-P-CCNC: 8 U/L
ANION GAP SERPL CALC-SCNC: 8 MMOL/L
AST SERPL-CCNC: 13 U/L
BILIRUB SERPL-MCNC: 0.5 MG/DL
BUN SERPL-MCNC: 11 MG/DL
CALCIUM SERPL-MCNC: 9.5 MG/DL
CHLORIDE SERPL-SCNC: 104 MMOL/L
CO2 SERPL-SCNC: 29 MMOL/L
CREAT SERPL-MCNC: 0.8 MG/DL
EST. GFR  (AFRICAN AMERICAN): >60 ML/MIN/1.73 M^2
EST. GFR  (NON AFRICAN AMERICAN): >60 ML/MIN/1.73 M^2
GLUCOSE SERPL-MCNC: 85 MG/DL
POTASSIUM SERPL-SCNC: 4.3 MMOL/L
PROT SERPL-MCNC: 6.5 G/DL
SODIUM SERPL-SCNC: 141 MMOL/L

## 2017-02-25 PROCEDURE — C9113 INJ PANTOPRAZOLE SODIUM, VIA: HCPCS | Performed by: EMERGENCY MEDICINE

## 2017-02-25 PROCEDURE — 80053 COMPREHEN METABOLIC PANEL: CPT

## 2017-02-25 PROCEDURE — 25000003 PHARM REV CODE 250: Performed by: FAMILY MEDICINE

## 2017-02-25 PROCEDURE — 63600175 PHARM REV CODE 636 W HCPCS: Performed by: FAMILY MEDICINE

## 2017-02-25 PROCEDURE — 94761 N-INVAS EAR/PLS OXIMETRY MLT: CPT

## 2017-02-25 PROCEDURE — 25000003 PHARM REV CODE 250: Performed by: EMERGENCY MEDICINE

## 2017-02-25 PROCEDURE — 63600175 PHARM REV CODE 636 W HCPCS: Performed by: INTERNAL MEDICINE

## 2017-02-25 PROCEDURE — 25000003 PHARM REV CODE 250: Performed by: NURSE PRACTITIONER

## 2017-02-25 PROCEDURE — 99231 SBSQ HOSP IP/OBS SF/LOW 25: CPT | Mod: ,,, | Performed by: INTERNAL MEDICINE

## 2017-02-25 PROCEDURE — 99233 SBSQ HOSP IP/OBS HIGH 50: CPT | Mod: ,,, | Performed by: INTERNAL MEDICINE

## 2017-02-25 PROCEDURE — 11000001 HC ACUTE MED/SURG PRIVATE ROOM

## 2017-02-25 PROCEDURE — 63600175 PHARM REV CODE 636 W HCPCS: Performed by: EMERGENCY MEDICINE

## 2017-02-25 PROCEDURE — 36415 COLL VENOUS BLD VENIPUNCTURE: CPT

## 2017-02-25 PROCEDURE — 63600175 PHARM REV CODE 636 W HCPCS: Performed by: NURSE PRACTITIONER

## 2017-02-25 PROCEDURE — 27000221 HC OXYGEN, UP TO 24 HOURS

## 2017-02-25 RX ORDER — HYDROMORPHONE HYDROCHLORIDE 1 MG/ML
2 INJECTION, SOLUTION INTRAMUSCULAR; INTRAVENOUS; SUBCUTANEOUS EVERY 4 HOURS PRN
Status: DISCONTINUED | OUTPATIENT
Start: 2017-02-25 | End: 2017-02-25

## 2017-02-25 RX ORDER — HYDROMORPHONE HYDROCHLORIDE 2 MG/ML
2 INJECTION, SOLUTION INTRAMUSCULAR; INTRAVENOUS; SUBCUTANEOUS EVERY 4 HOURS PRN
Status: DISCONTINUED | OUTPATIENT
Start: 2017-02-25 | End: 2017-02-27

## 2017-02-25 RX ADMIN — HYDROMORPHONE HYDROCHLORIDE 2 MG: 2 INJECTION INTRAMUSCULAR; INTRAVENOUS; SUBCUTANEOUS at 02:02

## 2017-02-25 RX ADMIN — ENOXAPARIN SODIUM 40 MG: 100 INJECTION SUBCUTANEOUS at 10:02

## 2017-02-25 RX ADMIN — PANTOPRAZOLE SODIUM 40 MG: 40 INJECTION, POWDER, FOR SOLUTION INTRAVENOUS at 09:02

## 2017-02-25 RX ADMIN — ONDANSETRON 8 MG: 8 TABLET, ORALLY DISINTEGRATING ORAL at 07:02

## 2017-02-25 RX ADMIN — HYDROMORPHONE HYDROCHLORIDE 2 MG: 2 INJECTION INTRAMUSCULAR; INTRAVENOUS; SUBCUTANEOUS at 03:02

## 2017-02-25 RX ADMIN — HYDROMORPHONE HYDROCHLORIDE 2 MG: 2 INJECTION INTRAMUSCULAR; INTRAVENOUS; SUBCUTANEOUS at 11:02

## 2017-02-25 RX ADMIN — SODIUM CHLORIDE: 0.9 INJECTION, SOLUTION INTRAVENOUS at 03:02

## 2017-02-25 RX ADMIN — POLYETHYLENE GLYCOL 3350 17 G: 17 POWDER, FOR SOLUTION ORAL at 09:02

## 2017-02-25 RX ADMIN — FOLIC ACID: 5 INJECTION, SOLUTION INTRAMUSCULAR; INTRAVENOUS; SUBCUTANEOUS at 07:02

## 2017-02-25 RX ADMIN — HYDROMORPHONE HYDROCHLORIDE 2 MG: 2 INJECTION INTRAMUSCULAR; INTRAVENOUS; SUBCUTANEOUS at 07:02

## 2017-02-25 RX ADMIN — SODIUM CHLORIDE: 0.9 INJECTION, SOLUTION INTRAVENOUS at 07:02

## 2017-02-25 NOTE — SUBJECTIVE & OBJECTIVE
Subjective:     Interval History: Esophageal stent placed yestrday. Improved pain was severe after stent placement decreased but still moderate to severe.  He did tolerate a littrle liquids.    Review of Systems   Constitutional: Negative.    HENT: Negative.    Eyes: Negative.    Respiratory: Negative.    Cardiovascular: Positive for chest pain.   Gastrointestinal: Negative.    Genitourinary: Negative.    Musculoskeletal: Negative.    Skin: Negative.    Neurological: Negative.    Psychiatric/Behavioral: Negative.      Objective:     Vital Signs (Most Recent):  Temp: 98.4 °F (36.9 °C) (02/25/17 1133)  Pulse: 96 (02/25/17 1133)  Resp: 18 (02/25/17 1133)  BP: 137/84 (02/25/17 1133)  SpO2: 97 % (02/25/17 1133) Vital Signs (24h Range):  Temp:  [97.9 °F (36.6 °C)-99.1 °F (37.3 °C)] 98.4 °F (36.9 °C)  Pulse:  [] 96  Resp:  [13-73] 18  SpO2:  [93 %-98 %] 97 %  BP: (123-155)/(81-90) 137/84     Weight: 50 kg (110 lb 4.8 oz) (02/24/17 0554)  Body mass index is 20.17 kg/(m^2).      Intake/Output Summary (Last 24 hours) at 02/25/17 1352  Last data filed at 02/25/17 1137   Gross per 24 hour   Intake          4213.33 ml   Output             1200 ml   Net          3013.33 ml       Lines/Drains/Airways     Central Venous Catheter Line                 Port A Cath Single Lumen right subclavian -- days          Peripheral Intravenous Line                 Peripheral IV - Single Lumen 02/22/17 0837 Right Antecubital 3 days                Physical Exam   Constitutional: He is oriented to person, place, and time. He appears well-developed and well-nourished. No distress.   HENT:   Head: Normocephalic and atraumatic.   Mouth/Throat: Oropharynx is clear and moist. No oropharyngeal exudate.   Eyes: Conjunctivae are normal. Pupils are equal, round, and reactive to light. Right eye exhibits no discharge. Left eye exhibits no discharge. No scleral icterus.   Pulmonary/Chest: Effort normal. No respiratory distress. He has no wheezes.    Abdominal: Soft. He exhibits no distension. There is no tenderness.   Musculoskeletal: He exhibits no edema.   Neurological: He is alert and oriented to person, place, and time.   Psychiatric: He has a normal mood and affect. His behavior is normal.   Vitals reviewed.      Significant Labs:  CBC: No results for input(s): WBC, HGB, HCT, PLT in the last 48 hours.  CMP:   Recent Labs  Lab 02/25/17  0432   GLU 85   CALCIUM 9.5   ALBUMIN 2.9*   PROT 6.5      K 4.3   CO2 29      BUN 11   CREATININE 0.8   ALKPHOS 106   ALT 8*   AST 13   BILITOT 0.5         Significant Imaging:  none

## 2017-02-25 NOTE — ASSESSMENT & PLAN NOTE
S/p stent tolerating small volume liquids  -Aggressive pain management  -Continue with liquids diet

## 2017-02-25 NOTE — PROGRESS NOTES
[facility] -   Gastroenterology  Progress Note    Patient Name: Frederick J Lejeune  MRN: [id number]  Admission Date: 2/22/2017  Hospital Length of Stay: 3 days  Code Status: Full Code   Attending Provider: Derek Bourgeois MD  Consulting Provider: Maria Dolores Quinteros MD  Primary Care Physician: Kenrick Muñoz MD  Principal Problem: Esophageal obstruction    Last Recorded LACE+ Scores     None          Subjective:     Interval History: Esophageal stent placed yestrday. Improved pain was severe after stent placement decreased but still moderate to severe.  He did tolerate a littrle liquids.    Review of Systems   Constitutional: Negative.    HENT: Negative.    Eyes: Negative.    Respiratory: Negative.    Cardiovascular: Positive for chest pain.   Gastrointestinal: Negative.    Genitourinary: Negative.    Musculoskeletal: Negative.    Skin: Negative.    Neurological: Negative.    Psychiatric/Behavioral: Negative.      Objective:     Vital Signs (Most Recent):  Temp: 98.4 °F (36.9 °C) (02/25/17 1133)  Pulse: 96 (02/25/17 1133)  Resp: 18 (02/25/17 1133)  BP: 137/84 (02/25/17 1133)  SpO2: 97 % (02/25/17 1133) Vital Signs (24h Range):  Temp:  [97.9 °F (36.6 °C)-99.1 °F (37.3 °C)] 98.4 °F (36.9 °C)  Pulse:  [] 96  Resp:  [13-73] 18  SpO2:  [93 %-98 %] 97 %  BP: (123-155)/(81-90) 137/84     Weight: 50 kg (110 lb 4.8 oz) (02/24/17 0554)  Body mass index is 20.17 kg/(m^2).      Intake/Output Summary (Last 24 hours) at 02/25/17 1352  Last data filed at 02/25/17 1137   Gross per 24 hour   Intake          4213.33 ml   Output             1200 ml   Net          3013.33 ml       Lines/Drains/Airways     Central Venous Catheter Line                 Port A Cath Single Lumen right subclavian -- days          Peripheral Intravenous Line                 Peripheral IV - Single Lumen 02/22/17 0837 Right Antecubital 3 days                Physical Exam   Constitutional: He is oriented to person, place, and time. He appears  well-developed and well-nourished. No distress.   HENT:   Head: Normocephalic and atraumatic.   Mouth/Throat: Oropharynx is clear and moist. No oropharyngeal exudate.   Eyes: Conjunctivae are normal. Pupils are equal, round, and reactive to light. Right eye exhibits no discharge. Left eye exhibits no discharge. No scleral icterus.   Pulmonary/Chest: Effort normal. No respiratory distress. He has no wheezes.   Abdominal: Soft. He exhibits no distension. There is no tenderness.   Musculoskeletal: He exhibits no edema.   Neurological: He is alert and oriented to person, place, and time.   Psychiatric: He has a normal mood and affect. His behavior is normal.   Vitals reviewed.      Significant Labs:  CBC: No results for input(s): WBC, HGB, HCT, PLT in the last 48 hours.  CMP:   Recent Labs  Lab 02/25/17  0432   GLU 85   CALCIUM 9.5   ALBUMIN 2.9*   PROT 6.5      K 4.3   CO2 29      BUN 11   CREATININE 0.8   ALKPHOS 106   ALT 8*   AST 13   BILITOT 0.5         Significant Imaging:  none    Assessment/Plan:     * Esophageal obstruction  S/p stent tolerating small volume liquids  -Aggressive pain management  -Continue with liquids diet      VTE Risk Mitigation         Ordered     enoxaparin injection 40 mg  Daily     Route:  Subcutaneous        02/22/17 1549     Medium Risk of VTE  Once      02/22/17 1549     Place SUKHI hose  Until discontinued      02/22/17 1549     Place sequential compression device  Until discontinued      02/22/17 1549     Reason for No Pharmacological VTE Prophylaxis  Once      02/22/17 1549          Thank you for your consult. I will sign off. Please contact us if you have any additional questions.    Maria Dolores Quinteros MD  Gastroenterology  [facility] [place]

## 2017-02-25 NOTE — PLAN OF CARE
Problem: Patient Care Overview  Goal: Plan of Care Review  Outcome: Ongoing (interventions implemented as appropriate)  Patient AAO and VSS. Remains free of injury. Fall precautions maintained with bed low and wheels locked.  IVF administered as ordered. Pain not well managed with prn Dilaudid 2 mg every 4 hours; pain rated 7-10/10 throughout night. Patient states that the 2 mg Dilaudid did give him a little more relief than the 1 mg did. Nausea treated with Zofran ODT at 0720 this morning. Chart review for 24 hours completed. Will continue to monitor till discharge.

## 2017-02-25 NOTE — PLAN OF CARE
Problem: Patient Care Overview  Goal: Plan of Care Review  Outcome: Ongoing (interventions implemented as appropriate)  Patient educated on side effects and indications for medications ordered. Patient is AAO x4. Patient's pain control is being monitored for effectiveness. 12hr chart check completed. Will continue to monitor.

## 2017-02-25 NOTE — ASSESSMENT & PLAN NOTE
1.  I had a detailed discussion with the patient today with regard to his current clinical situation.  Further management with regard to his metastatic malignancy will be as per his primary oncologist Dr. Espinosa is an outpatient.  He is currently on Pembrolizumab started 1/2017 and it is likely too early to assess for response with this.  2.  The patient is status post esophageal stent placement for extrinsic compression due to metastatic lymphadenopathy.  He will likely need palliative radiation to try to help with this if feasible.  I will discuss with radiation oncology.  This will be initiated as an outpatient if feasible.

## 2017-02-25 NOTE — SUBJECTIVE & OBJECTIVE
Interval History: stent 2/24/2017. Tolerating clear liquids, will advance to Full liquids.    Review of Systems   Constitutional: Positive for appetite change and fatigue. Negative for activity change, chills, diaphoresis, fever and unexpected weight change.   HENT: Positive for trouble swallowing. Negative for congestion, ear discharge, ear pain, facial swelling, hearing loss, postnasal drip, sore throat, tinnitus and voice change.    Eyes: Negative for photophobia, pain, discharge, redness, itching and visual disturbance.   Respiratory: Negative for cough, choking, chest tightness, shortness of breath, wheezing and stridor.    Cardiovascular: Negative for chest pain, palpitations and leg swelling.   Gastrointestinal: Negative for abdominal distention, abdominal pain, blood in stool, constipation, diarrhea, nausea and vomiting.        Dysphagia   Endocrine: Negative for cold intolerance, heat intolerance, polydipsia, polyphagia and polyuria.   Genitourinary: Negative for difficulty urinating, flank pain, frequency, hematuria and urgency.   Musculoskeletal: Negative for arthralgias, back pain, gait problem and myalgias.        Pain in right groin and right neck   Skin: Negative for color change, pallor, rash and wound.   Neurological: Positive for dizziness and weakness. Negative for tremors, seizures, syncope, facial asymmetry, speech difficulty, light-headedness, numbness and headaches.   Hematological: Negative for adenopathy. Does not bruise/bleed easily.   Psychiatric/Behavioral: Negative for agitation, confusion, hallucinations and suicidal ideas. The patient is not nervous/anxious.    All other systems reviewed and are negative.    Objective:     Vital Signs (Most Recent):  Temp: 98.6 °F (37 °C) (02/25/17 1522)  Pulse: 105 (02/25/17 1522)  Resp: 18 (02/25/17 1522)  BP: (!) 137/91 (02/25/17 1522)  SpO2: 95 % (02/25/17 1522) Vital Signs (24h Range):  Temp:  [97.9 °F (36.6 °C)-99.1 °F (37.3 °C)] 98.6 °F (37  °C)  Pulse:  [] 105  Resp:  [18] 18  SpO2:  [94 %-97 %] 95 %  BP: (123-144)/(81-91) 137/91     Weight: 50 kg (110 lb 4.8 oz)  Body mass index is 20.17 kg/(m^2).    Intake/Output Summary (Last 24 hours) at 02/25/17 1758  Last data filed at 02/25/17 1751   Gross per 24 hour   Intake             4500 ml   Output             1950 ml   Net             2550 ml      Physical Exam   Constitutional: He is oriented to person, place, and time. He appears well-developed. He appears cachectic. He has a sickly appearance. He appears ill. No distress.   HENT:   Head: Normocephalic and atraumatic.   Nose: Nose normal.   Mouth/Throat: Oropharynx is clear and moist. No oropharyngeal exudate.   Eyes: EOM are normal. Pupils are equal, round, and reactive to light. No scleral icterus.   Neck: Normal range of motion. Neck supple. No tracheal deviation present. No thyromegaly present.   Cardiovascular: Normal rate, regular rhythm, normal heart sounds and intact distal pulses.    No murmur heard.  Pulmonary/Chest: Effort normal. No stridor. No respiratory distress. He has no wheezes. He has rales. He exhibits no tenderness.   Abdominal: Soft. Bowel sounds are normal. He exhibits no distension and no mass. There is no tenderness. There is no rebound and no guarding.   Musculoskeletal: Normal range of motion. He exhibits no edema or tenderness.   Lymphadenopathy:     He has no cervical adenopathy.   Neurological: He is alert and oriented to person, place, and time. Coordination normal.   Skin: Skin is warm. No rash noted. He is not diaphoretic. No erythema.   Psychiatric: He has a normal mood and affect. His behavior is normal. Judgment and thought content normal.   Nursing note and vitals reviewed.      Significant Labs:   CBC: No results for input(s): WBC, HGB, HCT, PLT in the last 48 hours.  CMP:   Recent Labs  Lab 02/24/17  0503 02/25/17  0432    141   K 4.1 4.3    104   CO2 24 29   GLU 89 85   BUN 7* 11   CREATININE  0.7 0.8   CALCIUM 9.4 9.5   PROT 6.2 6.5   ALBUMIN 2.8* 2.9*   BILITOT 0.4 0.5   ALKPHOS 98 106   AST 12 13   ALT 10 8*   ANIONGAP 10 8   EGFRNONAA >60 >60       Significant Imaging: I have reviewed all pertinent imaging results/findings within the past 24 hours.   Imaging Results         X-Ray Chest 1 View (Final result) Result time:  02/24/17 16:41:07    Final result by Almaz Donato MD (02/24/17 16:41:07)    Impression:      Fluoroscopic and endoscopic guided esophageal stent placement as described above.      Electronically signed by: ALMAZ DONATO MD  Date:     02/24/17  Time:    16:41     Narrative:    EXAM: Fluoroscopy for esophageal stent placement.    CLINICAL HISTORY:  Esophageal mass and stricture.    Comparison: CT chest and esophagram 02/22/2017.    FINDINGS:  This procedure was performed by Dr. Cheng.    Submitted images demonstrate an endoscope within the esophagus followed by deployment of an esophageal stent from the proximal to distal esophagus.  There is narrowing of the mid stent corresponding to the esophageal stricture with flared proximal and distal ends.  For further information please refer to Dr. Cheng's procedure note.    Fluoroscopy time: 223 seconds.    Fluoroscopic images: 5.            FL Less Than 1 Hour (In process)         FL Esophagram Complete (Final result) Result time:  02/22/17 17:42:10    Final result by Raheel Yi MD (02/22/17 17:42:10)    Impression:     Moderately severe smooth stricture of the distal thoracic esophagus which may be produced by the subcarinal lymphadenopathy. Can't exclude primary esophageal neoplasm.      Electronically signed by: RAHEEL YI MD  Date:     02/22/17  Time:    17:42     Narrative:    History: Metastatic lung cancer, worsening dysphagia, evidence of esophageal obstruction on CT    Gastrografin esophagram was performed. The patient swallowed Gastrografin without difficulty. There is a persistent 2.4 cm long moderately  severe stricture below the twila. The stricture has fairly smooth margins, without mucosal irregularity. While a primary neoplasm of the esophagus is not excluded, this may be consistent with extrinsic compression from the adjacent subcarinal lymphadenopathy. There is moderate dilatation of the esophagus above the stricture. No abnormality demonstrated the at the GE junction.    1.3 minutes of fluoroscopy was utilized for the procedure.            CT Chest With Contrast (Final result) Result time:  02/22/17 10:51:06    Final result by Raheel Calloway MD (02/22/17 10:51:06)    Impression:     Marked air distention of the proximal thoracic esophagus down to the region of unchanged subcarinal lymphadenopathy. The esophageal obstruction may be on the basis of lymphadenopathy but esophageal mass at this level is a definite consideration. Development of bilateral pleural effusions. Otherwise no significant change from 1/11/17 PET/CT.    All CT scans at this facility use dose modulation, iterative reconstruction and/or weight based dosing when appropriate to reduce radiation dose to as low as reasonably achievable.       Electronically signed by: RAHEEL CALLOWAY MD  Date:     02/22/17  Time:    10:51     Narrative:    History: Dysphagia, onset gradually one month ago, worsening a week ago. History of metastatic lung cancer    Comparison 1/11/17 PET/CT, 12/20/16 CT chest    There is marked air distention of the proximal esophagus down to the subcarinal region where there is prominent subcarinal lymphadenopathy as before. The degree of air distention of the proximal esophagus is worse. There is no air in the esophagus at the subcarinal region and then air is demonstrated in the distal esophagus. It would be difficult to exclude an esophageal mass at this level, versus extrinsic mass effect on the esophagus by the adenopathy. There has been interval development of small to moderate sized bilateral pleural effusions. The  mass in the right lower lobe is essentially unchanged. There are several left lower lobe masses which are essentially unchanged. A few scattered small pulmonary metastases bilaterally. There is emphysematous change and marked diffuse interstitial fibrosis. There is nodular thickening of the pericardium. Right supraclavicular lymphadenopathy again noted.

## 2017-02-25 NOTE — PROGRESS NOTES
[facility] -   Hematology/Oncology  Progress Note    Patient Name: Frederick J Lejeune  Admission Date: 2/22/2017  Hospital Length of Stay: 3 days  Code Status: Full Code     Subjective:     HPI:  HemOnc Diagnosis: Squamous cell carcinoma of lung     Stage: Stage IV (bilateral pulmonary nodules, R supraclavic lad, R mainstem bronchus obstruction, LLL mass)     Pathology:  - EGD 10/19/16: 1. Gastric biopsy: Normal gastric mucosa. No Helicobacter identified on routine stain. 2. Irregular Z line, biopsy: Squamoglandular mucosa with mild chronic inflammation and intestinal metaplasia (Palma's esophagus). Negative for dysplasia.     - Bronchoscopy with biopsy of R mainstem bronchus mass 10/21/16: Non-small cell carcinoma, see note.  Immunohistochemical staining with satisfactory control material shows the tumor to be positive for p63 and CK 5/6 and negative for TTF-1 and CK 7. This immonophenotype is consistent with squamous cell carcinoma. PD-L1 expression 70%.     Prior Treatment:   1. Palliative radiation to the R mainstem bronchus  2. Carbo (AUC 2) -Taxol 45mg/m2 weekly x 6-7 weeks discontinued early 1/2017 due to disease progression  3. Palliative radiation to R supraclavicular LAD given pain and discomfort with swallowing, 5 fractions will be completed 1/22/17     Current Treatment: Pembrolizumab started 1/2017      Interval History: The patient is status post esophageal stent placement yesterday.  He reports pain after stent placement and is requiring IV Dilaudid when necessary for this.    Oncology Treatment Plan:   [facility] MK [id number] KEYNOTE 240    Medications:  Continuous Infusions:   sodium chloride 0.9% 100 mL/hr at 02/25/17 0728     Scheduled Meds:   enoxaparin  40 mg Subcutaneous Daily    pantoprazole  40 mg Intravenous Daily    polyethylene glycol  17 g Oral Daily    Banana bag   Intravenous Daily     PRN Meds:albuterol-ipratropium 2.5mg-0.5mg/3mL, benzonatate, diphenhydrAMINE,  hydrocodone-apap 2.5-108 MG/5 ML, hydromorphone (PF), morphine, morphine, ondansetron     Review of Systems   Constitutional: Positive for activity change, appetite change, fatigue and unexpected weight change. Negative for chills, diaphoresis and fever.   HENT: Negative for congestion, dental problem, ear pain, mouth sores, nosebleeds, postnasal drip, sinus pressure, sore throat, tinnitus, trouble swallowing and voice change.    Eyes: Negative for photophobia, pain, discharge, redness, itching and visual disturbance.   Respiratory: Negative for cough, chest tightness, shortness of breath, wheezing and stridor.    Cardiovascular: Negative for chest pain, palpitations and leg swelling.   Gastrointestinal: Negative for abdominal distention, abdominal pain, anal bleeding, blood in stool, constipation, diarrhea, nausea and vomiting.   Endocrine: Negative for cold intolerance, heat intolerance, polydipsia, polyphagia and polyuria.   Genitourinary: Negative for decreased urine volume, difficulty urinating, dysuria, flank pain, frequency, hematuria and urgency.   Musculoskeletal: Positive for arthralgias. Negative for back pain, gait problem, joint swelling and myalgias.   Skin: Negative for pallor and rash.   Allergic/Immunologic: Negative for immunocompromised state.   Neurological: Negative for dizziness, syncope, weakness, light-headedness and headaches.   Hematological: Negative for adenopathy. Does not bruise/bleed easily.   Psychiatric/Behavioral: Negative for agitation and confusion. The patient is not nervous/anxious.      Objective:     Vital Signs (Most Recent):  Temp: 98.4 °F (36.9 °C) (02/25/17 1133)  Pulse: 96 (02/25/17 1133)  Resp: 18 (02/25/17 1133)  BP: 137/84 (02/25/17 1133)  SpO2: 97 % (02/25/17 1133) Vital Signs (24h Range):  Temp:  [97.9 °F (36.6 °C)-99.1 °F (37.3 °C)] 98.4 °F (36.9 °C)  Pulse:  [94-98] 96  Resp:  [18] 18  SpO2:  [94 %-97 %] 97 %  BP: (123-144)/(81-90) 137/84     Weight: 50 kg (110 lb  4.8 oz)  Body mass index is 20.17 kg/(m^2).  Body surface area is 1.48 meters squared.      Intake/Output Summary (Last 24 hours) at 02/25/17 1536  Last data filed at 02/25/17 1137   Gross per 24 hour   Intake          4013.33 ml   Output             1200 ml   Net          2813.33 ml       Physical Exam   Constitutional: He is oriented to person, place, and time. He appears well-developed. He appears cachectic. He has a sickly appearance. He appears ill. No distress.   HENT:   Head: Normocephalic and atraumatic.   Nose: Nose normal.   Mouth/Throat: Oropharynx is clear and moist. No oropharyngeal exudate.   Eyes: EOM are normal. Pupils are equal, round, and reactive to light. No scleral icterus.   Neck: Normal range of motion. Neck supple. No tracheal deviation present. No thyromegaly present.   Cardiovascular: Normal rate, regular rhythm, normal heart sounds and intact distal pulses.    No murmur heard.  Pulmonary/Chest: Effort normal. No stridor. No respiratory distress. He has no wheezes. He has rales. He exhibits no tenderness.   Abdominal: Soft. Bowel sounds are normal. He exhibits no distension and no mass. There is no tenderness. There is no rebound and no guarding.   Musculoskeletal: Normal range of motion. He exhibits no edema or tenderness.   Lymphadenopathy:     He has no cervical adenopathy.   Neurological: He is alert and oriented to person, place, and time. Coordination normal.   Skin: Skin is warm. No rash noted. He is not diaphoretic. No erythema.   Psychiatric: He has a normal mood and affect. His behavior is normal. Judgment and thought content normal.   Nursing note and vitals reviewed.      Significant Labs:   I have reviewed all of the patient's relevant lab work available in the medical record and have utilized this in my evaluation and management recommendations today    Diagnostic Results:  I have reviewed all of the patient's diagnostic/imaging results available in the medical record and have  utilized this in my evaluation and management recommendations today.    Assessment/Plan:     Lung cancer  1.  I had a detailed discussion with the patient today with regard to his current clinical situation.  Further management with regard to his metastatic malignancy will be as per his primary oncologist Dr. Espinosa is an outpatient.  He is currently on Pembrolizumab started 1/2017 and it is likely too early to assess for response with this.  2.  The patient is status post esophageal stent placement for extrinsic compression due to metastatic lymphadenopathy.  He will likely need palliative radiation to try to help with this if feasible.  I will discuss with radiation oncology.  This will be initiated as an outpatient if feasible.      Thank you for your consult.      Frederick Jeong MD  Hematology/Oncology  [facility]

## 2017-02-25 NOTE — SUBJECTIVE & OBJECTIVE
Interval History: The patient is status post esophageal stent placement yesterday.  He reports pain after stent placement and is requiring IV Dilaudid when necessary for this.    Oncology Treatment Plan:   OC[facility] MK 3475 KEYNOTE 240    Medications:  Continuous Infusions:   sodium chloride 0.9% 100 mL/hr at 02/25/17 0728     Scheduled Meds:   enoxaparin  40 mg Subcutaneous Daily    pantoprazole  40 mg Intravenous Daily    polyethylene glycol  17 g Oral Daily    Banana bag   Intravenous Daily     PRN Meds:albuterol-ipratropium 2.5mg-0.5mg/3mL, benzonatate, diphenhydrAMINE, hydrocodone-apap 2.5-108 MG/5 ML, hydromorphone (PF), morphine, morphine, ondansetron     Review of Systems   Constitutional: Positive for activity change, appetite change, fatigue and unexpected weight change. Negative for chills, diaphoresis and fever.   HENT: Negative for congestion, dental problem, ear pain, mouth sores, nosebleeds, postnasal drip, sinus pressure, sore throat, tinnitus, trouble swallowing and voice change.    Eyes: Negative for photophobia, pain, discharge, redness, itching and visual disturbance.   Respiratory: Negative for cough, chest tightness, shortness of breath, wheezing and stridor.    Cardiovascular: Negative for chest pain, palpitations and leg swelling.   Gastrointestinal: Negative for abdominal distention, abdominal pain, anal bleeding, blood in stool, constipation, diarrhea, nausea and vomiting.   Endocrine: Negative for cold intolerance, heat intolerance, polydipsia, polyphagia and polyuria.   Genitourinary: Negative for decreased urine volume, difficulty urinating, dysuria, flank pain, frequency, hematuria and urgency.   Musculoskeletal: Positive for arthralgias. Negative for back pain, gait problem, joint swelling and myalgias.   Skin: Negative for pallor and rash.   Allergic/Immunologic: Negative for immunocompromised state.   Neurological: Negative for dizziness, syncope, weakness, light-headedness and  headaches.   Hematological: Negative for adenopathy. Does not bruise/bleed easily.   Psychiatric/Behavioral: Negative for agitation and confusion. The patient is not nervous/anxious.      Objective:     Vital Signs (Most Recent):  Temp: 98.4 °F (36.9 °C) (02/25/17 1133)  Pulse: 96 (02/25/17 1133)  Resp: 18 (02/25/17 1133)  BP: 137/84 (02/25/17 1133)  SpO2: 97 % (02/25/17 1133) Vital Signs (24h Range):  Temp:  [97.9 °F (36.6 °C)-99.1 °F (37.3 °C)] 98.4 °F (36.9 °C)  Pulse:  [94-98] 96  Resp:  [18] 18  SpO2:  [94 %-97 %] 97 %  BP: (123-144)/(81-90) 137/84     Weight: 50 kg (110 lb 4.8 oz)  Body mass index is 20.17 kg/(m^2).  Body surface area is 1.48 meters squared.      Intake/Output Summary (Last 24 hours) at 02/25/17 1536  Last data filed at 02/25/17 1137   Gross per 24 hour   Intake          4013.33 ml   Output             1200 ml   Net          2813.33 ml       Physical Exam   Constitutional: He is oriented to person, place, and time. He appears well-developed. He appears cachectic. He has a sickly appearance. He appears ill. No distress.   HENT:   Head: Normocephalic and atraumatic.   Nose: Nose normal.   Mouth/Throat: Oropharynx is clear and moist. No oropharyngeal exudate.   Eyes: EOM are normal. Pupils are equal, round, and reactive to light. No scleral icterus.   Neck: Normal range of motion. Neck supple. No tracheal deviation present. No thyromegaly present.   Cardiovascular: Normal rate, regular rhythm, normal heart sounds and intact distal pulses.    No murmur heard.  Pulmonary/Chest: Effort normal. No stridor. No respiratory distress. He has no wheezes. He has rales. He exhibits no tenderness.   Abdominal: Soft. Bowel sounds are normal. He exhibits no distension and no mass. There is no tenderness. There is no rebound and no guarding.   Musculoskeletal: Normal range of motion. He exhibits no edema or tenderness.   Lymphadenopathy:     He has no cervical adenopathy.   Neurological: He is alert and  oriented to person, place, and time. Coordination normal.   Skin: Skin is warm. No rash noted. He is not diaphoretic. No erythema.   Psychiatric: He has a normal mood and affect. His behavior is normal. Judgment and thought content normal.   Nursing note and vitals reviewed.      Significant Labs:   I have reviewed all of the patient's relevant lab work available in the medical record and have utilized this in my evaluation and management recommendations today    Diagnostic Results:  I have reviewed all of the patient's diagnostic/imaging results available in the medical record and have utilized this in my evaluation and management recommendations today.

## 2017-02-25 NOTE — PLAN OF CARE
Problem: Patient Care Overview  Goal: Plan of Care Review  Outcome: Ongoing (interventions implemented as appropriate)  Fall precautions maintained, pt free from injuries/fall, up w/ assist, uses urinal. C/o epigastric pain, moderately relieved by rest, repositioning, and prn pain meds. Advanced to full liq, pt tolerating well, denies nausea/vomiting, denies difficulty swallowing. Strict I & O's. IVF given as prescribed. POC and meds reviewed w/ pt, pt verbalizes understanding. Chart check done. Will cont to monitor.

## 2017-02-26 LAB
ALBUMIN SERPL BCP-MCNC: 2.7 G/DL
ALP SERPL-CCNC: 105 U/L
ALT SERPL W/O P-5'-P-CCNC: 9 U/L
ANION GAP SERPL CALC-SCNC: 7 MMOL/L
AST SERPL-CCNC: 15 U/L
BILIRUB SERPL-MCNC: 0.4 MG/DL
BUN SERPL-MCNC: 8 MG/DL
CALCIUM SERPL-MCNC: 9.5 MG/DL
CHLORIDE SERPL-SCNC: 104 MMOL/L
CO2 SERPL-SCNC: 30 MMOL/L
CREAT SERPL-MCNC: 0.7 MG/DL
EST. GFR  (AFRICAN AMERICAN): >60 ML/MIN/1.73 M^2
EST. GFR  (NON AFRICAN AMERICAN): >60 ML/MIN/1.73 M^2
GLUCOSE SERPL-MCNC: 111 MG/DL
POTASSIUM SERPL-SCNC: 4.7 MMOL/L
PROT SERPL-MCNC: 6.3 G/DL
SODIUM SERPL-SCNC: 141 MMOL/L
TROPONIN I SERPL DL<=0.01 NG/ML-MCNC: 0.01 NG/ML
TROPONIN I SERPL DL<=0.01 NG/ML-MCNC: <0.006 NG/ML

## 2017-02-26 PROCEDURE — 36415 COLL VENOUS BLD VENIPUNCTURE: CPT

## 2017-02-26 PROCEDURE — 63600175 PHARM REV CODE 636 W HCPCS: Performed by: FAMILY MEDICINE

## 2017-02-26 PROCEDURE — 80053 COMPREHEN METABOLIC PANEL: CPT

## 2017-02-26 PROCEDURE — 94761 N-INVAS EAR/PLS OXIMETRY MLT: CPT

## 2017-02-26 PROCEDURE — C9113 INJ PANTOPRAZOLE SODIUM, VIA: HCPCS | Performed by: EMERGENCY MEDICINE

## 2017-02-26 PROCEDURE — 63600175 PHARM REV CODE 636 W HCPCS: Performed by: EMERGENCY MEDICINE

## 2017-02-26 PROCEDURE — 99233 SBSQ HOSP IP/OBS HIGH 50: CPT | Mod: ,,, | Performed by: INTERNAL MEDICINE

## 2017-02-26 PROCEDURE — 99900035 HC TECH TIME PER 15 MIN (STAT)

## 2017-02-26 PROCEDURE — 97802 MEDICAL NUTRITION INDIV IN: CPT

## 2017-02-26 PROCEDURE — 27000221 HC OXYGEN, UP TO 24 HOURS

## 2017-02-26 PROCEDURE — 63600175 PHARM REV CODE 636 W HCPCS: Performed by: NURSE PRACTITIONER

## 2017-02-26 PROCEDURE — 25000003 PHARM REV CODE 250: Performed by: FAMILY MEDICINE

## 2017-02-26 PROCEDURE — 93010 ELECTROCARDIOGRAM REPORT: CPT | Mod: 76,,, | Performed by: INTERNAL MEDICINE

## 2017-02-26 PROCEDURE — 63600175 PHARM REV CODE 636 W HCPCS: Performed by: INTERNAL MEDICINE

## 2017-02-26 PROCEDURE — 93005 ELECTROCARDIOGRAM TRACING: CPT

## 2017-02-26 PROCEDURE — 93010 ELECTROCARDIOGRAM REPORT: CPT | Mod: ,,, | Performed by: INTERNAL MEDICINE

## 2017-02-26 PROCEDURE — 84484 ASSAY OF TROPONIN QUANT: CPT

## 2017-02-26 PROCEDURE — 11000001 HC ACUTE MED/SURG PRIVATE ROOM

## 2017-02-26 RX ADMIN — DIPHENHYDRAMINE HYDROCHLORIDE 25 MG: 50 INJECTION, SOLUTION INTRAMUSCULAR; INTRAVENOUS at 09:02

## 2017-02-26 RX ADMIN — ENOXAPARIN SODIUM 40 MG: 100 INJECTION SUBCUTANEOUS at 08:02

## 2017-02-26 RX ADMIN — HYDROMORPHONE HYDROCHLORIDE 2 MG: 2 INJECTION INTRAMUSCULAR; INTRAVENOUS; SUBCUTANEOUS at 06:02

## 2017-02-26 RX ADMIN — HYDROMORPHONE HYDROCHLORIDE 2 MG: 2 INJECTION INTRAMUSCULAR; INTRAVENOUS; SUBCUTANEOUS at 01:02

## 2017-02-26 RX ADMIN — HYDROMORPHONE HYDROCHLORIDE 2 MG: 2 INJECTION INTRAMUSCULAR; INTRAVENOUS; SUBCUTANEOUS at 04:02

## 2017-02-26 RX ADMIN — HYDROMORPHONE HYDROCHLORIDE 2 MG: 2 INJECTION INTRAMUSCULAR; INTRAVENOUS; SUBCUTANEOUS at 11:02

## 2017-02-26 RX ADMIN — PANTOPRAZOLE SODIUM 40 MG: 40 INJECTION, POWDER, FOR SOLUTION INTRAVENOUS at 09:02

## 2017-02-26 RX ADMIN — HYDROMORPHONE HYDROCHLORIDE 2 MG: 2 INJECTION INTRAMUSCULAR; INTRAVENOUS; SUBCUTANEOUS at 09:02

## 2017-02-26 RX ADMIN — FOLIC ACID: 5 INJECTION, SOLUTION INTRAMUSCULAR; INTRAVENOUS; SUBCUTANEOUS at 06:02

## 2017-02-26 NOTE — PROGRESS NOTES
[facility] Hospital Medicine  Progress Note    Patient Name: Frederick J Lejeune  MRN: [id number]  Patient Class: IP- Inpatient   Admission Date: 2/22/2017  Length of Stay: 3 days  Attending Physician: Derek Bourgeois MD  Primary Care Provider: Kenrick Muñoz MD        Subjective:     Principal Problem:Esophageal obstruction    HPI:  Frederick J Lejeune is a 66 y.o. male with PMHx of GERD, weight loss, dysphagia, odynophagia, RLL lung mass squamous cell carcinoma, who presented to ER from Dr. Espinosa's office with c/o dysphagia, weakness, dizziness, chest pain. He has had difficulty eating due to odynophagia for the past month. Patient has completed palliative radiation to relieve right mainstem bronchus obstruction. He then was treated with weekly Carbo-Taxol x 6-7 weeks, which was discontinued due to disease progression on imaging in early 1/2017. He is currently receiving Pembrolizumab every 3 weeks. Today, he presented to Dr. Espinosa's office on an urgent visit regarding his trouble swallowing and inability to eat. He is also dizzy, weak, SOB and has pain in his chest. No cough, fevers, chills. His left groin mass is stable per patient, but he still has some mild pain in the right supraclavicular region due to lymphadenopathy. He was recently evaluated by Dr. Cheng who recommended further evaluation with esophagram, but pt was too sick to attend appointment. GI has been consulted for esophagram for pallative treatment.    Hospital Course:  Esophagram completed. Esophageal stent placed 2/24/2017. Patient had pain following stent requiring IV dilaudid. Patient seen and examined. Tolerating clear liquids. Will start full liquids with Boost. Calorie count and nutritional counseling.    Interval History: stent 2/24/2017. Tolerating clear liquids, will advance to Full liquids.    Review of Systems   Constitutional: Positive for appetite change and fatigue. Negative for activity change, chills,  diaphoresis, fever and unexpected weight change.   HENT: Positive for trouble swallowing. Negative for congestion, ear discharge, ear pain, facial swelling, hearing loss, postnasal drip, sore throat, tinnitus and voice change.    Eyes: Negative for photophobia, pain, discharge, redness, itching and visual disturbance.   Respiratory: Negative for cough, choking, chest tightness, shortness of breath, wheezing and stridor.    Cardiovascular: Negative for chest pain, palpitations and leg swelling.   Gastrointestinal: Negative for abdominal distention, abdominal pain, blood in stool, constipation, diarrhea, nausea and vomiting.        Dysphagia   Endocrine: Negative for cold intolerance, heat intolerance, polydipsia, polyphagia and polyuria.   Genitourinary: Negative for difficulty urinating, flank pain, frequency, hematuria and urgency.   Musculoskeletal: Negative for arthralgias, back pain, gait problem and myalgias.        Pain in right groin and right neck   Skin: Negative for color change, pallor, rash and wound.   Neurological: Positive for dizziness and weakness. Negative for tremors, seizures, syncope, facial asymmetry, speech difficulty, light-headedness, numbness and headaches.   Hematological: Negative for adenopathy. Does not bruise/bleed easily.   Psychiatric/Behavioral: Negative for agitation, confusion, hallucinations and suicidal ideas. The patient is not nervous/anxious.    All other systems reviewed and are negative.    Objective:     Vital Signs (Most Recent):  Temp: 98.6 °F (37 °C) (02/25/17 1522)  Pulse: 105 (02/25/17 1522)  Resp: 18 (02/25/17 1522)  BP: (!) 137/91 (02/25/17 1522)  SpO2: 95 % (02/25/17 1522) Vital Signs (24h Range):  Temp:  [97.9 °F (36.6 °C)-99.1 °F (37.3 °C)] 98.6 °F (37 °C)  Pulse:  [] 105  Resp:  [18] 18  SpO2:  [94 %-97 %] 95 %  BP: (123-144)/(81-91) 137/91     Weight: 50 kg (110 lb 4.8 oz)  Body mass index is 20.17 kg/(m^2).    Intake/Output Summary (Last 24 hours) at  02/25/17 1758  Last data filed at 02/25/17 1751   Gross per 24 hour   Intake             4500 ml   Output             1950 ml   Net             2550 ml      Physical Exam   Constitutional: He is oriented to person, place, and time. He appears well-developed. He appears cachectic. He has a sickly appearance. He appears ill. No distress.   HENT:   Head: Normocephalic and atraumatic.   Nose: Nose normal.   Mouth/Throat: Oropharynx is clear and moist. No oropharyngeal exudate.   Eyes: EOM are normal. Pupils are equal, round, and reactive to light. No scleral icterus.   Neck: Normal range of motion. Neck supple. No tracheal deviation present. No thyromegaly present.   Cardiovascular: Normal rate, regular rhythm, normal heart sounds and intact distal pulses.    No murmur heard.  Pulmonary/Chest: Effort normal. No stridor. No respiratory distress. He has no wheezes. He has rales. He exhibits no tenderness.   Abdominal: Soft. Bowel sounds are normal. He exhibits no distension and no mass. There is no tenderness. There is no rebound and no guarding.   Musculoskeletal: Normal range of motion. He exhibits no edema or tenderness.   Lymphadenopathy:     He has no cervical adenopathy.   Neurological: He is alert and oriented to person, place, and time. Coordination normal.   Skin: Skin is warm. No rash noted. He is not diaphoretic. No erythema.   Psychiatric: He has a normal mood and affect. His behavior is normal. Judgment and thought content normal.   Nursing note and vitals reviewed.      Significant Labs:   CBC: No results for input(s): WBC, HGB, HCT, PLT in the last 48 hours.  CMP:   Recent Labs  Lab 02/24/17  0503 02/25/17  0432    141   K 4.1 4.3    104   CO2 24 29   GLU 89 85   BUN 7* 11   CREATININE 0.7 0.8   CALCIUM 9.4 9.5   PROT 6.2 6.5   ALBUMIN 2.8* 2.9*   BILITOT 0.4 0.5   ALKPHOS 98 106   AST 12 13   ALT 10 8*   ANIONGAP 10 8   EGFRNONAA >60 >60       Significant Imaging: I have reviewed all pertinent  imaging results/findings within the past 24 hours.   Imaging Results         X-Ray Chest 1 View (Final result) Result time:  02/24/17 16:41:07    Final result by Almaz Donato MD (02/24/17 16:41:07)    Impression:      Fluoroscopic and endoscopic guided esophageal stent placement as described above.      Electronically signed by: ALMAZ DONATO MD  Date:     02/24/17  Time:    16:41     Narrative:    EXAM: Fluoroscopy for esophageal stent placement.    CLINICAL HISTORY:  Esophageal mass and stricture.    Comparison: CT chest and esophagram 02/22/2017.    FINDINGS:  This procedure was performed by Dr. Cheng.    Submitted images demonstrate an endoscope within the esophagus followed by deployment of an esophageal stent from the proximal to distal esophagus.  There is narrowing of the mid stent corresponding to the esophageal stricture with flared proximal and distal ends.  For further information please refer to Dr. Cheng's procedure note.    Fluoroscopy time: 223 seconds.    Fluoroscopic images: 5.            FL Less Than 1 Hour (In process)         FL Esophagram Complete (Final result) Result time:  02/22/17 17:42:10    Final result by Topher Yi MD (02/22/17 17:42:10)    Impression:     Moderately severe smooth stricture of the distal thoracic esophagus which may be produced by the subcarinal lymphadenopathy. Can't exclude primary esophageal neoplasm.      Electronically signed by: TOPHER YI MD  Date:     02/22/17  Time:    17:42     Narrative:    History: Metastatic lung cancer, worsening dysphagia, evidence of esophageal obstruction on CT    Gastrografin esophagram was performed. The patient swallowed Gastrografin without difficulty. There is a persistent 2.4 cm long moderately severe stricture below the twila. The stricture has fairly smooth margins, without mucosal irregularity. While a primary neoplasm of the esophagus is not excluded, this may be consistent with extrinsic compression  from the adjacent subcarinal lymphadenopathy. There is moderate dilatation of the esophagus above the stricture. No abnormality demonstrated the at the GE junction.    1.3 minutes of fluoroscopy was utilized for the procedure.            CT Chest With Contrast (Final result) Result time:  02/22/17 10:51:06    Final result by Raheel Calloway MD (02/22/17 10:51:06)    Impression:     Marked air distention of the proximal thoracic esophagus down to the region of unchanged subcarinal lymphadenopathy. The esophageal obstruction may be on the basis of lymphadenopathy but esophageal mass at this level is a definite consideration. Development of bilateral pleural effusions. Otherwise no significant change from 1/11/17 PET/CT.    All CT scans at this facility use dose modulation, iterative reconstruction and/or weight based dosing when appropriate to reduce radiation dose to as low as reasonably achievable.       Electronically signed by: RAHEEL CALLOWAY MD  Date:     02/22/17  Time:    10:51     Narrative:    History: Dysphagia, onset gradually one month ago, worsening a week ago. History of metastatic lung cancer    Comparison 1/11/17 PET/CT, 12/20/16 CT chest    There is marked air distention of the proximal esophagus down to the subcarinal region where there is prominent subcarinal lymphadenopathy as before. The degree of air distention of the proximal esophagus is worse. There is no air in the esophagus at the subcarinal region and then air is demonstrated in the distal esophagus. It would be difficult to exclude an esophageal mass at this level, versus extrinsic mass effect on the esophagus by the adenopathy. There has been interval development of small to moderate sized bilateral pleural effusions. The mass in the right lower lobe is essentially unchanged. There are several left lower lobe masses which are essentially unchanged. A few scattered small pulmonary metastases bilaterally. There is emphysematous change  and marked diffuse interstitial fibrosis. There is nodular thickening of the pericardium. Right supraclavicular lymphadenopathy again noted.            Assessment/Plan:      * Esophageal obstruction  -GI consult: Dr. Cheng performed esophagram  - Stent placed 2/24/17        Oropharyngeal dysphagia  -esophagram and stent by   -nutritional consult  -tolerating clear liquids, will advance to Full  -calorie count      Lung cancer  -outpatient followup      Bone metastases  Outpatient followup      COPD, very severe  nebulizers PRN      Left groin pain  -4-5 cm lymph node - known to Dr. Espinosa      VTE Risk Mitigation         Ordered     enoxaparin injection 40 mg  Daily     Route:  Subcutaneous        02/22/17 1549     Medium Risk of VTE  Once      02/22/17 1549     Place SUKHI hose  Until discontinued      02/22/17 1549     Place sequential compression device  Until discontinued      02/22/17 1549     Reason for No Pharmacological VTE Prophylaxis  Once      02/22/17 1549          Kathleen Campos NP  Department of Hospital Medicine   [facility]

## 2017-02-26 NOTE — PLAN OF CARE
Problem: Patient Care Overview  Goal: Plan of Care Review  Outcome: Ongoing (interventions implemented as appropriate)  Patient AAO and VSS. Remains free of injury. Fall precautions maintained with bed low and wheels locked.  IVF and banana bag administered as ordered. Pain adequately managed with prn meds. Patient tolerated full liquids.  Chart review for 24 hours completed. Will continue to monitor till discharge.

## 2017-02-26 NOTE — ASSESSMENT & PLAN NOTE
-esophagram and stent by   -nutritional consult  -tolerating clear liquids, will advance to Full  -calorie count

## 2017-02-26 NOTE — PROGRESS NOTES
[facility] -   Hematology/Oncology  Progress Note    Patient Name: Frederick J Lejeune  Admission Date: 2/22/2017  Hospital Length of Stay: 4 days  Code Status: Full Code     Subjective:     HPI:  HemOnc Diagnosis: Squamous cell carcinoma of lung     Stage: Stage IV (bilateral pulmonary nodules, R supraclavic lad, R mainstem bronchus obstruction, LLL mass)     Pathology:  - EGD 10/19/16: 1. Gastric biopsy: Normal gastric mucosa. No Helicobacter identified on routine stain. 2. Irregular Z line, biopsy: Squamoglandular mucosa with mild chronic inflammation and intestinal metaplasia (Palma's esophagus). Negative for dysplasia.     - Bronchoscopy with biopsy of R mainstem bronchus mass 10/21/16: Non-small cell carcinoma, see note.  Immunohistochemical staining with satisfactory control material shows the tumor to be positive for p63 and CK 5/6 and negative for TTF-1 and CK 7. This immonophenotype is consistent with squamous cell carcinoma. PD-L1 expression 70%.     Prior Treatment:   1. Palliative radiation to the R mainstem bronchus  2. Carbo (AUC 2) -Taxol 45mg/m2 weekly x 6-7 weeks discontinued early 1/2017 due to disease progression  3. Palliative radiation to R supraclavicular LAD given pain and discomfort with swallowing, 5 fractions will be completed 1/22/17     Current Treatment: Pembrolizumab started 1/2017      Interval History: Reports different type of chest pain today in addition to pain from esophageal stent placement. Workup is in progress.    Oncology Treatment Plan:   I  3475 KEYNOTE 240    Medications:  Continuous Infusions:   sodium chloride 0.9% Stopped (02/25/17 1931)     Scheduled Meds:   enoxaparin  40 mg Subcutaneous Daily    pantoprazole  40 mg Intravenous Daily    polyethylene glycol  17 g Oral Daily    Banana bag   Intravenous Daily     PRN Meds:albuterol-ipratropium 2.5mg-0.5mg/3mL, benzonatate, diphenhydrAMINE, hydrocodone-apap 2.5-108 MG/5 ML, hydromorphone (PF),  morphine, morphine, ondansetron     Review of Systems   Constitutional: Positive for activity change, appetite change, fatigue and unexpected weight change. Negative for chills, diaphoresis and fever.   HENT: Negative for congestion, dental problem, ear pain, mouth sores, nosebleeds, postnasal drip, sinus pressure, sore throat, tinnitus, trouble swallowing and voice change.    Eyes: Negative for photophobia, pain, discharge, redness, itching and visual disturbance.   Respiratory: Negative for cough, chest tightness, shortness of breath, wheezing and stridor.    Cardiovascular: Positive for chest pain. Negative for palpitations and leg swelling.   Gastrointestinal: Negative for abdominal distention, abdominal pain, anal bleeding, blood in stool, constipation, diarrhea, nausea and vomiting.   Endocrine: Negative for cold intolerance, heat intolerance, polydipsia, polyphagia and polyuria.   Genitourinary: Negative for decreased urine volume, difficulty urinating, dysuria, flank pain, frequency, hematuria and urgency.   Musculoskeletal: Positive for arthralgias. Negative for back pain, gait problem, joint swelling and myalgias.   Skin: Negative for pallor and rash.   Allergic/Immunologic: Negative for immunocompromised state.   Neurological: Negative for dizziness, syncope, weakness, light-headedness and headaches.   Hematological: Negative for adenopathy. Does not bruise/bleed easily.   Psychiatric/Behavioral: Negative for agitation and confusion. The patient is not nervous/anxious.      Objective:     Vital Signs (Most Recent):  Temp: 98.8 °F (37.1 °C) (02/26/17 1212)  Pulse: 98 (02/26/17 1212)  Resp: 18 (02/26/17 1212)  BP: 118/78 (02/26/17 1212)  SpO2: 95 % (02/26/17 1212) Vital Signs (24h Range):  Temp:  [98.1 °F (36.7 °C)-99.3 °F (37.4 °C)] 98.8 °F (37.1 °C)  Pulse:  [] 98  Resp:  [18-20] 18  SpO2:  [92 %-97 %] 95 %  BP: (116-137)/(67-91) 118/78     Weight: 50 kg (110 lb 4.8 oz)  Body mass index is 20.17  kg/(m^2).  Body surface area is 1.48 meters squared.      Intake/Output Summary (Last 24 hours) at 02/26/17 1421  Last data filed at 02/26/17 1326   Gross per 24 hour   Intake          2713.34 ml   Output             3390 ml   Net          -676.66 ml       Physical Exam   Constitutional: He is oriented to person, place, and time. He appears well-developed. He appears cachectic. He has a sickly appearance. He appears ill. No distress.   HENT:   Head: Normocephalic and atraumatic.   Nose: Nose normal.   Mouth/Throat: Oropharynx is clear and moist. No oropharyngeal exudate.   Eyes: EOM are normal. Pupils are equal, round, and reactive to light. No scleral icterus.   Neck: Normal range of motion. Neck supple. No tracheal deviation present. No thyromegaly present.   Cardiovascular: Normal rate, regular rhythm, normal heart sounds and intact distal pulses.    No murmur heard.  Pulmonary/Chest: Effort normal. No stridor. No respiratory distress. He has no wheezes. He has rales. He exhibits no tenderness.   Abdominal: Soft. Bowel sounds are normal. He exhibits no distension and no mass. There is no tenderness. There is no rebound and no guarding.   Musculoskeletal: Normal range of motion. He exhibits no edema or tenderness.   Lymphadenopathy:     He has no cervical adenopathy.   Neurological: He is alert and oriented to person, place, and time. Coordination normal.   Skin: Skin is warm. No rash noted. He is not diaphoretic. No erythema.   Psychiatric: He has a normal mood and affect. His behavior is normal. Judgment and thought content normal.   Nursing note and vitals reviewed.      Significant Labs:   I have reviewed all of the patient's relevant lab work available in the medical record and have utilized this in my evaluation and management recommendations today    Diagnostic Results:  I have reviewed all of the patient's diagnostic/imaging results available in the medical record and have utilized this in my evaluation  and management recommendations today.    Assessment/Plan:     Lung cancer  1.  I had a detailed discussion with the patient today with regard to his current clinical situation.  Further management with regard to his metastatic malignancy will be as per his primary oncologist Dr. Espinosa is an outpatient.  He is currently on Pembrolizumab started 1/2017 and it is likely too early to assess for response with this.  2.  The patient is status post esophageal stent placement for extrinsic compression due to metastatic lymphadenopathy.  He will likely need palliative radiation to try to help with this if feasible.  I will discuss with radiation oncology.  This will be initiated as an outpatient if feasible.      Thank you for your consult.      Frederick Jeong MD  Hematology/Oncology  [facility]

## 2017-02-26 NOTE — SUBJECTIVE & OBJECTIVE
Interval History: Reports different type of chest pain today in addition to pain from esophageal stent placement. Workup is in progress.    Oncology Treatment Plan:   [facility] MK 3475 KEYNOTE 240    Medications:  Continuous Infusions:   sodium chloride 0.9% Stopped (02/25/17 1931)     Scheduled Meds:   enoxaparin  40 mg Subcutaneous Daily    pantoprazole  40 mg Intravenous Daily    polyethylene glycol  17 g Oral Daily    Banana bag   Intravenous Daily     PRN Meds:albuterol-ipratropium 2.5mg-0.5mg/3mL, benzonatate, diphenhydrAMINE, hydrocodone-apap 2.5-108 MG/5 ML, hydromorphone (PF), morphine, morphine, ondansetron     Review of Systems   Constitutional: Positive for activity change, appetite change, fatigue and unexpected weight change. Negative for chills, diaphoresis and fever.   HENT: Negative for congestion, dental problem, ear pain, mouth sores, nosebleeds, postnasal drip, sinus pressure, sore throat, tinnitus, trouble swallowing and voice change.    Eyes: Negative for photophobia, pain, discharge, redness, itching and visual disturbance.   Respiratory: Negative for cough, chest tightness, shortness of breath, wheezing and stridor.    Cardiovascular: Positive for chest pain. Negative for palpitations and leg swelling.   Gastrointestinal: Negative for abdominal distention, abdominal pain, anal bleeding, blood in stool, constipation, diarrhea, nausea and vomiting.   Endocrine: Negative for cold intolerance, heat intolerance, polydipsia, polyphagia and polyuria.   Genitourinary: Negative for decreased urine volume, difficulty urinating, dysuria, flank pain, frequency, hematuria and urgency.   Musculoskeletal: Positive for arthralgias. Negative for back pain, gait problem, joint swelling and myalgias.   Skin: Negative for pallor and rash.   Allergic/Immunologic: Negative for immunocompromised state.   Neurological: Negative for dizziness, syncope, weakness, light-headedness and headaches.   Hematological:  Negative for adenopathy. Does not bruise/bleed easily.   Psychiatric/Behavioral: Negative for agitation and confusion. The patient is not nervous/anxious.      Objective:     Vital Signs (Most Recent):  Temp: 98.8 °F (37.1 °C) (02/26/17 1212)  Pulse: 98 (02/26/17 1212)  Resp: 18 (02/26/17 1212)  BP: 118/78 (02/26/17 1212)  SpO2: 95 % (02/26/17 1212) Vital Signs (24h Range):  Temp:  [98.1 °F (36.7 °C)-99.3 °F (37.4 °C)] 98.8 °F (37.1 °C)  Pulse:  [] 98  Resp:  [18-20] 18  SpO2:  [92 %-97 %] 95 %  BP: (116-137)/(67-91) 118/78     Weight: 50 kg (110 lb 4.8 oz)  Body mass index is 20.17 kg/(m^2).  Body surface area is 1.48 meters squared.      Intake/Output Summary (Last 24 hours) at 02/26/17 1421  Last data filed at 02/26/17 1326   Gross per 24 hour   Intake          2713.34 ml   Output             3390 ml   Net          -676.66 ml       Physical Exam   Constitutional: He is oriented to person, place, and time. He appears well-developed. He appears cachectic. He has a sickly appearance. He appears ill. No distress.   HENT:   Head: Normocephalic and atraumatic.   Nose: Nose normal.   Mouth/Throat: Oropharynx is clear and moist. No oropharyngeal exudate.   Eyes: EOM are normal. Pupils are equal, round, and reactive to light. No scleral icterus.   Neck: Normal range of motion. Neck supple. No tracheal deviation present. No thyromegaly present.   Cardiovascular: Normal rate, regular rhythm, normal heart sounds and intact distal pulses.    No murmur heard.  Pulmonary/Chest: Effort normal. No stridor. No respiratory distress. He has no wheezes. He has rales. He exhibits no tenderness.   Abdominal: Soft. Bowel sounds are normal. He exhibits no distension and no mass. There is no tenderness. There is no rebound and no guarding.   Musculoskeletal: Normal range of motion. He exhibits no edema or tenderness.   Lymphadenopathy:     He has no cervical adenopathy.   Neurological: He is alert and oriented to person, place, and  time. Coordination normal.   Skin: Skin is warm. No rash noted. He is not diaphoretic. No erythema.   Psychiatric: He has a normal mood and affect. His behavior is normal. Judgment and thought content normal.   Nursing note and vitals reviewed.      Significant Labs:   I have reviewed all of the patient's relevant lab work available in the medical record and have utilized this in my evaluation and management recommendations today    Diagnostic Results:  I have reviewed all of the patient's diagnostic/imaging results available in the medical record and have utilized this in my evaluation and management recommendations today.

## 2017-02-26 NOTE — PLAN OF CARE
Problem: Patient Care Overview  Goal: Plan of Care Review  Outcome: Ongoing (interventions implemented as appropriate)  Recommendation/Intervention: 1. Continue current diet and oral supplements, advance to Puree if medically able. 2. Further dicussed ways to increase calorie/protein intake.  Goals: Oral diet advanced within 48 hours with intake greater than 75% of 3 meals daily.  Nutrition Goal Status: new

## 2017-02-26 NOTE — CONSULTS
[facility] -   Adult Nutrition  Consult Note    SUMMARY     Recommendations  Recommendation/Intervention: 1. Continue current diet and oral supplements, advance to Puree if medically able.     2. Further dicussed ways to increase calorie/protein intake.  Goals: Oral diet advanced within 48 hours with intake greater than 75% of 3 meals daily.  Nutrition Goal Status: new    Nutrition Discharge Plan  Home on Regular/Puree diet as tolerated with oral nutrition supplement as needed    Reason for Assessment  Reason for Assessment: RD follow-up, nurse/nurse practitioner consult  Diagnosis:  (esophageal obstruction)  Relevent Medical History: Lung CA, COPD, GERD, Lumbar Vertebral Fracture   Interdisciplinary Rounds: attended  General Information Comments: Patient had severe weight loss over 4-6 months last year. His weight has been stable according to EPIC records since November 2016 at about 50kg    Nutrition Prescription Ordered  Current Diet Order: Full Liquid  Nutrition Order Comments: Patient is NPO for procedure this afternoon.  Oral Nutrition Supplement: Boost Plus 6 cans daily     Evaluation of Received Nutrients/Fluid Intake  Oral Calories (kcal): 2160  Oral Protein (gm): 84     Nutrition Risk Screen  Nutrition Risk Screen: unintentional loss of 10 lbs or more in the past 2 mos, dysphagia or difficulty swallowing, reduced oral intake over the last month    Nutrition/Diet History  Typical Food/Fluid Intake: Patient with good intake of liquid with some puree foods (grits, yogurt, cream of wheat) States his swallowing is easier since stent placement however he still has some early satiety. He is unable to consume 3 standard meals but uis consuming some between meals. He is drinking 6 boost plus daily in addition to some other puree foods. Discussed and encouraged again use of Boost as smoothie base adding puree fruits and veggies and protein powders as needed to meet estimated  calorie/protein/vitamin/mineral needs.  Food Preferences: no milk  Supplemental Drinks or Food Habits: Boost Plus  Factors Affecting Nutritional Intake: difficulty/impaired swallowing, early satiety    Labs/Tests/Procedures/Meds  Pertinent Labs Reviewed: reviewed  Pertinent Labs Comments: Gluc 111, Alb 2.7  Pertinent Medications Reviewed: reviewed    Physical Findings  Overall Physical Appearance:  (thin)  Oral/Mouth Cavity: WDL  Skin:  (Rod 20)    Anthropometrics  Height (inches): 62.01 in  Weight Method: Bed Scale  Weight (kg): 50 kg  Ideal Body Weight (IBW), Male: 118.06 lb  % Ideal Body Weight, Male (lb): 93.37 lb  BMI (kg/m2): 20.16  BMI Grade: 18.5-24.9 - normal  Usual Body Weight (UBW), k kg  % Usual Body Weight: 78.59  % Weight Change:  (21% weight loss in 6 months with stable wt since 2016)  Weight Loss: unintentional    Estimated/Assessed Needs  Weight Used For Calorie Calculations: 50.3 kg (110 lb 14.3 oz)   Height (cm): 157.5 cm  Energy Need Method: Terrell-St Jeor (x1.3 = 1511)  30 kcal/kg (kcal): 1509 and 35 kcal/kg (kcal): 1760.5   Weight Used For Protein Calculations: 50.3 kg (110 lb 14.3 oz)  1.2 gm Protein (gm): 60.49 and 1.5 gm Protein (gm): 75.61  Fluid Need Method: RDA Method (1ml/dee or as needed)    Monitor and Evaluation  Food and Nutrient Intake: food and beverage intake  Food and Nutrient Adminstration: diet order  Anthropometric Measurements: weight  Nutrition-Focused Physical Findings: overall appearance    Nutrition Risk  Level of Risk:  (2x weekly)    Nutrition Follow-Up  RD Follow-up?: Yes      Assessment and Plan  * Esophageal obstruction  Nutrition Problem:   Swallowing difficulty    Etiology/Related to:   Per H&P patient with Oropharyngeal dysphagia    As evidenced by:  Reports of swallowing difficulty on primarily liquid diet and hx of weight loss, now stable.    Treatment Strategy:   1. Puree/Liquid diet if patient declines PEG  2. Provided tips for increasing  fruit/vegetable consumption and adequate protein/calorie intake on Puree/Liquid diet    Nutrition Diagnosis Status:   New

## 2017-02-27 LAB
ALBUMIN SERPL BCP-MCNC: 2.5 G/DL
ALP SERPL-CCNC: 146 U/L
ALT SERPL W/O P-5'-P-CCNC: 43 U/L
ANION GAP SERPL CALC-SCNC: 8 MMOL/L
AST SERPL-CCNC: 49 U/L
BILIRUB SERPL-MCNC: 0.3 MG/DL
BUN SERPL-MCNC: 6 MG/DL
CALCIUM SERPL-MCNC: 9.2 MG/DL
CHLORIDE SERPL-SCNC: 105 MMOL/L
CO2 SERPL-SCNC: 27 MMOL/L
CREAT SERPL-MCNC: 0.6 MG/DL
EST. GFR  (AFRICAN AMERICAN): >60 ML/MIN/1.73 M^2
EST. GFR  (NON AFRICAN AMERICAN): >60 ML/MIN/1.73 M^2
GLUCOSE SERPL-MCNC: 110 MG/DL
POTASSIUM SERPL-SCNC: 3.8 MMOL/L
PROT SERPL-MCNC: 6.1 G/DL
SODIUM SERPL-SCNC: 140 MMOL/L
TROPONIN I SERPL DL<=0.01 NG/ML-MCNC: 0.01 NG/ML

## 2017-02-27 PROCEDURE — 63600175 PHARM REV CODE 636 W HCPCS: Performed by: FAMILY MEDICINE

## 2017-02-27 PROCEDURE — 36415 COLL VENOUS BLD VENIPUNCTURE: CPT

## 2017-02-27 PROCEDURE — 25000003 PHARM REV CODE 250: Performed by: EMERGENCY MEDICINE

## 2017-02-27 PROCEDURE — 25000003 PHARM REV CODE 250: Performed by: FAMILY MEDICINE

## 2017-02-27 PROCEDURE — 11000001 HC ACUTE MED/SURG PRIVATE ROOM

## 2017-02-27 PROCEDURE — 25000003 PHARM REV CODE 250: Performed by: NURSE PRACTITIONER

## 2017-02-27 PROCEDURE — 63600175 PHARM REV CODE 636 W HCPCS: Performed by: EMERGENCY MEDICINE

## 2017-02-27 PROCEDURE — 84484 ASSAY OF TROPONIN QUANT: CPT

## 2017-02-27 PROCEDURE — 63600175 PHARM REV CODE 636 W HCPCS: Performed by: NURSE PRACTITIONER

## 2017-02-27 PROCEDURE — 99233 SBSQ HOSP IP/OBS HIGH 50: CPT | Mod: ,,, | Performed by: INTERNAL MEDICINE

## 2017-02-27 PROCEDURE — 63600175 PHARM REV CODE 636 W HCPCS: Performed by: INTERNAL MEDICINE

## 2017-02-27 PROCEDURE — 80053 COMPREHEN METABOLIC PANEL: CPT

## 2017-02-27 PROCEDURE — C9113 INJ PANTOPRAZOLE SODIUM, VIA: HCPCS | Performed by: EMERGENCY MEDICINE

## 2017-02-27 PROCEDURE — 96372 THER/PROPH/DIAG INJ SC/IM: CPT

## 2017-02-27 RX ORDER — HYDROMORPHONE HYDROCHLORIDE 1 MG/ML
1 INJECTION, SOLUTION INTRAMUSCULAR; INTRAVENOUS; SUBCUTANEOUS EVERY 4 HOURS PRN
Status: DISCONTINUED | OUTPATIENT
Start: 2017-02-27 | End: 2017-02-28 | Stop reason: HOSPADM

## 2017-02-27 RX ORDER — HYDROMORPHONE HYDROCHLORIDE 2 MG/1
2 TABLET ORAL EVERY 4 HOURS
Status: DISCONTINUED | OUTPATIENT
Start: 2017-02-27 | End: 2017-02-28 | Stop reason: HOSPADM

## 2017-02-27 RX ADMIN — HYDROMORPHONE HYDROCHLORIDE 2 MG: 2 TABLET ORAL at 06:02

## 2017-02-27 RX ADMIN — SODIUM CHLORIDE: 0.9 INJECTION, SOLUTION INTRAVENOUS at 12:02

## 2017-02-27 RX ADMIN — FOLIC ACID: 5 INJECTION, SOLUTION INTRAMUSCULAR; INTRAVENOUS; SUBCUTANEOUS at 06:02

## 2017-02-27 RX ADMIN — ONDANSETRON 8 MG: 8 TABLET, ORALLY DISINTEGRATING ORAL at 09:02

## 2017-02-27 RX ADMIN — SODIUM CHLORIDE: 0.9 INJECTION, SOLUTION INTRAVENOUS at 05:02

## 2017-02-27 RX ADMIN — POLYETHYLENE GLYCOL 3350 17 G: 17 POWDER, FOR SOLUTION ORAL at 08:02

## 2017-02-27 RX ADMIN — HYDROMORPHONE HYDROCHLORIDE 2 MG: 2 INJECTION INTRAMUSCULAR; INTRAVENOUS; SUBCUTANEOUS at 05:02

## 2017-02-27 RX ADMIN — SODIUM CHLORIDE: 0.9 INJECTION, SOLUTION INTRAVENOUS at 10:02

## 2017-02-27 RX ADMIN — HYDROMORPHONE HYDROCHLORIDE 2 MG: 2 INJECTION INTRAMUSCULAR; INTRAVENOUS; SUBCUTANEOUS at 12:02

## 2017-02-27 RX ADMIN — PANTOPRAZOLE SODIUM 40 MG: 40 INJECTION, POWDER, FOR SOLUTION INTRAVENOUS at 08:02

## 2017-02-27 RX ADMIN — ENOXAPARIN SODIUM 40 MG: 100 INJECTION SUBCUTANEOUS at 09:02

## 2017-02-27 RX ADMIN — HYDROMORPHONE HYDROCHLORIDE 2 MG: 2 TABLET ORAL at 10:02

## 2017-02-27 RX ADMIN — HYDROMORPHONE HYDROCHLORIDE 1 MG: 1 INJECTION, SOLUTION INTRAMUSCULAR; INTRAVENOUS; SUBCUTANEOUS at 10:02

## 2017-02-27 RX ADMIN — HYDROMORPHONE HYDROCHLORIDE 2 MG: 2 INJECTION INTRAMUSCULAR; INTRAVENOUS; SUBCUTANEOUS at 03:02

## 2017-02-27 RX ADMIN — HYDROMORPHONE HYDROCHLORIDE 2 MG: 2 INJECTION INTRAMUSCULAR; INTRAVENOUS; SUBCUTANEOUS at 08:02

## 2017-02-27 NOTE — ASSESSMENT & PLAN NOTE
1.  I had a detailed discussion with the patient today with regard to his current clinical situation.  Further management with regard to his metastatic malignancy will be as per his primary oncologist Dr. Espinosa is an outpatient.  He is currently on Pembrolizumab started 1/2017 and it is likely too early to assess for response with this.  2.  The patient is status post esophageal stent placement for extrinsic compression due to metastatic lymphadenopathy.  He will likely need palliative radiation to try to help with this if feasible.  This will be initiated as an outpatient if feasible. I have discussed this with his primary oncologist Dr. Espinosa.

## 2017-02-27 NOTE — PROGRESS NOTES
[facility] Hospital Medicine  Progress Note    Patient Name: Frederick J Lejeune  MRN: [id number]  Patient Class: IP- Inpatient   Admission Date: 2/22/2017  Length of Stay: 4 days  Attending Physician: Derek Bourgeois MD  Primary Care Provider: Kenrick Muñoz MD        Subjective:     Principal Problem:Esophageal obstruction    HPI:  Frederick J Lejeune is a 66 y.o. male with PMHx of GERD, weight loss, dysphagia, odynophagia, RLL lung mass squamous cell carcinoma, who presented to ER from Dr. Espinosa's office with c/o dysphagia, weakness, dizziness, chest pain. He has had difficulty eating due to odynophagia for the past month. Patient has completed palliative radiation to relieve right mainstem bronchus obstruction. He then was treated with weekly Carbo-Taxol x 6-7 weeks, which was discontinued due to disease progression on imaging in early 1/2017. He is currently receiving Pembrolizumab every 3 weeks. Today, he presented to Dr. Espinosa's office on an urgent visit regarding his trouble swallowing and inability to eat. He is also dizzy, weak, SOB and has pain in his chest. No cough, fevers, chills. His left groin mass is stable per patient, but he still has some mild pain in the right supraclavicular region due to lymphadenopathy. He was recently evaluated by Dr. Cheng who recommended further evaluation with esophagram, but pt was too sick to attend appointment. GI has been consulted for esophagram for pallative treatment.    Hospital Course:  Esophagram completed. Esophageal stent placed 2/24/2017. Patient had pain following stent requiring IV dilaudid. Patient seen and examined. Tolerating clear liquids. Will start full liquids with Boost. Calorie count and nutritional counseling. Had chest pain across chest. 1 st troponin negative. 2nd troponin pending. ECG showed new inverted T Waves. Discussed with Dr. Cannon. Tolerating full liquids.    Interval History: Had chest pain with 1st troponin negative,  ECG showed inverted T waves. 2nd troponin pending.    Review of Systems   Constitutional: Positive for activity change, appetite change, fatigue and unexpected weight change. Negative for chills, diaphoresis and fever.   HENT: Negative for congestion, dental problem, ear pain, mouth sores, nosebleeds, postnasal drip, sinus pressure, sore throat, tinnitus, trouble swallowing and voice change.    Eyes: Negative for photophobia, pain, discharge, redness, itching and visual disturbance.   Respiratory: Negative for cough, chest tightness, shortness of breath, wheezing and stridor.    Cardiovascular: Positive for chest pain. Negative for palpitations and leg swelling.   Gastrointestinal: Negative for abdominal distention, abdominal pain, anal bleeding, blood in stool, constipation, diarrhea, nausea and vomiting.   Endocrine: Negative for cold intolerance, heat intolerance, polydipsia, polyphagia and polyuria.   Genitourinary: Negative for decreased urine volume, difficulty urinating, dysuria, flank pain, frequency, hematuria and urgency.   Musculoskeletal: Positive for arthralgias. Negative for back pain, gait problem, joint swelling and myalgias.   Skin: Negative for pallor and rash.   Allergic/Immunologic: Negative for immunocompromised state.   Neurological: Negative for dizziness, syncope, weakness, light-headedness and headaches.   Hematological: Negative for adenopathy. Does not bruise/bleed easily.   Psychiatric/Behavioral: Negative for agitation and confusion. The patient is not nervous/anxious.      Objective:     Vital Signs (Most Recent):  Temp: 98.4 °F (36.9 °C) (02/26/17 1552)  Pulse: 110 (02/26/17 1552)  Resp: 18 (02/26/17 1552)  BP: 130/81 (02/26/17 1552)  SpO2: 96 % (02/26/17 1552) Vital Signs (24h Range):  Temp:  [98.1 °F (36.7 °C)-99.3 °F (37.4 °C)] 98.4 °F (36.9 °C)  Pulse:  [] 110  Resp:  [18-20] 18  SpO2:  [92 %-97 %] 96 %  BP: (116-137)/(67-81) 130/81     Weight: 50 kg (110 lb 4.8 oz)  Body  mass index is 20.17 kg/(m^2).    Intake/Output Summary (Last 24 hours) at 02/26/17 1902  Last data filed at 02/26/17 1500   Gross per 24 hour   Intake             1400 ml   Output             2715 ml   Net            -1315 ml      Physical Exam   Constitutional: He is oriented to person, place, and time. He appears well-developed. He appears cachectic. He has a sickly appearance. He appears ill. No distress.   HENT:   Head: Normocephalic and atraumatic.   Nose: Nose normal.   Mouth/Throat: Oropharynx is clear and moist. No oropharyngeal exudate.   Eyes: EOM are normal. Pupils are equal, round, and reactive to light. No scleral icterus.   Neck: Normal range of motion. Neck supple. No tracheal deviation present. No thyromegaly present.   Cardiovascular: Normal rate, regular rhythm, normal heart sounds and intact distal pulses.    No murmur heard.  Pulmonary/Chest: Effort normal. No stridor. No respiratory distress. He has no wheezes. He has rales. He exhibits no tenderness.   Abdominal: Soft. Bowel sounds are normal. He exhibits no distension and no mass. There is no tenderness. There is no rebound and no guarding.   Musculoskeletal: Normal range of motion. He exhibits no edema or tenderness.   Lymphadenopathy:     He has no cervical adenopathy.   Neurological: He is alert and oriented to person, place, and time. Coordination normal.   Skin: Skin is warm. No rash noted. He is not diaphoretic. No erythema.   Psychiatric: He has a normal mood and affect. His behavior is normal. Judgment and thought content normal.   Nursing note and vitals reviewed.      Significant Labs:   CBC: No results for input(s): WBC, HGB, HCT, PLT in the last 48 hours.  CMP:   Recent Labs  Lab 02/25/17  0432 02/26/17  0624    141   K 4.3 4.7    104   CO2 29 30*   GLU 85 111*   BUN 11 8   CREATININE 0.8 0.7   CALCIUM 9.5 9.5   PROT 6.5 6.3   ALBUMIN 2.9* 2.7*   BILITOT 0.5 0.4   ALKPHOS 106 105   AST 13 15   ALT 8* 9*   ANIONGAP 8 7*    EGFRNONAA >60 >60       Significant Imaging: I have reviewed all pertinent imaging results/findings within the past 24 hours.   Imaging Results         X-Ray Chest 1 View (Final result) Result time:  02/26/17 11:16:42    Final result by Hayde Hooker MD (02/26/17 11:16:42)    Impression:     See above.            Electronically signed by: HAYDE HOOKER  Date:     02/26/17  Time:    11:16     Narrative:    Chest x-ray single view    Indication: Sudden onset chest pain, chest tightness.  Status post esophageal stent.  Esophageal mass.    Findings: Comparison study 12/26/2016.  Interval placement of an esophageal stent.  Right chest wall MediPort is again noted with the catheter tip was in the proximal SVC.  There is pulmonary fibrosis.  Small left pleural effusion blunts the costophrenic angle.  Bibasilar haziness.  Shallow breath.  Normal size heart.  No pneumothorax.            X-Ray Chest 1 View (Final result) Result time:  02/24/17 16:41:07    Final result by Almaz Donato MD (02/24/17 16:41:07)    Impression:      Fluoroscopic and endoscopic guided esophageal stent placement as described above.      Electronically signed by: ALMAZ DONATO MD  Date:     02/24/17  Time:    16:41     Narrative:    EXAM: Fluoroscopy for esophageal stent placement.    CLINICAL HISTORY:  Esophageal mass and stricture.    Comparison: CT chest and esophagram 02/22/2017.    FINDINGS:  This procedure was performed by Dr. Cheng.    Submitted images demonstrate an endoscope within the esophagus followed by deployment of an esophageal stent from the proximal to distal esophagus.  There is narrowing of the mid stent corresponding to the esophageal stricture with flared proximal and distal ends.  For further information please refer to Dr. Cheng's procedure note.    Fluoroscopy time: 223 seconds.    Fluoroscopic images: 5.            FL Less Than 1 Hour (In process)         FL Esophagram Complete (Final result) Result time:  02/22/17  17:42:10    Final result by Raheel Calloway MD (02/22/17 17:42:10)    Impression:     Moderately severe smooth stricture of the distal thoracic esophagus which may be produced by the subcarinal lymphadenopathy. Can't exclude primary esophageal neoplasm.      Electronically signed by: RAHEEL CALLOWAY MD  Date:     02/22/17  Time:    17:42     Narrative:    History: Metastatic lung cancer, worsening dysphagia, evidence of esophageal obstruction on CT    Gastrografin esophagram was performed. The patient swallowed Gastrografin without difficulty. There is a persistent 2.4 cm long moderately severe stricture below the twila. The stricture has fairly smooth margins, without mucosal irregularity. While a primary neoplasm of the esophagus is not excluded, this may be consistent with extrinsic compression from the adjacent subcarinal lymphadenopathy. There is moderate dilatation of the esophagus above the stricture. No abnormality demonstrated the at the GE junction.    1.3 minutes of fluoroscopy was utilized for the procedure.            CT Chest With Contrast (Final result) Result time:  02/22/17 10:51:06    Final result by Raheel Calloway MD (02/22/17 10:51:06)    Impression:     Marked air distention of the proximal thoracic esophagus down to the region of unchanged subcarinal lymphadenopathy. The esophageal obstruction may be on the basis of lymphadenopathy but esophageal mass at this level is a definite consideration. Development of bilateral pleural effusions. Otherwise no significant change from 1/11/17 PET/CT.    All CT scans at this facility use dose modulation, iterative reconstruction and/or weight based dosing when appropriate to reduce radiation dose to as low as reasonably achievable.       Electronically signed by: RAHEEL CALLOWAY MD  Date:     02/22/17  Time:    10:51     Narrative:    History: Dysphagia, onset gradually one month ago, worsening a week ago. History of metastatic lung  cancer    Comparison 1/11/17 PET/CT, 12/20/16 CT chest    There is marked air distention of the proximal esophagus down to the subcarinal region where there is prominent subcarinal lymphadenopathy as before. The degree of air distention of the proximal esophagus is worse. There is no air in the esophagus at the subcarinal region and then air is demonstrated in the distal esophagus. It would be difficult to exclude an esophageal mass at this level, versus extrinsic mass effect on the esophagus by the adenopathy. There has been interval development of small to moderate sized bilateral pleural effusions. The mass in the right lower lobe is essentially unchanged. There are several left lower lobe masses which are essentially unchanged. A few scattered small pulmonary metastases bilaterally. There is emphysematous change and marked diffuse interstitial fibrosis. There is nodular thickening of the pericardium. Right supraclavicular lymphadenopathy again noted.            Assessment/Plan:      * Esophageal obstruction  -GI consult: Dr. Cheng performed esophagram  - Stent placed 2/24/17        Oropharyngeal dysphagia  -esophagram and stent by   -nutritional consult  -tolerating clear liquids, will advance to Full  -calorie count      Lung cancer  -outpatient followup      Bone metastases  Outpatient followup      Chest pain  Check serial troponins  If elevated consult Cardiology      VTE Risk Mitigation         Ordered     enoxaparin injection 40 mg  Daily     Route:  Subcutaneous        02/22/17 1549     Medium Risk of VTE  Once      02/22/17 1549     Place SUKHI hose  Until discontinued      02/22/17 1549     Place sequential compression device  Until discontinued      02/22/17 1549     Reason for No Pharmacological VTE Prophylaxis  Once      02/22/17 1549          Kathleen Campos NP  Department of Hospital Medicine   [facility]

## 2017-02-27 NOTE — PLAN OF CARE
Pt remains free of injuries. Pain mildly controlled c PRN meds and diversionary activities. Will try PO Dilaudid and IVP Dilaudid for BT. IVF per MD orders. 12 hr chart check completed. Will continue to monitor.

## 2017-02-27 NOTE — PLAN OF CARE
Problem: Patient Care Overview  Goal: Plan of Care Review  Outcome: Ongoing (interventions implemented as appropriate)  2l nc, respirations even and unlabored, pain, shortness of breath on exertion, no nausea, refused bed alarm

## 2017-02-27 NOTE — PLAN OF CARE
Problem: Patient Care Overview  Goal: Plan of Care Review  Outcome: Ongoing (interventions implemented as appropriate)  POC reviewed with patient. Indications for medications and possible side effects explained. Pt verbalized understanding. Pain medication administered per order. Pt c/o chest pain/tightness; states it was getting progressively worse during the day. MD notified. IVF infusing. VS stable. Pt in no acute distress; will continue to monitor. 12 hour chart check complete.

## 2017-02-27 NOTE — PROGRESS NOTES
[facility] -   Hematology/Oncology  Progress Note    Patient Name: Frederick J Lejeune  Admission Date: 2/22/2017  Hospital Length of Stay: 5 days  Code Status: Full Code     Subjective:     HPI:  HemOnc Diagnosis: Squamous cell carcinoma of lung     Stage: Stage IV (bilateral pulmonary nodules, R supraclavic lad, R mainstem bronchus obstruction, LLL mass)     Pathology:  - EGD 10/19/16: 1. Gastric biopsy: Normal gastric mucosa. No Helicobacter identified on routine stain. 2. Irregular Z line, biopsy: Squamoglandular mucosa with mild chronic inflammation and intestinal metaplasia (Palma's esophagus). Negative for dysplasia.     - Bronchoscopy with biopsy of R mainstem bronchus mass 10/21/16: Non-small cell carcinoma, see note.  Immunohistochemical staining with satisfactory control material shows the tumor to be positive for p63 and CK 5/6 and negative for TTF-1 and CK 7. This immonophenotype is consistent with squamous cell carcinoma. PD-L1 expression 70%.     Prior Treatment:   1. Palliative radiation to the R mainstem bronchus  2. Carbo (AUC 2) -Taxol 45mg/m2 weekly x 6-7 weeks discontinued early 1/2017 due to disease progression  3. Palliative radiation to R supraclavicular LAD given pain and discomfort with swallowing, 5 fractions will be completed 1/22/17     Current Treatment: Pembrolizumab started 1/2017      Interval History: Continues to report chest pain. Cardiac enzymes negative.    Oncology Treatment Plan:   OCI MK 3475 KEYNOTE 240    Medications:  Continuous Infusions:   sodium chloride 0.9% 100 mL/hr at 02/27/17 1707     Scheduled Meds:   enoxaparin  40 mg Subcutaneous Daily    pantoprazole  40 mg Intravenous Daily    polyethylene glycol  17 g Oral Daily    Banana bag   Intravenous Daily     PRN Meds:albuterol-ipratropium 2.5mg-0.5mg/3mL, benzonatate, diphenhydrAMINE, hydrocodone-apap 2.5-108 MG/5 ML, hydromorphone (PF), morphine, morphine, ondansetron     Review of Systems    Constitutional: Positive for activity change, appetite change, fatigue and unexpected weight change. Negative for chills, diaphoresis and fever.   HENT: Negative for congestion, dental problem, ear pain, mouth sores, nosebleeds, postnasal drip, sinus pressure, sore throat, tinnitus, trouble swallowing and voice change.    Eyes: Negative for photophobia, pain, discharge, redness, itching and visual disturbance.   Respiratory: Negative for cough, chest tightness, shortness of breath, wheezing and stridor.    Cardiovascular: Positive for chest pain. Negative for palpitations and leg swelling.   Gastrointestinal: Negative for abdominal distention, abdominal pain, anal bleeding, blood in stool, constipation, diarrhea, nausea and vomiting.   Endocrine: Negative for cold intolerance, heat intolerance, polydipsia, polyphagia and polyuria.   Genitourinary: Negative for decreased urine volume, difficulty urinating, dysuria, flank pain, frequency, hematuria and urgency.   Musculoskeletal: Positive for arthralgias. Negative for back pain, gait problem, joint swelling and myalgias.   Skin: Negative for pallor and rash.   Allergic/Immunologic: Negative for immunocompromised state.   Neurological: Negative for dizziness, syncope, weakness, light-headedness and headaches.   Hematological: Negative for adenopathy. Does not bruise/bleed easily.   Psychiatric/Behavioral: Negative for agitation and confusion. The patient is not nervous/anxious.      Objective:     Vital Signs (Most Recent):  Temp: 98.3 °F (36.8 °C) (02/27/17 1538)  Pulse: 99 (02/27/17 1538)  Resp: 18 (02/27/17 1538)  BP: 113/73 (02/27/17 1538)  SpO2: (!) 94 % (02/27/17 1538) Vital Signs (24h Range):  Temp:  [97.8 °F (36.6 °C)-98.9 °F (37.2 °C)] 98.3 °F (36.8 °C)  Pulse:  [] 99  Resp:  [16-20] 18  SpO2:  [92 %-95 %] 94 %  BP: (103-113)/(68-80) 113/73     Weight: 51 kg (112 lb 6.4 oz)  Body mass index is 20.56 kg/(m^2).  Body surface area is 1.49 meters  squared.      Intake/Output Summary (Last 24 hours) at 02/27/17 1711  Last data filed at 02/27/17 1537   Gross per 24 hour   Intake             1790 ml   Output             1850 ml   Net              -60 ml       Physical Exam   Constitutional: He is oriented to person, place, and time. He appears well-developed. He appears cachectic. He has a sickly appearance. He appears ill. No distress.   HENT:   Head: Normocephalic and atraumatic.   Nose: Nose normal.   Mouth/Throat: Oropharynx is clear and moist. No oropharyngeal exudate.   Eyes: EOM are normal. Pupils are equal, round, and reactive to light. No scleral icterus.   Neck: Normal range of motion. Neck supple. No tracheal deviation present. No thyromegaly present.   Cardiovascular: Normal rate, regular rhythm, normal heart sounds and intact distal pulses.    No murmur heard.  Pulmonary/Chest: Effort normal. No stridor. No respiratory distress. He has no wheezes. He has rales. He exhibits no tenderness.   Abdominal: Soft. Bowel sounds are normal. He exhibits no distension and no mass. There is no tenderness. There is no rebound and no guarding.   Musculoskeletal: Normal range of motion. He exhibits no edema or tenderness.   Lymphadenopathy:     He has no cervical adenopathy.   Neurological: He is alert and oriented to person, place, and time. Coordination normal.   Skin: Skin is warm. No rash noted. He is not diaphoretic. No erythema.   Psychiatric: He has a normal mood and affect. His behavior is normal. Judgment and thought content normal.   Nursing note and vitals reviewed.      Significant Labs:   I have reviewed all of the patient's relevant lab work available in the medical record and have utilized this in my evaluation and management recommendations today    Diagnostic Results:  I have reviewed all of the patient's diagnostic/imaging results available in the medical record and have utilized this in my evaluation and management recommendations  today.    Assessment/Plan:     Lung cancer  1.  I had a detailed discussion with the patient today with regard to his current clinical situation.  Further management with regard to his metastatic malignancy will be as per his primary oncologist Dr. Espinosa is an outpatient.  He is currently on Pembrolizumab started 1/2017 and it is likely too early to assess for response with this.  2.  The patient is status post esophageal stent placement for extrinsic compression due to metastatic lymphadenopathy.  He will likely need palliative radiation to try to help with this if feasible.  This will be initiated as an outpatient if feasible. I have discussed this with his primary oncologist Dr. Espinosa.      Thank you for your consult.      Frederick Jeong MD  Hematology/Oncology  [facility] -

## 2017-02-27 NOTE — SUBJECTIVE & OBJECTIVE
Interval History: Had chest pain with 1st troponin negative, ECG showed inverted T waves. 2nd troponin pending.    Review of Systems   Constitutional: Positive for activity change, appetite change, fatigue and unexpected weight change. Negative for chills, diaphoresis and fever.   HENT: Negative for congestion, dental problem, ear pain, mouth sores, nosebleeds, postnasal drip, sinus pressure, sore throat, tinnitus, trouble swallowing and voice change.    Eyes: Negative for photophobia, pain, discharge, redness, itching and visual disturbance.   Respiratory: Negative for cough, chest tightness, shortness of breath, wheezing and stridor.    Cardiovascular: Positive for chest pain. Negative for palpitations and leg swelling.   Gastrointestinal: Negative for abdominal distention, abdominal pain, anal bleeding, blood in stool, constipation, diarrhea, nausea and vomiting.   Endocrine: Negative for cold intolerance, heat intolerance, polydipsia, polyphagia and polyuria.   Genitourinary: Negative for decreased urine volume, difficulty urinating, dysuria, flank pain, frequency, hematuria and urgency.   Musculoskeletal: Positive for arthralgias. Negative for back pain, gait problem, joint swelling and myalgias.   Skin: Negative for pallor and rash.   Allergic/Immunologic: Negative for immunocompromised state.   Neurological: Negative for dizziness, syncope, weakness, light-headedness and headaches.   Hematological: Negative for adenopathy. Does not bruise/bleed easily.   Psychiatric/Behavioral: Negative for agitation and confusion. The patient is not nervous/anxious.      Objective:     Vital Signs (Most Recent):  Temp: 98.4 °F (36.9 °C) (02/26/17 1552)  Pulse: 110 (02/26/17 1552)  Resp: 18 (02/26/17 1552)  BP: 130/81 (02/26/17 1552)  SpO2: 96 % (02/26/17 1552) Vital Signs (24h Range):  Temp:  [98.1 °F (36.7 °C)-99.3 °F (37.4 °C)] 98.4 °F (36.9 °C)  Pulse:  [] 110  Resp:  [18-20] 18  SpO2:  [92 %-97 %] 96 %  BP:  (116-137)/(67-81) 130/81     Weight: 50 kg (110 lb 4.8 oz)  Body mass index is 20.17 kg/(m^2).    Intake/Output Summary (Last 24 hours) at 02/26/17 1902  Last data filed at 02/26/17 1500   Gross per 24 hour   Intake             1400 ml   Output             2715 ml   Net            -1315 ml      Physical Exam   Constitutional: He is oriented to person, place, and time. He appears well-developed. He appears cachectic. He has a sickly appearance. He appears ill. No distress.   HENT:   Head: Normocephalic and atraumatic.   Nose: Nose normal.   Mouth/Throat: Oropharynx is clear and moist. No oropharyngeal exudate.   Eyes: EOM are normal. Pupils are equal, round, and reactive to light. No scleral icterus.   Neck: Normal range of motion. Neck supple. No tracheal deviation present. No thyromegaly present.   Cardiovascular: Normal rate, regular rhythm, normal heart sounds and intact distal pulses.    No murmur heard.  Pulmonary/Chest: Effort normal. No stridor. No respiratory distress. He has no wheezes. He has rales. He exhibits no tenderness.   Abdominal: Soft. Bowel sounds are normal. He exhibits no distension and no mass. There is no tenderness. There is no rebound and no guarding.   Musculoskeletal: Normal range of motion. He exhibits no edema or tenderness.   Lymphadenopathy:     He has no cervical adenopathy.   Neurological: He is alert and oriented to person, place, and time. Coordination normal.   Skin: Skin is warm. No rash noted. He is not diaphoretic. No erythema.   Psychiatric: He has a normal mood and affect. His behavior is normal. Judgment and thought content normal.   Nursing note and vitals reviewed.      Significant Labs:   CBC: No results for input(s): WBC, HGB, HCT, PLT in the last 48 hours.  CMP:   Recent Labs  Lab 02/25/17  0432 02/26/17  0624    141   K 4.3 4.7    104   CO2 29 30*   GLU 85 111*   BUN 11 8   CREATININE 0.8 0.7   CALCIUM 9.5 9.5   PROT 6.5 6.3   ALBUMIN 2.9* 2.7*   BILITOT  0.5 0.4   ALKPHOS 106 105   AST 13 15   ALT 8* 9*   ANIONGAP 8 7*   EGFRNONAA >60 >60       Significant Imaging: I have reviewed all pertinent imaging results/findings within the past 24 hours.   Imaging Results         X-Ray Chest 1 View (Final result) Result time:  02/26/17 11:16:42    Final result by Hayde Hooker MD (02/26/17 11:16:42)    Impression:     See above.            Electronically signed by: HAYDE HOOKER  Date:     02/26/17  Time:    11:16     Narrative:    Chest x-ray single view    Indication: Sudden onset chest pain, chest tightness.  Status post esophageal stent.  Esophageal mass.    Findings: Comparison study 12/26/2016.  Interval placement of an esophageal stent.  Right chest wall MediPort is again noted with the catheter tip was in the proximal SVC.  There is pulmonary fibrosis.  Small left pleural effusion blunts the costophrenic angle.  Bibasilar haziness.  Shallow breath.  Normal size heart.  No pneumothorax.            X-Ray Chest 1 View (Final result) Result time:  02/24/17 16:41:07    Final result by Almaz Donato MD (02/24/17 16:41:07)    Impression:      Fluoroscopic and endoscopic guided esophageal stent placement as described above.      Electronically signed by: ALMAZ DONATO MD  Date:     02/24/17  Time:    16:41     Narrative:    EXAM: Fluoroscopy for esophageal stent placement.    CLINICAL HISTORY:  Esophageal mass and stricture.    Comparison: CT chest and esophagram 02/22/2017.    FINDINGS:  This procedure was performed by Dr. Cheng.    Submitted images demonstrate an endoscope within the esophagus followed by deployment of an esophageal stent from the proximal to distal esophagus.  There is narrowing of the mid stent corresponding to the esophageal stricture with flared proximal and distal ends.  For further information please refer to Dr. Cheng's procedure note.    Fluoroscopy time: 223 seconds.    Fluoroscopic images: 5.            FL Less Than 1 Hour (In process)          FL Esophagram Complete (Final result) Result time:  02/22/17 17:42:10    Final result by Raheel Calloway MD (02/22/17 17:42:10)    Impression:     Moderately severe smooth stricture of the distal thoracic esophagus which may be produced by the subcarinal lymphadenopathy. Can't exclude primary esophageal neoplasm.      Electronically signed by: RAHEEL CALLOWAY MD  Date:     02/22/17  Time:    17:42     Narrative:    History: Metastatic lung cancer, worsening dysphagia, evidence of esophageal obstruction on CT    Gastrografin esophagram was performed. The patient swallowed Gastrografin without difficulty. There is a persistent 2.4 cm long moderately severe stricture below the twila. The stricture has fairly smooth margins, without mucosal irregularity. While a primary neoplasm of the esophagus is not excluded, this may be consistent with extrinsic compression from the adjacent subcarinal lymphadenopathy. There is moderate dilatation of the esophagus above the stricture. No abnormality demonstrated the at the GE junction.    1.3 minutes of fluoroscopy was utilized for the procedure.            CT Chest With Contrast (Final result) Result time:  02/22/17 10:51:06    Final result by Raheel Calloway MD (02/22/17 10:51:06)    Impression:     Marked air distention of the proximal thoracic esophagus down to the region of unchanged subcarinal lymphadenopathy. The esophageal obstruction may be on the basis of lymphadenopathy but esophageal mass at this level is a definite consideration. Development of bilateral pleural effusions. Otherwise no significant change from 1/11/17 PET/CT.    All CT scans at this facility use dose modulation, iterative reconstruction and/or weight based dosing when appropriate to reduce radiation dose to as low as reasonably achievable.       Electronically signed by: RAHEEL CALLOWAY MD  Date:     02/22/17  Time:    10:51     Narrative:    History: Dysphagia, onset gradually one month ago,  worsening a week ago. History of metastatic lung cancer    Comparison 1/11/17 PET/CT, 12/20/16 CT chest    There is marked air distention of the proximal esophagus down to the subcarinal region where there is prominent subcarinal lymphadenopathy as before. The degree of air distention of the proximal esophagus is worse. There is no air in the esophagus at the subcarinal region and then air is demonstrated in the distal esophagus. It would be difficult to exclude an esophageal mass at this level, versus extrinsic mass effect on the esophagus by the adenopathy. There has been interval development of small to moderate sized bilateral pleural effusions. The mass in the right lower lobe is essentially unchanged. There are several left lower lobe masses which are essentially unchanged. A few scattered small pulmonary metastases bilaterally. There is emphysematous change and marked diffuse interstitial fibrosis. There is nodular thickening of the pericardium. Right supraclavicular lymphadenopathy again noted.

## 2017-02-27 NOTE — NURSING
Went into pt's room at 0845 to administer 0900 meds. Pt c/o of sudden onset of chest tightness/pain that was getting progressively worse. VS stable. MD notified, and order for STAT EKG, CXR, and Troponin level obtained. Results reviewed by Dr. Bourgeois and Sobeida Robert NP.

## 2017-02-27 NOTE — SUBJECTIVE & OBJECTIVE
Interval History: Continues to report chest pain. Cardiac enzymes negative.    Oncology Treatment Plan:   [facility] MK 3475 KEYNOTE 240    Medications:  Continuous Infusions:   sodium chloride 0.9% 100 mL/hr at 02/27/17 1707     Scheduled Meds:   enoxaparin  40 mg Subcutaneous Daily    pantoprazole  40 mg Intravenous Daily    polyethylene glycol  17 g Oral Daily    Banana bag   Intravenous Daily     PRN Meds:albuterol-ipratropium 2.5mg-0.5mg/3mL, benzonatate, diphenhydrAMINE, hydrocodone-apap 2.5-108 MG/5 ML, hydromorphone (PF), morphine, morphine, ondansetron     Review of Systems   Constitutional: Positive for activity change, appetite change, fatigue and unexpected weight change. Negative for chills, diaphoresis and fever.   HENT: Negative for congestion, dental problem, ear pain, mouth sores, nosebleeds, postnasal drip, sinus pressure, sore throat, tinnitus, trouble swallowing and voice change.    Eyes: Negative for photophobia, pain, discharge, redness, itching and visual disturbance.   Respiratory: Negative for cough, chest tightness, shortness of breath, wheezing and stridor.    Cardiovascular: Positive for chest pain. Negative for palpitations and leg swelling.   Gastrointestinal: Negative for abdominal distention, abdominal pain, anal bleeding, blood in stool, constipation, diarrhea, nausea and vomiting.   Endocrine: Negative for cold intolerance, heat intolerance, polydipsia, polyphagia and polyuria.   Genitourinary: Negative for decreased urine volume, difficulty urinating, dysuria, flank pain, frequency, hematuria and urgency.   Musculoskeletal: Positive for arthralgias. Negative for back pain, gait problem, joint swelling and myalgias.   Skin: Negative for pallor and rash.   Allergic/Immunologic: Negative for immunocompromised state.   Neurological: Negative for dizziness, syncope, weakness, light-headedness and headaches.   Hematological: Negative for adenopathy. Does not bruise/bleed easily.    Psychiatric/Behavioral: Negative for agitation and confusion. The patient is not nervous/anxious.      Objective:     Vital Signs (Most Recent):  Temp: 98.3 °F (36.8 °C) (02/27/17 1538)  Pulse: 99 (02/27/17 1538)  Resp: 18 (02/27/17 1538)  BP: 113/73 (02/27/17 1538)  SpO2: (!) 94 % (02/27/17 1538) Vital Signs (24h Range):  Temp:  [97.8 °F (36.6 °C)-98.9 °F (37.2 °C)] 98.3 °F (36.8 °C)  Pulse:  [] 99  Resp:  [16-20] 18  SpO2:  [92 %-95 %] 94 %  BP: (103-113)/(68-80) 113/73     Weight: 51 kg (112 lb 6.4 oz)  Body mass index is 20.56 kg/(m^2).  Body surface area is 1.49 meters squared.      Intake/Output Summary (Last 24 hours) at 02/27/17 1711  Last data filed at 02/27/17 1537   Gross per 24 hour   Intake             1790 ml   Output             1850 ml   Net              -60 ml       Physical Exam   Constitutional: He is oriented to person, place, and time. He appears well-developed. He appears cachectic. He has a sickly appearance. He appears ill. No distress.   HENT:   Head: Normocephalic and atraumatic.   Nose: Nose normal.   Mouth/Throat: Oropharynx is clear and moist. No oropharyngeal exudate.   Eyes: EOM are normal. Pupils are equal, round, and reactive to light. No scleral icterus.   Neck: Normal range of motion. Neck supple. No tracheal deviation present. No thyromegaly present.   Cardiovascular: Normal rate, regular rhythm, normal heart sounds and intact distal pulses.    No murmur heard.  Pulmonary/Chest: Effort normal. No stridor. No respiratory distress. He has no wheezes. He has rales. He exhibits no tenderness.   Abdominal: Soft. Bowel sounds are normal. He exhibits no distension and no mass. There is no tenderness. There is no rebound and no guarding.   Musculoskeletal: Normal range of motion. He exhibits no edema or tenderness.   Lymphadenopathy:     He has no cervical adenopathy.   Neurological: He is alert and oriented to person, place, and time. Coordination normal.   Skin: Skin is warm. No  rash noted. He is not diaphoretic. No erythema.   Psychiatric: He has a normal mood and affect. His behavior is normal. Judgment and thought content normal.   Nursing note and vitals reviewed.      Significant Labs:   I have reviewed all of the patient's relevant lab work available in the medical record and have utilized this in my evaluation and management recommendations today    Diagnostic Results:  I have reviewed all of the patient's diagnostic/imaging results available in the medical record and have utilized this in my evaluation and management recommendations today.

## 2017-02-28 VITALS
OXYGEN SATURATION: 95 % | HEIGHT: 62 IN | WEIGHT: 116.38 LBS | SYSTOLIC BLOOD PRESSURE: 112 MMHG | TEMPERATURE: 98 F | HEART RATE: 101 BPM | BODY MASS INDEX: 21.42 KG/M2 | DIASTOLIC BLOOD PRESSURE: 65 MMHG | RESPIRATION RATE: 18 BRPM

## 2017-02-28 LAB
ALBUMIN SERPL BCP-MCNC: 2.4 G/DL
ALP SERPL-CCNC: 156 U/L
ALT SERPL W/O P-5'-P-CCNC: 37 U/L
ANION GAP SERPL CALC-SCNC: 7 MMOL/L
AST SERPL-CCNC: 31 U/L
BILIRUB SERPL-MCNC: 0.3 MG/DL
BUN SERPL-MCNC: 6 MG/DL
CALCIUM SERPL-MCNC: 9.4 MG/DL
CHLORIDE SERPL-SCNC: 106 MMOL/L
CO2 SERPL-SCNC: 28 MMOL/L
CREAT SERPL-MCNC: 0.6 MG/DL
EST. GFR  (AFRICAN AMERICAN): >60 ML/MIN/1.73 M^2
EST. GFR  (NON AFRICAN AMERICAN): >60 ML/MIN/1.73 M^2
GLUCOSE SERPL-MCNC: 106 MG/DL
POTASSIUM SERPL-SCNC: 3.6 MMOL/L
PROT SERPL-MCNC: 6.1 G/DL
SODIUM SERPL-SCNC: 141 MMOL/L

## 2017-02-28 PROCEDURE — 63600175 PHARM REV CODE 636 W HCPCS: Performed by: NURSE PRACTITIONER

## 2017-02-28 PROCEDURE — 27000221 HC OXYGEN, UP TO 24 HOURS

## 2017-02-28 PROCEDURE — 25000003 PHARM REV CODE 250: Performed by: NURSE PRACTITIONER

## 2017-02-28 PROCEDURE — 94640 AIRWAY INHALATION TREATMENT: CPT

## 2017-02-28 PROCEDURE — 63600175 PHARM REV CODE 636 W HCPCS: Performed by: EMERGENCY MEDICINE

## 2017-02-28 PROCEDURE — C9113 INJ PANTOPRAZOLE SODIUM, VIA: HCPCS | Performed by: EMERGENCY MEDICINE

## 2017-02-28 PROCEDURE — 25000242 PHARM REV CODE 250 ALT 637 W/ HCPCS: Performed by: NURSE PRACTITIONER

## 2017-02-28 PROCEDURE — 36415 COLL VENOUS BLD VENIPUNCTURE: CPT

## 2017-02-28 PROCEDURE — 94761 N-INVAS EAR/PLS OXIMETRY MLT: CPT

## 2017-02-28 PROCEDURE — 80053 COMPREHEN METABOLIC PANEL: CPT

## 2017-02-28 PROCEDURE — 25000003 PHARM REV CODE 250: Performed by: EMERGENCY MEDICINE

## 2017-02-28 PROCEDURE — 99233 SBSQ HOSP IP/OBS HIGH 50: CPT | Mod: ,,, | Performed by: INTERNAL MEDICINE

## 2017-02-28 RX ORDER — HYDROMORPHONE HYDROCHLORIDE 2 MG/1
4 TABLET ORAL EVERY 4 HOURS PRN
Qty: 15 TABLET | Refills: 0 | Status: ON HOLD | OUTPATIENT
Start: 2017-02-28 | End: 2017-03-17

## 2017-02-28 RX ORDER — ONDANSETRON 4 MG/1
8 TABLET, ORALLY DISINTEGRATING ORAL EVERY 8 HOURS PRN
Qty: 60 TABLET | Refills: 1 | Status: SHIPPED | OUTPATIENT
Start: 2017-02-28

## 2017-02-28 RX ORDER — HYDROMORPHONE HYDROCHLORIDE 2 MG/1
4 TABLET ORAL EVERY 4 HOURS PRN
Qty: 15 TABLET | Refills: 0 | Status: SHIPPED | OUTPATIENT
Start: 2017-02-28 | End: 2017-02-28

## 2017-02-28 RX ORDER — POLYETHYLENE GLYCOL 3350 17 G/17G
17 POWDER, FOR SOLUTION ORAL DAILY PRN
Qty: 30 PACKET | Refills: 0 | Status: SHIPPED | OUTPATIENT
Start: 2017-02-28

## 2017-02-28 RX ADMIN — HYDROMORPHONE HYDROCHLORIDE 1 MG: 1 INJECTION, SOLUTION INTRAMUSCULAR; INTRAVENOUS; SUBCUTANEOUS at 02:02

## 2017-02-28 RX ADMIN — ONDANSETRON 8 MG: 8 TABLET, ORALLY DISINTEGRATING ORAL at 06:02

## 2017-02-28 RX ADMIN — HYDROMORPHONE HYDROCHLORIDE 1 MG: 1 INJECTION, SOLUTION INTRAMUSCULAR; INTRAVENOUS; SUBCUTANEOUS at 11:02

## 2017-02-28 RX ADMIN — DIPHENHYDRAMINE HYDROCHLORIDE 25 MG: 50 INJECTION, SOLUTION INTRAMUSCULAR; INTRAVENOUS at 12:02

## 2017-02-28 RX ADMIN — HYDROMORPHONE HYDROCHLORIDE 1 MG: 1 INJECTION, SOLUTION INTRAMUSCULAR; INTRAVENOUS; SUBCUTANEOUS at 06:02

## 2017-02-28 RX ADMIN — SODIUM CHLORIDE: 0.9 INJECTION, SOLUTION INTRAVENOUS at 08:02

## 2017-02-28 RX ADMIN — IPRATROPIUM BROMIDE AND ALBUTEROL SULFATE 3 ML: .5; 3 SOLUTION RESPIRATORY (INHALATION) at 12:02

## 2017-02-28 RX ADMIN — POLYETHYLENE GLYCOL 3350 17 G: 17 POWDER, FOR SOLUTION ORAL at 08:02

## 2017-02-28 RX ADMIN — HYDROMORPHONE HYDROCHLORIDE 2 MG: 2 TABLET ORAL at 02:02

## 2017-02-28 RX ADMIN — HYDROMORPHONE HYDROCHLORIDE 2 MG: 2 TABLET ORAL at 03:02

## 2017-02-28 RX ADMIN — PANTOPRAZOLE SODIUM 40 MG: 40 INJECTION, POWDER, FOR SOLUTION INTRAVENOUS at 08:02

## 2017-02-28 NOTE — SUBJECTIVE & OBJECTIVE
Interval History: Discussed with Dr. Jeong. Will deesculate IV pain meds.  Review of Systems   Constitutional: Positive for activity change, appetite change, fatigue and unexpected weight change. Negative for chills, diaphoresis and fever.   HENT: Negative for congestion, dental problem, ear pain, mouth sores, nosebleeds, postnasal drip, sinus pressure, sore throat, tinnitus, trouble swallowing and voice change.    Eyes: Negative for photophobia, pain, discharge, redness, itching and visual disturbance.   Respiratory: Negative for cough, chest tightness, shortness of breath, wheezing and stridor.    Cardiovascular: Negative for palpitations and leg swelling.   Gastrointestinal: Negative for abdominal distention, abdominal pain, anal bleeding, blood in stool, constipation, diarrhea, nausea and vomiting.   Endocrine: Negative for cold intolerance, heat intolerance, polydipsia, polyphagia and polyuria.   Genitourinary: Negative for decreased urine volume, difficulty urinating, dysuria, flank pain, frequency, hematuria and urgency. Penile swellin-7/10 in esophageal area.   Musculoskeletal: Positive for arthralgias. Negative for back pain, gait problem, joint swelling and myalgias.   Skin: Negative for pallor and rash.   Allergic/Immunologic: Negative for immunocompromised state.   Neurological: Negative for dizziness, syncope, weakness, light-headedness and headaches.   Hematological: Negative for adenopathy. Does not bruise/bleed easily.   Psychiatric/Behavioral: Negative for agitation and confusion. The patient is not nervous/anxious.      Objective:     Vital Signs (Most Recent):  Temp: 98.3 °F (36.8 °C) (17)  Pulse: 99 (17)  Resp: 18 (17)  BP: 113/73 (17)  SpO2: (!) 94 % (17) Vital Signs (24h Range):  Temp:  [97.8 °F (36.6 °C)-98.9 °F (37.2 °C)] 98.3 °F (36.8 °C)  Pulse:  [] 99  Resp:  [16-20] 18  SpO2:  [92 %-95 %] 94 %  BP: (103-113)/(68-80)  113/73     Weight: 51 kg (112 lb 6.4 oz)  Body mass index is 20.56 kg/(m^2).    Intake/Output Summary (Last 24 hours) at 02/27/17 1855  Last data filed at 02/27/17 1730   Gross per 24 hour   Intake             1610 ml   Output             2000 ml   Net             -390 ml      Physical Exam   Constitutional: He is oriented to person, place, and time. He appears well-developed. He appears cachectic. He has a sickly appearance. He appears ill. No distress.   HENT:   Head: Normocephalic and atraumatic.   Nose: Nose normal.   Mouth/Throat: Oropharynx is clear and moist. No oropharyngeal exudate.   Eyes: EOM are normal. Pupils are equal, round, and reactive to light. No scleral icterus.   Neck: Normal range of motion. Neck supple. No tracheal deviation present. No thyromegaly present.   Cardiovascular: Normal rate, regular rhythm, normal heart sounds and intact distal pulses.    No murmur heard.  Pulmonary/Chest: Effort normal. No stridor. No respiratory distress. He has no wheezes. He has rales. He exhibits no tenderness.   Abdominal: Soft. Bowel sounds are normal. He exhibits no distension and no mass. There is no tenderness. There is no rebound and no guarding.   Musculoskeletal: Normal range of motion. He exhibits no edema or tenderness.   Lymphadenopathy:     He has no cervical adenopathy.   Neurological: He is alert and oriented to person, place, and time. Coordination normal.   Skin: Skin is warm. No rash noted. He is not diaphoretic. No erythema.   Psychiatric: He has a normal mood and affect. His behavior is normal. Judgment and thought content normal.   Nursing note and vitals reviewed.      Significant Labs:   CBC: No results for input(s): WBC, HGB, HCT, PLT in the last 48 hours.  CMP:   Recent Labs  Lab 02/26/17  0624 02/27/17  0624    140   K 4.7 3.8    105   CO2 30* 27   * 110   BUN 8 6*   CREATININE 0.7 0.6   CALCIUM 9.5 9.2   PROT 6.3 6.1   ALBUMIN 2.7* 2.5*   BILITOT 0.4 0.3   ALKPHOS  105 146*   AST 15 49*   ALT 9* 43   ANIONGAP 7* 8   EGFRNONAA >60 >60       Significant Imaging: I have reviewed all pertinent imaging results/findings within the past 24 hours.   Imaging Results         X-Ray Chest 1 View (Final result) Result time:  02/26/17 11:16:42    Final result by Hayde Hooker MD (02/26/17 11:16:42)    Impression:     See above.            Electronically signed by: HAYDE HOOKER  Date:     02/26/17  Time:    11:16     Narrative:    Chest x-ray single view    Indication: Sudden onset chest pain, chest tightness.  Status post esophageal stent.  Esophageal mass.    Findings: Comparison study 12/26/2016.  Interval placement of an esophageal stent.  Right chest wall MediPort is again noted with the catheter tip was in the proximal SVC.  There is pulmonary fibrosis.  Small left pleural effusion blunts the costophrenic angle.  Bibasilar haziness.  Shallow breath.  Normal size heart.  No pneumothorax.            X-Ray Chest 1 View (Final result) Result time:  02/24/17 16:41:07    Final result by Almaz Donato MD (02/24/17 16:41:07)    Impression:      Fluoroscopic and endoscopic guided esophageal stent placement as described above.      Electronically signed by: ALMAZ DONATO MD  Date:     02/24/17  Time:    16:41     Narrative:    EXAM: Fluoroscopy for esophageal stent placement.    CLINICAL HISTORY:  Esophageal mass and stricture.    Comparison: CT chest and esophagram 02/22/2017.    FINDINGS:  This procedure was performed by Dr. Cheng.    Submitted images demonstrate an endoscope within the esophagus followed by deployment of an esophageal stent from the proximal to distal esophagus.  There is narrowing of the mid stent corresponding to the esophageal stricture with flared proximal and distal ends.  For further information please refer to Dr. Cheng's procedure note.    Fluoroscopy time: 223 seconds.    Fluoroscopic images: 5.            FL Less Than 1 Hour (Final result) Result time:   02/27/17 13:51:02    Final result by [employer], Rad Results In (02/27/17 13:51:02)    Impression:     Please see above.      Electronically signed by: VIRTUAL RADIOLOGIST  Date:     02/27/17  Time:    13:51     Narrative:    Fluoroscopy used for procedure.  Please see physician notes for details.            FL Esophagram Complete (Final result) Result time:  02/22/17 17:42:10    Final result by Raheel Calloway MD (02/22/17 17:42:10)    Impression:     Moderately severe smooth stricture of the distal thoracic esophagus which may be produced by the subcarinal lymphadenopathy. Can't exclude primary esophageal neoplasm.      Electronically signed by: RAHEEL CALLOWAY MD  Date:     02/22/17  Time:    17:42     Narrative:    History: Metastatic lung cancer, worsening dysphagia, evidence of esophageal obstruction on CT    Gastrografin esophagram was performed. The patient swallowed Gastrografin without difficulty. There is a persistent 2.4 cm long moderately severe stricture below the twila. The stricture has fairly smooth margins, without mucosal irregularity. While a primary neoplasm of the esophagus is not excluded, this may be consistent with extrinsic compression from the adjacent subcarinal lymphadenopathy. There is moderate dilatation of the esophagus above the stricture. No abnormality demonstrated the at the GE junction.    1.3 minutes of fluoroscopy was utilized for the procedure.            CT Chest With Contrast (Final result) Result time:  02/22/17 10:51:06    Final result by Raheel Calloway MD (02/22/17 10:51:06)    Impression:     Marked air distention of the proximal thoracic esophagus down to the region of unchanged subcarinal lymphadenopathy. The esophageal obstruction may be on the basis of lymphadenopathy but esophageal mass at this level is a definite consideration. Development of bilateral pleural effusions. Otherwise no significant change from 1/11/17 PET/CT.    All CT scans at this facility use  dose modulation, iterative reconstruction and/or weight based dosing when appropriate to reduce radiation dose to as low as reasonably achievable.       Electronically signed by: TOPHER YI MD  Date:     02/22/17  Time:    10:51     Narrative:    History: Dysphagia, onset gradually one month ago, worsening a week ago. History of metastatic lung cancer    Comparison 1/11/17 PET/CT, 12/20/16 CT chest    There is marked air distention of the proximal esophagus down to the subcarinal region where there is prominent subcarinal lymphadenopathy as before. The degree of air distention of the proximal esophagus is worse. There is no air in the esophagus at the subcarinal region and then air is demonstrated in the distal esophagus. It would be difficult to exclude an esophageal mass at this level, versus extrinsic mass effect on the esophagus by the adenopathy. There has been interval development of small to moderate sized bilateral pleural effusions. The mass in the right lower lobe is essentially unchanged. There are several left lower lobe masses which are essentially unchanged. A few scattered small pulmonary metastases bilaterally. There is emphysematous change and marked diffuse interstitial fibrosis. There is nodular thickening of the pericardium. Right supraclavicular lymphadenopathy again noted.

## 2017-02-28 NOTE — PROGRESS NOTES
[facility] -   Hematology/Oncology  Progress Note    Patient Name: Frederick J Lejeune  Admission Date: 2/22/2017  Hospital Length of Stay: 6 days  Code Status: Full Code     Subjective:     HPI:  HemOnc Diagnosis: Squamous cell carcinoma of lung     Stage: Stage IV (bilateral pulmonary nodules, R supraclavic lad, R mainstem bronchus obstruction, LLL mass)     Pathology:  - EGD 10/19/16: 1. Gastric biopsy: Normal gastric mucosa. No Helicobacter identified on routine stain. 2. Irregular Z line, biopsy: Squamoglandular mucosa with mild chronic inflammation and intestinal metaplasia (Palma's esophagus). Negative for dysplasia.     - Bronchoscopy with biopsy of R mainstem bronchus mass 10/21/16: Non-small cell carcinoma, see note.  Immunohistochemical staining with satisfactory control material shows the tumor to be positive for p63 and CK 5/6 and negative for TTF-1 and CK 7. This immonophenotype is consistent with squamous cell carcinoma. PD-L1 expression 70%.     Prior Treatment:   1. Palliative radiation to the R mainstem bronchus  2. Carbo (AUC 2) -Taxol 45mg/m2 weekly x 6-7 weeks discontinued early 1/2017 due to disease progression  3. Palliative radiation to R supraclavicular LAD given pain and discomfort with swallowing, 5 fractions will be completed 1/22/17     Current Treatment: Pembrolizumab started 1/2017      Interval History: Continues to have mid chest pain    Oncology Treatment Plan:   [facility] MK [id number] KEYNOTE 240    Medications:  Continuous Infusions:   sodium chloride 0.9% 100 mL/hr at 02/28/17 0849     Scheduled Meds:   enoxaparin  40 mg Subcutaneous Daily    HYDROmorphone  2 mg Oral Q4H    pantoprazole  40 mg Intravenous Daily    polyethylene glycol  17 g Oral Daily    Banana bag   Intravenous Daily     PRN Meds:albuterol-ipratropium 2.5mg-0.5mg/3mL, benzonatate, diphenhydrAMINE, hydromorphone (PF), ondansetron     Review of Systems   Constitutional: Positive for activity change,  appetite change, fatigue and unexpected weight change. Negative for chills, diaphoresis and fever.   HENT: Negative for congestion, dental problem, ear pain, mouth sores, nosebleeds, postnasal drip, sinus pressure, sore throat, tinnitus, trouble swallowing and voice change.    Eyes: Negative for photophobia, pain, discharge, redness, itching and visual disturbance.   Respiratory: Negative for cough, chest tightness, shortness of breath, wheezing and stridor.    Cardiovascular: Positive for chest pain. Negative for palpitations and leg swelling.   Gastrointestinal: Negative for abdominal distention, abdominal pain, anal bleeding, blood in stool, constipation, diarrhea, nausea and vomiting.   Endocrine: Negative for cold intolerance, heat intolerance, polydipsia, polyphagia and polyuria.   Genitourinary: Negative for decreased urine volume, difficulty urinating, dysuria, flank pain, frequency, hematuria and urgency.   Musculoskeletal: Positive for arthralgias. Negative for back pain, gait problem, joint swelling and myalgias.   Skin: Negative for pallor and rash.   Allergic/Immunologic: Negative for immunocompromised state.   Neurological: Negative for dizziness, syncope, weakness, light-headedness and headaches.   Hematological: Negative for adenopathy. Does not bruise/bleed easily.   Psychiatric/Behavioral: Negative for agitation and confusion. The patient is not nervous/anxious.      Objective:     Vital Signs (Most Recent):  Temp: 98.4 °F (36.9 °C) (02/28/17 1137)  Pulse: 101 (02/28/17 1137)  Resp: 18 (02/28/17 1137)  BP: 112/65 (02/28/17 1137)  SpO2: 95 % (02/28/17 1137) Vital Signs (24h Range):  Temp:  [98.2 °F (36.8 °C)-99.2 °F (37.3 °C)] 98.4 °F (36.9 °C)  Pulse:  [] 101  Resp:  [18-24] 18  SpO2:  [93 %-96 %] 95 %  BP: (112-136)/(65-77) 112/65     Weight: 52.8 kg (116 lb 6.5 oz)  Body mass index is 21.29 kg/(m^2).  Body surface area is 1.52 meters squared.      Intake/Output Summary (Last 24 hours) at  02/28/17 1606  Last data filed at 02/28/17 1417   Gross per 24 hour   Intake             7019 ml   Output             1100 ml   Net             5919 ml       Physical Exam   Constitutional: He is oriented to person, place, and time. He appears well-developed. He appears cachectic. He has a sickly appearance. He appears ill. No distress.   HENT:   Head: Normocephalic and atraumatic.   Nose: Nose normal.   Mouth/Throat: Oropharynx is clear and moist. No oropharyngeal exudate.   Eyes: EOM are normal. Pupils are equal, round, and reactive to light. No scleral icterus.   Neck: Normal range of motion. Neck supple. No tracheal deviation present. No thyromegaly present.   Cardiovascular: Normal rate, regular rhythm, normal heart sounds and intact distal pulses.    No murmur heard.  Pulmonary/Chest: Effort normal. No stridor. No respiratory distress. He has no wheezes. He has rales. He exhibits no tenderness.   Abdominal: Soft. Bowel sounds are normal. He exhibits no distension and no mass. There is no tenderness. There is no rebound and no guarding.   Musculoskeletal: Normal range of motion. He exhibits no edema or tenderness.   Lymphadenopathy:     He has no cervical adenopathy.   Neurological: He is alert and oriented to person, place, and time. Coordination normal.   Skin: Skin is warm. No rash noted. He is not diaphoretic. No erythema.   Psychiatric: He has a normal mood and affect. His behavior is normal. Judgment and thought content normal.   Nursing note and vitals reviewed.      Significant Labs:   I have reviewed all of the patient's relevant lab work available in the medical record and have utilized this in my evaluation and management recommendations today    Diagnostic Results:  I have reviewed all of the patient's diagnostic/imaging results available in the medical record and have utilized this in my evaluation and management recommendations today.    Assessment/Plan:     Lung cancer  1.  I had a detailed  discussion with the patient today with regard to his current clinical situation.  Further management with regard to his metastatic malignancy will be as per his primary oncologist Dr. Espinosa is an outpatient.  He is currently on Pembrolizumab started 1/2017 and it is likely too early to assess for response with this.  2.  The patient is status post esophageal stent placement for extrinsic compression due to metastatic lymphadenopathy.  He will likely need palliative radiation to try to help with this if feasible.  This will be initiated as an outpatient if feasible. I have discussed this with his primary oncologist Dr. Espinosa.  3.  We had a detailed discussion today with regard to management of his pain.  I think this is primarily a combination of stent placement and malignancy.  I would recommend discharging the patient home on Dilaudid 4 mg by mouth every 4 hours when necessary.  Sedation/constipation precautions were discussed in detail and the patient expressed understanding.  Hopefully initiation of palliative radiation therapy will help the patient with his ongoing pain from malignancy.      Thank you for your consult.      Frederick Jeong MD  Hematology/Oncology  [facility] [place]

## 2017-02-28 NOTE — ASSESSMENT & PLAN NOTE
1.  I had a detailed discussion with the patient today with regard to his current clinical situation.  Further management with regard to his metastatic malignancy will be as per his primary oncologist Dr. Espinosa is an outpatient.  He is currently on Pembrolizumab started 1/2017 and it is likely too early to assess for response with this.  2.  The patient is status post esophageal stent placement for extrinsic compression due to metastatic lymphadenopathy.  He will likely need palliative radiation to try to help with this if feasible.  This will be initiated as an outpatient if feasible. I have discussed this with his primary oncologist Dr. Espinosa.  3.  We had a detailed discussion today with regard to management of his pain.  I think this is primarily a combination of stent placement and malignancy.  I would recommend discharging the patient home on Dilaudid 4 mg by mouth every 4 hours when necessary.  Sedation/constipation precautions were discussed in detail and the patient expressed understanding.  Hopefully initiation of palliative radiation therapy will help the patient with his ongoing pain from malignancy.

## 2017-02-28 NOTE — DISCHARGE INSTRUCTIONS
Ondansetron tablets  What is this medicine?  ONDANSETRON (on DAN se july) is used to treat nausea and vomiting caused by chemotherapy. It is also used to prevent or treat nausea and vomiting after surgery.  How should I use this medicine?  Take this medicine by mouth with a glass of water. Follow the directions on your prescription label. Take your doses at regular intervals. Do not take your medicine more often than directed.  Talk to your pediatrician regarding the use of this medicine in children. Special care may be needed.  What side effects may I notice from receiving this medicine?  Side effects that you should report to your doctor or health care professional as soon as possible:  · allergic reactions like skin rash, itching or hives, swelling of the face, lips or tongue  · breathing problems  · confusion  · dizziness  · fast or irregular heartbeat  · feeling faint or lightheaded, falls  · fever and chills  · loss of balance or coordination  · seizures  · sweating  · swelling of the hands or feet  · tightness in the chest  · tremors  · unusually weak or tired  Side effects that usually do not require medical attention (report to your doctor or health care professional if they continue or are bothersome):  · constipation or diarrhea  · headache  What may interact with this medicine?  Do not take this medicine with any of the following medications:  · apomorphine  · certain medicines for fungal infections like fluconazole, itraconazole, ketoconazole, posaconazole, voriconazole  · cisapride  · dofetilide  · dronedarone  · pimozide  · thioridazine  · ziprasidone  This medicine may also interact with the following medications:  · carbamazepine  · certain medicines for depression, anxiety, or psychotic disturbances  · fentanyl  · linezolid  · MAOIs like Carbex, Eldepryl, Marplan, Nardil, and Parnate  · methylene blue (injected into a vein)  · other medicines that prolong the QT interval (cause an abnormal heart  rhythm)  · phenytoin  · rifampicin  · tramadol  What if I miss a dose?  If you miss a dose, take it as soon as you can. If it is almost time for your next dose, take only that dose. Do not take double or extra doses.  Where should I keep my medicine?  Keep out of the reach of children.  Store between 2 and 30 degrees C (36 and 86 degrees F). Throw away any unused medicine after the expiration date.  What should I tell my health care provider before I take this medicine?  They need to know if you have any of these conditions:  · heart disease  · history of irregular heartbeat  · liver disease  · low levels of magnesium or potassium in the blood  · an unusual or allergic reaction to ondansetron, granisetron, other medicines, foods, dyes, or preservatives  · pregnant or trying to get pregnant  · breast-feeding  What should I watch for while using this medicine?  Check with your doctor or health care professional right away if you have any sign of an allergic reaction.  Date Last Reviewed:   NOTE:This sheet is a summary. It may not cover all possible information. If you have questions about this medicine, talk to your doctor, pharmacist, or health care provider. Copyright© 2016 [employer]        Hydromorphone tablets  What is this medicine?  HYDROMORPHONE (larry droe MOR fone) is a pain reliever. It is used to treat moderate to severe pain.  How should I use this medicine?  Take this medicine by mouth with a glass of water. Follow the directions on the prescription label. You can take this medicine with or without food. If it upsets your stomach, take it with food. Take your medicine at regular intervals. Do not take it more often than directed. Do not stop taking except on your doctor's advice.  A special MedGuide will be given to you by the pharmacist with each prescription and refill. Be sure to read this information carefully each time.  Talk to your pediatrician regarding the use of this medicine in children.  Special care may be needed.  What side effects may I notice from receiving this medicine?  Side effects that you should report to your doctor or health care professional as soon as possible:  · allergic reactions like skin rash, itching or hives, swelling of the face, lips, or tongue  · breathing problems  · confusion  · seizures  · signs and symptoms of low blood pressure like dizziness; feeling faint or lightheaded, falls; unusually weak or tired  · trouble passing urine or change in the amount of urine  Side effects that usually do not require medical attention (report to your doctor or health care professional if they continue or are bothersome):  · constipation  · dry mouth  · nausea, vomiting  · tiredness  What may interact with this medicine?  This medicine may interact with the following medications:  · alcohol  · antihistamines for allergy, cough and cold  · certain medicines for anxiety or sleep  · certain medicines for depression like amitriptyline, fluoxetine, sertraline  · certain medicines for seizures like phenobarbital, primidone  · general anesthetics like halothane, isoflurane, methoxyflurane, propofol  · local anesthetics like lidocaine, pramoxine, tetracaine  · MAOIs like Carbex, Eldepryl, Marplan, Nardil, and Parnate  · medicines that relax muscles for surgery  · other narcotic medicines for pain or cough  · phenothiazines like chlorpromazine, mesoridazine, prochlorperazine, thioridazine  What if I miss a dose?  If you miss a dose, take it as soon as you can. If it is almost time for your next dose, take only that dose. Do not take double or extra doses.  Where should I keep my medicine?  Keep out of the reach of children. This medicine can be abused. Keep your medicine in a safe place to protect it from theft. Do not share this medicine with anyone. Selling or giving away this medicine is dangerous and against the law.  Store at room temperature between 15 and 30 degrees C (59 and 86 degrees  F). Keep container tightly closed. Protect from light.  This medicine may cause accidental overdose and death if it is taken by other adults, children, or pets. Flush any unused medicine down the toilet to reduce the chance of harm. Do not use the medicine after the expiration date.  What should I tell my health care provider before I take this medicine?  They need to know if you have any of these conditions:  · brain tumor  · drug abuse or addiction  · head injury  · heart disease  · if you often drink alcohol  · kidney disease  · liver disease  · lung or breathing disease, like asthma  · problems urinating  · seizures  · stomach or intestine problems  · an unusual or allergic reaction to hydromorphone, other medicines, foods, dyes, or preservatives  · pregnant or trying to get pregnant  · breast-feeding  What should I watch for while using this medicine?  Tell your doctor or health care professional if your pain does not go away, if it gets worse, or if you have new or a different type of pain. You may develop tolerance to the medicine. Tolerance means that you will need a higher dose of the medicine for pain relief. Tolerance is normal and is expected if you take this medicine for a long time.  Do not suddenly stop taking your medicine because you may develop a severe reaction. Your body becomes used to the medicine. This does NOT mean you are addicted. Addiction is a behavior related to getting and using a drug for a non-medical reason. If you have pain, you have a medical reason to take pain medicine. Your doctor will tell you how much medicine to take. If your doctor wants you to stop the medicine, the dose will be slowly lowered over time to avoid any side effects.  There are different types of narcotic medicines (opiates). If you take more than one type at the same time or if you are taking another medicine that also causes drowsiness, you may have more side effects. Give your health care provider a list of  all medicines you use. Your doctor will tell you how much medicine to take. Do not take more medicine than directed. Call emergency for help if you have problems breathing or unusual sleepiness.  You may get drowsy or dizzy. Do not drive, use machinery, or do anything that needs mental alertness until you know how this medicine affects you. Do not stand or sit up quickly, especially if you are an older patient. This reduces the risk of dizzy or fainting spells. Alcohol may interfere with the effect of this medicine. Avoid alcoholic drinks.  This medicine will cause constipation. Try to have a bowel movement at least every 2 to 3 days. If you do not have a bowel movement for 3 days, call your doctor or health care professional.  Your mouth may get dry. Chewing sugarless gum or sucking hard candy, and drinking plenty of water may help. Contact your doctor if the problem does not go away or is severe.  Date Last Reviewed:   NOTE:This sheet is a summary. It may not cover all possible information. If you have questions about this medicine, talk to your doctor, pharmacist, or health care provider. Copyright© 2016 [employer]        Esophageal Dilation     A balloon dilator may be used to widen a stricture in the esophagus.   An esophageal dilation is a procedure used to widen a narrowed section of your esophagus. This is the tube that leads from your throat to your stomach. Narrowing (stricture) of the esophagus can cause problems. These include trouble swallowing. This sheet explains what to expect with esophageal dilation.  Why esophageal dilation is needed  Several problems can be treated with esophageal dilation. They include:  · Peptic stricture. This is caused by reflux esophagitis. With this problem, the esophagus is irritated by acid reflux (heartburn). This occurs when acid from your stomach flows back up into the esophagus. Stomach acid damages the lining of the esophagus. This leads to a buildup of scar  tissue. As a result, the esophagus is narrowed.  · Schatzkis ring. This is an abnormal ring of tissue. It forms where the esophagus meets the stomach. It can cause trouble swallowing. It can also cause food to get stuck in the esophagus. The cause of this condition is not known.  · Achalasia. This condition stops food and liquids from moving into your stomach from the esophagus. It affects the lower esophageal sphincter (LES). The LES is a muscular ring that opens (relaxes) when you swallow. With achalasia, the LES does not relax. This condition can also cause problems with peristalsis. This is the normal muscular action of the esophagus that moves food into the stomach.  · Eosinophilic esophagitis. This is a redness and swelling (inflammation) in the esophagus. It is caused by an environmental trigger such as a food allergy. It can lead to pain, trouble swallowing, and strictures.  · Less common causes of stricture. Other causes of stricture include radiation treatment and cancer.  Before you have esophageal dilation  · Tell your provider about any medicines you take. This includes prescription medicines, over-the-counter medicines, herbs, vitamins, and other supplements. Be sure to mention aspirin or any blood thinners youre taking.  · Let your provider know if you need to take antibiotics before dental procedures. You may need to take them before esophageal dilation as well.  · Tell your provider about any health conditions you have, such as heart or lung disease. Also mention any allergies to medicines.  · Youll need to have an empty stomach for the procedure. Follow your providers instructions for not eating and drinking before the procedure.  · Arrange to have a family member or friend drive you home after the procedure.  During the procedure  · You may be given local anesthesia to numb your throat. Youll also likely be given medicine to relax you. The procedure takes about 15 minutes. It does not cause  trouble breathing.  · A tube called an endoscope (scope) is used. This is a narrow tube with a tiny light and camera at the end. The scope is inserted through your mouth and into your esophagus. It lets your provider see inside your esophagus. To help guide your provider, an imaging method called fluoroscopy may also be used. This creates a moving X-ray image on a computer screen.   · Next, special tiny tools are carefully guided through your mouth and down into the esophagus. They widen the stricture and are then removed. Different types of instruments are used. The type used depends on the size and cause of the stricture. Types include:  ¨ Balloon dilator. A tiny empty balloon is put into the stricture using an endoscope. The balloon is slowly filled with air. The air is removed from the balloon when the stricture is widened to the right size. Balloon dilators are used to treat many types of strictures.  ¨ Guided wire dilator. A thin wire is eased down the esophagus. A small tube thats wider on one end is guided down the wire. It is put into the stricture to stretch it. These dilators are used to treat all kinds of strictures.  ¨ Bougies. These are weighted, cone-shaped tubes. Starting with smaller cones, your provider uses increasingly larger cones until the stricture is stretched the right amount. Bougies are often used to treat strictures that are simple (short, straight, and not very narrow).  After the procedure  · Youll be watched closely until your provider says youre ready to go home. Youll need to have a friend or family member drive you home.  · You may have a sore throat for the rest of the day.  · You may have pain behind your breastbone for a short time afterwards.  · You can start drinking fluids again after the numbness in your throat goes away. You can resume eating the same day or the next day.  Risks and possible complications  Risks and possible complications for esophageal dilation  include:  · Infection  · A tear or hole in the esophagus lining, causing bleeding and possibly needing surgery to fix  · Risks of anesthesia  Follow-up  You may need to have the procedure repeated one or more times. This depends on the cause and extent of the narrowing. Repeat procedures can allow the dilation to take place more slowly. This reduces the risks of the procedure.  If your stricture was caused by reflux esophagitis, youll likely need to take medicine to treat that condition. Your provider will tell you more.  When to call your provider  Call your healthcare provider right away if you have any of the following after the procedure:  · Fever of 100.4°F (38.0°C)  · Chest pain  · Trouble swallowing  · Vomiting blood or material that looks like coffee grounds  · Bleeding  · Black, tarry, or bloody stools   Date Last Reviewed: 7/1/2016  © 3227-1776 [employer] [address] [place] [address]. All rights reserved. This information is not intended as a substitute for professional medical care. Always follow your healthcare professional's instructions.

## 2017-02-28 NOTE — PLAN OF CARE
Met with patient. Patient verbalized concerns about pain control and his meals. Referred concerns to Alexa charge nurse. Clarified that patient does not want to order his own food. Nursing to order food for him.      02/28/17 1124   Discharge Reassessment   Assessment Type Discharge Planning Reassessment   Can the patient answer the patient profile reliably? Yes, cognitively intact   How does the patient rate their overall health at the present time? Fair   Describe the patient's ability to walk at the present time. No restrictions   How often would a person be available to care for the patient? Whenever needed   Number of comorbid conditions (as recorded on the chart) Three   During the past month, has the patient often been bothered by feeling down, depressed or hopeless? No   During the past month, has the patient often been bothered by little interest or pleasure in doing things? Yes   Discharge plan remains the same: Yes   Discharge Plan A Home with family   Change in patient condition or support system No

## 2017-02-28 NOTE — NURSING
Patient d/c with script of Dilaudid 2mg tab. Patient and spouse verbalizes understanding of d/c instructions and med instructions.

## 2017-02-28 NOTE — SUBJECTIVE & OBJECTIVE
Interval History: Continues to have mid chest pain    Oncology Treatment Plan:   [facility] MK [id number] KEYNOTE [id number]    Medications:  Continuous Infusions:   sodium chloride 0.9% 100 mL/hr at 02/28/17 0849     Scheduled Meds:   enoxaparin  40 mg Subcutaneous Daily    HYDROmorphone  2 mg Oral Q4H    pantoprazole  40 mg Intravenous Daily    polyethylene glycol  17 g Oral Daily    Banana bag   Intravenous Daily     PRN Meds:albuterol-ipratropium 2.5mg-0.5mg/3mL, benzonatate, diphenhydrAMINE, hydromorphone (PF), ondansetron     Review of Systems   Constitutional: Positive for activity change, appetite change, fatigue and unexpected weight change. Negative for chills, diaphoresis and fever.   HENT: Negative for congestion, dental problem, ear pain, mouth sores, nosebleeds, postnasal drip, sinus pressure, sore throat, tinnitus, trouble swallowing and voice change.    Eyes: Negative for photophobia, pain, discharge, redness, itching and visual disturbance.   Respiratory: Negative for cough, chest tightness, shortness of breath, wheezing and stridor.    Cardiovascular: Positive for chest pain. Negative for palpitations and leg swelling.   Gastrointestinal: Negative for abdominal distention, abdominal pain, anal bleeding, blood in stool, constipation, diarrhea, nausea and vomiting.   Endocrine: Negative for cold intolerance, heat intolerance, polydipsia, polyphagia and polyuria.   Genitourinary: Negative for decreased urine volume, difficulty urinating, dysuria, flank pain, frequency, hematuria and urgency.   Musculoskeletal: Positive for arthralgias. Negative for back pain, gait problem, joint swelling and myalgias.   Skin: Negative for pallor and rash.   Allergic/Immunologic: Negative for immunocompromised state.   Neurological: Negative for dizziness, syncope, weakness, light-headedness and headaches.   Hematological: Negative for adenopathy. Does not bruise/bleed easily.   Psychiatric/Behavioral: Negative for agitation and  confusion. The patient is not nervous/anxious.      Objective:     Vital Signs (Most Recent):  Temp: 98.4 °F (36.9 °C) (02/28/17 1137)  Pulse: 101 (02/28/17 1137)  Resp: 18 (02/28/17 1137)  BP: 112/65 (02/28/17 1137)  SpO2: 95 % (02/28/17 1137) Vital Signs (24h Range):  Temp:  [98.2 °F (36.8 °C)-99.2 °F (37.3 °C)] 98.4 °F (36.9 °C)  Pulse:  [] 101  Resp:  [18-24] 18  SpO2:  [93 %-96 %] 95 %  BP: (112-136)/(65-77) 112/65     Weight: 52.8 kg (116 lb 6.5 oz)  Body mass index is 21.29 kg/(m^2).  Body surface area is 1.52 meters squared.      Intake/Output Summary (Last 24 hours) at 02/28/17 1606  Last data filed at 02/28/17 1417   Gross per 24 hour   Intake             7019 ml   Output             1100 ml   Net             5919 ml       Physical Exam   Constitutional: He is oriented to person, place, and time. He appears well-developed. He appears cachectic. He has a sickly appearance. He appears ill. No distress.   HENT:   Head: Normocephalic and atraumatic.   Nose: Nose normal.   Mouth/Throat: Oropharynx is clear and moist. No oropharyngeal exudate.   Eyes: EOM are normal. Pupils are equal, round, and reactive to light. No scleral icterus.   Neck: Normal range of motion. Neck supple. No tracheal deviation present. No thyromegaly present.   Cardiovascular: Normal rate, regular rhythm, normal heart sounds and intact distal pulses.    No murmur heard.  Pulmonary/Chest: Effort normal. No stridor. No respiratory distress. He has no wheezes. He has rales. He exhibits no tenderness.   Abdominal: Soft. Bowel sounds are normal. He exhibits no distension and no mass. There is no tenderness. There is no rebound and no guarding.   Musculoskeletal: Normal range of motion. He exhibits no edema or tenderness.   Lymphadenopathy:     He has no cervical adenopathy.   Neurological: He is alert and oriented to person, place, and time. Coordination normal.   Skin: Skin is warm. No rash noted. He is not diaphoretic. No erythema.    Psychiatric: He has a normal mood and affect. His behavior is normal. Judgment and thought content normal.   Nursing note and vitals reviewed.      Significant Labs:   I have reviewed all of the patient's relevant lab work available in the medical record and have utilized this in my evaluation and management recommendations today    Diagnostic Results:  I have reviewed all of the patient's diagnostic/imaging results available in the medical record and have utilized this in my evaluation and management recommendations today.

## 2017-02-28 NOTE — PROGRESS NOTES
[facility] Hospital Medicine  Progress Note    Patient Name: Frederick J Lejeune  MRN: [id number]  Patient Class: IP- Inpatient   Admission Date: 2/22/2017  Length of Stay: 5 days  Attending Physician: Derek Bourgeois MD  Primary Care Provider: Kenrick Muñoz MD        Subjective:     Principal Problem:Esophageal obstruction    HPI:  Frederick J Lejeune is a 66 y.o. male with PMHx of GERD, weight loss, dysphagia, odynophagia, RLL lung mass squamous cell carcinoma, who presented to ER from Dr. Espinosa's office with c/o dysphagia, weakness, dizziness, chest pain. He has had difficulty eating due to odynophagia for the past month. Patient has completed palliative radiation to relieve right mainstem bronchus obstruction. He then was treated with weekly Carbo-Taxol x 6-7 weeks, which was discontinued due to disease progression on imaging in early 1/2017. He is currently receiving Pembrolizumab every 3 weeks. Today, he presented to Dr. Espinosa's office on an urgent visit regarding his trouble swallowing and inability to eat. He is also dizzy, weak, SOB and has pain in his chest. No cough, fevers, chills. His left groin mass is stable per patient, but he still has some mild pain in the right supraclavicular region due to lymphadenopathy. He was recently evaluated by Dr. Cheng who recommended further evaluation with esophagram, but pt was too sick to attend appointment. GI has been consulted for esophagram for pallative treatment.    Hospital Course:  Esophagram completed. Esophageal stent placed 2/24/2017. Patient had pain following stent requiring IV dilaudid. Patient seen and examined. Tolerating clear liquids. Will start full liquids with Boost. Calorie count and nutritional counseling. Had chest pain across chest. troponins negative.  ECG showed new inverted T Waves. Discussed with Dr. Cannon. Tolerating full liquids, but still requiring IV Dilaudid 2 mg q 4 hours. Discussed with Dr. Jeong. Planning  on sending patient home tomorrow. Will deesculate IV meds - patient agreed.     Interval History: Discussed with Dr. Jeong. Will deesculate IV pain meds.  Review of Systems   Constitutional: Positive for activity change, appetite change, fatigue and unexpected weight change. Negative for chills, diaphoresis and fever.   HENT: Negative for congestion, dental problem, ear pain, mouth sores, nosebleeds, postnasal drip, sinus pressure, sore throat, tinnitus, trouble swallowing and voice change.    Eyes: Negative for photophobia, pain, discharge, redness, itching and visual disturbance.   Respiratory: Negative for cough, chest tightness, shortness of breath, wheezing and stridor.    Cardiovascular: Negative for palpitations and leg swelling.   Gastrointestinal: Negative for abdominal distention, abdominal pain, anal bleeding, blood in stool, constipation, diarrhea, nausea and vomiting.   Endocrine: Negative for cold intolerance, heat intolerance, polydipsia, polyphagia and polyuria.   Genitourinary: Negative for decreased urine volume, difficulty urinating, dysuria, flank pain, frequency, hematuria and urgency. Penile swellin-7/10 in esophageal area.   Musculoskeletal: Positive for arthralgias. Negative for back pain, gait problem, joint swelling and myalgias.   Skin: Negative for pallor and rash.   Allergic/Immunologic: Negative for immunocompromised state.   Neurological: Negative for dizziness, syncope, weakness, light-headedness and headaches.   Hematological: Negative for adenopathy. Does not bruise/bleed easily.   Psychiatric/Behavioral: Negative for agitation and confusion. The patient is not nervous/anxious.      Objective:     Vital Signs (Most Recent):  Temp: 98.3 °F (36.8 °C) (17)  Pulse: 99 (17)  Resp: 18 (17)  BP: 113/73 (17)  SpO2: (!) 94 % (17) Vital Signs (24h Range):  Temp:  [97.8 °F (36.6 °C)-98.9 °F (37.2 °C)] 98.3 °F (36.8 °C)  Pulse:   [] 99  Resp:  [16-20] 18  SpO2:  [92 %-95 %] 94 %  BP: (103-113)/(68-80) 113/73     Weight: 51 kg (112 lb 6.4 oz)  Body mass index is 20.56 kg/(m^2).    Intake/Output Summary (Last 24 hours) at 02/27/17 1855  Last data filed at 02/27/17 1730   Gross per 24 hour   Intake             1610 ml   Output             2000 ml   Net             -390 ml      Physical Exam   Constitutional: He is oriented to person, place, and time. He appears well-developed. He appears cachectic. He has a sickly appearance. He appears ill. No distress.   HENT:   Head: Normocephalic and atraumatic.   Nose: Nose normal.   Mouth/Throat: Oropharynx is clear and moist. No oropharyngeal exudate.   Eyes: EOM are normal. Pupils are equal, round, and reactive to light. No scleral icterus.   Neck: Normal range of motion. Neck supple. No tracheal deviation present. No thyromegaly present.   Cardiovascular: Normal rate, regular rhythm, normal heart sounds and intact distal pulses.    No murmur heard.  Pulmonary/Chest: Effort normal. No stridor. No respiratory distress. He has no wheezes. He has rales. He exhibits no tenderness.   Abdominal: Soft. Bowel sounds are normal. He exhibits no distension and no mass. There is no tenderness. There is no rebound and no guarding.   Musculoskeletal: Normal range of motion. He exhibits no edema or tenderness.   Lymphadenopathy:     He has no cervical adenopathy.   Neurological: He is alert and oriented to person, place, and time. Coordination normal.   Skin: Skin is warm. No rash noted. He is not diaphoretic. No erythema.   Psychiatric: He has a normal mood and affect. His behavior is normal. Judgment and thought content normal.   Nursing note and vitals reviewed.      Significant Labs:   CBC: No results for input(s): WBC, HGB, HCT, PLT in the last 48 hours.  CMP:   Recent Labs  Lab 02/26/17  0624 02/27/17  0624    140   K 4.7 3.8    105   CO2 30* 27   * 110   BUN 8 6*   CREATININE 0.7 0.6    CALCIUM 9.5 9.2   PROT 6.3 6.1   ALBUMIN 2.7* 2.5*   BILITOT 0.4 0.3   ALKPHOS 105 146*   AST 15 49*   ALT 9* 43   ANIONGAP 7* 8   EGFRNONAA >60 >60       Significant Imaging: I have reviewed all pertinent imaging results/findings within the past 24 hours.   Imaging Results         X-Ray Chest 1 View (Final result) Result time:  02/26/17 11:16:42    Final result by Hayde Hooker MD (02/26/17 11:16:42)    Impression:     See above.            Electronically signed by: HAYDE HOOKER  Date:     02/26/17  Time:    11:16     Narrative:    Chest x-ray single view    Indication: Sudden onset chest pain, chest tightness.  Status post esophageal stent.  Esophageal mass.    Findings: Comparison study 12/26/2016.  Interval placement of an esophageal stent.  Right chest wall MediPort is again noted with the catheter tip was in the proximal SVC.  There is pulmonary fibrosis.  Small left pleural effusion blunts the costophrenic angle.  Bibasilar haziness.  Shallow breath.  Normal size heart.  No pneumothorax.            X-Ray Chest 1 View (Final result) Result time:  02/24/17 16:41:07    Final result by Almaz Donato MD (02/24/17 16:41:07)    Impression:      Fluoroscopic and endoscopic guided esophageal stent placement as described above.      Electronically signed by: ALMAZ DONATO MD  Date:     02/24/17  Time:    16:41     Narrative:    EXAM: Fluoroscopy for esophageal stent placement.    CLINICAL HISTORY:  Esophageal mass and stricture.    Comparison: CT chest and esophagram 02/22/2017.    FINDINGS:  This procedure was performed by Dr. Cheng.    Submitted images demonstrate an endoscope within the esophagus followed by deployment of an esophageal stent from the proximal to distal esophagus.  There is narrowing of the mid stent corresponding to the esophageal stricture with flared proximal and distal ends.  For further information please refer to Dr. Cheng's procedure note.    Fluoroscopy time: 223  seconds.    Fluoroscopic images: 5.            FL Less Than 1 Hour (Final result) Result time:  02/27/17 13:51:02    Final result by Kip Braden Results In (02/27/17 13:51:02)    Impression:     Please see above.      Electronically signed by: GRAYSON RADIOLOGIST  Date:     02/27/17  Time:    13:51     Narrative:    Fluoroscopy used for procedure.  Please see physician notes for details.            FL Esophagram Complete (Final result) Result time:  02/22/17 17:42:10    Final result by Topher Calloway MD (02/22/17 17:42:10)    Impression:     Moderately severe smooth stricture of the distal thoracic esophagus which may be produced by the subcarinal lymphadenopathy. Can't exclude primary esophageal neoplasm.      Electronically signed by: TOPHER CALLOWAY MD  Date:     02/22/17  Time:    17:42     Narrative:    History: Metastatic lung cancer, worsening dysphagia, evidence of esophageal obstruction on CT    Gastrografin esophagram was performed. The patient swallowed Gastrografin without difficulty. There is a persistent 2.4 cm long moderately severe stricture below the twila. The stricture has fairly smooth margins, without mucosal irregularity. While a primary neoplasm of the esophagus is not excluded, this may be consistent with extrinsic compression from the adjacent subcarinal lymphadenopathy. There is moderate dilatation of the esophagus above the stricture. No abnormality demonstrated the at the GE junction.    1.3 minutes of fluoroscopy was utilized for the procedure.            CT Chest With Contrast (Final result) Result time:  02/22/17 10:51:06    Final result by Topher Calloway MD (02/22/17 10:51:06)    Impression:     Marked air distention of the proximal thoracic esophagus down to the region of unchanged subcarinal lymphadenopathy. The esophageal obstruction may be on the basis of lymphadenopathy but esophageal mass at this level is a definite consideration. Development of bilateral pleural  effusions. Otherwise no significant change from 1/11/17 PET/CT.    All CT scans at this facility use dose modulation, iterative reconstruction and/or weight based dosing when appropriate to reduce radiation dose to as low as reasonably achievable.       Electronically signed by: TOPHER YI MD  Date:     02/22/17  Time:    10:51     Narrative:    History: Dysphagia, onset gradually one month ago, worsening a week ago. History of metastatic lung cancer    Comparison 1/11/17 PET/CT, 12/20/16 CT chest    There is marked air distention of the proximal esophagus down to the subcarinal region where there is prominent subcarinal lymphadenopathy as before. The degree of air distention of the proximal esophagus is worse. There is no air in the esophagus at the subcarinal region and then air is demonstrated in the distal esophagus. It would be difficult to exclude an esophageal mass at this level, versus extrinsic mass effect on the esophagus by the adenopathy. There has been interval development of small to moderate sized bilateral pleural effusions. The mass in the right lower lobe is essentially unchanged. There are several left lower lobe masses which are essentially unchanged. A few scattered small pulmonary metastases bilaterally. There is emphysematous change and marked diffuse interstitial fibrosis. There is nodular thickening of the pericardium. Right supraclavicular lymphadenopathy again noted.            Assessment/Plan:      * Esophageal obstruction  -GI consult: Dr. Cheng performed esophagram  - Stent placed 2/24/17        Oropharyngeal dysphagia  -esophagram and stent by   -nutritional consult  -tolerating clear liquids, will advance to Full  -calorie count      Lung cancer  -outpatient followup      Bone metastases  Outpatient followup      COPD, very severe  nebulizers PRN      Chest pain  Check serial troponins  If elevated consult Cardiology      Left groin pain  -4-5 cm lymph node - known to  Dr. Espinosa      VTE Risk Mitigation         Ordered     enoxaparin injection 40 mg  Daily     Route:  Subcutaneous        02/22/17 1549     Medium Risk of VTE  Once      02/22/17 1549     Place SUKHI hose  Until discontinued      02/22/17 1549     Place sequential compression device  Until discontinued      02/22/17 1549     Reason for No Pharmacological VTE Prophylaxis  Once      02/22/17 1549          Kathleen Campos NP  Department of Hospital Medicine   [facility]

## 2017-02-28 NOTE — PLAN OF CARE
Problem: Patient Care Overview  Goal: Plan of Care Review  Outcome: Ongoing (interventions implemented as appropriate)  Pt remained free of injury during shift, stable condition, receiving IV fluids, nausea controlled with PRN medication, no acute distress, and will continue to monitor. Pt reported that pain was not well controlled with scheduled pain medication and PRN medication. 24hr chart review performed.

## 2017-02-28 NOTE — PLAN OF CARE
Problem: Patient Care Overview  Goal: Plan of Care Review  Outcome: Outcome(s) achieved Date Met:  02/28/17  Patient remains injury free, stable condition, pain managed appropriately, nausea controlled with PRN medication,no s/s of acute distress. Patient d/c to home with spouse.

## 2017-02-28 NOTE — PROGRESS NOTES
Notified Dr Bourgeois of US results. No DVT, thrombus noted to cephalic vein.  Per Dr Bourgeois no new orders, okay to discharge pt.

## 2017-02-28 NOTE — DISCHARGE SUMMARY
[facility] Hospital Medicine  Discharge Summary      Patient Name: Frederick J Lejeune  MRN: [id number]  Admission Date: 2/22/2017  Hospital Length of Stay: 6 days  Discharge Date and Time:  02/28/2017 2:13 PM  Attending Physician: Derek Bourgeois MD   Discharging Provider: Kathleen Campos NP  Primary Care Provider: Kenrick Muñoz MD      HPI:   Frederick J Lejeune is a 66 y.o. male with PMHx of GERD, weight loss, dysphagia, odynophagia, RLL lung mass squamous cell carcinoma, who presented to ER from Dr. Espinosa's office with c/o dysphagia, weakness, dizziness, chest pain. He has had difficulty eating due to odynophagia for the past month. Patient has completed palliative radiation to relieve right mainstem bronchus obstruction. He then was treated with weekly Carbo-Taxol x 6-7 weeks, which was discontinued due to disease progression on imaging in early 1/2017. He is currently receiving Pembrolizumab every 3 weeks. Today, he presented to Dr. Espinosa's office on an urgent visit regarding his trouble swallowing and inability to eat. He is also dizzy, weak, SOB and has pain in his chest. No cough, fevers, chills. His left groin mass is stable per patient, but he still has some mild pain in the right supraclavicular region due to lymphadenopathy. He was recently evaluated by Dr. Cheng who recommended further evaluation with esophagram, but pt was too sick to attend appointment. GI has been consulted for esophagram for pallative treatment.    Hospital Course:   Esophagram completed. Esophageal stent placed 2/24/2017. Patient had pain following stent requiring IV dilaudid 2mg every 4 hours for 2 days. Tolerating full liquids with Boost. Calorie count and nutritional counseling. Had chest pain across chest. troponins negative.  ECG showed new inverted T Waves. Discussed with Dr. Cannon. Discussed with Dr. Jeong. GI recommended Chest XraY: No acute issues. Dr. Chneg stated if patient continues to have  severe Chest pain (at discharge 7/10 with IV diulaudid) then he could remove the stent and place PEG tube. Patient wishes at this time to avoid PEG. His esophageal pain before stent was 4/10. Dr. Jeong recommended po Dilaudid q 4 hours with po zofran 8 mg q 8 hours PRN nausea. He should see Dr. Espinosa in 2 days. Patient also c/o right arm swelling from infiltrated IV, u/s ordered to r/o DVT. Patient seen and examined and deemed stable for d/c.    Procedure(s) (LRB):  ESOPHAGOGASTRODUODENOSCOPY (EGD) (N/A) with STENT placement     Indwelling Lines/Drains at time of discharge:   Lines/Drains/Airways     Central Venous Catheter Line                 Port A Cath Single Lumen right subclavian -- days                Consults:   Consults         Status Ordering Provider     Inpatient consult to Dietary  Once     Provider:  (Not yet assigned)    Completed CHRISTOPHER SAWANT     Inpatient consult to Dietary  Once     Provider:  (Not yet assigned)    Completed CHRISTOPHER SAWANT     Inpatient consult to Gastroenterology  Once     Provider:  Ashok Cheng III, MD    Completed TAMERA BAUM     Inpatient consult to Gastroenterology  Once     Provider:  Ashok Cheng III, MD    Completed DM BRAGA     Inpatient consult to Hospitalist  Once     Provider:  (Not yet assigned)    Acknowledged TAMERA BAUM     Inpatient consult to Oncology  Once     Provider:  Adam Barraza MD    Acknowledged TAMERA BAUM          Significant Diagnostic Studies: Labs:   CMP   Recent Labs  Lab 02/27/17  0624 02/28/17  0606    141   K 3.8 3.6    106   CO2 27 28    106   BUN 6* 6*   CREATININE 0.6 0.6   CALCIUM 9.2 9.4   PROT 6.1 6.1   ALBUMIN 2.5* 2.4*   BILITOT 0.3 0.3   ALKPHOS 146* 156*   AST 49* 31   ALT 43 37   ANIONGAP 8 7*   ESTGFRAFRICA >60 >60   EGFRNONAA >60 >60    and CBC No results for input(s): WBC, HGB, HCT, PLT in the last 48 hours.    Pending Diagnostic Studies:     Procedure  Component Value Units Date/Time    Comprehensive Metabolic Panel (CMP) [[id number]] Collected:  02/27/17 0500    Order Status:  Sent Lab Status:  No result     Specimen:  Blood from Blood     Troponin I [[id number]] Collected:  02/27/17 0500    Order Status:  Sent Lab Status:  No result     Specimen:  Blood from Blood     US Upper Extremity Veins Right [[id number]]     Order Status:  Sent Lab Status:  No result         Final Active Diagnoses:    Diagnosis Date Noted POA    PRINCIPAL PROBLEM:  Esophageal obstruction [K22.2] 02/22/2017 Yes    Oropharyngeal dysphagia [R13.12] 02/22/2017 Yes    Lung cancer [C34.90] 11/17/2016 Yes    Bone metastases [C79.51] 10/28/2016 Yes    Left groin pain [R10.30]  Yes    Chest pain [R07.9] 11/05/2016 Yes    COPD, very severe [J44.9] 04/08/2016 Yes     Chronic      Problems Resolved During this Admission:    Diagnosis Date Noted Date Resolved POA      No new Assessment & Plan notes have been filed under this hospital service since the last note was generated.  Service: Hospital Medicine      Discharged Condition: stable    Disposition: Home or Self Care    Follow Up:  Follow-up Information     Follow up with Kenrick Muñoz MD. Schedule an appointment as soon as possible for a visit in 3 days.    Specialty:  Family Medicine    Contact information:    [address] [place] [address] [phone]          Follow up with Zoila Espinosa MD. Schedule an appointment as soon as possible for a visit in 1 day.    Specialty:  Hematology and Oncology    Contact information:    [address] [place] [address] [address] Robin [place] [address] [phone]          Patient Instructions:     Diet general   Scheduling Instructions: Full liquid with boost and water     Activity as tolerated     Call MD for:  increased confusion or weakness     Call MD for:  persistent dizziness, light-headedness, or visual disturbances     Call MD for:  worsening rash     Call MD for:  severe persistent headache      Call MD for:  difficulty breathing or increased cough     Call MD for:  redness, tenderness, or signs of infection (pain, swelling, redness, odor or green/yellow discharge around incision site)     Call MD for:  severe uncontrolled pain     Call MD for:  persistent nausea and vomiting or diarrhea     Call MD for:  temperature >100.4     No dressing needed   Scheduling Instructions: Warm compresses to Right arm 20 minutes 4 x day       Medications:  Reconciled Home Medications:   Current Discharge Medication List      START taking these medications    Details   HYDROmorphone (DILAUDID) 2 MG tablet Take 2 tablets (4 mg total) by mouth every 4 (four) hours as needed for Pain.  Qty: 15 tablet, Refills: 0      polyethylene glycol (GLYCOLAX) 17 gram PwPk Take 17 g by mouth daily as needed (constipation).  Qty: 30 packet, Refills: 0         CONTINUE these medications which have CHANGED    Details   ondansetron (ZOFRAN-ODT) 4 MG TbDL Take 2 tablets (8 mg total) by mouth every 8 (eight) hours as needed.  Qty: 60 tablet, Refills: 1         CONTINUE these medications which have NOT CHANGED    Details   albuterol-ipratropium 2.5mg-0.5mg/3mL (DUO-NEB) 0.5 mg-3 mg(2.5 mg base)/3 mL nebulizer solution Take 3 mLs by nebulization every 6 (six) hours as needed for Wheezing or Shortness of Breath.  Qty: 30 vial, Refills: 0      alprazolam (XANAX) 0.25 MG tablet Take 2 tablets (0.5 mg total) by mouth 2 (two) times daily as needed for Insomnia or Anxiety.  Qty: 60 tablet, Refills: 2    Associated Diagnoses: Insomnia, unspecified type      atorvastatin (LIPITOR) 40 MG tablet Take 40 mg by mouth every evening.      benzonatate (TESSALON) 100 MG capsule Take 1 capsule (100 mg total) by mouth 3 (three) times daily as needed for Cough.  Qty: 90 capsule, Refills: 5    Associated Diagnoses: Malignant neoplasm of lower lobe of right lung      DICYCLOMINE HCL (GI COCKTAIL SIMPLE RECORD) Take 15 mLs by mouth daily as needed.  Qty: 1 Bottle,  Refills: 3      docusate sodium (COLACE) 100 MG capsule Take 1 capsule (100 mg total) by mouth 2 (two) times daily.  Refills: 0      esomeprazole (NEXIUM) 40 MG capsule Take 1 capsule (40 mg total) by mouth 2 (two) times daily.  Qty: 60 capsule, Refills: 0      meclizine (ANTIVERT) 25 mg tablet Take 1 tablet (25 mg total) by mouth 3 (three) times daily as needed for Dizziness.  Qty: 15 tablet, Refills: 0      OXYGEN-AIR DELIVERY SYSTEMS MISC by Misc.(Non-Drug; Combo Route) route.      prochlorperazine (COMPAZINE) 10 MG tablet Take 1 tablet (10 mg total) by mouth every 6 (six) hours as needed.  Qty: 30 tablet, Refills: 1    Associated Diagnoses: Insomnia, unspecified type      fish oil-omega-3 fatty acids 300-1,000 mg capsule Take 2 g by mouth once daily.         STOP taking these medications       hydrocodone-acetaminophen 5-325mg (NORCO) 5-325 mg per tablet Comments:   Reason for Stopping:         oxycodone (ROXICODONE) 5 MG immediate release tablet Comments:   Reason for Stopping:         metoprolol tartrate (LOPRESSOR) 25 MG tablet Comments:   Reason for Stopping:         oxycodone-acetaminophen (PERCOCET)  mg per tablet Comments:   Reason for Stopping:             Time spent on the discharge of patient: 45 minutes    Kathleen Campos NP  Department of Hospital Medicine  [facility]

## 2017-03-01 ENCOUNTER — TELEPHONE (OUTPATIENT)
Dept: HEMATOLOGY/ONCOLOGY | Facility: CLINIC | Age: 67
End: 2017-03-01

## 2017-03-03 ENCOUNTER — HOSPITAL ENCOUNTER (INPATIENT)
Facility: HOSPITAL | Age: 67
LOS: 13 days | Discharge: HOSPICE | DRG: 177 | End: 2017-03-17
Attending: EMERGENCY MEDICINE | Admitting: INTERNAL MEDICINE
Payer: MEDICARE

## 2017-03-03 DIAGNOSIS — K22.70 BARRETT'S ESOPHAGUS WITHOUT DYSPLASIA: Chronic | ICD-10-CM

## 2017-03-03 DIAGNOSIS — R10.32 LEFT GROIN PAIN: ICD-10-CM

## 2017-03-03 DIAGNOSIS — C34.01 LUNG CANCER, MAIN BRONCHUS, RIGHT: ICD-10-CM

## 2017-03-03 DIAGNOSIS — I24.9 ACS (ACUTE CORONARY SYNDROME): ICD-10-CM

## 2017-03-03 DIAGNOSIS — J96.11 CHRONIC RESPIRATORY FAILURE WITH HYPOXIA: Chronic | ICD-10-CM

## 2017-03-03 DIAGNOSIS — G47.00 INSOMNIA, UNSPECIFIED TYPE: ICD-10-CM

## 2017-03-03 DIAGNOSIS — R13.12 OROPHARYNGEAL DYSPHAGIA: Primary | ICD-10-CM

## 2017-03-03 DIAGNOSIS — K22.2 ESOPHAGEAL OBSTRUCTION: ICD-10-CM

## 2017-03-03 DIAGNOSIS — E86.0 DEHYDRATION, MILD: ICD-10-CM

## 2017-03-03 DIAGNOSIS — C34.91 PRIMARY MALIGNANT NEOPLASM OF RIGHT LUNG METASTATIC TO OTHER SITE: Chronic | ICD-10-CM

## 2017-03-03 DIAGNOSIS — J90 PLEURAL EFFUSION, BILATERAL: ICD-10-CM

## 2017-03-03 DIAGNOSIS — R13.19 ESOPHAGEAL DYSPHAGIA: Chronic | ICD-10-CM

## 2017-03-03 DIAGNOSIS — R07.89 OTHER CHEST PAIN: ICD-10-CM

## 2017-03-03 DIAGNOSIS — J44.9 COPD, VERY SEVERE: Chronic | ICD-10-CM

## 2017-03-03 DIAGNOSIS — G89.3 CANCER RELATED PAIN: ICD-10-CM

## 2017-03-03 DIAGNOSIS — E43 SEVERE PROTEIN-CALORIE MALNUTRITION: ICD-10-CM

## 2017-03-03 DIAGNOSIS — J86.0 BRONCHO-ESOPHAGEAL FISTULA: ICD-10-CM

## 2017-03-03 DIAGNOSIS — R64 CACHEXIA: ICD-10-CM

## 2017-03-03 DIAGNOSIS — J84.10 DIFFUSE INTERSTITIAL PULMONARY FIBROSIS: ICD-10-CM

## 2017-03-03 DIAGNOSIS — R45.89 ANXIETY ABOUT HEALTH: ICD-10-CM

## 2017-03-03 DIAGNOSIS — E78.5 DYSLIPIDEMIA: Chronic | ICD-10-CM

## 2017-03-03 DIAGNOSIS — J96.22 ACUTE ON CHRONIC RESPIRATORY FAILURE WITH HYPOXIA AND HYPERCAPNIA: ICD-10-CM

## 2017-03-03 DIAGNOSIS — J18.9 PNEUMONIA DUE TO INFECTIOUS ORGANISM, UNSPECIFIED LATERALITY, UNSPECIFIED PART OF LUNG: ICD-10-CM

## 2017-03-03 DIAGNOSIS — Q39.8 SEVERE ESOPHAGEAL DYSPLASIA: ICD-10-CM

## 2017-03-03 DIAGNOSIS — G89.3 CHRONIC NEOPLASM-RELATED PAIN: ICD-10-CM

## 2017-03-03 DIAGNOSIS — F41.9 ANXIETY: ICD-10-CM

## 2017-03-03 DIAGNOSIS — J96.21 ACUTE ON CHRONIC RESPIRATORY FAILURE WITH HYPOXIA AND HYPERCAPNIA: ICD-10-CM

## 2017-03-03 DIAGNOSIS — K44.9 HIATAL HERNIA: Chronic | ICD-10-CM

## 2017-03-03 DIAGNOSIS — R42 DIZZINESS: ICD-10-CM

## 2017-03-03 DIAGNOSIS — C79.51 BONE METASTASES: ICD-10-CM

## 2017-03-03 DIAGNOSIS — J42 CHRONIC BRONCHITIS, UNSPECIFIED CHRONIC BRONCHITIS TYPE: ICD-10-CM

## 2017-03-03 PROCEDURE — 84484 ASSAY OF TROPONIN QUANT: CPT

## 2017-03-03 PROCEDURE — 99285 EMERGENCY DEPT VISIT HI MDM: CPT | Mod: 25

## 2017-03-03 PROCEDURE — 93010 ELECTROCARDIOGRAM REPORT: CPT | Mod: ,,, | Performed by: INTERNAL MEDICINE

## 2017-03-03 PROCEDURE — 63600175 PHARM REV CODE 636 W HCPCS: Performed by: EMERGENCY MEDICINE

## 2017-03-03 PROCEDURE — 25000003 PHARM REV CODE 250: Performed by: EMERGENCY MEDICINE

## 2017-03-03 PROCEDURE — 85025 COMPLETE CBC W/AUTO DIFF WBC: CPT

## 2017-03-03 PROCEDURE — 96361 HYDRATE IV INFUSION ADD-ON: CPT

## 2017-03-03 PROCEDURE — 96374 THER/PROPH/DIAG INJ IV PUSH: CPT

## 2017-03-03 PROCEDURE — 80053 COMPREHEN METABOLIC PANEL: CPT

## 2017-03-03 PROCEDURE — 96375 TX/PRO/DX INJ NEW DRUG ADDON: CPT

## 2017-03-03 PROCEDURE — 83880 ASSAY OF NATRIURETIC PEPTIDE: CPT

## 2017-03-03 PROCEDURE — 85610 PROTHROMBIN TIME: CPT

## 2017-03-03 RX ORDER — ONDANSETRON 2 MG/ML
4 INJECTION INTRAMUSCULAR; INTRAVENOUS
Status: COMPLETED | OUTPATIENT
Start: 2017-03-03 | End: 2017-03-03

## 2017-03-03 RX ORDER — MORPHINE SULFATE 4 MG/ML
4 INJECTION, SOLUTION INTRAMUSCULAR; INTRAVENOUS
Status: COMPLETED | OUTPATIENT
Start: 2017-03-03 | End: 2017-03-03

## 2017-03-03 RX ADMIN — MORPHINE SULFATE 4 MG: 4 INJECTION, SOLUTION INTRAMUSCULAR; INTRAVENOUS at 11:03

## 2017-03-03 RX ADMIN — SODIUM CHLORIDE 500 ML: 0.9 INJECTION, SOLUTION INTRAVENOUS at 11:03

## 2017-03-03 RX ADMIN — ONDANSETRON 4 MG: 2 INJECTION INTRAMUSCULAR; INTRAVENOUS at 11:03

## 2017-03-03 NOTE — IP AVS SNAPSHOT
[facility]  [id number] [place] [address] [address] [place] [address]           Patient Discharge Instructions     Our goal is to set you up for success. This packet includes information on your condition, medications, and your home care. It will help you to care for yourself so you don't get sicker and need to go back to the hospital.     Please ask your nurse if you have any questions.        There are many details to remember when preparing to leave the hospital. Here is what you will need to do:    1. Take your medicine. If you are prescribed medications, review your Medication List in the following pages. You may have new medications to  at the pharmacy and others that you'll need to stop taking. Review the instructions for how and when to take your medications. Talk with your doctor or nurses if you are unsure of what to do.     2. Go to your follow-up appointments. Specific follow-up information is listed in the following pages. Your may be contacted by a transition nurse or clinical provider about future appointments. Be sure we have all of the phone numbers to reach you, if needed. Please contact your provider's office if you are unable to make an appointment.     3. Watch for warning signs. Your doctor or nurse will give you detailed warning signs to watch for and when to call for assistance. These instructions may also include educational information about your condition. If you experience any of warning signs to your health, call your doctor.               ** Verify the list of medication(s) below is accurate and up to date. Carry this with you in case of emergency. If your medications have changed, please notify your healthcare provider.             Medication List      START taking these medications        Additional Info                      amoxicillin-clavulanate 875-125mg 875-125 mg per tablet   Commonly known as:  AUGMENTIN   Quantity:  14 tablet   Refills:  0   Dose:  1 tablet     Instructions:  Take 1 tablet by mouth 2 (two) times daily.     Begin Date    AM    Noon    PM    Bedtime       lactose-reduced food with fibr Liqd   Commonly known as:  ISOSOURCE 1.5 HILARIO   Refills:  0   Dose:  237 mL    Instructions:  Take 237 mLs by mouth 5 (five) times daily. 30 ml water flush before and after feeding. Give 100ml water flush three times daily between feedings. May increase water flushes if patient is complaining of thirst.     Begin Date    AM    Noon    PM    Bedtime       predniSONE 10 MG tablet   Commonly known as:  DELTASONE   Quantity:  40 tablet   Refills:  0   Dose:  10 mg    Instructions:  Take 1 tablet (10 mg total) by mouth once daily. Take 40 mg daily for 4 days, then 30 mg daily for 4 days, then 20 mg daily for 4 days, then 10 mg daily for 4 days then stop.     Begin Date    AM    Noon    PM    Bedtime         CHANGE how you take these medications        Additional Info                      esomeprazole 40 MG capsule   Commonly known as:  NEXIUM   Quantity:  60 capsule   Refills:  0   Dose:  40 mg   What changed:    - how much to take  - when to take this    Instructions:  Take 1 capsule (40 mg total) by mouth 2 (two) times daily.     Begin Date    AM    Noon    PM    Bedtime       meclizine 25 mg tablet   Commonly known as:  ANTIVERT   Quantity:  15 tablet   Refills:  0   Dose:  25 mg   What changed:  when to take this    Instructions:  Take 1 tablet (25 mg total) by mouth 3 (three) times daily as needed for Dizziness.     Begin Date    AM    Noon    PM    Bedtime         CONTINUE taking these medications        Additional Info                      albuterol-ipratropium 2.5mg-0.5mg/3mL 0.5 mg-3 mg(2.5 mg base)/3 mL nebulizer solution   Commonly known as:  DUO-NEB   Quantity:  30 vial   Refills:  0   Dose:  3 mL    Last time this was given:  3 mLs on 3/16/2017  7:59 PM   Instructions:  Take 3 mLs by nebulization every 6 (six) hours as needed for Wheezing or Shortness of Breath.      Begin Date    AM    Noon    PM    Bedtime       alprazolam 0.25 MG tablet   Commonly known as:  XANAX   Quantity:  60 tablet   Refills:  2   Dose:  0.5 mg    Last time this was given:  1 mg on 3/16/2017  8:56 AM   Instructions:  Take 2 tablets (0.5 mg total) by mouth 2 (two) times daily as needed for Insomnia or Anxiety.     Begin Date    AM    Noon    PM    Bedtime       atorvastatin 40 MG tablet   Commonly known as:  LIPITOR   Refills:  0   Dose:  40 mg    Instructions:  Take 40 mg by mouth every evening.     Begin Date    AM    Noon    PM    Bedtime       benzonatate 100 MG capsule   Commonly known as:  TESSALON   Quantity:  90 capsule   Refills:  5   Dose:  100 mg    Instructions:  Take 1 capsule (100 mg total) by mouth 3 (three) times daily as needed for Cough.     Begin Date    AM    Noon    PM    Bedtime       docusate sodium 100 MG capsule   Commonly known as:  COLACE   Refills:  0   Dose:  100 mg    Instructions:  Take 1 capsule (100 mg total) by mouth 2 (two) times daily.     Begin Date    AM    Noon    PM    Bedtime       fish oil-omega-3 fatty acids 300-1,000 mg capsule   Refills:  0   Dose:  2 g    Instructions:  Take 2 g by mouth once daily.     Begin Date    AM    Noon    PM    Bedtime       GI COCKTAIL SIMPLE RECORD   Quantity:  1 Bottle   Refills:  3   Dose:  15 mL    Instructions:  Take 15 mLs by mouth daily as needed.     Begin Date    AM    Noon    PM    Bedtime       HYDROmorphone 2 MG tablet   Commonly known as:  DILAUDID   Quantity:  20 tablet   Refills:  0   Dose:  4 mg    Instructions:  Take 2 tablets (4 mg total) by mouth every 4 (four) hours as needed for Pain.     Begin Date    AM    Noon    PM    Bedtime       ondansetron 4 MG Tbdl   Commonly known as:  ZOFRAN-ODT   Quantity:  60 tablet   Refills:  1   Dose:  8 mg    Instructions:  Take 2 tablets (8 mg total) by mouth every 8 (eight) hours as needed.     Begin Date    AM    Noon    PM    Bedtime       OXYGEN-AIR DELIVERY SYSTEMS [facility]    Refills:  0    Instructions:  by Misc.(Non-Drug; Combo Route) route.     Begin Date    AM    Noon    PM    Bedtime       polyethylene glycol 17 gram Pwpk   Commonly known as:  GLYCOLAX   Quantity:  30 packet   Refills:  0   Dose:  17 g    Instructions:  Take 17 g by mouth daily as needed (constipation).     Begin Date    AM    Noon    PM    Bedtime       prochlorperazine 10 MG tablet   Commonly known as:  COMPAZINE   Quantity:  30 tablet   Refills:  1   Dose:  10 mg    Instructions:  Take 1 tablet (10 mg total) by mouth every 6 (six) hours as needed.     Begin Date    AM    Noon    PM    Bedtime            Where to Get Your Medications      These medications were sent to [facility] [address] [facility]  [address] [facility] [address]     Phone:  [phone]     amoxicillin-clavulanate 875-125mg 875-125 mg per tablet         You can get these medications from any pharmacy     Bring a paper prescription for each of these medications     alprazolam 0.25 MG tablet    HYDROmorphone 2 MG tablet    predniSONE 10 MG tablet       You don't need a prescription for these medications     lactose-reduced food with fibr Liqd                  Please bring to all follow up appointments:    1. A copy of your discharge instructions.  2. All medicines you are currently taking in their original bottles.  3. Identification and insurance card.    Please arrive 15 minutes ahead of scheduled appointment time.    Please call 24 hours in advance if you must reschedule your appointment and/or time.        Your Scheduled Appointments     Mar 20, 2017  4:40 PM CDT   Hospital Follow Up with Kenrick Muñoz MD   [facility]    [address] [facility] [address] [phone]            Mar 21, 2017  1:40 PM CDT   Established Patient Visit with Adam Barraza MD   [facility] - Hemotology Oncology ([facility])    [address] [facility] [address]  "  [phone]            May 02, 2017  9:30 AM CDT   Diagnostic Xray with ONLH XR1-   [facility]    [address] [facility] [address] [phone]            May 02, 2017 10:00 AM CDT   Established Patient Visit with Zack Taylor MD   [facility] ([facility]    [address] [place] [address] [phone]              Follow-up Information     Follow up with Kenrick Muñoz MD.    Specialty:  Family Medicine    Contact information:    [address] [address] [address] [phone]          Follow up with Adam Barraza MD.    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      Admission Information     Date & Time Provider Department [facility]    3/3/2017 11:09 PM Karli Vail MD [facility] [id number]      Care Providers     Provider Role Specialty Primary office phone    Karli Vail MD Attending Provider Internal Medicine [phone]    Derek Bourgeois MD Team Attending  Internal Medicine [phone]    Pnacho Iraheta MD Consulting Physician  Hematology and Oncology [phone]      Your Vitals Were     BP Pulse Temp Resp Height Weight    96/73 (BP Location: Right arm, Patient Position: Lying, BP Method: Automatic) 108 98.6 °F (37 °C) (Oral) 18 5' 2" (1.575 m) 44.7 kg (98 lb 9.6 oz)    SpO2 BMI             97% 18.03 kg/m2         Recent Lab Values     No lab values to display.      Pending Labs     Order Current Status    Culture, " Body Fluid (Aerobic) w/ GS In process    AFB culture (includes stain) Preliminary result    Fungus culture Preliminary result      Allergies as of 3/17/2017        Reactions    Ambien [Zolpidem] Other (See Comments)    Makes me extremely hyped up like im going crazy.    Zoloft [Sertraline] Other (See Comments)    unknown      [facility]     [facility] Nurse Care Line - 24/7 Assistance  Unless otherwise directed by your provider, please contact [facility], our nurse care line that is available for 24/7 assistance.     Registered nurses in the [facility] provide clinical advisement, health education, appointment booking, and other advisory services.  Call for this free service at [phone].        Advance Directives     An advance directive is a document which, in the event you are no longer able to make decisions for yourself, tells your healthcare team what kind of treatment you do or do not want to receive, or who you would like to make those decisions for you.  If you do not currently have an advance directive, [facility] encourages you to create one.  For more information call:  [phone]), [phone] ([phone]),  or log on to [email or web address].        Language Assistance Services     ATTENTION: Language assistance services are available, free of charge. Please call [phone].      ATENCIÓN: Si habla español, tiene a marsh disposición servicios gratuitos de asistencia lingüística. Llame al [phone].     CHÚ Ý: N?u b?n nói Ti?ng Vi?t, có các d?ch v? h? tr? ngôn ng? mi?n phí dành cho b?n. G?i s? [phone].        Pneumonmia Discharge Instructions                 [facility] complies with applicable Federal civil rights laws and does not discriminate on the basis of race, color, national origin, age, disability, or sex.

## 2017-03-04 PROBLEM — Q39.8 SEVERE ESOPHAGEAL DYSPLASIA: Status: ACTIVE | Noted: 2017-03-04

## 2017-03-04 PROBLEM — E43 SEVERE PROTEIN-CALORIE MALNUTRITION: Status: ACTIVE | Noted: 2017-03-04

## 2017-03-04 PROBLEM — Q39.8 SEVERE ESOPHAGEAL DYSPLASIA: Status: RESOLVED | Noted: 2017-03-04 | Resolved: 2017-03-04

## 2017-03-04 LAB
ALBUMIN SERPL BCP-MCNC: 2.2 G/DL
ALBUMIN SERPL BCP-MCNC: 2.3 G/DL
ALP SERPL-CCNC: 204 U/L
ALP SERPL-CCNC: 214 U/L
ALT SERPL W/O P-5'-P-CCNC: 73 U/L
ALT SERPL W/O P-5'-P-CCNC: 82 U/L
ANION GAP SERPL CALC-SCNC: 8 MMOL/L
ANION GAP SERPL CALC-SCNC: 9 MMOL/L
AST SERPL-CCNC: 58 U/L
AST SERPL-CCNC: 68 U/L
BASOPHILS # BLD AUTO: 0.01 K/UL
BASOPHILS # BLD AUTO: 0.02 K/UL
BASOPHILS NFR BLD: 0.1 %
BASOPHILS NFR BLD: 0.2 %
BILIRUB SERPL-MCNC: 0.7 MG/DL
BILIRUB SERPL-MCNC: 0.8 MG/DL
BNP SERPL-MCNC: 109 PG/ML
BUN SERPL-MCNC: 11 MG/DL
BUN SERPL-MCNC: 9 MG/DL
CALCIUM SERPL-MCNC: 10.1 MG/DL
CALCIUM SERPL-MCNC: 10.2 MG/DL
CHLORIDE SERPL-SCNC: 102 MMOL/L
CHLORIDE SERPL-SCNC: 107 MMOL/L
CO2 SERPL-SCNC: 31 MMOL/L
CO2 SERPL-SCNC: 31 MMOL/L
CREAT SERPL-MCNC: 0.6 MG/DL
CREAT SERPL-MCNC: 0.7 MG/DL
DIFFERENTIAL METHOD: ABNORMAL
DIFFERENTIAL METHOD: ABNORMAL
EOSINOPHIL # BLD AUTO: 0.1 K/UL
EOSINOPHIL # BLD AUTO: 0.1 K/UL
EOSINOPHIL NFR BLD: 1 %
EOSINOPHIL NFR BLD: 1.2 %
ERYTHROCYTE [DISTWIDTH] IN BLOOD BY AUTOMATED COUNT: 12.6 %
ERYTHROCYTE [DISTWIDTH] IN BLOOD BY AUTOMATED COUNT: 13.9 %
EST. GFR  (AFRICAN AMERICAN): >60 ML/MIN/1.73 M^2
EST. GFR  (AFRICAN AMERICAN): >60 ML/MIN/1.73 M^2
EST. GFR  (NON AFRICAN AMERICAN): >60 ML/MIN/1.73 M^2
EST. GFR  (NON AFRICAN AMERICAN): >60 ML/MIN/1.73 M^2
GLUCOSE SERPL-MCNC: 104 MG/DL
GLUCOSE SERPL-MCNC: 122 MG/DL
HCT VFR BLD AUTO: 33.5 %
HCT VFR BLD AUTO: 33.5 %
HGB BLD-MCNC: 10.8 G/DL
HGB BLD-MCNC: 11.1 G/DL
INR PPP: 1.1
LACTATE SERPL-SCNC: 1.8 MMOL/L
LDH SERPL L TO P-CCNC: 204 U/L
LYMPHOCYTES # BLD AUTO: 0.8 K/UL
LYMPHOCYTES # BLD AUTO: 1.3 K/UL
LYMPHOCYTES NFR BLD: 13.8 %
LYMPHOCYTES NFR BLD: 7.3 %
MCH RBC QN AUTO: 35.1 PG
MCH RBC QN AUTO: 35.4 PG
MCHC RBC AUTO-ENTMCNC: 32.2 %
MCHC RBC AUTO-ENTMCNC: 33.1 %
MCV RBC AUTO: 107 FL
MCV RBC AUTO: 109 FL
MONOCYTES # BLD AUTO: 0.9 K/UL
MONOCYTES # BLD AUTO: 1.7 K/UL
MONOCYTES NFR BLD: 15.8 %
MONOCYTES NFR BLD: 9.2 %
NEUTROPHILS # BLD AUTO: 7.2 K/UL
NEUTROPHILS # BLD AUTO: 8.1 K/UL
NEUTROPHILS NFR BLD: 75.6 %
NEUTROPHILS NFR BLD: 76 %
PLATELET # BLD AUTO: 233 K/UL
PLATELET # BLD AUTO: 261 K/UL
PMV BLD AUTO: 10.2 FL
PMV BLD AUTO: 10.3 FL
POTASSIUM SERPL-SCNC: 4 MMOL/L
POTASSIUM SERPL-SCNC: 4.2 MMOL/L
PROT SERPL-MCNC: 6.6 G/DL
PROT SERPL-MCNC: 6.9 G/DL
PROTHROMBIN TIME: 11.5 SEC
RBC # BLD AUTO: 3.08 M/UL
RBC # BLD AUTO: 3.14 M/UL
SODIUM SERPL-SCNC: 142 MMOL/L
SODIUM SERPL-SCNC: 146 MMOL/L
TROPONIN I SERPL DL<=0.01 NG/ML-MCNC: 0.01 NG/ML
WBC # BLD AUTO: 10.69 K/UL
WBC # BLD AUTO: 9.44 K/UL

## 2017-03-04 PROCEDURE — 25000003 PHARM REV CODE 250: Performed by: NURSE PRACTITIONER

## 2017-03-04 PROCEDURE — 25000242 PHARM REV CODE 250 ALT 637 W/ HCPCS: Performed by: EMERGENCY MEDICINE

## 2017-03-04 PROCEDURE — 11000001 HC ACUTE MED/SURG PRIVATE ROOM

## 2017-03-04 PROCEDURE — 63600175 PHARM REV CODE 636 W HCPCS: Performed by: INTERNAL MEDICINE

## 2017-03-04 PROCEDURE — 36415 COLL VENOUS BLD VENIPUNCTURE: CPT

## 2017-03-04 PROCEDURE — 80053 COMPREHEN METABOLIC PANEL: CPT

## 2017-03-04 PROCEDURE — 63600175 PHARM REV CODE 636 W HCPCS: Performed by: NURSE PRACTITIONER

## 2017-03-04 PROCEDURE — 27000221 HC OXYGEN, UP TO 24 HOURS

## 2017-03-04 PROCEDURE — 63600175 PHARM REV CODE 636 W HCPCS: Performed by: EMERGENCY MEDICINE

## 2017-03-04 PROCEDURE — 25000242 PHARM REV CODE 250 ALT 637 W/ HCPCS: Performed by: INTERNAL MEDICINE

## 2017-03-04 PROCEDURE — 85025 COMPLETE CBC W/AUTO DIFF WBC: CPT

## 2017-03-04 PROCEDURE — 96372 THER/PROPH/DIAG INJ SC/IM: CPT

## 2017-03-04 PROCEDURE — 83615 LACTATE (LD) (LDH) ENZYME: CPT

## 2017-03-04 PROCEDURE — 25000003 PHARM REV CODE 250: Performed by: INTERNAL MEDICINE

## 2017-03-04 PROCEDURE — 94640 AIRWAY INHALATION TREATMENT: CPT

## 2017-03-04 PROCEDURE — 99233 SBSQ HOSP IP/OBS HIGH 50: CPT | Mod: ,,, | Performed by: INTERNAL MEDICINE

## 2017-03-04 PROCEDURE — 83605 ASSAY OF LACTIC ACID: CPT

## 2017-03-04 PROCEDURE — 25000242 PHARM REV CODE 250 ALT 637 W/ HCPCS: Performed by: NURSE PRACTITIONER

## 2017-03-04 RX ORDER — METHYLPREDNISOLONE SOD SUCC 125 MG
125 VIAL (EA) INJECTION ONCE
Status: COMPLETED | OUTPATIENT
Start: 2017-03-04 | End: 2017-03-04

## 2017-03-04 RX ORDER — IPRATROPIUM BROMIDE AND ALBUTEROL SULFATE 2.5; .5 MG/3ML; MG/3ML
3 SOLUTION RESPIRATORY (INHALATION)
Status: COMPLETED | OUTPATIENT
Start: 2017-03-04 | End: 2017-03-04

## 2017-03-04 RX ORDER — DEXTROSE MONOHYDRATE AND SODIUM CHLORIDE 5; .9 G/100ML; G/100ML
INJECTION, SOLUTION INTRAVENOUS CONTINUOUS
Status: DISCONTINUED | OUTPATIENT
Start: 2017-03-04 | End: 2017-03-04

## 2017-03-04 RX ORDER — HYDROMORPHONE HYDROCHLORIDE 1 MG/ML
1 INJECTION, SOLUTION INTRAMUSCULAR; INTRAVENOUS; SUBCUTANEOUS EVERY 4 HOURS PRN
Status: DISCONTINUED | OUTPATIENT
Start: 2017-03-04 | End: 2017-03-05

## 2017-03-04 RX ORDER — IPRATROPIUM BROMIDE AND ALBUTEROL SULFATE 2.5; .5 MG/3ML; MG/3ML
3 SOLUTION RESPIRATORY (INHALATION) EVERY 4 HOURS
Status: COMPLETED | OUTPATIENT
Start: 2017-03-04 | End: 2017-03-04

## 2017-03-04 RX ORDER — ONDANSETRON 2 MG/ML
4 INJECTION INTRAMUSCULAR; INTRAVENOUS EVERY 8 HOURS PRN
Status: DISCONTINUED | OUTPATIENT
Start: 2017-03-04 | End: 2017-03-04

## 2017-03-04 RX ORDER — ARFORMOTEROL TARTRATE 15 UG/2ML
15 SOLUTION RESPIRATORY (INHALATION) 2 TIMES DAILY
Status: DISCONTINUED | OUTPATIENT
Start: 2017-03-04 | End: 2017-03-17 | Stop reason: HOSPADM

## 2017-03-04 RX ORDER — HYDRALAZINE HYDROCHLORIDE 20 MG/ML
10 INJECTION INTRAMUSCULAR; INTRAVENOUS EVERY 6 HOURS PRN
Status: DISCONTINUED | OUTPATIENT
Start: 2017-03-04 | End: 2017-03-17 | Stop reason: HOSPADM

## 2017-03-04 RX ORDER — IPRATROPIUM BROMIDE AND ALBUTEROL SULFATE 2.5; .5 MG/3ML; MG/3ML
3 SOLUTION RESPIRATORY (INHALATION) EVERY 4 HOURS PRN
Status: DISCONTINUED | OUTPATIENT
Start: 2017-03-04 | End: 2017-03-17 | Stop reason: HOSPADM

## 2017-03-04 RX ORDER — FAMOTIDINE 20 MG/50ML
20 INJECTION, SOLUTION INTRAVENOUS 2 TIMES DAILY
Status: DISCONTINUED | OUTPATIENT
Start: 2017-03-04 | End: 2017-03-17 | Stop reason: HOSPADM

## 2017-03-04 RX ORDER — BUDESONIDE 0.5 MG/2ML
0.5 INHALANT ORAL 2 TIMES DAILY
Status: DISCONTINUED | OUTPATIENT
Start: 2017-03-04 | End: 2017-03-17 | Stop reason: HOSPADM

## 2017-03-04 RX ORDER — FORMOTEROL FUMARATE DIHYDRATE 20 UG/2ML
20 SOLUTION RESPIRATORY (INHALATION) EVERY 12 HOURS
Status: DISCONTINUED | OUTPATIENT
Start: 2017-03-04 | End: 2017-03-04

## 2017-03-04 RX ORDER — ONDANSETRON 2 MG/ML
4 INJECTION INTRAMUSCULAR; INTRAVENOUS EVERY 12 HOURS PRN
Status: DISCONTINUED | OUTPATIENT
Start: 2017-03-04 | End: 2017-03-07

## 2017-03-04 RX ORDER — MORPHINE SULFATE 4 MG/ML
4 INJECTION, SOLUTION INTRAMUSCULAR; INTRAVENOUS EVERY 4 HOURS PRN
Status: DISCONTINUED | OUTPATIENT
Start: 2017-03-04 | End: 2017-03-05

## 2017-03-04 RX ORDER — GUAIFENESIN 100 MG/5ML
200 SOLUTION ORAL EVERY 4 HOURS PRN
Status: DISCONTINUED | OUTPATIENT
Start: 2017-03-04 | End: 2017-03-17 | Stop reason: HOSPADM

## 2017-03-04 RX ORDER — DEXTROSE MONOHYDRATE AND SODIUM CHLORIDE 5; .9 G/100ML; G/100ML
INJECTION, SOLUTION INTRAVENOUS CONTINUOUS
Status: DISCONTINUED | OUTPATIENT
Start: 2017-03-04 | End: 2017-03-05

## 2017-03-04 RX ORDER — ENOXAPARIN SODIUM 100 MG/ML
40 INJECTION SUBCUTANEOUS EVERY 24 HOURS
Status: DISCONTINUED | OUTPATIENT
Start: 2017-03-04 | End: 2017-03-09

## 2017-03-04 RX ADMIN — ARFORMOTEROL TARTRATE 15 MCG: 15 SOLUTION RESPIRATORY (INHALATION) at 08:03

## 2017-03-04 RX ADMIN — ONDANSETRON 4 MG: 2 INJECTION INTRAMUSCULAR; INTRAVENOUS at 01:03

## 2017-03-04 RX ADMIN — IPRATROPIUM BROMIDE AND ALBUTEROL SULFATE 3 ML: .5; 3 SOLUTION RESPIRATORY (INHALATION) at 04:03

## 2017-03-04 RX ADMIN — HYDROMORPHONE HYDROCHLORIDE 1 MG: 1 INJECTION, SOLUTION INTRAMUSCULAR; INTRAVENOUS; SUBCUTANEOUS at 12:03

## 2017-03-04 RX ADMIN — FAMOTIDINE 20 MG: 20 INJECTION, SOLUTION INTRAVENOUS at 09:03

## 2017-03-04 RX ADMIN — BUDESONIDE 0.5 MG: 0.5 INHALANT RESPIRATORY (INHALATION) at 08:03

## 2017-03-04 RX ADMIN — ENOXAPARIN SODIUM 40 MG: 100 INJECTION SUBCUTANEOUS at 12:03

## 2017-03-04 RX ADMIN — IPRATROPIUM BROMIDE AND ALBUTEROL SULFATE 3 ML: .5; 3 SOLUTION RESPIRATORY (INHALATION) at 08:03

## 2017-03-04 RX ADMIN — FOLIC ACID: 5 INJECTION, SOLUTION INTRAMUSCULAR; INTRAVENOUS; SUBCUTANEOUS at 01:03

## 2017-03-04 RX ADMIN — HYDROMORPHONE HYDROCHLORIDE 1 MG: 1 INJECTION, SOLUTION INTRAMUSCULAR; INTRAVENOUS; SUBCUTANEOUS at 11:03

## 2017-03-04 RX ADMIN — IPRATROPIUM BROMIDE AND ALBUTEROL SULFATE 3 ML: .5; 3 SOLUTION RESPIRATORY (INHALATION) at 12:03

## 2017-03-04 RX ADMIN — HYDROMORPHONE HYDROCHLORIDE 1 MG: 1 INJECTION, SOLUTION INTRAMUSCULAR; INTRAVENOUS; SUBCUTANEOUS at 05:03

## 2017-03-04 RX ADMIN — HYDROMORPHONE HYDROCHLORIDE 1 MG: 1 INJECTION, SOLUTION INTRAMUSCULAR; INTRAVENOUS; SUBCUTANEOUS at 03:03

## 2017-03-04 RX ADMIN — HYDROMORPHONE HYDROCHLORIDE 1 MG: 1 INJECTION, SOLUTION INTRAMUSCULAR; INTRAVENOUS; SUBCUTANEOUS at 07:03

## 2017-03-04 RX ADMIN — METHYLPREDNISOLONE SODIUM SUCCINATE 80 MG: 40 INJECTION, POWDER, FOR SOLUTION INTRAMUSCULAR; INTRAVENOUS at 06:03

## 2017-03-04 RX ADMIN — METHYLPREDNISOLONE SODIUM SUCCINATE 125 MG: 125 INJECTION, POWDER, FOR SOLUTION INTRAMUSCULAR; INTRAVENOUS at 01:03

## 2017-03-04 RX ADMIN — DEXTROSE MONOHYDRATE AND SODIUM CHLORIDE: 5; .9 INJECTION, SOLUTION INTRAVENOUS at 02:03

## 2017-03-04 NOTE — CONSULTS
[facility] -   Gastroenterology  Consult Note    Patient Name: Frederick J Lejeune  MRN: [id number]  Admission Date: 3/3/2017  Hospital Length of Stay: 0 days  Code Status: Full Code   Attending Provider: Tamara Guerrero MD   Consulting Provider: Mario Shah MD  Primary Care Physician: Kenrick Muñoz MD  Principal Problem:Primary malignant neoplasm of right lung metastatic to other site    Last Recorded LACE+ Scores     None          Inpatient consult to Gastroenterology  Consult performed by: MARIO SHAH  Consult ordered by: KO GHOSH  Reason for consult: Unable to eat due to lung mass  Assessment/Recommendations: Patient Active Problem List    Primary malignant neoplasm of right lung metastatic to other site         Priority: 1 - High (4)         Date Noted: 11/17/2016      Esophageal dysphagia         Priority: 1 - High (4)         Date Noted: 10/19/2016      Palma's esophagus without dysplasia         Priority: 2  (5)         Date Noted: 10/19/2016      Severe protein-calorie malnutrition         Date Noted: 03/04/2017      Esophageal obstruction         Date Noted: 02/22/2017      Dehydration, mild         Date Noted: 12/24/2016      Dizziness         Date Noted: 12/11/2016      Left groin pain      ACS (acute coronary syndrome)         Date Noted: 12/06/2016      Cachexia         Date Noted: 11/08/2016      COPD exacerbation         Date Noted: 11/05/2016      Chronic respiratory failure with hypoxia         Date Noted: 11/05/2016      Chest pain         Date Noted: 11/05/2016      Dyslipidemia         Date Noted: 11/05/2016      Chronic bronchitis         Date Noted: 11/01/2016      Bone metastases         Date Noted: 10/28/2016      Chronic neoplasm-related pain         Date Noted: 10/28/2016      Anxiety about health         Date Noted: 10/28/2016      Lung cancer, main bronchus right         Date Noted: 10/21/2016      Hiatal hernia         Date Noted: 10/19/2016      COPD, very  severe         Date Noted: 04/08/2016            Subjective:     HPI:  Mr. Lejeune is a well known patient to our service who has lung cancer and it has been compressing the esophageal cavity, causing difficulty breathing. He had an esophageal stent placed by Dr. Cheng a few days ago and since then he has been complaining with severe pain and not been able to eat. Initially he had refused having a PEG tube but he has changed his mind now. I have reviewed the recent chest X-rays and the stent appears patent and in good place, but he is not able to even drink water. He comes back to the hospital complaining with pain and unable to eat. I had a lengthy discussion with the patient about his options:  - Removing the stent  - Removing the stent and placing a PEG  - Placing a PEG  - Doing nothing   I recommend that we go ahead and order an FL Esophagogram to determine the patency of the stent; if patent we will go ahead and place a PEG tube, guided via endoscopy. If the stent is not patent, he will need to have a surgically placed PEG. I am concerned that external compression from the lung mass is collapsing the esophagus and stent and we will not be able to pass the instrument.       Past Medical History:   Diagnosis Date    Cancer     lung    COPD (chronic obstructive pulmonary disease)     GERD (gastroesophageal reflux disease)     Lumbar vertebral fracture        Past Surgical History:   Procedure Laterality Date    ABSCESS DRAINAGE      APPENDECTOMY      BACK SURGERY      ESOPHAGUS SURGERY      HAND SURGERY Left        Review of patient's allergies indicates:   Allergen Reactions    Ambien [zolpidem] Other (See Comments)     Makes me extremely hyped up like im going crazy.    Zoloft [sertraline] Other (See Comments)     unknown     Family History     Problem Relation (Age of Onset)    Cancer Father    Emphysema Mother        Social History Main Topics    Smoking status: Former Smoker     Packs/day: 1.00      Years: 50.00     Types: Cigarettes     Quit date: 5/10/2016    Smokeless tobacco: Former User     Types: Snuff     Quit date: 5/10/2016    Alcohol use No    Drug use: No    Sexual activity: Yes     Partners: Female     Review of Systems   Constitutional: Positive for activity change, appetite change, fatigue and unexpected weight change.   HENT: Positive for trouble swallowing.    Eyes: Negative for photophobia, discharge and itching.   Respiratory: Positive for cough, choking, chest tightness, shortness of breath and wheezing.    Cardiovascular: Positive for chest pain.   Gastrointestinal: Negative for abdominal pain, anal bleeding, blood in stool and constipation.   Endocrine: Negative for cold intolerance, heat intolerance, polydipsia and polyphagia.   Genitourinary: Negative for dysuria, frequency and hematuria.   Musculoskeletal: Positive for back pain and myalgias.   Skin: Negative for color change, pallor and rash.   Neurological: Negative for dizziness, facial asymmetry, light-headedness and headaches.   Hematological: Negative for adenopathy. Does not bruise/bleed easily.   Psychiatric/Behavioral: Negative for agitation, behavioral problems and confusion.     Objective:     Vital Signs (Most Recent):  Temp: 99.2 °F (37.3 °C) (03/04/17 1209)  Pulse: 103 (03/04/17 1252)  Resp: 20 (03/04/17 1252)  BP: 105/74 (03/04/17 1209)  SpO2: 95 % (03/04/17 1252) Vital Signs (24h Range):  Temp:  [98.5 °F (36.9 °C)-99.2 °F (37.3 °C)] 99.2 °F (37.3 °C)  Pulse:  [] 103  Resp:  [16-28] 20  SpO2:  [94 %-100 %] 95 %  BP: ()/(64-77) 105/74     Weight: 49.9 kg (110 lb) (03/04/17 0227)  Body mass index is 20.12 kg/(m^2).      Intake/Output Summary (Last 24 hours) at 03/04/17 1255  Last data filed at 03/04/17 1210   Gross per 24 hour   Intake           518.67 ml   Output              900 ml   Net          -381.33 ml       Lines/Drains/Airways     Central Venous Catheter Line                 Port A Cath Single  Lumen right subclavian -- days          Peripheral Intravenous Line                 Peripheral IV - Single Lumen 03/03/17 2349 Left Antecubital less than 1 day                Physical Exam   Constitutional: He is oriented to person, place, and time. He appears well-developed.   HENT:   Head: Normocephalic and atraumatic.   Eyes: EOM are normal. Pupils are equal, round, and reactive to light.   Neck: Normal range of motion.   Cardiovascular: Normal rate and regular rhythm.    Pulmonary/Chest: He is in respiratory distress. He has rales.   Abdominal: Soft.   Musculoskeletal: Normal range of motion.   Neurological: He is alert and oriented to person, place, and time.   Skin: Skin is warm and dry.   Vitals reviewed.      Significant Labs:  Amylase: No results for input(s): AMYLASE in the last 48 hours.  CBC:   Recent Labs  Lab 03/03/17 2340   WBC 10.69   HGB 10.8*   HCT 33.5*        BMP:   Recent Labs  Lab 03/03/17 2340         K 4.2      CO2 31*   BUN 11   CREATININE 0.7   CALCIUM 10.1     Coagulation:   Recent Labs  Lab 03/03/17  2340   INR 1.1     Lipase: No results for input(s): LIPASE in the last 48 hours.  Liver Function Test:   Recent Labs  Lab 03/03/17 2340   ALT 82*   AST 68*   ALKPHOS 214*   BILITOT 0.7   PROT 6.9   ALBUMIN 2.3*       Significant Imaging:  Imaging results within the past 24 hours have been reviewed.    Assessment/Plan:     * Primary malignant neoplasm of right lung metastatic to other site  Mass causing external compression of the esophagus. See above for plan.     Esophageal dysphagia  I recommend that we go ahead and order an [place] Esophagogram to determine the patency of the stent; if patent we will go ahead and place a PEG tube, guided via endoscopy. If the stent is not patent, he will need to have a surgically placed PEG. I am concerned that external compression from the lung mass is collapsing the esophagus and stent and we will not be able to pass the  instrument.       Palma's esophagus without dysplasia  Not relevant at this point.       VTE Risk Mitigation         Ordered     enoxaparin injection 40 mg  Daily     Route:  Subcutaneous        03/04/17 0217     Place sequential compression device  Until discontinued      03/04/17 0222     Medium Risk of VTE  Once      03/04/17 0217          Thank you for your consult. I will follow-up with patient. Please contact us if you have any additional questions.    Mario Anguiano MD  Gastroenterology  [facility]

## 2017-03-04 NOTE — H&P
[facility] Hospital Medicine  History & Physical    Patient Name: Frederick J Lejeune  MRN: [id number]  Admission Date: 3/3/2017  Attending Physician: Patricio Dockery MD  Primary Care Provider: Kenrick Muñoz MD         Patient information was obtained from patient and ER records.     Subjective:     Principal Problem:Severe esophageal dysplasia    Chief Complaint:   Chief Complaint   Patient presents with    Chest Pain     x 9 days        HPI: Frederick J Lejeune is a 66 y.o. male with PMHx of GERD, weight loss, dysphagia, odynophagia, RLL lung mass squamous cell carcinoma, who presented to ER from Dr. Stafford's office with c/o dysphagia, weakness, dizziness, chest pain. He has had difficulty eating due to odynophagia for the past month. Patient has completed palliative radiation to relieve right mainstem bronchus obstruction. He then was treated with weekly Carbo-Taxol x 6-7 weeks, which was discontinued due to disease progression on imaging in early 1/2017. He is currently receiving Pembrolizumab every 3 weeks. He was recently evaluated by Dr. Cheng who recommended further evaluation with esophagram, that was done on 2/24/17 with Esophageal stent placement. Per discharge summary, Dr. Cheng stated if patient continues to have severe dysphagia or chest pain then he could remove the stent and place PEG tube. He was discharged on 2/28/17.    Patient returns to the hospital 3 days after discharge with symptoms of inability to keep anything down. Getting dehydrated, He is agreeable for PEG placement.      Past Medical History:   Diagnosis Date    Cancer     lung    COPD (chronic obstructive pulmonary disease)     GERD (gastroesophageal reflux disease)     Lumbar vertebral fracture        Past Surgical History:   Procedure Laterality Date    ABSCESS DRAINAGE      APPENDECTOMY      BACK SURGERY      ESOPHAGUS SURGERY      HAND SURGERY Left        Review of patient's allergies indicates:   Allergen  Reactions    Ambien [zolpidem] Other (See Comments)     Makes me extremely hyped up like im going crazy.    Zoloft [sertraline] Other (See Comments)     unknown       No current facility-administered medications on file prior to encounter.      Current Outpatient Prescriptions on File Prior to Encounter   Medication Sig    albuterol-ipratropium 2.5mg-0.5mg/3mL (DUO-NEB) 0.5 mg-3 mg(2.5 mg base)/3 mL nebulizer solution Take 3 mLs by nebulization every 6 (six) hours as needed for Wheezing or Shortness of Breath.    alprazolam (XANAX) 0.25 MG tablet Take 2 tablets (0.5 mg total) by mouth 2 (two) times daily as needed for Insomnia or Anxiety.    atorvastatin (LIPITOR) 40 MG tablet Take 40 mg by mouth every evening.    benzonatate (TESSALON) 100 MG capsule Take 1 capsule (100 mg total) by mouth 3 (three) times daily as needed for Cough.    DICYCLOMINE HCL (GI COCKTAIL SIMPLE RECORD) Take 15 mLs by mouth daily as needed.    docusate sodium (COLACE) 100 MG capsule Take 1 capsule (100 mg total) by mouth 2 (two) times daily.    esomeprazole (NEXIUM) 40 MG capsule Take 1 capsule (40 mg total) by mouth 2 (two) times daily. (Patient taking differently: Take 20 mg by mouth once daily. )    fish oil-omega-3 fatty acids 300-1,000 mg capsule Take 2 g by mouth once daily.    HYDROmorphone (DILAUDID) 2 MG tablet Take 2 tablets (4 mg total) by mouth every 4 (four) hours as needed for Pain.    meclizine (ANTIVERT) 25 mg tablet Take 1 tablet (25 mg total) by mouth 3 (three) times daily as needed for Dizziness. (Patient taking differently: Take 25 mg by mouth 3 (three) times daily. )    ondansetron (ZOFRAN-ODT) 4 MG TbDL Take 2 tablets (8 mg total) by mouth every 8 (eight) hours as needed.    OXYGEN-AIR DELIVERY SYSTEMS MISC by [facility].(Non-Drug; Combo Route) route.    polyethylene glycol (GLYCOLAX) 17 gram PwPk Take 17 g by mouth daily as needed (constipation).    prochlorperazine (COMPAZINE) 10 MG tablet Take 1 tablet (10  mg total) by mouth every 6 (six) hours as needed.     Family History     Problem Relation (Age of Onset)    Cancer Father    Emphysema Mother        Social History Main Topics    Smoking status: Former Smoker     Packs/day: 1.00     Years: 50.00     Types: Cigarettes     Quit date: 5/10/2016    Smokeless tobacco: Former User     Types: Snuff     Quit date: 5/10/2016    Alcohol use No    Drug use: No    Sexual activity: Yes     Partners: Female     Review of Systems   Constitutional: Positive for activity change, appetite change and fatigue. Negative for fever.   HENT: Negative.  Negative for congestion, nosebleeds and sore throat.    Eyes: Negative.  Negative for photophobia, redness and visual disturbance.   Respiratory: Negative.  Negative for cough, shortness of breath and wheezing.    Cardiovascular: Negative.  Negative for chest pain, palpitations and leg swelling.   Gastrointestinal: Positive for nausea (dysphagia). Negative for abdominal pain, constipation, diarrhea and vomiting.   Endocrine: Negative.  Negative for polydipsia, polyphagia and polyuria.   Genitourinary: Negative.  Negative for dysuria, flank pain, frequency and urgency.   Musculoskeletal: Negative.  Negative for arthralgias, back pain and joint swelling.   Skin: Negative.  Negative for color change, pallor and rash.   Allergic/Immunologic: Negative.  Negative for environmental allergies, food allergies and immunocompromised state.   Neurological: Negative.  Negative for dizziness, syncope, weakness, light-headedness, numbness and headaches.   Hematological: Negative.  Negative for adenopathy. Does not bruise/bleed easily.   Psychiatric/Behavioral: Negative.  Negative for confusion and hallucinations. The patient is not nervous/anxious.    All other systems reviewed and are negative.    Objective:     Vital Signs (Most Recent):  Temp: 98.9 °F (37.2 °C) (03/04/17 0118)  Pulse: 108 (03/04/17 0118)  Resp: (!) 22 (03/04/17 0118)  BP: 108/68  (03/04/17 0118)  SpO2: 96 % (03/04/17 0118) Vital Signs (24h Range):  Temp:  [98.9 °F (37.2 °C)] 98.9 °F (37.2 °C)  Pulse:  [103-108] 108  Resp:  [16-28] 22  SpO2:  [96 %-100 %] 96 %  BP: (102-108)/(64-68) 108/68        There is no height or weight on file to calculate BMI.    Physical Exam   Constitutional: He is oriented to person, place, and time. No distress.   Frail, thinly built, cachectic appearing   HENT:   Head: Normocephalic and atraumatic.   Eyes: Conjunctivae and EOM are normal. Pupils are equal, round, and reactive to light. No scleral icterus.   Neck: Normal range of motion. Neck supple. No thyromegaly present.   Cardiovascular: Normal rate, regular rhythm and normal heart sounds.    No murmur heard.  Pulmonary/Chest: Effort normal and breath sounds normal. No respiratory distress. He has no wheezes. He exhibits no tenderness.   Abdominal: Soft. Bowel sounds are normal. There is no tenderness.   Musculoskeletal: Normal range of motion. He exhibits no edema or tenderness.   Neurological: He is alert and oriented to person, place, and time. No cranial nerve deficit. He exhibits normal muscle tone. Coordination normal.   Skin: Skin is warm and dry. He is not diaphoretic.   Temporal wasting   Psychiatric: He has a normal mood and affect. His behavior is normal.   Nursing note and vitals reviewed.       Significant Labs:   BMP:   Recent Labs  Lab 03/03/17  2340         K 4.2      CO2 31*   BUN 11   CREATININE 0.7   CALCIUM 10.1     CBC:   Recent Labs  Lab 03/03/17  2340   WBC 10.69   HGB 10.8*   HCT 33.5*        CMP:   Recent Labs  Lab 03/03/17  2340      K 4.2      CO2 31*      BUN 11   CREATININE 0.7   CALCIUM 10.1   PROT 6.9   ALBUMIN 2.3*   BILITOT 0.7   ALKPHOS 214*   AST 68*   ALT 82*   ANIONGAP 9   EGFRNONAA >60     Urine Studies: No results for input(s): COLORU, APPEARANCEUA, PHUR, SPECGRAV, PROTEINUA, GLUCUA, KETONESU, BILIRUBINUA, OCCULTUA, NITRITE,  UROBILINOGEN, LEUKOCYTESUR, RBCUA, WBCUA, BACTERIA, SQUAMEPITHEL, HYALINECASTS in the last 48 hours.    Invalid input(s): WRIGHTSUR  All pertinent labs within the past 24 hours have been reviewed.    Significant Imaging:   Imaging Results         X-Ray Chest PA And Lateral (In process)             Assessment/Plan:     * Severe esophageal dysplasia  - status post esophageal sent placement on 2/24/17  - Patient says he is unable to keep anything down  - Consult GI  - Patient agreeable for PEG placement  - IV hydration for now      Primary malignant neoplasm of right lung metastatic to other site  - Follow up with oncology as out-patient      Severe protein-calorie malnutrition  - Dietitian evaluation  - IV hydration for now      VTE Risk Mitigation         Ordered     enoxaparin injection 40 mg  Daily     Route:  Subcutaneous        03/04/17 0217     Place sequential compression device  Until discontinued      03/04/17 0222     Medium Risk of VTE  Once      03/04/17 0217        Patricio Dockery MD  Department of Hospital Medicine   [facility]

## 2017-03-04 NOTE — ASSESSMENT & PLAN NOTE
I recommend that we go ahead and order an [place] Esophagogram to determine the patency of the stent; if patent we will go ahead and place a PEG tube, guided via endoscopy. If the stent is not patent, he will need to have a surgically placed PEG. I am concerned that external compression from the lung mass is collapsing the esophagus and stent and we will not be able to pass the instrument.

## 2017-03-04 NOTE — ASSESSMENT & PLAN NOTE
- status post esophageal sent placement on 2/24/17  - Patient says he is unable to keep anything down  - Consult GI  - Patient agreeable for PEG placement  - IV hydration for now

## 2017-03-04 NOTE — SUBJECTIVE & OBJECTIVE
Past Medical History:   Diagnosis Date    Cancer     lung    COPD (chronic obstructive pulmonary disease)     GERD (gastroesophageal reflux disease)     Lumbar vertebral fracture        Past Surgical History:   Procedure Laterality Date    ABSCESS DRAINAGE      APPENDECTOMY      BACK SURGERY      ESOPHAGUS SURGERY      HAND SURGERY Left        Review of patient's allergies indicates:   Allergen Reactions    Ambien [zolpidem] Other (See Comments)     Makes me extremely hyped up like im going crazy.    Zoloft [sertraline] Other (See Comments)     unknown     Family History     Problem Relation (Age of Onset)    Cancer Father    Emphysema Mother        Social History Main Topics    Smoking status: Former Smoker     Packs/day: 1.00     Years: 50.00     Types: Cigarettes     Quit date: 5/10/2016    Smokeless tobacco: Former User     Types: Snuff     Quit date: 5/10/2016    Alcohol use No    Drug use: No    Sexual activity: Yes     Partners: Female     Review of Systems   Constitutional: Positive for activity change, appetite change, fatigue and unexpected weight change.   HENT: Positive for trouble swallowing.    Eyes: Negative for photophobia, discharge and itching.   Respiratory: Positive for cough, choking, chest tightness, shortness of breath and wheezing.    Cardiovascular: Positive for chest pain.   Gastrointestinal: Negative for abdominal pain, anal bleeding, blood in stool and constipation.   Endocrine: Negative for cold intolerance, heat intolerance, polydipsia and polyphagia.   Genitourinary: Negative for dysuria, frequency and hematuria.   Musculoskeletal: Positive for back pain and myalgias.   Skin: Negative for color change, pallor and rash.   Neurological: Negative for dizziness, facial asymmetry, light-headedness and headaches.   Hematological: Negative for adenopathy. Does not bruise/bleed easily.   Psychiatric/Behavioral: Negative for agitation, behavioral problems and confusion.      Objective:     Vital Signs (Most Recent):  Temp: 99.2 °F (37.3 °C) (03/04/17 1209)  Pulse: 103 (03/04/17 1252)  Resp: 20 (03/04/17 1252)  BP: 105/74 (03/04/17 1209)  SpO2: 95 % (03/04/17 1252) Vital Signs (24h Range):  Temp:  [98.5 °F (36.9 °C)-99.2 °F (37.3 °C)] 99.2 °F (37.3 °C)  Pulse:  [] 103  Resp:  [16-28] 20  SpO2:  [94 %-100 %] 95 %  BP: ()/(64-77) 105/74     Weight: 49.9 kg (110 lb) (03/04/17 0227)  Body mass index is 20.12 kg/(m^2).      Intake/Output Summary (Last 24 hours) at 03/04/17 1255  Last data filed at 03/04/17 1210   Gross per 24 hour   Intake           518.67 ml   Output              900 ml   Net          -381.33 ml       Lines/Drains/Airways     Central Venous Catheter Line                 Port A Cath Single Lumen right subclavian -- days          Peripheral Intravenous Line                 Peripheral IV - Single Lumen 03/03/17 2349 Left Antecubital less than 1 day                Physical Exam   Constitutional: He is oriented to person, place, and time. He appears well-developed.   HENT:   Head: Normocephalic and atraumatic.   Eyes: EOM are normal. Pupils are equal, round, and reactive to light.   Neck: Normal range of motion.   Cardiovascular: Normal rate and regular rhythm.    Pulmonary/Chest: He is in respiratory distress. He has rales.   Abdominal: Soft.   Musculoskeletal: Normal range of motion.   Neurological: He is alert and oriented to person, place, and time.   Skin: Skin is warm and dry.   Vitals reviewed.      Significant Labs:  Amylase: No results for input(s): AMYLASE in the last 48 hours.  CBC:   Recent Labs  Lab 03/03/17  2340   WBC 10.69   HGB 10.8*   HCT 33.5*        BMP:   Recent Labs  Lab 03/03/17  2340         K 4.2      CO2 31*   BUN 11   CREATININE 0.7   CALCIUM 10.1     Coagulation:   Recent Labs  Lab 03/03/17  2340   INR 1.1     Lipase: No results for input(s): LIPASE in the last 48 hours.  Liver Function Test:   Recent Labs  Lab  03/03/17  2340   ALT 82*   AST 68*   ALKPHOS 214*   BILITOT 0.7   PROT 6.9   ALBUMIN 2.3*       Significant Imaging:  Imaging results within the past 24 hours have been reviewed.

## 2017-03-04 NOTE — SUBJECTIVE & OBJECTIVE
Past Medical History:   Diagnosis Date    Cancer     lung    COPD (chronic obstructive pulmonary disease)     GERD (gastroesophageal reflux disease)     Lumbar vertebral fracture        Past Surgical History:   Procedure Laterality Date    ABSCESS DRAINAGE      APPENDECTOMY      BACK SURGERY      ESOPHAGUS SURGERY      HAND SURGERY Left        Review of patient's allergies indicates:   Allergen Reactions    Ambien [zolpidem] Other (See Comments)     Makes me extremely hyped up like im going crazy.    Zoloft [sertraline] Other (See Comments)     unknown       No current facility-administered medications on file prior to encounter.      Current Outpatient Prescriptions on File Prior to Encounter   Medication Sig    albuterol-ipratropium 2.5mg-0.5mg/3mL (DUO-NEB) 0.5 mg-3 mg(2.5 mg base)/3 mL nebulizer solution Take 3 mLs by nebulization every 6 (six) hours as needed for Wheezing or Shortness of Breath.    alprazolam (XANAX) 0.25 MG tablet Take 2 tablets (0.5 mg total) by mouth 2 (two) times daily as needed for Insomnia or Anxiety.    atorvastatin (LIPITOR) 40 MG tablet Take 40 mg by mouth every evening.    benzonatate (TESSALON) 100 MG capsule Take 1 capsule (100 mg total) by mouth 3 (three) times daily as needed for Cough.    DICYCLOMINE HCL (GI COCKTAIL SIMPLE RECORD) Take 15 mLs by mouth daily as needed.    docusate sodium (COLACE) 100 MG capsule Take 1 capsule (100 mg total) by mouth 2 (two) times daily.    esomeprazole (NEXIUM) 40 MG capsule Take 1 capsule (40 mg total) by mouth 2 (two) times daily. (Patient taking differently: Take 20 mg by mouth once daily. )    fish oil-omega-3 fatty acids 300-1,000 mg capsule Take 2 g by mouth once daily.    HYDROmorphone (DILAUDID) 2 MG tablet Take 2 tablets (4 mg total) by mouth every 4 (four) hours as needed for Pain.    meclizine (ANTIVERT) 25 mg tablet Take 1 tablet (25 mg total) by mouth 3 (three) times daily as needed for Dizziness. (Patient taking  differently: Take 25 mg by mouth 3 (three) times daily. )    ondansetron (ZOFRAN-ODT) 4 MG TbDL Take 2 tablets (8 mg total) by mouth every 8 (eight) hours as needed.    OXYGEN-AIR DELIVERY SYSTEMS MISC by [facility] [place] [facility].(Non-Drug; Combo Route) route.    polyethylene glycol (GLYCOLAX) 17 gram PwPk Take 17 g by mouth daily as needed (constipation).    prochlorperazine (COMPAZINE) 10 MG tablet Take 1 tablet (10 mg total) by mouth every 6 (six) hours as needed.     Family History     Problem Relation (Age of Onset)    Cancer Father    Emphysema Mother        Social History Main Topics    Smoking status: Former Smoker     Packs/day: 1.00     Years: 50.00     Types: Cigarettes     Quit date: 5/10/2016    Smokeless tobacco: Former User     Types: Snuff     Quit date: 5/10/2016    Alcohol use No    Drug use: No    Sexual activity: Yes     Partners: Female     Review of Systems   Constitutional: Positive for activity change, appetite change and fatigue. Negative for fever.   HENT: Negative.  Negative for congestion, nosebleeds and sore throat.    Eyes: Negative.  Negative for photophobia, redness and visual disturbance.   Respiratory: Negative.  Negative for cough, shortness of breath and wheezing.    Cardiovascular: Negative.  Negative for chest pain, palpitations and leg swelling.   Gastrointestinal: Positive for nausea (dysphagia). Negative for abdominal pain, constipation, diarrhea and vomiting.   Endocrine: Negative.  Negative for polydipsia, polyphagia and polyuria.   Genitourinary: Negative.  Negative for dysuria, flank pain, frequency and urgency.   Musculoskeletal: Negative.  Negative for arthralgias, back pain and joint swelling.   Skin: Negative.  Negative for color change, pallor and rash.   Allergic/Immunologic: Negative.  Negative for environmental allergies, food allergies and immunocompromised state.   Neurological: Negative.  Negative for dizziness, syncope, weakness, light-headedness, numbness and headaches.    Hematological: Negative.  Negative for adenopathy. Does not bruise/bleed easily.   Psychiatric/Behavioral: Negative.  Negative for confusion and hallucinations. The patient is not nervous/anxious.    All other systems reviewed and are negative.    Objective:     Vital Signs (Most Recent):  Temp: 98.9 °F (37.2 °C) (03/04/17 0118)  Pulse: 108 (03/04/17 0118)  Resp: (!) 22 (03/04/17 0118)  BP: 108/68 (03/04/17 0118)  SpO2: 96 % (03/04/17 0118) Vital Signs (24h Range):  Temp:  [98.9 °F (37.2 °C)] 98.9 °F (37.2 °C)  Pulse:  [103-108] 108  Resp:  [16-28] 22  SpO2:  [96 %-100 %] 96 %  BP: (102-108)/(64-68) 108/68        There is no height or weight on file to calculate BMI.    Physical Exam   Constitutional: He is oriented to person, place, and time. No distress.   Frail, thinly built, cachectic appearing   HENT:   Head: Normocephalic and atraumatic.   Eyes: Conjunctivae and EOM are normal. Pupils are equal, round, and reactive to light. No scleral icterus.   Neck: Normal range of motion. Neck supple. No thyromegaly present.   Cardiovascular: Normal rate, regular rhythm and normal heart sounds.    No murmur heard.  Pulmonary/Chest: Effort normal and breath sounds normal. No respiratory distress. He has no wheezes. He exhibits no tenderness.   Abdominal: Soft. Bowel sounds are normal. There is no tenderness.   Musculoskeletal: Normal range of motion. He exhibits no edema or tenderness.   Neurological: He is alert and oriented to person, place, and time. No cranial nerve deficit. He exhibits normal muscle tone. Coordination normal.   Skin: Skin is warm and dry. He is not diaphoretic.   Temporal wasting   Psychiatric: He has a normal mood and affect. His behavior is normal.   Nursing note and vitals reviewed.       Significant Labs:   BMP:   Recent Labs  Lab 03/03/17  2340         K 4.2      CO2 31*   BUN 11   CREATININE 0.7   CALCIUM 10.1     CBC:   Recent Labs  Lab 03/03/17  2340   WBC 10.69   HGB 10.8*    HCT 33.5*        CMP:   Recent Labs  Lab 03/03/17  2340      K 4.2      CO2 31*      BUN 11   CREATININE 0.7   CALCIUM 10.1   PROT 6.9   ALBUMIN 2.3*   BILITOT 0.7   ALKPHOS 214*   AST 68*   ALT 82*   ANIONGAP 9   EGFRNONAA >60     Urine Studies: No results for input(s): COLORU, APPEARANCEUA, PHUR, SPECGRAV, PROTEINUA, GLUCUA, KETONESU, BILIRUBINUA, OCCULTUA, NITRITE, UROBILINOGEN, LEUKOCYTESUR, RBCUA, WBCUA, BACTERIA, SQUAMEPITHEL, HYALINECASTS in the last 48 hours.    Invalid input(s): [facility]  All pertinent labs within the past 24 hours have been reviewed.    Significant Imaging:   Imaging Results         X-Ray Chest PA And Lateral (In process)

## 2017-03-04 NOTE — ED PROVIDER NOTES
"SCRIBE #1 NOTE: I, Sophie Renzo, am scribing for, and in the presence of, Fanny Beal MD. I have scribed the entire note.      History      Chief Complaint   Patient presents with    Chest Pain     x 9 days       Review of patient's allergies indicates:   Allergen Reactions    Ambien [zolpidem] Other (See Comments)     Makes me extremely hyped up like im going crazy.    Zoloft [sertraline] Other (See Comments)     unknown        HPI   HPI    3/3/2017, 11:28 PM   History obtained from the patient      History of Present Illness: Frederick J Lejeune is a 66 y.o. male patient who presents to the Emergency Department for CP which onset gradually 9 days ago. Pt has been in pain since esophageal stent was placed 9 days ago by Dr. Cheng. Pt reports being on chemo for lung cancer. Symptoms are constant and moderate in severity. The patient describes the sxs as "something sitting on my chest". No mitigating factors reported. Pain is exacerbated with movement, cough, and PO intake. Associated sxs include chronic cough and choking when eating for the past 4 days. Patient denies any SOB, diaphoresis, palpitations, extremity weakness/numbness, leg swelling, dizziness,  N/V, and all other sxs at this time. No further complaints or concerns at this time.     Arrival mode: EMS    PCP: Kenrick Muñoz MD       Past Medical History:  Past Medical History:   Diagnosis Date    Cancer     lung    COPD (chronic obstructive pulmonary disease)     GERD (gastroesophageal reflux disease)     Lumbar vertebral fracture        Past Surgical History:  Past Surgical History:   Procedure Laterality Date    ABSCESS DRAINAGE      APPENDECTOMY      BACK SURGERY      ESOPHAGUS SURGERY      HAND SURGERY Left          Family History:  Family History   Problem Relation Age of Onset    Emphysema Mother     Cancer Father      mesothelioma       Social History:  Social History     Social History Main Topics    Smoking status: Former Smoker     " Packs/day: 1.00     Years: 50.00     Types: Cigarettes     Quit date: 5/10/2016    Smokeless tobacco: Former User     Types: Snuff     Quit date: 5/10/2016    Alcohol use No    Drug use: No    Sexual activity: Yes     Partners: Female       ROS   Review of Systems   Constitutional: Negative for diaphoresis and fever.   HENT: Negative for sore throat.    Respiratory: Positive for cough (chronic) and choking (when eating). Negative for shortness of breath.    Cardiovascular: Positive for chest pain. Negative for palpitations and leg swelling.   Gastrointestinal: Negative for nausea and vomiting.   Genitourinary: Negative for dysuria.   Musculoskeletal: Negative for back pain.   Skin: Negative for rash.   Neurological: Negative for dizziness, weakness and numbness.   Hematological: Does not bruise/bleed easily.   All other systems reviewed and are negative.      Physical Exam    Initial Vitals   BP Pulse Resp Temp SpO2   03/03/17 2309 03/03/17 2309 03/03/17 2309 -- 03/03/17 2309   108/64 105 16  97 %      Physical Exam  Nursing Notes and Vital Signs Reviewed.  Constitutional: Patient is in no acute distress. Awake and alert. Well-developed and well-nourished.  Head: Atraumatic. Normocephalic.  Eyes: PERRL. EOM intact. Conjunctivae are not pale. No scleral icterus.  ENT: Mucous membranes are moist. Oropharynx is clear and symmetric.    Neck: Supple. Full ROM. No lymphadenopathy.  Cardiovascular: Tacchycardic. Regular rhythm. No murmurs, rubs, or gallops. Distal pulses are 2+ and symmetric.  Pulmonary/Chest: No respiratory distress. Clear to auscultation bilaterally. No wheezing, rales, or rhonchi.  Abdominal: Soft and non-distended.  There is no tenderness.  No rebound, guarding, or rigidity. Good bowel sounds.  Genitourinary: No CVA tenderness  Musculoskeletal: Moves all extremities. No obvious deformities. No edema. No calf tenderness.  Skin: Warm and dry. Port in R anterior chest.  Neurological:  Alert, awake,  "and appropriate.  Normal speech.  No acute focal neurological deficits are appreciated.  Psychiatric: Normal affect. Good eye contact. Appropriate in content.    ED Course    Procedures  ED Vital Signs:  Vitals:    03/03/17 2309 03/03/17 2339 03/03/17 2340 03/03/17 2345   BP: 108/64      Pulse: 105 104  104   Resp: 16   (!) 27   Temp:       TempSrc:       SpO2: 97%  97% 100%   Weight:       Height:        03/03/17 2347 03/04/17 0008 03/04/17 0013 03/04/17 0118   BP: 102/66   108/68   Pulse: 104 103 104 108   Resp: (!) 28 (!) 22 (!) 25 (!) 22   Temp:    98.9 °F (37.2 °C)   TempSrc:    Oral   SpO2:  100% 100% 96%   Weight:       Height:        03/04/17 0227 03/04/17 0400   BP: 109/67 96/65   Pulse: 107 101   Resp: (!) 22 20   Temp: 99 °F (37.2 °C) 99 °F (37.2 °C)   TempSrc: Oral Oral   SpO2: 96% (!) 94%   Weight: 49.9 kg (110 lb)    Height: 5' 2" (1.575 m)        Abnormal Lab Results:  Labs Reviewed   CBC W/ AUTO DIFFERENTIAL - Abnormal; Notable for the following:        Result Value    RBC 3.08 (*)     Hemoglobin 10.8 (*)     Hematocrit 33.5 (*)      (*)     MCH 35.1 (*)     Gran # 8.1 (*)     Lymph # 0.8 (*)     Mono # 1.7 (*)     Gran% 75.6 (*)     Lymph% 7.3 (*)     Mono% 15.8 (*)     All other components within normal limits    Narrative:     PLEASE REVIEW ORDER START TIME BEFORE MARKING SPECIMEN  COLLECTED.   COMPREHENSIVE METABOLIC PANEL - Abnormal; Notable for the following:     CO2 31 (*)     Albumin 2.3 (*)     Alkaline Phosphatase 214 (*)     AST 68 (*)     ALT 82 (*)     All other components within normal limits    Narrative:     PLEASE REVIEW ORDER START TIME BEFORE MARKING SPECIMEN  COLLECTED.   B-TYPE NATRIURETIC PEPTIDE - Abnormal; Notable for the following:      (*)     All other components within normal limits    Narrative:     PLEASE REVIEW ORDER START TIME BEFORE MARKING SPECIMEN  COLLECTED.   PROTIME-INR    Narrative:     PLEASE REVIEW ORDER START TIME BEFORE MARKING " SPECIMEN  COLLECTED.   TROPONIN I    Narrative:     PLEASE REVIEW ORDER START TIME BEFORE MARKING SPECIMEN  COLLECTED.        All Lab Results:  Results for orders placed or performed during the hospital encounter of 03/03/17   CBC auto differential   Result Value Ref Range    WBC 10.69 3.90 - 12.70 K/uL    RBC 3.08 (L) 4.60 - 6.20 M/uL    Hemoglobin 10.8 (L) 14.0 - 18.0 g/dL    Hematocrit 33.5 (L) 40.0 - 54.0 %     (H) 82 - 98 fL    MCH 35.1 (H) 27.0 - 31.0 pg    MCHC 32.2 32.0 - 36.0 %    RDW 12.6 11.5 - 14.5 %    Platelets 233 150 - 350 K/uL    MPV 10.2 9.2 - 12.9 fL    Gran # 8.1 (H) 1.8 - 7.7 K/uL    Lymph # 0.8 (L) 1.0 - 4.8 K/uL    Mono # 1.7 (H) 0.3 - 1.0 K/uL    Eos # 0.1 0.0 - 0.5 K/uL    Baso # 0.01 0.00 - 0.20 K/uL    Gran% 75.6 (H) 38.0 - 73.0 %    Lymph% 7.3 (L) 18.0 - 48.0 %    Mono% 15.8 (H) 4.0 - 15.0 %    Eosinophil% 1.2 0.0 - 8.0 %    Basophil% 0.1 0.0 - 1.9 %    Differential Method Automated    Comprehensive metabolic panel   Result Value Ref Range    Sodium 142 136 - 145 mmol/L    Potassium 4.2 3.5 - 5.1 mmol/L    Chloride 102 95 - 110 mmol/L    CO2 31 (H) 23 - 29 mmol/L    Glucose 104 70 - 110 mg/dL    BUN, Bld 11 8 - 23 mg/dL    Creatinine 0.7 0.5 - 1.4 mg/dL    Calcium 10.1 8.7 - 10.5 mg/dL    Total Protein 6.9 6.0 - 8.4 g/dL    Albumin 2.3 (L) 3.5 - 5.2 g/dL    Total Bilirubin 0.7 0.1 - 1.0 mg/dL    Alkaline Phosphatase 214 (H) 55 - 135 U/L    AST 68 (H) 10 - 40 U/L    ALT 82 (H) 10 - 44 U/L    Anion Gap 9 8 - 16 mmol/L    eGFR if African American >60 >60 mL/min/1.73 m^2    eGFR if non African American >60 >60 mL/min/1.73 m^2   Protime-INR   Result Value Ref Range    Prothrombin Time 11.5 9.0 - 12.5 sec    INR 1.1 0.8 - 1.2   Troponin I   Result Value Ref Range    Troponin I 0.010 0.000 - 0.026 ng/mL   B-Type natriuretic peptide (BNP)   Result Value Ref Range     (H) 0 - 99 pg/mL       Imaging Results:  Imaging Results         X-Ray Chest PA And Lateral (In process)          Per  ED physician, pt's CXR results stable.    The EKG was ordered, reviewed, and independently interpreted by the ED provider.  Interpretation time: 2317  Rate: 104 BPM  Rhythm: sinus tachycardia  Interpretation: Possible left atrial enlargement. T wave abnormality, consider anterolateral ischemia. Abnormal ECG. No STEMI.  When compared to EKG performed 2/26/17, there are no significant changes.         The Emergency Provider reviewed the vital signs and test results, which are outlined above.    ED Discussion     1:06 AM: Discussed case with Dr. Dockery ([facility]), agrees with current care and management of pt and accepts admission.   Admitting Service: [facility]   Admitting Physician: Dr. Dockery  Admit to: Med-Surg    1:10 AM: Re-evaluated pt. Discussed with patient the need to have peg tube placed. Pt agreed with plan. I have discussed test results, shared treatment plan, and the need for admission with patient and family at bedside. Pt and family express understanding at this time and agree with all information. All questions answered. Pt and family have no further questions or concerns at this time. Pt is ready for admit.    ED Medication(s):  Medications   enoxaparin injection 40 mg (not administered)   ondansetron injection 4 mg (not administered)   morphine injection 4 mg (not administered)   dextrose 5 % and 0.9 % NaCl infusion ( Intravenous New Bag 3/4/17 3392)   hydrALAZINE injection 10 mg (not administered)   guaifenesin 100 mg/5 ml syrup 200 mg (not administered)   hydromorphone (PF) injection 1 mg (1 mg Intravenous Given 3/4/17 0342)   albuterol-ipratropium 2.5mg-0.5mg/3mL nebulizer solution 3 mL (not administered)   famotidine IVPB 20 mg (not administered)   sodium chloride 0.9% bolus 500 mL (0 mLs Intravenous Stopped 3/4/17 0118)   morphine injection 4 mg (4 mg Intravenous Given 3/3/17 7162)   ondansetron injection 4 mg (4 mg Intravenous Given 3/3/17 2352)   albuterol-ipratropium  2.5mg-0.5mg/3mL nebulizer solution 3 mL (3 mLs Nebulization Given 3/4/17 0012)       Current Discharge Medication List                Medical Decision Making    Medical Decision Making:   Clinical Tests:   Lab Tests: Ordered and Reviewed  Radiological Study: Reviewed and Ordered  Medical Tests: Reviewed and Ordered           Scribe Attestation:   Scribe #1: I performed the above scribed service and the documentation accurately describes the services I performed. I attest to the accuracy of the note.    Attending:   Physician Attestation Statement for Scribe #1: I, Fanny Beal MD, personally performed the services described in this documentation, as scribed by Sophie Muller, in my presence, and it is both accurate and complete.          Clinical Impression       ICD-10-CM ICD-9-CM   1. Oropharyngeal dysphagia R13.12 787.22   2. Esophageal obstruction K22.2 530.3       Disposition:   Disposition: Admitted (Med-surg)  Condition: Fair         Fanny Beal MD  03/04/17 0548

## 2017-03-04 NOTE — PLAN OF CARE
Problem: Patient Care Overview  Goal: Plan of Care Review  Outcome: Ongoing (interventions implemented as appropriate)  Fall precautions in place. Side rails up. Bed locked and low in position. Call light and personal items within reach. 24 hour order chart check completed. Pt educated on medication's side effects. Pt verbalized understanding.   Will continue to monitor.

## 2017-03-05 PROBLEM — J18.9 PNEUMONIA DUE TO INFECTIOUS ORGANISM: Status: ACTIVE | Noted: 2017-03-05

## 2017-03-05 PROBLEM — R13.12 OROPHARYNGEAL DYSPHAGIA: Status: ACTIVE | Noted: 2017-03-05

## 2017-03-05 LAB
ANION GAP SERPL CALC-SCNC: 9 MMOL/L
BASOPHILS # BLD AUTO: 0 K/UL
BASOPHILS NFR BLD: 0 %
BUN SERPL-MCNC: 12 MG/DL
CALCIUM SERPL-MCNC: 10 MG/DL
CHLORIDE SERPL-SCNC: 109 MMOL/L
CO2 SERPL-SCNC: 29 MMOL/L
CREAT SERPL-MCNC: 0.6 MG/DL
DIFFERENTIAL METHOD: ABNORMAL
EOSINOPHIL # BLD AUTO: 0 K/UL
EOSINOPHIL NFR BLD: 0 %
ERYTHROCYTE [DISTWIDTH] IN BLOOD BY AUTOMATED COUNT: 13.8 %
EST. GFR  (AFRICAN AMERICAN): >60 ML/MIN/1.73 M^2
EST. GFR  (NON AFRICAN AMERICAN): >60 ML/MIN/1.73 M^2
GLUCOSE SERPL-MCNC: 133 MG/DL
HCT VFR BLD AUTO: 32.9 %
HGB BLD-MCNC: 10.7 G/DL
LYMPHOCYTES # BLD AUTO: 0.6 K/UL
LYMPHOCYTES NFR BLD: 7.1 %
MAGNESIUM SERPL-MCNC: 2.2 MG/DL
MCH RBC QN AUTO: 34.9 PG
MCHC RBC AUTO-ENTMCNC: 32.5 %
MCV RBC AUTO: 107 FL
MONOCYTES # BLD AUTO: 0.5 K/UL
MONOCYTES NFR BLD: 5.7 %
NEUTROPHILS # BLD AUTO: 7.6 K/UL
NEUTROPHILS NFR BLD: 87.2 %
PHOSPHATE SERPL-MCNC: 3.3 MG/DL
PLATELET # BLD AUTO: 259 K/UL
PMV BLD AUTO: 10.5 FL
POTASSIUM SERPL-SCNC: 3.5 MMOL/L
RBC # BLD AUTO: 3.07 M/UL
SODIUM SERPL-SCNC: 147 MMOL/L
WBC # BLD AUTO: 8.73 K/UL

## 2017-03-05 PROCEDURE — 83735 ASSAY OF MAGNESIUM: CPT

## 2017-03-05 PROCEDURE — 63600175 PHARM REV CODE 636 W HCPCS: Performed by: NURSE PRACTITIONER

## 2017-03-05 PROCEDURE — 84100 ASSAY OF PHOSPHORUS: CPT

## 2017-03-05 PROCEDURE — 85025 COMPLETE CBC W/AUTO DIFF WBC: CPT

## 2017-03-05 PROCEDURE — 21400001 HC TELEMETRY ROOM

## 2017-03-05 PROCEDURE — 11000001 HC ACUTE MED/SURG PRIVATE ROOM

## 2017-03-05 PROCEDURE — 63600175 PHARM REV CODE 636 W HCPCS: Performed by: EMERGENCY MEDICINE

## 2017-03-05 PROCEDURE — 36415 COLL VENOUS BLD VENIPUNCTURE: CPT

## 2017-03-05 PROCEDURE — 25000003 PHARM REV CODE 250: Performed by: NURSE PRACTITIONER

## 2017-03-05 PROCEDURE — 99233 SBSQ HOSP IP/OBS HIGH 50: CPT | Mod: ,,, | Performed by: INTERNAL MEDICINE

## 2017-03-05 PROCEDURE — 80048 BASIC METABOLIC PNL TOTAL CA: CPT

## 2017-03-05 PROCEDURE — 94640 AIRWAY INHALATION TREATMENT: CPT

## 2017-03-05 PROCEDURE — 63600175 PHARM REV CODE 636 W HCPCS: Performed by: INTERNAL MEDICINE

## 2017-03-05 PROCEDURE — 27000221 HC OXYGEN, UP TO 24 HOURS

## 2017-03-05 PROCEDURE — 94761 N-INVAS EAR/PLS OXIMETRY MLT: CPT

## 2017-03-05 PROCEDURE — 25000242 PHARM REV CODE 250 ALT 637 W/ HCPCS: Performed by: INTERNAL MEDICINE

## 2017-03-05 PROCEDURE — 25000003 PHARM REV CODE 250: Performed by: INTERNAL MEDICINE

## 2017-03-05 PROCEDURE — 96372 THER/PROPH/DIAG INJ SC/IM: CPT

## 2017-03-05 RX ORDER — HYDROMORPHONE HYDROCHLORIDE 1 MG/ML
1 INJECTION, SOLUTION INTRAMUSCULAR; INTRAVENOUS; SUBCUTANEOUS
Status: DISCONTINUED | OUTPATIENT
Start: 2017-03-05 | End: 2017-03-14

## 2017-03-05 RX ORDER — CEFEPIME HYDROCHLORIDE 2 G/50ML
2 INJECTION, SOLUTION INTRAVENOUS
Status: DISCONTINUED | OUTPATIENT
Start: 2017-03-05 | End: 2017-03-13

## 2017-03-05 RX ADMIN — ARFORMOTEROL TARTRATE 15 MCG: 15 SOLUTION RESPIRATORY (INHALATION) at 09:03

## 2017-03-05 RX ADMIN — HYDROMORPHONE HYDROCHLORIDE 1 MG: 1 INJECTION, SOLUTION INTRAMUSCULAR; INTRAVENOUS; SUBCUTANEOUS at 03:03

## 2017-03-05 RX ADMIN — IPRATROPIUM BROMIDE AND ALBUTEROL SULFATE 3 ML: .5; 3 SOLUTION RESPIRATORY (INHALATION) at 08:03

## 2017-03-05 RX ADMIN — IPRATROPIUM BROMIDE AND ALBUTEROL SULFATE 3 ML: .5; 3 SOLUTION RESPIRATORY (INHALATION) at 09:03

## 2017-03-05 RX ADMIN — HYDROMORPHONE HYDROCHLORIDE 1 MG: 1 INJECTION, SOLUTION INTRAMUSCULAR; INTRAVENOUS; SUBCUTANEOUS at 07:03

## 2017-03-05 RX ADMIN — METHYLPREDNISOLONE SODIUM SUCCINATE 80 MG: 40 INJECTION, POWDER, FOR SOLUTION INTRAMUSCULAR; INTRAVENOUS at 05:03

## 2017-03-05 RX ADMIN — BUDESONIDE 0.5 MG: 0.5 INHALANT RESPIRATORY (INHALATION) at 09:03

## 2017-03-05 RX ADMIN — ASCORBIC ACID, VITAMIN A PALMITATE, CHOLECALCIFEROL, THIAMINE HYDROCHLORIDE, RIBOFLAVIN-5 PHOSPHATE SODIUM, PYRIDOXINE HYDROCHLORIDE, NIACINAMIDE, DEXPANTHENOL, ALPHA-TOCOPHEROL ACETATE, VITAMIN K1, FOLIC ACID, BIOTIN, CYANOCOBALAMIN: 200; 3300; 200; 6; 3.6; 6; 40; 15; 10; 150; 600; 60; 5 INJECTION, SOLUTION INTRAVENOUS at 01:03

## 2017-03-05 RX ADMIN — ONDANSETRON 4 MG: 2 INJECTION INTRAMUSCULAR; INTRAVENOUS at 07:03

## 2017-03-05 RX ADMIN — HYDROMORPHONE HYDROCHLORIDE 1 MG: 1 INJECTION, SOLUTION INTRAMUSCULAR; INTRAVENOUS; SUBCUTANEOUS at 01:03

## 2017-03-05 RX ADMIN — BUDESONIDE 0.5 MG: 0.5 INHALANT RESPIRATORY (INHALATION) at 08:03

## 2017-03-05 RX ADMIN — FAMOTIDINE 20 MG: 20 INJECTION, SOLUTION INTRAVENOUS at 10:03

## 2017-03-05 RX ADMIN — FAMOTIDINE 20 MG: 20 INJECTION, SOLUTION INTRAVENOUS at 09:03

## 2017-03-05 RX ADMIN — HYDROMORPHONE HYDROCHLORIDE 1 MG: 1 INJECTION, SOLUTION INTRAMUSCULAR; INTRAVENOUS; SUBCUTANEOUS at 05:03

## 2017-03-05 RX ADMIN — METHYLPREDNISOLONE SODIUM SUCCINATE 80 MG: 40 INJECTION, POWDER, FOR SOLUTION INTRAMUSCULAR; INTRAVENOUS at 10:03

## 2017-03-05 RX ADMIN — ENOXAPARIN SODIUM 40 MG: 100 INJECTION SUBCUTANEOUS at 11:03

## 2017-03-05 RX ADMIN — CEFEPIME HYDROCHLORIDE 2 G: 2 INJECTION, SOLUTION INTRAVENOUS at 05:03

## 2017-03-05 RX ADMIN — HYDROMORPHONE HYDROCHLORIDE 1 MG: 1 INJECTION, SOLUTION INTRAMUSCULAR; INTRAVENOUS; SUBCUTANEOUS at 11:03

## 2017-03-05 RX ADMIN — HYDROMORPHONE HYDROCHLORIDE 1 MG: 1 INJECTION, SOLUTION INTRAMUSCULAR; INTRAVENOUS; SUBCUTANEOUS at 10:03

## 2017-03-05 RX ADMIN — METHYLPREDNISOLONE SODIUM SUCCINATE 80 MG: 40 INJECTION, POWDER, FOR SOLUTION INTRAMUSCULAR; INTRAVENOUS at 01:03

## 2017-03-05 RX ADMIN — HYDROMORPHONE HYDROCHLORIDE 1 MG: 1 INJECTION, SOLUTION INTRAMUSCULAR; INTRAVENOUS; SUBCUTANEOUS at 08:03

## 2017-03-05 RX ADMIN — ARFORMOTEROL TARTRATE 15 MCG: 15 SOLUTION RESPIRATORY (INHALATION) at 08:03

## 2017-03-05 RX ADMIN — CEFEPIME HYDROCHLORIDE 2 G: 2 INJECTION, SOLUTION INTRAVENOUS at 11:03

## 2017-03-05 NOTE — SUBJECTIVE & OBJECTIVE
Subjective:     Interval History:   Esophagogram revealed the followin. The stent is widely patent. Positioning appears grossly satisfactory.  2. The patient's had severe aspiration during the examination which she states is what happens to him on a routine basis. The exam was terminated at this point due to the severe aspiration.      Review of Systems   Constitutional: Positive for fatigue.   HENT: Positive for trouble swallowing.    Eyes: Negative for pain and itching.   Respiratory: Positive for shortness of breath.    Gastrointestinal: Positive for nausea and vomiting.   Endocrine: Negative.    Genitourinary: Negative.    Musculoskeletal: Positive for arthralgias.   Allergic/Immunologic: Negative.    Neurological: Negative.    Hematological: Negative.    Psychiatric/Behavioral: Negative.      Objective:     Vital Signs (Most Recent):  Temp: 97.5 °F (36.4 °C) (17 0757)  Pulse: 97 (17 112)  Resp: (!) 32 (17 112)  BP: 115/74 (17 112)  SpO2: (!) 93 % (17 112) Vital Signs (24h Range):  Temp:  [97.5 °F (36.4 °C)-98.9 °F (37.2 °C)] 97.5 °F (36.4 °C)  Pulse:  [] 97  Resp:  [17-32] 32  SpO2:  [88 %-95 %] 93 %  BP: (101-120)/(61-74) 115/74     Weight: 49.9 kg (110 lb) (17 0227)  Body mass index is 20.12 kg/(m^2).      Intake/Output Summary (Last 24 hours) at 17 1404  Last data filed at 17 1200   Gross per 24 hour   Intake          4224.67 ml   Output             1950 ml   Net          2274.67 ml       Lines/Drains/Airways     Central Venous Catheter Line                 Port A Cath Single Lumen right subclavian -- days          Peripheral Intravenous Line                 Peripheral IV - Single Lumen 17 0310 Left Forearm less than 1 day                Physical Exam   Constitutional: He is oriented to person, place, and time. He appears well-developed.   HENT:   Head: Normocephalic.   Eyes: Conjunctivae and EOM are normal. Pupils are equal, round, and  reactive to light.   Neck: Normal range of motion. Neck supple. No tracheal deviation present. No thyromegaly present.   Cardiovascular: Normal rate and regular rhythm.    Pulmonary/Chest: Effort normal. He has no wheezes. He has rales. He exhibits no tenderness.   Abdominal: Soft. Bowel sounds are normal. He exhibits no distension and no mass. There is no tenderness. There is no guarding.   Musculoskeletal: Normal range of motion.   Lymphadenopathy:     He has no cervical adenopathy.   Neurological: He is alert and oriented to person, place, and time. No cranial nerve deficit.   Skin: Skin is warm and dry.   Psychiatric: He has a normal mood and affect. His behavior is normal.   Nursing note and vitals reviewed.      Significant Labs:  CBC:   Recent Labs  Lab 03/03/17  2340 03/04/17  1345 03/05/17  1038   WBC 10.69 9.44 8.73   HGB 10.8* 11.1* 10.7*   HCT 33.5* 33.5* 32.9*    261 259     BMP:   Recent Labs  Lab 03/05/17  1038   *   *   K 3.5      CO2 29   BUN 12   CREATININE 0.6   CALCIUM 10.0   MG 2.2     Coagulation:   Recent Labs  Lab 03/03/17  2340   INR 1.1     Liver Function Test:   Recent Labs  Lab 03/03/17  2340 03/04/17  1346   ALT 82* 73*   AST 68* 58*   ALKPHOS 214* 204*   BILITOT 0.7 0.8   PROT 6.9 6.6   ALBUMIN 2.3* 2.2*         Significant Imaging:  Imaging results within the past 24 hours have been reviewed.

## 2017-03-05 NOTE — PROGRESS NOTES
[facility] Hospital Medicine  Progress Note    Patient Name: Frederick J Lejeune  MRN: [id number]  Patient Class: IP- Inpatient   Admission Date: 3/3/2017  Length of Stay: 0 days  Attending Physician: Tamara Guerrero MD  Primary Care Provider: Kenrick Muñoz MD        Subjective:     Principal Problem:Primary malignant neoplasm of right lung metastatic to other site    HPI:  Frederick J Lejeune is a 66 y.o. male with PMHx of GERD, weight loss, dysphagia, odynophagia, RLL lung mass squamous cell carcinoma, who presented to ER with continued disphagia. He has been recently hospitalized from Dr. Espinosa's office with c/o dysphagia, weakness, dizziness, chest pain. He has had difficulty eating due to odynophagia for the past month. Patient has completed palliative radiation to relieve right mainstem bronchus obstruction. He then was treated with weekly Carbo-Taxol x 6-7 weeks, which was discontinued due to disease progression on imaging in early 1/2017. He is currently receiving Pembrolizumab every 3 weeks. He was recently evaluated by Dr. Cheng, at last hospitalization, who recommended further evaluation with esophagram, that was done on 2/24/17 with Esophageal stent placement. Per discharge summary, Dr. Cheng stated if patient continues to have severe dysphagia or chest pain then he could remove the stent and place PEG tube. He was discharged on 2/28/17.    Patient returns to the hospital 3 days after discharge with symptoms of inability to keep anything down. Getting dehydrated, He is agreeable for PEG placement.      Hospital Course:  GI, Dr. Anguiano, was consulted and recommended an [place] Esophagogram to determine the patency of the stent; if patent PEG tube could be placed, guided via endoscopy. If the stent is not patent, he will need to have a surgically placed PEG. He was concerned that if external compression from the lung mass is collapsing the esophagus and stent, then the instrument couldn't be  passed. Patient also appeared to be in mild respiratory distress which resolved with Breathing treatments and IV steroids. Discussed case with Dr. Vargas. Discussed code status with patient and he is a full code.    Interval History: Dr. Anguiano discussed PEG placement: pending results of Esophagram    Review of Systems   Constitutional: Positive for activity change, appetite change and fatigue. Negative for fever.   HENT: Negative.  Negative for congestion, nosebleeds and sore throat.    Eyes: Negative.  Negative for photophobia, redness and visual disturbance.   Respiratory: Positive for cough and shortness of breath. Negative for wheezing.    Cardiovascular: Negative.  Negative for chest pain, palpitations and leg swelling.   Gastrointestinal: Positive for nausea (dysphagia). Negative for abdominal pain, constipation, diarrhea and vomiting.   Endocrine: Negative.  Negative for polydipsia, polyphagia and polyuria.   Genitourinary: Negative.  Negative for dysuria, flank pain, frequency and urgency.   Musculoskeletal: Negative.  Negative for arthralgias, back pain and joint swelling.   Skin: Negative.  Negative for color change, pallor and rash.   Allergic/Immunologic: Negative.  Negative for environmental allergies, food allergies and immunocompromised state.   Neurological: Negative.  Negative for dizziness, syncope, weakness, light-headedness, numbness and headaches.   Hematological: Negative.  Negative for adenopathy. Does not bruise/bleed easily.   Psychiatric/Behavioral: Negative.  Negative for confusion and hallucinations. The patient is not nervous/anxious.    All other systems reviewed and are negative.    Objective:     Vital Signs (Most Recent):  Temp: 98.9 °F (37.2 °C) (03/04/17 1618)  Pulse: 102 (03/04/17 1659)  Resp: 17 (03/04/17 1659)  BP: 101/64 (03/04/17 1618)  SpO2: (!) 93 % (03/04/17 1659) Vital Signs (24h Range):  Temp:  [98.5 °F (36.9 °C)-99.2 °F (37.3 °C)] 98.9 °F (37.2 °C)  Pulse:  []  102  Resp:  [16-28] 17  SpO2:  [93 %-100 %] 93 %  BP: ()/(64-77) 101/64     Weight: 49.9 kg (110 lb)  Body mass index is 20.12 kg/(m^2).    Intake/Output Summary (Last 24 hours) at 03/04/17 1841  Last data filed at 03/04/17 1836   Gross per 24 hour   Intake          1270.67 ml   Output             1350 ml   Net           -79.33 ml      Physical Exam   Constitutional: He is oriented to person, place, and time. No distress.   Frail, thinly built, cachectic, ill appearing   HENT:   Head: Normocephalic and atraumatic.   Eyes: Conjunctivae and EOM are normal. Pupils are equal, round, and reactive to light. No scleral icterus.   Neck: Normal range of motion. Neck supple. No thyromegaly present.   Cardiovascular: Normal rate, regular rhythm and normal heart sounds.    No murmur heard.  Pulmonary/Chest: Effort normal and breath sounds normal. No respiratory distress. He has no wheezes. He exhibits no tenderness.   Abdominal: Soft. Bowel sounds are normal. There is no tenderness.   Musculoskeletal: Normal range of motion. He exhibits no edema or tenderness.   Neurological: He is alert and oriented to person, place, and time. No cranial nerve deficit. He exhibits normal muscle tone. Coordination normal.   Skin: Skin is warm and dry. He is not diaphoretic.   Temporal wasting   Psychiatric: He has a normal mood and affect. His behavior is normal.   Nursing note and vitals reviewed.      Significant Labs:   CBC:   Recent Labs  Lab 03/03/17  2340 03/04/17  1345   WBC 10.69 9.44   HGB 10.8* 11.1*   HCT 33.5* 33.5*    261     CMP:   Recent Labs  Lab 03/03/17  2340 03/04/17  1346    146*   K 4.2 4.0    107   CO2 31* 31*    122*   BUN 11 9   CREATININE 0.7 0.6   CALCIUM 10.1 10.2   PROT 6.9 6.6   ALBUMIN 2.3* 2.2*   BILITOT 0.7 0.8   ALKPHOS 214* 204*   AST 68* 58*   ALT 82* 73*   ANIONGAP 9 8   EGFRNONAA >60 >60       Significant Imaging: I have reviewed all pertinent imaging results/findings within  the past 24 hours.   Imaging Results         X-Ray Chest PA And Lateral (Final result) Result time:  03/04/17 07:41:51    Final result by Avni Rivers III, MD (03/04/17 07:41:51)    Impression:         1. Slight improvement since February 28. Slight decrease in the effusions. Suspect minimal/mild congestion superimposed on chronic change but diminished since February. Esophageal stent again noted. No new abnormalities.      Electronically signed by: AVNI RIVERS MD  Date:     03/04/17  Time:    07:41     Narrative:    Two-view chest x-ray.    Clinical indication: Chest Pain     Heart size is unchanged compared to February.. Right-sided Mediport type catheter again noted in position as well as an esophageal stent. There is again moderate coarsening of the interstitial markings with small effusions suggested, best seen on the lateral image. These have decreased however compared to February. The interstitial markings are not quite as prominent suggesting decreased congestion superimposed on chronic change.            Assessment/Plan:      * Primary malignant neoplasm of right lung metastatic to other site  - Follow up with oncology as out-patient      Esophageal dysphagia  GI consulted  -esophagram ordered        Palma's esophagus without dysplasia  See above      Chronic respiratory failure with hypoxia  Nebulizers and IV steriods      Severe protein-calorie malnutrition  - Dietitian evaluation  - IV hydration for now      VTE Risk Mitigation         Ordered     enoxaparin injection 40 mg  Daily     Route:  Subcutaneous        03/04/17 0217     Place sequential compression device  Until discontinued      03/04/17 0222     Medium Risk of VTE  Once      03/04/17 0217          Kathleen Campos NP  Department of Hospital Medicine   [facility]

## 2017-03-05 NOTE — SUBJECTIVE & OBJECTIVE
Interval History: Esophogram showed patent stent and evidence of aspiration. Discussed with Dr. Anguiano.    Review of Systems   Constitutional: Positive for activity change, appetite change and fatigue. Negative for fever.   HENT: Negative.  Negative for congestion, nosebleeds and sore throat.    Eyes: Negative.  Negative for photophobia, redness and visual disturbance.   Respiratory: Positive for cough and shortness of breath. Negative for wheezing.    Cardiovascular: Negative.  Negative for chest pain, palpitations and leg swelling.   Gastrointestinal: Negative for abdominal pain, constipation, diarrhea, nausea (dysphagia) and vomiting.        Esophageal pain 8/10   Endocrine: Negative.  Negative for polydipsia, polyphagia and polyuria.   Genitourinary: Negative.  Negative for dysuria, flank pain, frequency and urgency.   Musculoskeletal: Negative.  Negative for arthralgias, back pain and joint swelling.   Skin: Negative.  Negative for color change, pallor and rash.   Allergic/Immunologic: Negative.  Negative for environmental allergies, food allergies and immunocompromised state.   Neurological: Negative.  Negative for dizziness, syncope, weakness, light-headedness, numbness and headaches.   Hematological: Negative.  Negative for adenopathy. Does not bruise/bleed easily.   Psychiatric/Behavioral: Negative.  Negative for confusion and hallucinations. The patient is not nervous/anxious.    All other systems reviewed and are negative.    Objective:     Vital Signs (Most Recent):  Temp: 97.5 °F (36.4 °C) (03/05/17 0757)  Pulse: 97 (03/05/17 1127)  Resp: (!) 32 (03/05/17 1127)  BP: 115/74 (03/05/17 1127)  SpO2: (!) 93 % (03/05/17 1127) Vital Signs (24h Range):  Temp:  [97.5 °F (36.4 °C)-98.9 °F (37.2 °C)] 97.5 °F (36.4 °C)  Pulse:  [] 97  Resp:  [17-32] 32  SpO2:  [88 %-95 %] 93 %  BP: (101-120)/(61-74) 115/74     Weight: 49.9 kg (110 lb)  Body mass index is 20.12 kg/(m^2).    Intake/Output Summary (Last 24 hours)  at 03/05/17 1221  Last data filed at 03/05/17 0837   Gross per 24 hour   Intake          4824.67 ml   Output             1750 ml   Net          3074.67 ml      Physical Exam   Constitutional: He is oriented to person, place, and time. No distress.   Frail, thinly built, cachectic, ill appearing   HENT:   Head: Normocephalic and atraumatic.   Eyes: Conjunctivae and EOM are normal. Pupils are equal, round, and reactive to light. No scleral icterus.   Neck: Normal range of motion. Neck supple. No thyromegaly present.   Cardiovascular: Normal rate, regular rhythm and normal heart sounds.    No murmur heard.  Pulmonary/Chest: He is in respiratory distress (mild). He has no wheezes. He has rales. He exhibits no tenderness.   Abdominal: Soft. Bowel sounds are normal. There is no tenderness.   Musculoskeletal: Normal range of motion. He exhibits no edema or tenderness.   Neurological: He is alert and oriented to person, place, and time. No cranial nerve deficit. He exhibits normal muscle tone. Coordination normal.   Skin: Skin is warm and dry. He is not diaphoretic.   Temporal wasting   Psychiatric: He has a normal mood and affect. His behavior is normal.   Nursing note and vitals reviewed.      Significant Labs:   CBC:   Recent Labs  Lab 03/03/17  2340 03/04/17  1345 03/05/17  1038   WBC 10.69 9.44 8.73   HGB 10.8* 11.1* 10.7*   HCT 33.5* 33.5* 32.9*    261 259     CMP:   Recent Labs  Lab 03/03/17  2340 03/04/17  1346 03/05/17  1038    146* 147*   K 4.2 4.0 3.5    107 109   CO2 31* 31* 29    122* 133*   BUN 11 9 12   CREATININE 0.7 0.6 0.6   CALCIUM 10.1 10.2 10.0   PROT 6.9 6.6  --    ALBUMIN 2.3* 2.2*  --    BILITOT 0.7 0.8  --    ALKPHOS 214* 204*  --    AST 68* 58*  --    ALT 82* 73*  --    ANIONGAP 9 8 9   EGFRNONAA >60 >60 >60       Significant Imaging: I have reviewed all pertinent imaging results/findings within the past 24 hours.   Imaging Results         FL Esophagram Complete (Final  result) Result time:  03/05/17 09:02:30    Final result by Avni Rivers III, MD (03/05/17 09:02:30)    Impression:        1. The stent is widely patent. Positioning appears grossly satisfactory.    2. The patient's had severe aspiration during the examination which she states is what happens to him on a routine basis. The exam was terminated at this point due to the severe aspiration.      Electronically signed by: AVNI RIVERS MD  Date:     03/05/17  Time:    09:02     Narrative:    Esophagram.    Compared to patient's prior pre-stent study. Esophageal stent has been placed grossly in satisfactory position and appears widely patent. No distal esophageal stricture. Barium did pass the stent into the distal esophagus and stomach without difficulty.    Of note however, upon the first swallow, the patient aspirated a large volume of barium. Due to this the exam was cut short. The patient was given a small amount of additional barium to swallow again with evidence of aspiration. The exam was terminated at this point.            X-Ray Chest PA And Lateral (Final result) Result time:  03/04/17 07:41:51    Final result by Avni Rivers III, MD (03/04/17 07:41:51)    Impression:         1. Slight improvement since February 28. Slight decrease in the effusions. Suspect minimal/mild congestion superimposed on chronic change but diminished since February. Esophageal stent again noted. No new abnormalities.      Electronically signed by: AVNI RIVERS MD  Date:     03/04/17  Time:    07:41     Narrative:    Two-view chest x-ray.    Clinical indication: Chest Pain     Heart size is unchanged compared to February.. Right-sided Mediport type catheter again noted in position as well as an esophageal stent. There is again moderate coarsening of the interstitial markings with small effusions suggested, best seen on the lateral image. These have decreased however compared to February. The interstitial  markings are not quite as prominent suggesting decreased congestion superimposed on chronic change.

## 2017-03-05 NOTE — PROGRESS NOTES
[facility] -   Gastroenterology  Progress Note    Patient Name: Frederick J Lejeune  MRN: [id number]  Admission Date: 3/3/2017  Hospital Length of Stay: 1 days  Code Status: Full Code   Attending Provider: Tamara Guerrero MD  Consulting Provider: Mario Anguiano MD  Primary Care Physician: Kenrick Muñoz MD  Principal Problem: Esophageal dysphagia    Last Recorded LACE+ Scores     None          Subjective:     Interval History:   Esophagogram revealed the followin. The stent is widely patent. Positioning appears grossly satisfactory.  2. The patient's had severe aspiration during the examination which she states is what happens to him on a routine basis. The exam was terminated at this point due to the severe aspiration.      Review of Systems   Constitutional: Positive for fatigue.   HENT: Positive for trouble swallowing.    Eyes: Negative for pain and itching.   Respiratory: Positive for shortness of breath.    Gastrointestinal: Positive for nausea and vomiting.   Endocrine: Negative.    Genitourinary: Negative.    Musculoskeletal: Positive for arthralgias.   Allergic/Immunologic: Negative.    Neurological: Negative.    Hematological: Negative.    Psychiatric/Behavioral: Negative.      Objective:     Vital Signs (Most Recent):  Temp: 97.5 °F (36.4 °C) (17 0757)  Pulse: 97 (17 112)  Resp: (!) 32 (17)  BP: 115/74 (17 112)  SpO2: (!) 93 % (17 112) Vital Signs (24h Range):  Temp:  [97.5 °F (36.4 °C)-98.9 °F (37.2 °C)] 97.5 °F (36.4 °C)  Pulse:  [] 97  Resp:  [17-32] 32  SpO2:  [88 %-95 %] 93 %  BP: (101-120)/(61-74) 115/74     Weight: 49.9 kg (110 lb) (17 0227)  Body mass index is 20.12 kg/(m^2).      Intake/Output Summary (Last 24 hours) at 17 1404  Last data filed at 17 1200   Gross per 24 hour   Intake          4224.67 ml   Output             1950 ml   Net          2274.67 ml       Lines/Drains/Airways     Central Venous Catheter Line                  Port A Cath Single Lumen right subclavian -- days          Peripheral Intravenous Line                 Peripheral IV - Single Lumen 17 0310 Left Forearm less than 1 day                Physical Exam   Constitutional: He is oriented to person, place, and time. He appears well-developed.   HENT:   Head: Normocephalic.   Eyes: Conjunctivae and EOM are normal. Pupils are equal, round, and reactive to light.   Neck: Normal range of motion. Neck supple. No tracheal deviation present. No thyromegaly present.   Cardiovascular: Normal rate and regular rhythm.    Pulmonary/Chest: Effort normal. He has no wheezes. He has rales. He exhibits no tenderness.   Abdominal: Soft. Bowel sounds are normal. He exhibits no distension and no mass. There is no tenderness. There is no guarding.   Musculoskeletal: Normal range of motion.   Lymphadenopathy:     He has no cervical adenopathy.   Neurological: He is alert and oriented to person, place, and time. No cranial nerve deficit.   Skin: Skin is warm and dry.   Psychiatric: He has a normal mood and affect. His behavior is normal.   Nursing note and vitals reviewed.      Significant Labs:  CBC:   Recent Labs  Lab 17  2340 17  1345 17  1038   WBC 10.69 9.44 8.73   HGB 10.8* 11.1* 10.7*   HCT 33.5* 33.5* 32.9*    261 259     BMP:   Recent Labs  Lab 17  1038   *   *   K 3.5      CO2 29   BUN 12   CREATININE 0.6   CALCIUM 10.0   MG 2.2     Coagulation:   Recent Labs  Lab 17  2340   INR 1.1     Liver Function Test:   Recent Labs  Lab 17  2340 17  1346   ALT 82* 73*   AST 68* 58*   ALKPHOS 214* 204*   BILITOT 0.7 0.8   PROT 6.9 6.6   ALBUMIN 2.3* 2.2*         Significant Imaging:  Imaging results within the past 24 hours have been reviewed.    Assessment/Plan:     * Esophageal dysphagia  Esophagogram revealed the followin. The stent is widely patent. Positioning appears grossly satisfactory.    2. The  patient's had severe aspiration during the examination which she states is what happens to him on a routine basis. The exam was terminated at this point due to the severe aspiration.    I discussed with the primary team and we agreed to wait a few days to replace electrolytes and give some IV nutrients, and then repeat an EGD with PEG placement. Patient agrees.     Primary malignant neoplasm of right lung metastatic to other site  Mass causing external compression of the esophagus. See above for plan.     Palma's esophagus without dysplasia  Not relevant at this point.     Chronic respiratory failure with hypoxia  Complicated by lung mass. Patient is being evaluated by Dr. Vargas.     Severe protein-calorie malnutrition  PEG placement soon if team agrees.     Oropharyngeal dysphagia  Abnormal FL Esophagogram. May benefit from PEG tube.       VTE Risk Mitigation         Ordered     enoxaparin injection 40 mg  Daily     Route:  Subcutaneous        03/04/17 0217     Place sequential compression device  Until discontinued      03/04/17 0222     Medium Risk of VTE  Once      03/04/17 0217          Thank you for your consult. I will follow-up with patient. Please contact us if you have any additional questions.    Mario Anguiano MD  Gastroenterology  [facility] [place]

## 2017-03-05 NOTE — CONSULTS
[facility] -   Adult Nutrition  Consult Note    SUMMARY     Recommendations    Recommendation/Intervention:   1. If patient to continue NPO >24 hrs Rec PPN due to malnutrition and possible surgical placement of PEG:     - Clinimex 4.25/10 @ 80 ml/hr + IVFE Daily       - To provide: 1479 kcal, 82 g pro, 1920 ml fluid  GIR: 2.7       - Monitor TG weekly with PN    2. Rec TF: Isosource 1.5 initiated @ 10 ml/hr and advanced 10 ml q 3 hrs to goal of 50 ml/hr (5 cans/day bolus)        - Fluid flushes of 150 ml q 4 hrs        - Hold for residuals >400 ml    3. Monitor Lytes, Mg, Phos daily for concerns of refeeding syndrome   4. Monitor weight weekly to assess adequacy of TF to promote weight gain  5. RD to monitor  Goals: Initiate nutrition within 24-48 hours  Nutrition Goal Status: new  Communication of RD Recs: other (comment) (sticky note)    Continuum of Care Plan    Referral to Outpatient Services:  (D/C planning: To soon to determine)    Reason for Assessment    Reason for Assessment: nurse/nurse practitioner consult  Diagnosis:  (dysphagia)  Relevent Medical History: Lung Ca; esophageal stent; GERD, COPD   Interdisciplinary Rounds: did not attend     General Information Comments: Attempted to speak with patient over phone- no answer. NPO. Awaiting PEG placement. Noted temporal wasting and cachectic appearance in notes. Unsure of last po intake- not tolerating liquids and solids PTA.     Nutrition Prescription Ordered    Current Diet Order: NPO     Evaluation of Received Nutrients/Fluid Intake     % Intake of Estimated Needs: 0-25%   % Intake of Meals: NPO    I/O: 1270/1350  IVF(dextrise): 2400 ml (408 kcal)      Nutrition Risk Screen     Nutrition Risk Screen: dysphagia or difficulty swallowing    Nutrition/Diet History     Typical Food/Fluid Intake: [employer]  Food Preferences: [employer]      Factors Affecting Nutritional Intake: difficulty/impaired swallowing, NPO     Labs/Tests/Procedures/Meds     Pertinent  Labs Reviewed: reviewed  Pertinent Labs Comments: alb 2.2  Pertinent Medications Reviewed: reviewed  Pertinent Medications Comments: methylprednisone; IVF (dextrose)    Physical Findings    Overall Physical Appearance: generalized wasting (per notes)  Tubes:  (-)  Oral/Mouth Cavity: tooth/teeth missing  Skin: intact (Rod 20)    Anthropometrics     Height (inches): 62.01 in  Weight Method: Stated  Weight (kg): 49.9 kg  Ideal Body Weight (IBW), Male: 118.06 lb     % Ideal Body Weight, Male (lb): 93.18 lb     BMI (kg/m2): 20.12  BMI Grade: 18.5-24.9 - normal     Estimated/Assessed Needs    Weight Used For Calorie Calculations: 49.9 kg (110 lb 0.2 oz)   Height (cm): 157.5 cm     Energy Need Method: Kcal/kg (8788-5571 kcal (to promote weight gain))     RMR (CanÃ³vanas-St. Jeor Equation): 1158.24     Weight Used For Protein Calculations: 49.9 kg (110 lb 0.2 oz)  Protein Requirements: 60-70 g    Fluid Need Method: RDA Method      Nutrition Diagnosis    Problem: Swallowing Difficulty  Etiology: Esophageal stent problems/dysphagia  As Evidenced by: NPO/PEG placement pending  Nutrition Diagnosis Status: New    Monitor and Evaluation    Food and Nutrient Intake: energy intake, enteral nutrition intake, parenteral nutrition intake  Food and Nutrient Adminstration: diet order, enteral and parenteral nutrition administration  Physical Activity and Function: nutrition-related ADLs and IADLs  Anthropometric Measurements: weight, weight change  Biochemical Data, Medical Tests and Procedures: electrolyte and renal panel, glucose/endocrine profile, lipid profile  Nutrition-Focused Physical Findings: overall appearance    Nutrition Risk    Level of Risk:  (2 x week)    Nutrition Follow-Up    RD Follow-up?: Yes    Assessment and Plan    No new Assessment & Plan notes have been filed under this hospital service since the last note was generated.  Service: Nutrition

## 2017-03-05 NOTE — ASSESSMENT & PLAN NOTE
Esophagogram revealed the followin. The stent is widely patent. Positioning appears grossly satisfactory.    2. The patient's had severe aspiration during the examination which she states is what happens to him on a routine basis. The exam was terminated at this point due to the severe aspiration.    I discussed with the primary team and we agreed to wait a few days to replace electrolytes and give some IV nutrients, and then repeat an EGD with PEG placement. Patient agrees.

## 2017-03-05 NOTE — PLAN OF CARE
Problem: Infection, Risk/Actual (Adult)  Goal: Infection Prevention/Resolution  Patient will demonstrate the desired outcomes by discharge/transition of care.   Outcome: Ongoing (interventions implemented as appropriate)  Remains free from harm, pain controlled via IV pain meds, no acute distress noted. 24 hour chart check complete.

## 2017-03-05 NOTE — PLAN OF CARE
Problem: Patient Care Overview  Goal: Plan of Care Review  Outcome: Ongoing (interventions implemented as appropriate)  Pt is tolerating NC well.Pt is receiving nebulized medications as ordered with NARN.Deep breathing/calming techniques taught and practiced with pt.

## 2017-03-05 NOTE — NURSING
Pt reports pain better controlled with increased frequency of prn pain medication. SOB reported. Currently on 3 L nasal cannula. NPO. NSR on the monitor. Clinimix infusing as ordered. Chart reviewed. Will monitor.

## 2017-03-05 NOTE — PLAN OF CARE
Recommendations     Recommendation/Intervention:   1. If patient to continue NPO >24 hrs Rec PPN due to malnutrition and possible surgical placement of PEG:  - Clinimex 4.25/10 @ 80 ml/hr + IVFE Daily   - To provide: 1479 kcal, 82 g pro, 1920 ml fluid GIR: 2.7   - Monitor TG weekly with PN   2. Rec TF: Isosource 1.5 initiated @ 10 ml/hr and advanced 10 ml q 3 hrs to goal of 50 ml/hr (5 cans/day bolus)   - Fluid flushes of 150 ml q 4 hrs   - Hold for residuals >400 ml   3. Monitor Lytes, Mg, Phos daily for concerns of refeeding syndrome   4. Monitor weight weekly to assess adequacy of TF to promote weight gain  5. RD to monitor    Goals: Initiate nutrition within 24-48 hours  Nutrition Goal Status: new  Communication of RD Recs: other (comment) (sticky note)     Continuum of Care Plan     Referral to Outpatient Services: (D/C planning: To soon to determine)

## 2017-03-05 NOTE — PLAN OF CARE
Problem: Patient Care Overview  Goal: Plan of Care Review  Outcome: Ongoing (interventions implemented as appropriate)  Patient awake, alert, oriented. Respirations even and unlabored. Patient on 2L of oxygen via nasal canula.  Patient remained free of falls on this shift, bed alarm set. No skin breakdown noted.  PIV clean, dry and intact. Patient received pain medication per MAR. Room free of clutter. Bed locked and in lowest position. Call light within reach.  Safety precautions in place. Side rails up x2. POC reviewed. No concerns voiced at this time. Will continue to monitor patient.

## 2017-03-05 NOTE — SUBJECTIVE & OBJECTIVE
Interval History: Dr. Anguiano discussed PEG placement: pending results of Esophagram    Review of Systems   Constitutional: Positive for activity change, appetite change and fatigue. Negative for fever.   HENT: Negative.  Negative for congestion, nosebleeds and sore throat.    Eyes: Negative.  Negative for photophobia, redness and visual disturbance.   Respiratory: Positive for cough and shortness of breath. Negative for wheezing.    Cardiovascular: Negative.  Negative for chest pain, palpitations and leg swelling.   Gastrointestinal: Positive for nausea (dysphagia). Negative for abdominal pain, constipation, diarrhea and vomiting.   Endocrine: Negative.  Negative for polydipsia, polyphagia and polyuria.   Genitourinary: Negative.  Negative for dysuria, flank pain, frequency and urgency.   Musculoskeletal: Negative.  Negative for arthralgias, back pain and joint swelling.   Skin: Negative.  Negative for color change, pallor and rash.   Allergic/Immunologic: Negative.  Negative for environmental allergies, food allergies and immunocompromised state.   Neurological: Negative.  Negative for dizziness, syncope, weakness, light-headedness, numbness and headaches.   Hematological: Negative.  Negative for adenopathy. Does not bruise/bleed easily.   Psychiatric/Behavioral: Negative.  Negative for confusion and hallucinations. The patient is not nervous/anxious.    All other systems reviewed and are negative.    Objective:     Vital Signs (Most Recent):  Temp: 98.9 °F (37.2 °C) (03/04/17 1618)  Pulse: 102 (03/04/17 1659)  Resp: 17 (03/04/17 1659)  BP: 101/64 (03/04/17 1618)  SpO2: (!) 93 % (03/04/17 1659) Vital Signs (24h Range):  Temp:  [98.5 °F (36.9 °C)-99.2 °F (37.3 °C)] 98.9 °F (37.2 °C)  Pulse:  [] 102  Resp:  [16-28] 17  SpO2:  [93 %-100 %] 93 %  BP: ()/(64-77) 101/64     Weight: 49.9 kg (110 lb)  Body mass index is 20.12 kg/(m^2).    Intake/Output Summary (Last 24 hours) at 03/04/17 0581  Last data filed at  03/04/17 1836   Gross per 24 hour   Intake          1270.67 ml   Output             1350 ml   Net           -79.33 ml      Physical Exam   Constitutional: He is oriented to person, place, and time. No distress.   Frail, thinly built, cachectic, ill appearing   HENT:   Head: Normocephalic and atraumatic.   Eyes: Conjunctivae and EOM are normal. Pupils are equal, round, and reactive to light. No scleral icterus.   Neck: Normal range of motion. Neck supple. No thyromegaly present.   Cardiovascular: Normal rate, regular rhythm and normal heart sounds.    No murmur heard.  Pulmonary/Chest: Effort normal and breath sounds normal. No respiratory distress. He has no wheezes. He exhibits no tenderness.   Abdominal: Soft. Bowel sounds are normal. There is no tenderness.   Musculoskeletal: Normal range of motion. He exhibits no edema or tenderness.   Neurological: He is alert and oriented to person, place, and time. No cranial nerve deficit. He exhibits normal muscle tone. Coordination normal.   Skin: Skin is warm and dry. He is not diaphoretic.   Temporal wasting   Psychiatric: He has a normal mood and affect. His behavior is normal.   Nursing note and vitals reviewed.      Significant Labs:   CBC:   Recent Labs  Lab 03/03/17  2340 03/04/17  1345   WBC 10.69 9.44   HGB 10.8* 11.1*   HCT 33.5* 33.5*    261     CMP:   Recent Labs  Lab 03/03/17  2340 03/04/17  1346    146*   K 4.2 4.0    107   CO2 31* 31*    122*   BUN 11 9   CREATININE 0.7 0.6   CALCIUM 10.1 10.2   PROT 6.9 6.6   ALBUMIN 2.3* 2.2*   BILITOT 0.7 0.8   ALKPHOS 214* 204*   AST 68* 58*   ALT 82* 73*   ANIONGAP 9 8   EGFRNONAA >60 >60       Significant Imaging: I have reviewed all pertinent imaging results/findings within the past 24 hours.   Imaging Results         X-Ray Chest PA And Lateral (Final result) Result time:  03/04/17 07:41:51    Final result by Avni Rivers III, MD (03/04/17 07:41:51)    Impression:         1. Slight  improvement since February 28. Slight decrease in the effusions. Suspect minimal/mild congestion superimposed on chronic change but diminished since February. Esophageal stent again noted. No new abnormalities.      Electronically signed by: SHARIF PACKER MD  Date:     03/04/17  Time:    07:41     Narrative:    Two-view chest x-ray.    Clinical indication: Chest Pain     Heart size is unchanged compared to February.. Right-sided Mediport type catheter again noted in position as well as an esophageal stent. There is again moderate coarsening of the interstitial markings with small effusions suggested, best seen on the lateral image. These have decreased however compared to February. The interstitial markings are not quite as prominent suggesting decreased congestion superimposed on chronic change.

## 2017-03-06 LAB
ALBUMIN SERPL BCP-MCNC: 2.2 G/DL
ALP SERPL-CCNC: 182 U/L
ALT SERPL W/O P-5'-P-CCNC: 128 U/L
ANION GAP SERPL CALC-SCNC: 8 MMOL/L
AST SERPL-CCNC: 62 U/L
BASOPHILS # BLD AUTO: 0.01 K/UL
BASOPHILS NFR BLD: 0.1 %
BILIRUB SERPL-MCNC: 0.3 MG/DL
BUN SERPL-MCNC: 20 MG/DL
CALCIUM SERPL-MCNC: 10.1 MG/DL
CHLORIDE SERPL-SCNC: 109 MMOL/L
CO2 SERPL-SCNC: 32 MMOL/L
CREAT SERPL-MCNC: 0.6 MG/DL
DIFFERENTIAL METHOD: ABNORMAL
EOSINOPHIL # BLD AUTO: 0 K/UL
EOSINOPHIL NFR BLD: 0 %
ERYTHROCYTE [DISTWIDTH] IN BLOOD BY AUTOMATED COUNT: 13.9 %
EST. GFR  (AFRICAN AMERICAN): >60 ML/MIN/1.73 M^2
EST. GFR  (NON AFRICAN AMERICAN): >60 ML/MIN/1.73 M^2
GLUCOSE SERPL-MCNC: 157 MG/DL
HCT VFR BLD AUTO: 32.7 %
HGB BLD-MCNC: 10.6 G/DL
LYMPHOCYTES # BLD AUTO: 0.8 K/UL
LYMPHOCYTES NFR BLD: 7 %
MCH RBC QN AUTO: 35.3 PG
MCHC RBC AUTO-ENTMCNC: 32.4 %
MCV RBC AUTO: 109 FL
MONOCYTES # BLD AUTO: 0.5 K/UL
MONOCYTES NFR BLD: 4.3 %
NEUTROPHILS # BLD AUTO: 10.1 K/UL
NEUTROPHILS NFR BLD: 88.6 %
PLATELET # BLD AUTO: 270 K/UL
PMV BLD AUTO: 10.8 FL
POTASSIUM SERPL-SCNC: 3.9 MMOL/L
PROT SERPL-MCNC: 6.4 G/DL
RBC # BLD AUTO: 3 M/UL
SODIUM SERPL-SCNC: 149 MMOL/L
WBC # BLD AUTO: 11.41 K/UL

## 2017-03-06 PROCEDURE — 94668 MNPJ CHEST WALL SBSQ: CPT

## 2017-03-06 PROCEDURE — 63600175 PHARM REV CODE 636 W HCPCS: Performed by: INTERNAL MEDICINE

## 2017-03-06 PROCEDURE — 25000003 PHARM REV CODE 250: Performed by: NURSE PRACTITIONER

## 2017-03-06 PROCEDURE — 63600175 PHARM REV CODE 636 W HCPCS: Performed by: NURSE PRACTITIONER

## 2017-03-06 PROCEDURE — 94640 AIRWAY INHALATION TREATMENT: CPT

## 2017-03-06 PROCEDURE — 80053 COMPREHEN METABOLIC PANEL: CPT

## 2017-03-06 PROCEDURE — 94799 UNLISTED PULMONARY SVC/PX: CPT

## 2017-03-06 PROCEDURE — 63600175 PHARM REV CODE 636 W HCPCS: Performed by: EMERGENCY MEDICINE

## 2017-03-06 PROCEDURE — 27000221 HC OXYGEN, UP TO 24 HOURS

## 2017-03-06 PROCEDURE — 25000242 PHARM REV CODE 250 ALT 637 W/ HCPCS: Performed by: NURSE PRACTITIONER

## 2017-03-06 PROCEDURE — 96372 THER/PROPH/DIAG INJ SC/IM: CPT

## 2017-03-06 PROCEDURE — 25000003 PHARM REV CODE 250: Performed by: INTERNAL MEDICINE

## 2017-03-06 PROCEDURE — 94664 DEMO&/EVAL PT USE INHALER: CPT

## 2017-03-06 PROCEDURE — 21400001 HC TELEMETRY ROOM

## 2017-03-06 PROCEDURE — 99223 1ST HOSP IP/OBS HIGH 75: CPT | Mod: ,,, | Performed by: INTERNAL MEDICINE

## 2017-03-06 PROCEDURE — 85025 COMPLETE CBC W/AUTO DIFF WBC: CPT

## 2017-03-06 PROCEDURE — 25000242 PHARM REV CODE 250 ALT 637 W/ HCPCS: Performed by: INTERNAL MEDICINE

## 2017-03-06 PROCEDURE — 94761 N-INVAS EAR/PLS OXIMETRY MLT: CPT

## 2017-03-06 PROCEDURE — 36415 COLL VENOUS BLD VENIPUNCTURE: CPT

## 2017-03-06 RX ORDER — IPRATROPIUM BROMIDE AND ALBUTEROL SULFATE 2.5; .5 MG/3ML; MG/3ML
3 SOLUTION RESPIRATORY (INHALATION) EVERY 6 HOURS
Status: DISCONTINUED | OUTPATIENT
Start: 2017-03-06 | End: 2017-03-06

## 2017-03-06 RX ORDER — IPRATROPIUM BROMIDE AND ALBUTEROL SULFATE 2.5; .5 MG/3ML; MG/3ML
3 SOLUTION RESPIRATORY (INHALATION) EVERY 4 HOURS
Status: DISCONTINUED | OUTPATIENT
Start: 2017-03-06 | End: 2017-03-11

## 2017-03-06 RX ORDER — ONDANSETRON 2 MG/ML
4 INJECTION INTRAMUSCULAR; INTRAVENOUS ONCE
Status: COMPLETED | OUTPATIENT
Start: 2017-03-06 | End: 2017-03-06

## 2017-03-06 RX ADMIN — IPRATROPIUM BROMIDE AND ALBUTEROL SULFATE 3 ML: .5; 3 SOLUTION RESPIRATORY (INHALATION) at 12:03

## 2017-03-06 RX ADMIN — HYDROMORPHONE HYDROCHLORIDE 1 MG: 1 INJECTION, SOLUTION INTRAMUSCULAR; INTRAVENOUS; SUBCUTANEOUS at 12:03

## 2017-03-06 RX ADMIN — CEFEPIME HYDROCHLORIDE 2 G: 2 INJECTION, SOLUTION INTRAVENOUS at 06:03

## 2017-03-06 RX ADMIN — ONDANSETRON 4 MG: 2 INJECTION INTRAMUSCULAR; INTRAVENOUS at 03:03

## 2017-03-06 RX ADMIN — ASCORBIC ACID, VITAMIN A PALMITATE, CHOLECALCIFEROL, THIAMINE HYDROCHLORIDE, RIBOFLAVIN-5 PHOSPHATE SODIUM, PYRIDOXINE HYDROCHLORIDE, NIACINAMIDE, DEXPANTHENOL, ALPHA-TOCOPHEROL ACETATE, VITAMIN K1, FOLIC ACID, BIOTIN, CYANOCOBALAMIN: 200; 3300; 200; 6; 3.6; 6; 40; 15; 10; 150; 600; 60; 5 INJECTION, SOLUTION INTRAVENOUS at 06:03

## 2017-03-06 RX ADMIN — CEFEPIME HYDROCHLORIDE 2 G: 2 INJECTION, SOLUTION INTRAVENOUS at 02:03

## 2017-03-06 RX ADMIN — HYDROMORPHONE HYDROCHLORIDE 1 MG: 1 INJECTION, SOLUTION INTRAMUSCULAR; INTRAVENOUS; SUBCUTANEOUS at 03:03

## 2017-03-06 RX ADMIN — HYDROMORPHONE HYDROCHLORIDE 1 MG: 1 INJECTION, SOLUTION INTRAMUSCULAR; INTRAVENOUS; SUBCUTANEOUS at 08:03

## 2017-03-06 RX ADMIN — IPRATROPIUM BROMIDE AND ALBUTEROL SULFATE 3 ML: .5; 3 SOLUTION RESPIRATORY (INHALATION) at 08:03

## 2017-03-06 RX ADMIN — METHYLPREDNISOLONE SODIUM SUCCINATE 40 MG: 40 INJECTION, POWDER, FOR SOLUTION INTRAMUSCULAR; INTRAVENOUS at 01:03

## 2017-03-06 RX ADMIN — BUDESONIDE 0.5 MG: 0.5 INHALANT RESPIRATORY (INHALATION) at 08:03

## 2017-03-06 RX ADMIN — ARFORMOTEROL TARTRATE 15 MCG: 15 SOLUTION RESPIRATORY (INHALATION) at 08:03

## 2017-03-06 RX ADMIN — ONDANSETRON 4 MG: 2 INJECTION INTRAMUSCULAR; INTRAVENOUS at 02:03

## 2017-03-06 RX ADMIN — HYDROMORPHONE HYDROCHLORIDE 1 MG: 1 INJECTION, SOLUTION INTRAMUSCULAR; INTRAVENOUS; SUBCUTANEOUS at 01:03

## 2017-03-06 RX ADMIN — METHYLPREDNISOLONE SODIUM SUCCINATE 80 MG: 40 INJECTION, POWDER, FOR SOLUTION INTRAMUSCULAR; INTRAVENOUS at 06:03

## 2017-03-06 RX ADMIN — FAMOTIDINE 20 MG: 20 INJECTION, SOLUTION INTRAVENOUS at 09:03

## 2017-03-06 RX ADMIN — IPRATROPIUM BROMIDE AND ALBUTEROL SULFATE 3 ML: .5; 3 SOLUTION RESPIRATORY (INHALATION) at 03:03

## 2017-03-06 RX ADMIN — CEFTRIAXONE 2 G: 1 INJECTION, POWDER, FOR SOLUTION INTRAMUSCULAR; INTRAVENOUS at 08:03

## 2017-03-06 RX ADMIN — HYDROMORPHONE HYDROCHLORIDE 1 MG: 1 INJECTION, SOLUTION INTRAMUSCULAR; INTRAVENOUS; SUBCUTANEOUS at 10:03

## 2017-03-06 RX ADMIN — HYDROMORPHONE HYDROCHLORIDE 1 MG: 1 INJECTION, SOLUTION INTRAMUSCULAR; INTRAVENOUS; SUBCUTANEOUS at 06:03

## 2017-03-06 RX ADMIN — METHYLPREDNISOLONE SODIUM SUCCINATE 40 MG: 40 INJECTION, POWDER, FOR SOLUTION INTRAMUSCULAR; INTRAVENOUS at 09:03

## 2017-03-06 RX ADMIN — CEFEPIME HYDROCHLORIDE 2 G: 2 INJECTION, SOLUTION INTRAVENOUS at 11:03

## 2017-03-06 RX ADMIN — ENOXAPARIN SODIUM 40 MG: 100 INJECTION SUBCUTANEOUS at 11:03

## 2017-03-06 RX ADMIN — HYDROMORPHONE HYDROCHLORIDE 1 MG: 1 INJECTION, SOLUTION INTRAMUSCULAR; INTRAVENOUS; SUBCUTANEOUS at 04:03

## 2017-03-06 RX ADMIN — HYDROMORPHONE HYDROCHLORIDE 1 MG: 1 INJECTION, SOLUTION INTRAMUSCULAR; INTRAVENOUS; SUBCUTANEOUS at 02:03

## 2017-03-06 RX ADMIN — HYDROMORPHONE HYDROCHLORIDE 1 MG: 1 INJECTION, SOLUTION INTRAMUSCULAR; INTRAVENOUS; SUBCUTANEOUS at 11:03

## 2017-03-06 NOTE — PLAN OF CARE
Problem: Patient Care Overview  Goal: Plan of Care Review  Outcome: Ongoing (interventions implemented as appropriate)  Pt reports mild pain control with dilaudid q 2 hours. SOB reported. Clinimix infusing. Chart reviewed. Will monitor.

## 2017-03-06 NOTE — ASSESSMENT & PLAN NOTE
- Follow up with oncology as out-patient  -Discussed with DR Espinosa, patient's primary oncologist.

## 2017-03-06 NOTE — ASSESSMENT & PLAN NOTE
GI consulted  -esophagram showed patent stent and severe aspiration  -not stable for PEG placement due to respiratory status

## 2017-03-06 NOTE — CONSULTS
[facility] -   Hematology/Oncology  Consult Note    Patient Name: Frederick J Lejeune  MRN: [id number]  Admission Date: 3/3/2017  Hospital Length of Stay: 2 days  Code Status: Full Code   Attending Provider: Derek Bourgeois MD  Consulting Provider: Pancho Iraheta MD  Primary Care Physician: Kenrick Muñoz MD  Principal Problem:Esophageal dysphagia    Consults  Subjective:     HPI:  History of Present Illness:  Frederick J Lejeune is a 66 y.o. male with GERD has a diagnosis of squamous cell carcinoma, PDL-1 positive.  He initlaly presneted  1 month history of weight loss, chest pain, dysphagia, odynophagia found to have esophageal narrowing and RLL lung mass.He has had difficulty eating due to odynophagia for the past month. He also states he has L groin pain due to what he thought was a hernia, which has since been found to be firm lymphadenopathy. Patient underwent EGD 10/19, which was notable for extrinsic compression in middle third of the esophagus as well as findings suggestive of Palma's esophagus, which were biopsied. 10/20, patient underwent EUS notable for lymphadenopathy in the paratracheal, mediastinal, and subcarinal region. FNA performed. Patient underwent bronchoscopy on 10/21 which was notable for a mass in the right mainstem bronchus, biopsy revealed lung cancer, squamous cell carcinoma.   Patient also completed palliative radiation to relieve right mainstem bronchus obstruction. He then was treated with weekly Carbo-Taxol x 6-7 weeks, which was discontinued due to disease progression on imaging in early 1/2017. He is currently receiving Pembrolizumab every 3 weeks.     The patient recently had placement of an esophageal stent due to obstruction.  Since having the stent placed, he has continue with severe pain on swallowing as well as a choking sensation every time he eats.  He has had an endoscopy that shows the stent to the patient. He has been having aspirations by radiographic  wallowing studies.  It is planned for him to have an EGd and feeding tube lalced for nutrition.  Plan is to continue Keytruda treatment once discharged             Past Medical History:  COPD, severe  GERD  Lumbar vertebral fracture s/p surgery in 1/2016 / Chronic low back pain  H/o intracranial hemorrhage     Social History   Marital status:       Spouse name: ganesh   Number of children: 2   Years of education: N/A     Occupational History     [employer]     Social History Main Topics   Smoking status: Former Smoker      Packs/day: 1.00      Years: 50.00      Types: Cigarettes      Quit date: 5/10/2016   Smokeless tobacco: Former User      Types: Snuff      Quit date: 5/10/2016   Alcohol use No   Drug use: No   Sexual activity: Yes      Partners: Female     Other Topics Concern   Not on file     Social History Narrative        Family History: family history includes Cancer in his father; Emphysema in his mother.           Oncology Treatment Plan:   [facility] MK 3475 KEYNOTE 240    Medications:  Continuous Infusions:   Amino acid 4.25% - dextrose 10% (CLINIMIX-E) solution with additives (1L provides 42.5 gm AA, 100 gm CHO (340 kcal/L dextrose), Na 35, K 30, Mg 5, Ca 4.5, Acetate 70, Cl 39, Phos 15) 80 mL/hr at 03/05/17 1352     Scheduled Meds:   albuterol-ipratropium 2.5mg-0.5mg/3mL  3 mL Nebulization Q6H    arformoterol  15 mcg Nebulization BID    budesonide  0.5 mg Nebulization BID    ceFEPime (MAXIPIME) IVPB  2 g Intravenous Q8H    enoxaparin  40 mg Subcutaneous Daily    famotidine  20 mg Intravenous BID    methylPREDNISolone sodium succinate  80 mg Intravenous Q8H     PRN Meds:albuterol-ipratropium 2.5mg-0.5mg/3mL, guaifenesin 100 mg/5 ml, hydrALAZINE, HYDROmorphone, ondansetron     Review of patient's allergies indicates:   Allergen Reactions    Ambien [zolpidem] Other (See Comments)     Makes me extremely hyped up like im going crazy.    Zoloft [sertraline] Other (See Comments)      unknown        Past Medical History:   Diagnosis Date    Carcinoma of right lung     Stage IV (bilateral pulmonary nodules, R supraclavic lad, R mainstem bronchus obstruction, LLL mass)    COPD (chronic obstructive pulmonary disease)     GERD (gastroesophageal reflux disease)     Lumbar vertebral fracture     Pulmonary fibrosis     severe     Past Surgical History:   Procedure Laterality Date    ABSCESS DRAINAGE      APPENDECTOMY      BACK SURGERY      ESOPHAGUS SURGERY      stent placement    HAND SURGERY Left      Family History     Problem Relation (Age of Onset)    Cancer Father    Emphysema Mother        Social History Main Topics    Smoking status: Former Smoker     Packs/day: 1.00     Years: 50.00     Types: Cigarettes     Quit date: 5/10/2016    Smokeless tobacco: Former User     Types: Snuff     Quit date: 5/10/2016    Alcohol use No    Drug use: No    Sexual activity: Yes     Partners: Female       Review of Systems   Constitutional: Negative.  Negative for fatigue.   Eyes: Negative.    Respiratory:        SOB on exertion   Cardiovascular: Negative.  Negative for chest pain.   Gastrointestinal: Negative for abdominal pain and nausea.        Pain on swallowing, aspirating   Genitourinary: Negative.  Negative for hematuria.   Musculoskeletal: Negative.    Skin: Negative.    Neurological: Negative.    Psychiatric/Behavioral: Negative.      Objective:     Vital Signs (Most Recent):  Temp: 98.1 °F (36.7 °C) (03/06/17 0846)  Pulse: 95 (03/06/17 1203)  Resp: 18 (03/06/17 1203)  BP: 132/80 (03/06/17 0846)  SpO2: 98 % (03/06/17 1203) Vital Signs (24h Range):  Temp:  [98.1 °F (36.7 °C)-98.9 °F (37.2 °C)] 98.1 °F (36.7 °C)  Pulse:  [90-99] 95  Resp:  [18-25] 18  SpO2:  [94 %-98 %] 98 %  BP: (122-132)/(76-89) 132/80     Weight: 49.9 kg (110 lb)  Body mass index is 20.12 kg/(m^2).  Body surface area is 1.48 meters squared.      Intake/Output Summary (Last 24 hours) at 03/06/17 1252  Last data filed at  03/06/17 1010   Gross per 24 hour   Intake          1409.67 ml   Output             1100 ml   Net           309.67 ml       Physical Exam   Constitutional: He is oriented to person, place, and time. He appears well-developed and well-nourished.   HENT:   Head: Normocephalic.   Mouth/Throat: No oropharyngeal exudate.   Eyes: Pupils are equal, round, and reactive to light.   Neck: No thyromegaly present.   Cardiovascular: Normal rate, regular rhythm and normal heart sounds.  Exam reveals no gallop.    No murmur heard.  Pulmonary/Chest: No respiratory distress. He has no wheezes. He has no rales.   Abdominal: Soft. Bowel sounds are normal. He exhibits no distension and no mass. There is no rebound and no guarding.   Musculoskeletal: Normal range of motion. He exhibits no edema.   Lymphadenopathy:     He has no cervical adenopathy.   Neurological: He is alert and oriented to person, place, and time.   Skin: Skin is warm and dry.   Psychiatric: He has a normal mood and affect. His behavior is normal.       Significant Labs:   BMP:   Recent Labs  Lab 03/04/17  1346 03/05/17  1038   * 133*   * 147*   K 4.0 3.5    109   CO2 31* 29   BUN 9 12   CREATININE 0.6 0.6   CALCIUM 10.2 10.0   MG  --  2.2   , CBC:   Recent Labs  Lab 03/04/17  1345 03/05/17  1038   WBC 9.44 8.73   HGB 11.1* 10.7*   HCT 33.5* 32.9*    259    and CMP:   Recent Labs  Lab 03/04/17  1346 03/05/17  1038   * 147*   K 4.0 3.5    109   CO2 31* 29   * 133*   BUN 9 12   CREATININE 0.6 0.6   CALCIUM 10.2 10.0   PROT 6.6  --    ALBUMIN 2.2*  --    BILITOT 0.8  --    ALKPHOS 204*  --    AST 58*  --    ALT 73*  --    ANIONGAP 8 9   EGFRNONAA >60 >60       Diagnostic Results:  I have reviewed and interpreted all pertinent imaging results/findings within the past 24 hours.    Assessment/Plan:     * Esophageal dysphagia  Stent placement in the [facility] ahs not helped and is casuing more symptoms as x the impression of the  patient. Discussed with Dr Anguiano. Stent can not be rem eoved    Chronic respiratory failure with hypoxia  Multifacrtorial with cancer, radiation pneumonits, pleural effusion and probable pneumonia all playing a part    Primary malignant neoplasm of right lung metastatic to other site  Plan is for him to go back on keyruda once discharged    Severe protein-calorie malnutrition  Plan is for him to have PEg for nutrition support    Oropharyngeal dysphagia  He ahs neen aspirating. Needs PEg tuvbe placement. patient thinking about it      Thank you for your consult. I will follow-up with patient. Please contact us if you have any additional questions.    Pancho Iraheta MD  Hematology/Oncology  [facility] [place]

## 2017-03-06 NOTE — ASSESSMENT & PLAN NOTE
Multifacrtorial with cancer, radiation pneumonits, pleural effusion and probable pneumonia all playing a part

## 2017-03-06 NOTE — PLAN OF CARE
"Met with patient at bedside for Readmission Questionnaire and D/C Planning Assessment.  Patient states that he was diagnosed with cancer 6-8 months ago and received radiation and chemotherapy (continues chemotherapy once every 3 weeks, per patient).  Patient verbalized feelings of frustration regarding continual inability to eat following esophageal stents and states feeling hopeful that by "putting a tube in my stomach, I will be able to get some nutrition and get stronger."  Patient indicates being interested in inpatient rehab as a possible option post-hospitalization.  Patient reports having no other needs or concerns at this time.    Requested order for PT/OT Eval, which may occur following placement of J-Tube.         03/06/17 4306   Discharge Assessment   Assessment Type Discharge Planning Assessment   Confirmed/corrected address and phone number on facesheet? Yes   Assessment information obtained from? Patient;Medical Record   Expected Length of Stay (days) (TBD)   Type of Healthcare Directive Received (Does not have one and does not want information)   Prior to hospitilization cognitive status: Alert/Oriented   Prior to hospitalization functional status: Assistive Equipment   Current cognitive status: Alert/Oriented   Current Functional Status: Assistive Equipment   Arrived From admitted as an inpatient;home health   Lives With spouse   Is patient able to care for self after discharge? Unable to determine at this time (comments)   How many people do you have in your home that can help with your care after discharge? 1   Who are your caregiver(s) and their phone number(s)? Faye LeJeune, Wife:  [phone]   Patient's perception of discharge disposition rehab facility;home health   Readmission Within The Last 30 Days current reason for admission unrelated to previous admission   Patient currently being followed by outpatient case management? No   Patient currently receives home health services? No   Does the " patient currently use HME? Yes   Patient currently receives private duty nursing? No   Patient currently receives any other outside agency services? No   Equipment Currently Used at Home oxygen;cane, straight  (Nebulizer)   Do you have any problems affording any of your prescribed medications? No   Is the patient taking medications as prescribed? yes   Do you have any financial concerns preventing you from receiving the healthcare you need? No   Does the patient have transportation to healthcare appointments? Yes   Transportation Available family or friend will provide;car   On Dialysis? No   Does the patient receive services at the [facility]? No   Are there any open cases? No   Discharge Plan A Rehab   Discharge Plan B Home with family;Home Health;Skilled Nursing Facility   Patient/Family In Agreement With Plan yes

## 2017-03-06 NOTE — PLAN OF CARE
"       03/06/17 1726   Readmission Questionnaire   At the time of your discharge, did someone talk to you about what your health problems were? Yes   At the time of discharge, did someone talk to you about what to watch out for regarding worsening of your health problem? Yes   At the time of discharge, did someone talk to you about what to do if you experienced worsening of your health problem? Yes   At the time of discharge, did someone talk to you about which medication to take when you left the hospital and which ones to stop taking? Yes   At the time of discharge, did someone talk to you about when and where to follow up with a doctor after you left the hospital? Yes   What do you believe caused you to be sick enough to be re-admitted? "I have pneumonia."   How often do you need to have someone help you when you read instructions, pamphlets, or other written material from your doctor or pharmacy? Sometimes   Do you have problems taking your medications as prescribed? No   Do you have any problems affording any of  your prescribed medications? No   Do you have problems obtaining/receiving your medications? No   Does the patient have transportation to healthcare appointments? Yes   Lives With spouse   Living Arrangements house   Who are your caregiver(s) and their phone number(s)? Faye LeJeune, Wife:  [phone]   Does your caregiver provide all the help you need? Yes   Are you currently feeling confused? No   Are you currently having problems thinking? No   Are you currently having memory problems? No   Have you felt down, depressed, or hopeless? 1  ("Because of my physical health--inability to eat and feeling weak")   Have you felt little interest or pleasure in doing things? 1   In the last 7 days, my sleep quality was: poor     "

## 2017-03-06 NOTE — SUBJECTIVE & OBJECTIVE
Interval History: More pain since stent placement. Not stable from respiratory status for PEG. IV meds changed to IV Diladid. On clinimex.    Review of Systems   Constitutional: Positive for activity change, appetite change and fatigue. Negative for fever.   HENT: Negative.  Negative for congestion, nosebleeds and sore throat.    Eyes: Negative.  Negative for photophobia, redness and visual disturbance.   Respiratory: Positive for cough and shortness of breath. Negative for wheezing.    Cardiovascular: Negative.  Negative for chest pain, palpitations and leg swelling.   Gastrointestinal: Negative for abdominal pain, constipation, diarrhea, nausea (dysphagia) and vomiting.        Esophageal pain 8/10   Endocrine: Negative.  Negative for polydipsia, polyphagia and polyuria.   Genitourinary: Negative.  Negative for dysuria, flank pain, frequency and urgency.   Musculoskeletal: Negative.  Negative for arthralgias, back pain and joint swelling.   Skin: Negative.  Negative for color change, pallor and rash.   Allergic/Immunologic: Negative.  Negative for environmental allergies, food allergies and immunocompromised state.   Neurological: Negative.  Negative for dizziness, syncope, weakness, light-headedness, numbness and headaches.   Hematological: Negative.  Negative for adenopathy. Does not bruise/bleed easily.   Psychiatric/Behavioral: Negative.  Negative for confusion and hallucinations. The patient is not nervous/anxious.    All other systems reviewed and are negative.    Objective:     Vital Signs (Most Recent):  Temp: 97.7 °F (36.5 °C) (03/06/17 1236)  Pulse: 99 (03/06/17 1530)  Resp: 20 (03/06/17 1530)  BP: 132/86 (03/06/17 1236)  SpO2: 96 % (03/06/17 1530) Vital Signs (24h Range):  Temp:  [97.7 °F (36.5 °C)-98.9 °F (37.2 °C)] 97.7 °F (36.5 °C)  Pulse:  [90-99] 99  Resp:  [18-25] 20  SpO2:  [94 %-98 %] 96 %  BP: (122-132)/(76-89) 132/86     Weight: 49.9 kg (110 lb)  Body mass index is 20.12  kg/(m^2).    Intake/Output Summary (Last 24 hours) at 03/06/17 1734  Last data filed at 03/06/17 1236   Gross per 24 hour   Intake          1058.67 ml   Output             1050 ml   Net             8.67 ml      Physical Exam   Constitutional: He is oriented to person, place, and time. No distress.   Frail, thinly built, cachectic, ill appearing   HENT:   Head: Normocephalic and atraumatic.   Eyes: Conjunctivae and EOM are normal. Pupils are equal, round, and reactive to light. No scleral icterus.   Neck: Normal range of motion. Neck supple. No thyromegaly present.   Cardiovascular: Normal rate, regular rhythm and normal heart sounds.    No murmur heard.  Pulmonary/Chest: He is in respiratory distress (mild). He has no wheezes. He has rales. He exhibits no tenderness.   Abdominal: Soft. Bowel sounds are normal. There is no tenderness.   Musculoskeletal: Normal range of motion. He exhibits no edema or tenderness.   Neurological: He is alert and oriented to person, place, and time. No cranial nerve deficit. He exhibits normal muscle tone. Coordination normal.   Skin: Skin is warm and dry. He is not diaphoretic.   Temporal wasting   Psychiatric: He has a normal mood and affect. His behavior is normal.   Nursing note and vitals reviewed.      Significant Labs:   CBC:   Recent Labs  Lab 03/05/17  1038 03/06/17  1503   WBC 8.73 11.41   HGB 10.7* 10.6*   HCT 32.9* 32.7*    270     CMP:   Recent Labs  Lab 03/05/17  1038 03/06/17  1503   * 149*   K 3.5 3.9    109   CO2 29 32*   * 157*   BUN 12 20   CREATININE 0.6 0.6   CALCIUM 10.0 10.1   PROT  --  6.4   ALBUMIN  --  2.2*   BILITOT  --  0.3   ALKPHOS  --  182*   AST  --  62*   ALT  --  128*   ANIONGAP 9 8   EGFRNONAA >60 >60       Significant Imaging: I have reviewed all pertinent imaging results/findings within the past 24 hours.   Imaging Results         FL Esophagram Complete (Final result) Result time:  03/05/17 09:02:30    Final result by Avni  LUKE Rivers III, MD (03/05/17 09:02:30)    Impression:        1. The stent is widely patent. Positioning appears grossly satisfactory.    2. The patient's had severe aspiration during the examination which she states is what happens to him on a routine basis. The exam was terminated at this point due to the severe aspiration.      Electronically signed by: AVNI RIVERS MD  Date:     03/05/17  Time:    09:02     Narrative:    Esophagram.    Compared to patient's prior pre-stent study. Esophageal stent has been placed grossly in satisfactory position and appears widely patent. No distal esophageal stricture. Barium did pass the stent into the distal esophagus and stomach without difficulty.    Of note however, upon the first swallow, the patient aspirated a large volume of barium. Due to this the exam was cut short. The patient was given a small amount of additional barium to swallow again with evidence of aspiration. The exam was terminated at this point.            X-Ray Chest PA And Lateral (Final result) Result time:  03/04/17 07:41:51    Final result by Avni Rivers III, MD (03/04/17 07:41:51)    Impression:         1. Slight improvement since February 28. Slight decrease in the effusions. Suspect minimal/mild congestion superimposed on chronic change but diminished since February. Esophageal stent again noted. No new abnormalities.      Electronically signed by: AVNI RIVERS MD  Date:     03/04/17  Time:    07:41     Narrative:    Two-view chest x-ray.    Clinical indication: Chest Pain     Heart size is unchanged compared to February.. Right-sided Mediport type catheter again noted in position as well as an esophageal stent. There is again moderate coarsening of the interstitial markings with small effusions suggested, best seen on the lateral image. These have decreased however compared to February. The interstitial markings are not quite as prominent suggesting decreased congestion  superimposed on chronic change.

## 2017-03-06 NOTE — PLAN OF CARE
Problem: Patient Care Overview  Goal: Plan of Care Review  Outcome: Ongoing (interventions implemented as appropriate)  Pain controlled by PRN Dilaudid Q2 hrs. 3L on NC. Clinimex @ 80 during shift. IV ABX during shift. NSR on cardiac monitor.  Fall precautions in place. Side rails up. Bed locked and low in position. Call light and personal items within reach. 24 hour order chart check completed. Pt educated on medication's side effects. Pt verbalized understanding.   Will continue to monitor.

## 2017-03-06 NOTE — SUBJECTIVE & OBJECTIVE
Oncology Treatment Plan:   [facility] 3475 KEYNOTE 240    Medications:  Continuous Infusions:   Amino acid 4.25% - dextrose 10% (CLINIMIX-E) solution with additives (1L provides 42.5 gm AA, 100 gm CHO (340 kcal/L dextrose), Na 35, K 30, Mg 5, Ca 4.5, Acetate 70, Cl 39, Phos 15) 80 mL/hr at 03/05/17 1352     Scheduled Meds:   albuterol-ipratropium 2.5mg-0.5mg/3mL  3 mL Nebulization Q6H    arformoterol  15 mcg Nebulization BID    budesonide  0.5 mg Nebulization BID    ceFEPime (MAXIPIME) IVPB  2 g Intravenous Q8H    enoxaparin  40 mg Subcutaneous Daily    famotidine  20 mg Intravenous BID    methylPREDNISolone sodium succinate  80 mg Intravenous Q8H     PRN Meds:albuterol-ipratropium 2.5mg-0.5mg/3mL, guaifenesin 100 mg/5 ml, hydrALAZINE, HYDROmorphone, ondansetron     Review of patient's allergies indicates:   Allergen Reactions    Ambien [zolpidem] Other (See Comments)     Makes me extremely hyped up like im going crazy.    Zoloft [sertraline] Other (See Comments)     unknown        Past Medical History:   Diagnosis Date    Carcinoma of right lung     Stage IV (bilateral pulmonary nodules, R supraclavic lad, R mainstem bronchus obstruction, LLL mass)    COPD (chronic obstructive pulmonary disease)     GERD (gastroesophageal reflux disease)     Lumbar vertebral fracture     Pulmonary fibrosis     severe     Past Surgical History:   Procedure Laterality Date    ABSCESS DRAINAGE      APPENDECTOMY      BACK SURGERY      ESOPHAGUS SURGERY      stent placement    HAND SURGERY Left      Family History     Problem Relation (Age of Onset)    Cancer Father    Emphysema Mother        Social History Main Topics    Smoking status: Former Smoker     Packs/day: 1.00     Years: 50.00     Types: Cigarettes     Quit date: 5/10/2016    Smokeless tobacco: Former User     Types: Snuff     Quit date: 5/10/2016    Alcohol use No    Drug use: No    Sexual activity: Yes     Partners: Female       Review of Systems    Constitutional: Negative.  Negative for fatigue.   Eyes: Negative.    Respiratory:        SOB on exertion   Cardiovascular: Negative.  Negative for chest pain.   Gastrointestinal: Negative for abdominal pain and nausea.        Pain on swallowing, aspirating   Genitourinary: Negative.  Negative for hematuria.   Musculoskeletal: Negative.    Skin: Negative.    Neurological: Negative.    Psychiatric/Behavioral: Negative.      Objective:     Vital Signs (Most Recent):  Temp: 98.1 °F (36.7 °C) (03/06/17 0846)  Pulse: 95 (03/06/17 1203)  Resp: 18 (03/06/17 1203)  BP: 132/80 (03/06/17 0846)  SpO2: 98 % (03/06/17 1203) Vital Signs (24h Range):  Temp:  [98.1 °F (36.7 °C)-98.9 °F (37.2 °C)] 98.1 °F (36.7 °C)  Pulse:  [90-99] 95  Resp:  [18-25] 18  SpO2:  [94 %-98 %] 98 %  BP: (122-132)/(76-89) 132/80     Weight: 49.9 kg (110 lb)  Body mass index is 20.12 kg/(m^2).  Body surface area is 1.48 meters squared.      Intake/Output Summary (Last 24 hours) at 03/06/17 1252  Last data filed at 03/06/17 1010   Gross per 24 hour   Intake          1409.67 ml   Output             1100 ml   Net           309.67 ml       Physical Exam   Constitutional: He is oriented to person, place, and time. He appears well-developed and well-nourished.   HENT:   Head: Normocephalic.   Mouth/Throat: No oropharyngeal exudate.   Eyes: Pupils are equal, round, and reactive to light.   Neck: No thyromegaly present.   Cardiovascular: Normal rate, regular rhythm and normal heart sounds.  Exam reveals no gallop.    No murmur heard.  Pulmonary/Chest: No respiratory distress. He has no wheezes. He has no rales.   Abdominal: Soft. Bowel sounds are normal. He exhibits no distension and no mass. There is no rebound and no guarding.   Musculoskeletal: Normal range of motion. He exhibits no edema.   Lymphadenopathy:     He has no cervical adenopathy.   Neurological: He is alert and oriented to person, place, and time.   Skin: Skin is warm and dry.   Psychiatric: He  has a normal mood and affect. His behavior is normal.       Significant Labs:   BMP:   Recent Labs  Lab 03/04/17  1346 03/05/17  1038   * 133*   * 147*   K 4.0 3.5    109   CO2 31* 29   BUN 9 12   CREATININE 0.6 0.6   CALCIUM 10.2 10.0   MG  --  2.2   , CBC:   Recent Labs  Lab 03/04/17  1345 03/05/17  1038   WBC 9.44 8.73   HGB 11.1* 10.7*   HCT 33.5* 32.9*    259    and CMP:   Recent Labs  Lab 03/04/17  1346 03/05/17  1038   * 147*   K 4.0 3.5    109   CO2 31* 29   * 133*   BUN 9 12   CREATININE 0.6 0.6   CALCIUM 10.2 10.0   PROT 6.6  --    ALBUMIN 2.2*  --    BILITOT 0.8  --    ALKPHOS 204*  --    AST 58*  --    ALT 73*  --    ANIONGAP 8 9   EGFRNONAA >60 >60       Diagnostic Results:  I have reviewed and interpreted all pertinent imaging results/findings within the past 24 hours.

## 2017-03-06 NOTE — ASSESSMENT & PLAN NOTE
Stent placement in the esopgahus ahs not helped and is casuing more symptoms as x the impression of the patient. Discussed with Dr Anguiano. Stent can not be rem eoved

## 2017-03-06 NOTE — PLAN OF CARE
Problem: Patient Care Overview  Goal: Plan of Care Review  Outcome: Ongoing (interventions implemented as appropriate)  Pt is tolerating NC well.Pt is receiving nebulized medications as ordered with NARN.

## 2017-03-06 NOTE — PROGRESS NOTES
[facility] Hospital Medicine  Progress Note    Patient Name: Frederick J Lejeune  MRN: [id number]  Patient Class: IP- Inpatient   Admission Date: 3/3/2017  Length of Stay: 2 days  Attending Physician: Derek Bourgeois MD  Primary Care Provider: Kenrick Muñoz MD        Subjective:     Principal Problem:Esophageal dysphagia    HPI:  Frederick J Lejeune is a 66 y.o. male with PMHx of GERD, weight loss, dysphagia, odynophagia, RLL lung mass squamous cell carcinoma, who presented to ER with continued disphagia. He has been recently hospitalized from Dr. Espinosa's office with c/o dysphagia, weakness, dizziness, chest pain. He has had difficulty eating due to odynophagia for the past month. Patient has completed palliative radiation to relieve right mainstem bronchus obstruction. He then was treated with weekly Carbo-Taxol x 6-7 weeks, which was discontinued due to disease progression on imaging in early 1/2017. He is currently receiving Pembrolizumab every 3 weeks. He was recently evaluated by Dr. Cheng, at last hospitalization, who recommended further evaluation with esophagram, that was done on 2/24/17 with Esophageal stent placement. Per discharge summary, Dr. Cheng stated if patient continues to have severe dysphagia or chest pain then he could remove the stent and place PEG tube. He was discharged on 2/28/17.    Patient returns to the hospital 3 days after discharge with symptoms of inability to keep anything down. Getting dehydrated, He is agreeable for PEG placement.      Hospital Course:  GI, Dr. Anguiano, was consulted. Esophagogram showed patent stent, he showed signs of severe aspiration. Patient made NPO and IV Cefepime started. Patient continues with mild respiratory distress and placed on Oxygen, IV steroids, nebulizers, IS, acapella. Discussed code status twice with patient and he wants to remain a full code. He also requested more IV pain medicine. Discussed with Dr. Bourgeois and Dr. Anguiano.  "Patient not stable to receive PEG. Patient states, "under NO circumstances can you call my family."    Interval History: More pain since stent placement. Not stable from respiratory status for PEG. IV meds changed to IV Diladid. On clinimex.    Review of Systems   Constitutional: Positive for activity change, appetite change and fatigue. Negative for fever.   HENT: Negative.  Negative for congestion, nosebleeds and sore throat.    Eyes: Negative.  Negative for photophobia, redness and visual disturbance.   Respiratory: Positive for cough and shortness of breath. Negative for wheezing.    Cardiovascular: Negative.  Negative for chest pain, palpitations and leg swelling.   Gastrointestinal: Negative for abdominal pain, constipation, diarrhea, nausea (dysphagia) and vomiting.        Esophageal pain 8/10   Endocrine: Negative.  Negative for polydipsia, polyphagia and polyuria.   Genitourinary: Negative.  Negative for dysuria, flank pain, frequency and urgency.   Musculoskeletal: Negative.  Negative for arthralgias, back pain and joint swelling.   Skin: Negative.  Negative for color change, pallor and rash.   Allergic/Immunologic: Negative.  Negative for environmental allergies, food allergies and immunocompromised state.   Neurological: Negative.  Negative for dizziness, syncope, weakness, light-headedness, numbness and headaches.   Hematological: Negative.  Negative for adenopathy. Does not bruise/bleed easily.   Psychiatric/Behavioral: Negative.  Negative for confusion and hallucinations. The patient is not nervous/anxious.    All other systems reviewed and are negative.    Objective:     Vital Signs (Most Recent):  Temp: 97.7 °F (36.5 °C) (03/06/17 1236)  Pulse: 99 (03/06/17 1530)  Resp: 20 (03/06/17 1530)  BP: 132/86 (03/06/17 1236)  SpO2: 96 % (03/06/17 1530) Vital Signs (24h Range):  Temp:  [97.7 °F (36.5 °C)-98.9 °F (37.2 °C)] 97.7 °F (36.5 °C)  Pulse:  [90-99] 99  Resp:  [18-25] 20  SpO2:  [94 %-98 %] 96 %  BP: " (122-132)/(76-89) 132/86     Weight: 49.9 kg (110 lb)  Body mass index is 20.12 kg/(m^2).    Intake/Output Summary (Last 24 hours) at 03/06/17 1734  Last data filed at 03/06/17 1236   Gross per 24 hour   Intake          1058.67 ml   Output             1050 ml   Net             8.67 ml      Physical Exam   Constitutional: He is oriented to person, place, and time. No distress.   Frail, thinly built, cachectic, ill appearing   HENT:   Head: Normocephalic and atraumatic.   Eyes: Conjunctivae and EOM are normal. Pupils are equal, round, and reactive to light. No scleral icterus.   Neck: Normal range of motion. Neck supple. No thyromegaly present.   Cardiovascular: Normal rate, regular rhythm and normal heart sounds.    No murmur heard.  Pulmonary/Chest: He is in respiratory distress (mild). He has no wheezes. He has rales. He exhibits no tenderness.   Abdominal: Soft. Bowel sounds are normal. There is no tenderness.   Musculoskeletal: Normal range of motion. He exhibits no edema or tenderness.   Neurological: He is alert and oriented to person, place, and time. No cranial nerve deficit. He exhibits normal muscle tone. Coordination normal.   Skin: Skin is warm and dry. He is not diaphoretic.   Temporal wasting   Psychiatric: He has a normal mood and affect. His behavior is normal.   Nursing note and vitals reviewed.      Significant Labs:   CBC:   Recent Labs  Lab 03/05/17  1038 03/06/17  1503   WBC 8.73 11.41   HGB 10.7* 10.6*   HCT 32.9* 32.7*    270     CMP:   Recent Labs  Lab 03/05/17  1038 03/06/17  1503   * 149*   K 3.5 3.9    109   CO2 29 32*   * 157*   BUN 12 20   CREATININE 0.6 0.6   CALCIUM 10.0 10.1   PROT  --  6.4   ALBUMIN  --  2.2*   BILITOT  --  0.3   ALKPHOS  --  182*   AST  --  62*   ALT  --  128*   ANIONGAP 9 8   EGFRNONAA >60 >60       Significant Imaging: I have reviewed all pertinent imaging results/findings within the past 24 hours.   Imaging Results         FL Esophagram  Complete (Final result) Result time:  03/05/17 09:02:30    Final result by Avni Rivers III, MD (03/05/17 09:02:30)    Impression:        1. The stent is widely patent. Positioning appears grossly satisfactory.    2. The patient's had severe aspiration during the examination which she states is what happens to him on a routine basis. The exam was terminated at this point due to the severe aspiration.      Electronically signed by: AVNI RIVERS MD  Date:     03/05/17  Time:    09:02     Narrative:    Esophagram.    Compared to patient's prior pre-stent study. Esophageal stent has been placed grossly in satisfactory position and appears widely patent. No distal esophageal stricture. Barium did pass the stent into the distal esophagus and stomach without difficulty.    Of note however, upon the first swallow, the patient aspirated a large volume of barium. Due to this the exam was cut short. The patient was given a small amount of additional barium to swallow again with evidence of aspiration. The exam was terminated at this point.            X-Ray Chest PA And Lateral (Final result) Result time:  03/04/17 07:41:51    Final result by Avni Rivers III, MD (03/04/17 07:41:51)    Impression:         1. Slight improvement since February 28. Slight decrease in the effusions. Suspect minimal/mild congestion superimposed on chronic change but diminished since February. Esophageal stent again noted. No new abnormalities.      Electronically signed by: AVNI RIVERS MD  Date:     03/04/17  Time:    07:41     Narrative:    Two-view chest x-ray.    Clinical indication: Chest Pain     Heart size is unchanged compared to February.. Right-sided Mediport type catheter again noted in position as well as an esophageal stent. There is again moderate coarsening of the interstitial markings with small effusions suggested, best seen on the lateral image. These have decreased however compared to February. The  interstitial markings are not quite as prominent suggesting decreased congestion superimposed on chronic change.            Assessment/Plan:      * Esophageal dysphagia  GI consulted  -esophagram showed patent stent and severe aspiration  -not stable for PEG placement due to respiratory status        Primary malignant neoplasm of right lung metastatic to other site  - Follow up with oncology as out-patient  -Discussed with DR Espinosa, patient's primary oncologist.    Chronic respiratory failure with hypoxia  Nebulizers and IV steriods  -cardiac monitor      Palma's esophagus without dysplasia  See above      Severe protein-calorie malnutrition  - Dietitian evaluation  - IV hydration for now  -NPO  -IV clinimix      Oropharyngeal dysphagia  See above      Pneumonia due to infectious organism  Aspiration pneumonia on IV ABx      VTE Risk Mitigation         Ordered     enoxaparin injection 40 mg  Daily     Route:  Subcutaneous        03/04/17 0217     Place sequential compression device  Until discontinued      03/04/17 0222     Medium Risk of VTE  Once      03/04/17 0217          Kathleen Campos NP  Department of Hospital Medicine   [facility]

## 2017-03-07 ENCOUNTER — ANESTHESIA (OUTPATIENT)
Dept: ENDOSCOPY | Facility: HOSPITAL | Age: 67
DRG: 177 | End: 2017-03-07
Payer: MEDICARE

## 2017-03-07 ENCOUNTER — ANESTHESIA EVENT (OUTPATIENT)
Dept: ENDOSCOPY | Facility: HOSPITAL | Age: 67
DRG: 177 | End: 2017-03-07
Payer: MEDICARE

## 2017-03-07 PROBLEM — J84.10 DIFFUSE INTERSTITIAL PULMONARY FIBROSIS: Status: ACTIVE | Noted: 2017-03-07

## 2017-03-07 LAB
BASOPHILS # BLD AUTO: 0 K/UL
BASOPHILS NFR BLD: 0 %
DIFFERENTIAL METHOD: ABNORMAL
EOSINOPHIL # BLD AUTO: 0 K/UL
EOSINOPHIL NFR BLD: 0 %
ERYTHROCYTE [DISTWIDTH] IN BLOOD BY AUTOMATED COUNT: 14.1 %
HCT VFR BLD AUTO: 33.7 %
HGB BLD-MCNC: 10.6 G/DL
LYMPHOCYTES # BLD AUTO: 0.8 K/UL
LYMPHOCYTES NFR BLD: 6.8 %
MCH RBC QN AUTO: 34.5 PG
MCHC RBC AUTO-ENTMCNC: 31.5 %
MCV RBC AUTO: 110 FL
MONOCYTES # BLD AUTO: 0.7 K/UL
MONOCYTES NFR BLD: 5.6 %
NEUTROPHILS # BLD AUTO: 10.9 K/UL
NEUTROPHILS NFR BLD: 88.5 %
PLATELET # BLD AUTO: 271 K/UL
PMV BLD AUTO: 10.9 FL
RBC # BLD AUTO: 3.07 M/UL
WBC # BLD AUTO: 12.39 K/UL

## 2017-03-07 PROCEDURE — 25500020 PHARM REV CODE 255: Performed by: INTERNAL MEDICINE

## 2017-03-07 PROCEDURE — 97165 OT EVAL LOW COMPLEX 30 MIN: CPT

## 2017-03-07 PROCEDURE — 21400001 HC TELEMETRY ROOM

## 2017-03-07 PROCEDURE — 63600175 PHARM REV CODE 636 W HCPCS: Performed by: INTERNAL MEDICINE

## 2017-03-07 PROCEDURE — 94761 N-INVAS EAR/PLS OXIMETRY MLT: CPT

## 2017-03-07 PROCEDURE — G8989 SELF CARE D/C STATUS: HCPCS | Mod: CK

## 2017-03-07 PROCEDURE — 36415 COLL VENOUS BLD VENIPUNCTURE: CPT

## 2017-03-07 PROCEDURE — 99499 UNLISTED E&M SERVICE: CPT | Mod: ,,, | Performed by: INTERNAL MEDICINE

## 2017-03-07 PROCEDURE — 99223 1ST HOSP IP/OBS HIGH 75: CPT | Mod: ,,, | Performed by: INTERNAL MEDICINE

## 2017-03-07 PROCEDURE — 25000242 PHARM REV CODE 250 ALT 637 W/ HCPCS: Performed by: NURSE PRACTITIONER

## 2017-03-07 PROCEDURE — 94668 MNPJ CHEST WALL SBSQ: CPT

## 2017-03-07 PROCEDURE — 97530 THERAPEUTIC ACTIVITIES: CPT

## 2017-03-07 PROCEDURE — 97162 PT EVAL MOD COMPLEX 30 MIN: CPT

## 2017-03-07 PROCEDURE — 25000003 PHARM REV CODE 250: Performed by: INTERNAL MEDICINE

## 2017-03-07 PROCEDURE — G8988 SELF CARE GOAL STATUS: HCPCS | Mod: CK

## 2017-03-07 PROCEDURE — 27000190 HC CPAP FULL FACE MASK W/VALVE

## 2017-03-07 PROCEDURE — 97803 MED NUTRITION INDIV SUBSEQ: CPT

## 2017-03-07 PROCEDURE — 25000003 PHARM REV CODE 250: Performed by: NURSE PRACTITIONER

## 2017-03-07 PROCEDURE — 94664 DEMO&/EVAL PT USE INHALER: CPT

## 2017-03-07 PROCEDURE — G8978 MOBILITY CURRENT STATUS: HCPCS | Mod: CK

## 2017-03-07 PROCEDURE — 99233 SBSQ HOSP IP/OBS HIGH 50: CPT | Mod: ,,, | Performed by: INTERNAL MEDICINE

## 2017-03-07 PROCEDURE — G8979 MOBILITY GOAL STATUS: HCPCS | Mod: CI

## 2017-03-07 PROCEDURE — 63600175 PHARM REV CODE 636 W HCPCS: Performed by: NURSE PRACTITIONER

## 2017-03-07 PROCEDURE — 94799 UNLISTED PULMONARY SVC/PX: CPT

## 2017-03-07 PROCEDURE — 94660 CPAP INITIATION&MGMT: CPT

## 2017-03-07 PROCEDURE — 85025 COMPLETE CBC W/AUTO DIFF WBC: CPT

## 2017-03-07 PROCEDURE — 94640 AIRWAY INHALATION TREATMENT: CPT

## 2017-03-07 PROCEDURE — G8987 SELF CARE CURRENT STATUS: HCPCS | Mod: CL

## 2017-03-07 PROCEDURE — 27000221 HC OXYGEN, UP TO 24 HOURS

## 2017-03-07 PROCEDURE — 63600175 PHARM REV CODE 636 W HCPCS: Performed by: EMERGENCY MEDICINE

## 2017-03-07 RX ORDER — MOXIFLOXACIN HYDROCHLORIDE 400 MG/250ML
400 INJECTION, SOLUTION INTRAVENOUS
Status: DISCONTINUED | OUTPATIENT
Start: 2017-03-07 | End: 2017-03-17 | Stop reason: HOSPADM

## 2017-03-07 RX ORDER — ONDANSETRON 2 MG/ML
4 INJECTION INTRAMUSCULAR; INTRAVENOUS EVERY 8 HOURS PRN
Status: DISCONTINUED | OUTPATIENT
Start: 2017-03-07 | End: 2017-03-07

## 2017-03-07 RX ORDER — ONDANSETRON 2 MG/ML
4 INJECTION INTRAMUSCULAR; INTRAVENOUS EVERY 8 HOURS
Status: DISCONTINUED | OUTPATIENT
Start: 2017-03-07 | End: 2017-03-17 | Stop reason: HOSPADM

## 2017-03-07 RX ORDER — LORAZEPAM 2 MG/ML
0.5 INJECTION INTRAMUSCULAR EVERY 8 HOURS PRN
Status: DISCONTINUED | OUTPATIENT
Start: 2017-03-07 | End: 2017-03-17 | Stop reason: HOSPADM

## 2017-03-07 RX ORDER — FUROSEMIDE 10 MG/ML
40 INJECTION INTRAMUSCULAR; INTRAVENOUS 2 TIMES DAILY
Status: COMPLETED | OUTPATIENT
Start: 2017-03-07 | End: 2017-03-09

## 2017-03-07 RX ADMIN — HYDROMORPHONE HYDROCHLORIDE 1 MG: 1 INJECTION, SOLUTION INTRAMUSCULAR; INTRAVENOUS; SUBCUTANEOUS at 07:03

## 2017-03-07 RX ADMIN — ONDANSETRON 4 MG: 2 INJECTION INTRAMUSCULAR; INTRAVENOUS at 01:03

## 2017-03-07 RX ADMIN — METHYLPREDNISOLONE SODIUM SUCCINATE 40 MG: 40 INJECTION, POWDER, FOR SOLUTION INTRAMUSCULAR; INTRAVENOUS at 05:03

## 2017-03-07 RX ADMIN — ARFORMOTEROL TARTRATE 15 MCG: 15 SOLUTION RESPIRATORY (INHALATION) at 07:03

## 2017-03-07 RX ADMIN — ONDANSETRON 4 MG: 2 INJECTION INTRAMUSCULAR; INTRAVENOUS at 09:03

## 2017-03-07 RX ADMIN — HYDROMORPHONE HYDROCHLORIDE 1 MG: 1 INJECTION, SOLUTION INTRAMUSCULAR; INTRAVENOUS; SUBCUTANEOUS at 12:03

## 2017-03-07 RX ADMIN — FAMOTIDINE 20 MG: 20 INJECTION, SOLUTION INTRAVENOUS at 09:03

## 2017-03-07 RX ADMIN — BUDESONIDE 0.5 MG: 0.5 INHALANT RESPIRATORY (INHALATION) at 07:03

## 2017-03-07 RX ADMIN — IPRATROPIUM BROMIDE AND ALBUTEROL SULFATE 3 ML: .5; 3 SOLUTION RESPIRATORY (INHALATION) at 03:03

## 2017-03-07 RX ADMIN — IPRATROPIUM BROMIDE AND ALBUTEROL SULFATE 3 ML: .5; 3 SOLUTION RESPIRATORY (INHALATION) at 11:03

## 2017-03-07 RX ADMIN — HYDROMORPHONE HYDROCHLORIDE 1 MG: 1 INJECTION, SOLUTION INTRAMUSCULAR; INTRAVENOUS; SUBCUTANEOUS at 11:03

## 2017-03-07 RX ADMIN — ASCORBIC ACID, VITAMIN A PALMITATE, CHOLECALCIFEROL, THIAMINE HYDROCHLORIDE, RIBOFLAVIN-5 PHOSPHATE SODIUM, PYRIDOXINE HYDROCHLORIDE, NIACINAMIDE, DEXPANTHENOL, ALPHA-TOCOPHEROL ACETATE, VITAMIN K1, FOLIC ACID, BIOTIN, CYANOCOBALAMIN: 200; 3300; 200; 6; 3.6; 6; 40; 15; 10; 150; 600; 60; 5 INJECTION, SOLUTION INTRAVENOUS at 09:03

## 2017-03-07 RX ADMIN — IPRATROPIUM BROMIDE AND ALBUTEROL SULFATE 3 ML: .5; 3 SOLUTION RESPIRATORY (INHALATION) at 08:03

## 2017-03-07 RX ADMIN — METHYLPREDNISOLONE SODIUM SUCCINATE 40 MG: 40 INJECTION, POWDER, FOR SOLUTION INTRAMUSCULAR; INTRAVENOUS at 01:03

## 2017-03-07 RX ADMIN — IOHEXOL 100 ML: 350 INJECTION, SOLUTION INTRAVENOUS at 03:03

## 2017-03-07 RX ADMIN — PROMETHAZINE HYDROCHLORIDE 12.5 MG: 25 INJECTION, SOLUTION INTRAMUSCULAR; INTRAVENOUS at 08:03

## 2017-03-07 RX ADMIN — FUROSEMIDE 40 MG: 10 INJECTION, SOLUTION INTRAVENOUS at 06:03

## 2017-03-07 RX ADMIN — HYDROMORPHONE HYDROCHLORIDE 1 MG: 1 INJECTION, SOLUTION INTRAMUSCULAR; INTRAVENOUS; SUBCUTANEOUS at 01:03

## 2017-03-07 RX ADMIN — IPRATROPIUM BROMIDE AND ALBUTEROL SULFATE 3 ML: .5; 3 SOLUTION RESPIRATORY (INHALATION) at 04:03

## 2017-03-07 RX ADMIN — HYDROMORPHONE HYDROCHLORIDE 1 MG: 1 INJECTION, SOLUTION INTRAMUSCULAR; INTRAVENOUS; SUBCUTANEOUS at 05:03

## 2017-03-07 RX ADMIN — BUDESONIDE 0.5 MG: 0.5 INHALANT RESPIRATORY (INHALATION) at 08:03

## 2017-03-07 RX ADMIN — HYDROMORPHONE HYDROCHLORIDE 1 MG: 1 INJECTION, SOLUTION INTRAMUSCULAR; INTRAVENOUS; SUBCUTANEOUS at 09:03

## 2017-03-07 RX ADMIN — CEFEPIME HYDROCHLORIDE 2 G: 2 INJECTION, SOLUTION INTRAVENOUS at 09:03

## 2017-03-07 RX ADMIN — ARFORMOTEROL TARTRATE 15 MCG: 15 SOLUTION RESPIRATORY (INHALATION) at 08:03

## 2017-03-07 RX ADMIN — IPRATROPIUM BROMIDE AND ALBUTEROL SULFATE 3 ML: .5; 3 SOLUTION RESPIRATORY (INHALATION) at 07:03

## 2017-03-07 RX ADMIN — METHYLPREDNISOLONE SODIUM SUCCINATE 40 MG: 40 INJECTION, POWDER, FOR SOLUTION INTRAMUSCULAR; INTRAVENOUS at 09:03

## 2017-03-07 RX ADMIN — IPRATROPIUM BROMIDE AND ALBUTEROL SULFATE 3 ML: .5; 3 SOLUTION RESPIRATORY (INHALATION) at 12:03

## 2017-03-07 RX ADMIN — HYDROMORPHONE HYDROCHLORIDE 1 MG: 1 INJECTION, SOLUTION INTRAMUSCULAR; INTRAVENOUS; SUBCUTANEOUS at 04:03

## 2017-03-07 RX ADMIN — HYDROMORPHONE HYDROCHLORIDE 1 MG: 1 INJECTION, SOLUTION INTRAMUSCULAR; INTRAVENOUS; SUBCUTANEOUS at 03:03

## 2017-03-07 RX ADMIN — ALTEPLASE 2 MG: 2.2 INJECTION, POWDER, LYOPHILIZED, FOR SOLUTION INTRAVENOUS at 07:03

## 2017-03-07 RX ADMIN — CEFEPIME HYDROCHLORIDE 2 G: 2 INJECTION, SOLUTION INTRAVENOUS at 01:03

## 2017-03-07 RX ADMIN — MOXIFLOXACIN HYDROCHLORIDE 400 MG: 400 INJECTION, SOLUTION INTRAVENOUS at 01:03

## 2017-03-07 RX ADMIN — ONDANSETRON 4 MG: 2 INJECTION INTRAMUSCULAR; INTRAVENOUS at 05:03

## 2017-03-07 RX ADMIN — CEFEPIME HYDROCHLORIDE 2 G: 2 INJECTION, SOLUTION INTRAVENOUS at 06:03

## 2017-03-07 NOTE — PT/OT/SLP EVAL
Occupational Therapy  Evaluation    Frederick J Lejeune   MRN: [id number]   Admitting Diagnosis: Esophageal dysphagia    OT Date of Treatment: 17   OT Start Time: 1420  OT Stop Time: 1440  OT Total Time (min): 20 min    Billable Minutes:  Evaluation  x 20 min    Diagnosis: Esophageal dysphagia   O.T. tx dx: debility and impaired self care skills    Past Medical History:   Diagnosis Date    Carcinoma of right lung     Stage IV (bilateral pulmonary nodules, R supraclavic lad, R mainstem bronchus obstruction, LLL mass)    COPD (chronic obstructive pulmonary disease)     GERD (gastroesophageal reflux disease)     Lumbar vertebral fracture     Pulmonary fibrosis     severe      Past Surgical History:   Procedure Laterality Date    ABSCESS DRAINAGE      APPENDECTOMY      BACK SURGERY      ESOPHAGUS SURGERY      stent placement    HAND SURGERY Left        Referring physician:   Date referred to OT:     General Precautions: Standard, fall  Orthopedic Precautions: N/A  Braces: N/A          Patient History:  Living Environment  Lives With: spouse  Living Arrangements: house  Home Accessibility: stairs to enter home  Home Layout: Able to live on 1st floor  Number of Stairs to Enter Home: 4  Transportation Available: family or friend will provide  Living Environment Comment: PT LIVES WITH WIFE, FUNCTIONALLY INDEP PTA, MOD INDEP WITH SPC, DRIVES  Equipment Currently Used at Home: cane, straight, oxygen    Prior level of function:   Bed Mobility/Transfers: independent  Grooming: independent  Bathing: independent  Upper Body Dressing: independent  Lower Body Dressing: independent  Toileting: independent        Dominant hand: right    Subjective:  Communicated with pt, and nurse -Katie prior to session.    Chief Complaint: none  Patient/Family stated goals: to get well and return home    Pain Ratin/10                   Objective:  Patient found with: peripheral IV, oxygen    Cognitive Exam:  Oriented to: Person and  Place  Follows Commands/attention: Follows one-step commands  Communication: clear/fluent  Memory:  No Deficits noted  Safety awareness/insight to disability: intact  Coping skills/emotional control: Appropriate to situation    Visual/perceptual:  Intact    Physical Exam:  Postural examination/scapula alignment: Rounded shoulder and Head forward  Skin integrity: Visible skin intact  Edema: Mild RUE    Sensation:   Intact    Upper Extremity Range of Motion:  Right Upper Extremity: WFL  Left Upper Extremity: WFL    Upper Extremity Strength:  Right Upper Extremity: grossly 4/5  Left Upper Extremity: grossly 4/5   Strength: WFL    Fine motor coordination:   Intact    Gross motor coordination: WFL    Functional Mobility:  Bed Mobility:  Rolling/Turning to Left: Stand by assistance  Rolling/Turning Right: Stand by assistance  Scooting/Bridging: Stand by Assistance  Supine to Sit: Stand by Assistance  Sit to Supine: Stand by Assistance    Transfers:       Functional Ambulation: Pt c/o dizziness in sit to  Stand task; therefore, pt return to bed. Pt stated he has med for dizziness earlier.     Activities of Daily Living:     Feeding adaptive equipment:   UE Dressing Level of Assistance: Minimum assistance  UE adaptive equipment:   LE Dressing Level of Assistance: Moderate assistance  LE adaptive equipment:                     Bathing adaptive equipment:     Balance:   Static Sit: NORMAL: No deviations seen in posture held statically  Dynamic Sit: GOOD: Maintains balance through MODERATE excursions of active trunk movement  Static Stand: FAIR: Maintains without assist but unable to take challenges  Dynamic stand: FAIR: Needs CONTACT GUARD during gait    Therapeutic Activities and Exercises:  Pt educated on bed mobility and role of OT/POC.    AM-PAC 6 CLICK ADL  How much help from another person does this patient currently need?  1 = Unable, Total/Dependent Assistance  2 = A lot, Maximum/Moderate Assistance  3 = A little,  "Minimum/Contact Guard/Supervision  4 = None, Modified [place]/Independent         AM-PAC Raw Score CMS "G-Code Modifier Level of Impairment Assistance   6 % Total / Unable   7 - 9 CM 80 - 100% Maximal Assist   10 - 14 CL 60 - 80% Moderate Assist   15 - 19 CK 40 - 60% Moderate Assist   20 - 22 CJ 20 - 40% Minimal Assist   23 CI 1-20% SBA / CGA   24 CH 0% Independent/ Mod I       Patient left supine with all lines intact and call button in reach    Assessment:  Frederick J Lejeune is a 66 y.o. male with a medical diagnosis of Esophageal dysphagia and presents with impaired self care and fx mobility. Pt would benefit skilled OT to max indep and to address impairments.     Rehab identified problem list/impairments: Rehab identified problem list/impairments: weakness, impaired endurance, impaired self care skills, gait instability, impaired functional mobilty, impaired balance, decreased coordination    Rehab potential is good.    Activity tolerance: Good    Discharge recommendations: Discharge Facility/Level Of Care Needs: nursing facility, skilled     Barriers to discharge: Barriers to Discharge: None    Equipment recommendations: bath bench     GOALS:   Occupational Therapy Goals        Problem: Occupational Therapy Goal    Goal Priority Disciplines Outcome Interventions   Occupational Therapy Goal     OT, PT/OT     Description:  Goals to be met by: 3/14/17     Patient will increase functional independence with ADLs by performing:    UE Dressing with Stand-by Assistance.  LE Dressing with Minimal Assistance.  Toileting from toilet with Minimal Assistance for hygiene and clothing management.   Toilet transfer to toilet with Contact Guard Assistance.  Upper extremity exercise program x10 reps per handout, with independence for  Minimal resistive ex to BUE.                PLAN:  Patient to be seen 3 x/week to address the above listed problems via self-care/home management, therapeutic exercises, therapeutic " activities  Plan of Care expires: 03/14/17  Plan of Care reviewed with: patient    OT G-codes  Functional Assessment Tool Used: [id number]  Score: 3  Functional Limitation: Self care  Self Care Current Status (): CL  Self Care Goal Status (): CK  Self Care Discharge Status (): SHERITA Galeas OT  03/07/2017

## 2017-03-07 NOTE — PLAN OF CARE
Pt remains free of injuries. Pain moderately controlled c PRN meds and relaxation techniques. MCCARTNEY. O2 4L per NC. BiPap ordered for HS. CTA today. May have PEG placed today after MD reviews CTA. Strict I/O.12 hr chart check completed. Will continue to monitor.

## 2017-03-07 NOTE — ASSESSMENT & PLAN NOTE
Plan is for him to go back on keyruda once discharged  Discussed with patient that nurse are having problems with IV access and getting labs drawn. Told is very reasonable in this situation to access the mediport. Patient has agreed

## 2017-03-07 NOTE — ASSESSMENT & PLAN NOTE
Multifacrtorial with cancer, radiation pneumonits, pleural effusion and probable pneumonia all playing a part  He complains that the SOB is getting worse.  He sleeps with head of bed up.  Will order Ct chest and ask Pulmonary to see. CXR was recently reported as having pleural effusions

## 2017-03-07 NOTE — PLAN OF CARE
Followed up with patient who reports having no needs or concerns at this time.  Discussed with patient that PT/OT is recommending a SNF level of care for him post-hospitalization.  Patient requesting for this  to meet again with him tomorrow regarding discharge plans, following possible PEG Tube placement.      PLAN:  Will follow up with patient tomorrow

## 2017-03-07 NOTE — CONSULTS
Inpatient consult to Pulmonology  Consult performed by: ALISHA DIALLO  Consult ordered by: EVE OLIVAREZ  Reason for consult: COPD, history of metastatic lung cancer,  Very short of breath , CT ordered.            Attending Physician: Derek Bourgeois MD      Chief Complaint:    Chief Complaint   Patient presents with    Chest Pain     x 9 days       HPI:    Frederick J Lejeune is a 66 y.o. male with history of Sever COPD, GERD, pulmonary fibrosis, SCC of lung  admitted with symptoms of esophageal spasm )chest pain associated with swallowing and choking) every time he eats. He reports onset of symptoms after esophageal stent placement. Associated symptoms include progressive exertional dyspnea ans intermittent coughing spells. He also reports decreased exercise tolerance to 15 feet on flat ground.   Patient denies fevers, chills rigors, hemoptysis, family history of asthma, childhood history of asthma.    Oncology History:  SCC of lung - PDL - 1 positive.  CTX -  weekly Carbo Taxol for 6 - 7 weeks, discontinued due to disease progression and currently on Pembrolizumab every 3 weeks  - last dose was 3 weeks ago.   XRT - Palliative radiotherapy for right mainstem bronchus obstruction.    Past Medical History:   Diagnosis Date    Carcinoma of right lung     Stage IV (bilateral pulmonary nodules, R supraclavic lad, R mainstem bronchus obstruction, LLL mass)    COPD (chronic obstructive pulmonary disease)     GERD (gastroesophageal reflux disease)     Lumbar vertebral fracture     Pulmonary fibrosis     severe        Past Surgical History:   Procedure Laterality Date    ABSCESS DRAINAGE      APPENDECTOMY      BACK SURGERY      ESOPHAGUS SURGERY      stent placement    HAND SURGERY Left         Social History     Social History    Marital status:      Spouse name: ganesh    Number of children: 2    Years of education: N/A     Occupational History     [employer]     Social History Main  Topics    Smoking status: Former Smoker     Packs/day: 1.00     Years: 50.00     Types: Cigarettes     Quit date: 5/10/2016    Smokeless tobacco: Former User     Types: Snuff     Quit date: 5/10/2016    Alcohol use No    Drug use: No    Sexual activity: Yes     Partners: Female     Other Topics Concern    None     Social History Narrative        Family History   Problem Relation Age of Onset    Emphysema Mother     Cancer Father      mesothelioma        Review of patient's allergies indicates:   Allergen Reactions    Ambien [zolpidem] Other (See Comments)     Makes me extremely hyped up like im going crazy.    Zoloft [sertraline] Other (See Comments)     unknown       Medications:      Scheduled Meds:   albuterol-ipratropium 2.5mg-0.5mg/3mL  3 mL Nebulization Q4H    arformoterol  15 mcg Nebulization BID    budesonide  0.5 mg Nebulization BID    ceFEPime (MAXIPIME) IVPB  2 g Intravenous Q8H    enoxaparin  40 mg Subcutaneous Daily    famotidine  20 mg Intravenous BID    furosemide  40 mg Intravenous BID    methylPREDNISolone sodium succinate  40 mg Intravenous Q8H    moxifloxacin  400 mg Intravenous Q24H    ondansetron  4 mg Intravenous Q8H     Continuous Infusions:   Amino acid 4.25% - dextrose 10% (CLINIMIX-E) solution with additives (1L provides 42.5 gm AA, 100 gm CHO (340 kcal/L dextrose), Na 35, K 30, Mg 5, Ca 4.5, Acetate 70, Cl 39, Phos 15) 80 mL/hr at 03/07/17 0255     PRN Meds:.albuterol-ipratropium 2.5mg-0.5mg/3mL, guaifenesin 100 mg/5 ml, hydrALAZINE, HYDROmorphone, lorazepam, promethazine (PHENERGAN) IVPB    Vital Signs (last 24 H):     Temp:  [97.7 °F (36.5 °C)-98 °F (36.7 °C)]   Pulse:  []   Resp:  [18-22]   BP: (124-138)/(86-90)   SpO2:  [96 %-99 %]     INTAKE & OUTPUT (Last 24H):       Intake/Output Summary (Last 24 hours) at 03/07/17 1008  Last data filed at 03/07/17 0727   Gross per 24 hour   Intake          1887.33 ml   Output             1350 ml   Net           537.33 ml        REVIEW OF SYSTEMS:        Constitutional: Negative for fever, chills, weight loss, malaise/fatigue and diaphoresis.   HENT: Negative for hearing loss, ear pain, nosebleeds, congestion, sore throat, neck pain, tinnitus and ear discharge.    Eyes: Negative for blurred vision, double vision, photophobia, pain, discharge and redness.   Respiratory: positive for cough. Negative for hemoptysis, sputum production, shortness of breath, wheezing and stridor.    Cardiovascular: Positive for substernal chest pain. Negative for palpitations, orthopnea, claudication, leg swelling and PND.   Gastrointestinal: Positive for odynophagia, heartburn, nausea, vomiting.  Negative for abdominal pain, diarrhea, constipation, blood in stool and melena.   Genitourinary: Negative for dysuria, urgency, frequency, hematuria and flank pain.   Musculoskeletal: Negative for myalgias, back pain, joint pain and falls.   Skin: Negative for itching and rash.   Neurological: Negative for dizziness, tingling, tremors, sensory change, speech change, focal weakness, seizures, loss of consciousness, weakness and headaches.   Endo/Heme/Allergies: Negative for environmental allergies and polydipsia. Does not bruise/bleed easily.   Psychiatric/Behavioral: Negative for depression, suicidal ideas, hallucinations, memory loss and substance abuse. The patient is not nervous/anxious and does not have insomnia.    All 14 systems reviewed and negative except as noted above.     PHYSICAL EXAM:    Constitutional: Appears well-developed, well-nourished and in no acute distress.  Patient is oriented to person, place, and time.   Head: Normocephalic and atraumatic. Mucous membranes moist.  Neck: Neck supple no mass.   Cardiovascular: Normal rate and regular rhythm.  S1 S2 appreciated by ascultation  Pulmonary/Chest: Effort normal and clear to auscultation bilaterally. Bilateral coarse breath sounds. No respiratory distress.   Abdomen: Soft. Non-tender and  non-distended. Bowel sounds are normal.   Neurological: Moves all extremities.  Skin: Warm and dry. No lesions.  Extremities: No clubbing cyanosis or edema.     Laboratory Data:       Reviewed and noted in plan where applicable. Please see chart for full laboratory data.      Lab Review   Lab Results   Component Value Date     (H) 03/06/2017    K 3.9 03/06/2017     03/06/2017    CO2 32 (H) 03/06/2017    BUN 20 03/06/2017    CREATININE 0.6 03/06/2017    CALCIUM 10.1 03/06/2017     Lab Results   Component Value Date    TROPONINI 0.010 03/03/2017     Lab Results   Component Value Date    WBC 12.39 03/07/2017    HGB 10.6 (L) 03/07/2017    HCT 33.7 (L) 03/07/2017     (H) 03/07/2017     03/07/2017     Lab Results   Component Value Date    CHOL 165 10/19/2016    TRIG 101 10/19/2016    HDL 29 (L) 10/19/2016          Radiology:     CT Chest With Contrast 02/22/17:     Reviewed and noted in plan where applicable. Please see chart for full radiology data.  There is marked air distention of the proximal esophagus down to the subcarinal region where there is prominent subcarinal lymphadenopathy as before. The degree of air distention of the proximal esophagus is worse. There is no air in the esophagus at the subcarinal region and then air is demonstrated in the distal esophagus. It would be difficult to exclude an esophageal mass at this level, versus extrinsic mass effect on the esophagus by the adenopathy. There has been interval development of small to moderate sized bilateral pleural effusions. The mass in the right lower lobe is essentially unchanged. There are several left lower lobe masses which are essentially unchanged. A few scattered small pulmonary metastases bilaterally. There is emphysematous change and marked diffuse interstitial fibrosis. There is nodular thickening of the pericardium. Right supraclavicular lymphadenopathy again noted.    ASSESSMENT:     Chronic respiratory failure with  hypoxia [J96.11]   Primary malignant neoplasm of right lung metastatic to other site [C34.91]   Diffuse interstitial pulmonary fibrosis [J84.10]   COPD, very severe [J44.9]         PLAN / RECOMMENDATIONS:     Add IV empiric antibiotics ( ? Aspiration)                                     Continue with LABA, DELFINO and ICS.        Continue IV glucocorticoids        Consider nighttime BiPAP 14/8.   Intermittent diuresis with attention to fluid balance.                                         DVT and GI Prophylaxis:     Counseling/Consultation:I discussed the case with primary care team    Thank you for allowing us to participate in the care of this patent.    We will continue to follow with you.

## 2017-03-07 NOTE — PLAN OF CARE
Problem: Patient Care Overview  Goal: Plan of Care Review  PT REQUIRES CGA FOR T/F TO CHAIR   Outcome: Ongoing (interventions implemented as appropriate)  Recommendation/Intervention: 1. Once PEG is placed start tubefeed Isosource 1.5 at 10ml/hr advance as tolerated to 50ml/hr with 100ml flushes every 3-4 hours or as needed or bolus 5 cans daily. Meets 100% of estimated needs (1800 calories, 81g, 934ml water) Provide 30ml flush before and after each can plus 100ml TID and as needed for hydration .   2. SLP evaluation for safety of pleasure feeding  Goals: Initiate nutrition within 24-48 hours  Nutrition Goal Status: new  Communication of RD Recs: other (comment) (sticky note) and reviewed with RN

## 2017-03-07 NOTE — CONSULTS
[facility] -   Adult Nutrition  Consult Note    SUMMARY     Recommendations  Recommendation/Intervention: 1. Once PEG is placed start tubefeed Isosource 1.5 at 10ml/hr advance as tolerated to 50ml/hr with 100ml flushes every 3-4 hours or as needed or bolus 5 cans daily. Meets 100% of estimated needs (1800 calories, 81g, 934ml water) Provide 30ml flush before and after each can plus 100ml TID and as needed for hydration .    2. SLP evaluation for safety of pleasure feeding  Goals: Initiate nutrition within 24-48 hours  Nutrition Goal Status: new  Communication of RD Recs: other (comment) (sticky note) and reviewed with RN    Continuum of Care Plan  Home on Isosurce 1.5 bolus 5 can daily with 30ml flush before and after plus 100ml TID and as needed for hydration. Pleasure feeds per SLP recommendations for consistency. (patient questioning whether he can still eat by mouth for pleasure)    Reason for Assessment  Reason for Assessment: RD follow-up  Diagnosis:  (dysphagia)  Relevent Medical History: Lung Ca; esophageal stent; GERD, COPD   Interdisciplinary Rounds: did not attend  General Information Comments: Spoke with patient and explained formula choice and administering as bolus vs continuous    Nutrition Prescription Ordered  Current Diet Order: NPO  Current Nutrition Support Formula Ordered:  (Clinimix 4.25/10 at 80ml/hr)    Evaluation of Received Nutrients/Fluid Intake  Parenteral Calories (kcal): 1481  Parenteral Protein (gm): 82  Parenteral Fluid (mL): 1920     Nutrition Risk Screen  Nutrition Risk Screen: dysphagia or difficulty swallowing    Nutrition/Diet History  Typical Food/Fluid Intake: Patient with poor ital intake and weight loss over the last several months, very familiar to nutrition services from previous admissiona. Patient is finally agreeable to PEG placement for nutrition support  Food Preferences: none reported  Factors Affecting Nutritional Intake: difficulty/impaired swallowing,  NPO    Labs/Tests/Procedures/Meds  Pertinent Labs Reviewed: reviewed  Pertinent Labs Comments: Na 149, Gluc 157, Aklb 2.2  Pertinent Medications Reviewed: reviewed  Pertinent Medications Comments: methylprednisone; IVF (dextrose)    Physical Findings  Overall Physical Appearance:  (thin)  Oral/Mouth Cavity: tooth/teeth missing  Skin: intact (Rod 20)    Anthropometrics  Height (inches): 62.01 in  Weight Method: Stated  Weight (kg): 50 kg  Ideal Body Weight (IBW), Male: 118.06 lb  % Ideal Body Weight, Male (lb): 93.37 lb  BMI (kg/m2): 20.16  BMI Grade: 18.5-24.9 - normal  Usual Body Weight (UBW), k kg  % Usual Body Weight: 78.12  % Weight Change:  (22% weight loss in 6 months)  Weight Loss: unintentional    Estimated/Assessed Needs  Weight Used For Calorie Calculations: 49.9 kg (110 lb 0.2 oz)   Height (cm): 157.5 cm  35 kcal/kg (kcal): 1746.5 and 40 kcal/kg (kcal):    Weight Used For Protein Calculations: 49.9 kg (110 lb 0.2 oz)  1.2 gm Protein (gm): 60.01 and 1.5 gm Protein (gm): 75.01  Fluid Need Method: RDA Method    Monitor and Evaluation  Food and Nutrient Intake: energy intake, enteral nutrition intake, parenteral nutrition intake  Food and Nutrient Adminstration: diet order, enteral and parenteral nutrition administration  Physical Activity and Function: nutrition-related ADLs and IADLs  Anthropometric Measurements: weight, weight change  Biochemical Data, Medical Tests and Procedures: electrolyte and renal panel, glucose/endocrine profile, lipid profile  Nutrition-Focused Physical Findings: overall appearance    Nutrition Risk  Level of Risk:  (2 x week)    Nutrition Follow-Up  RD Follow-up?: Yes    Assessment and Plan  * Esophageal dysphagia  Nutrition Problem:   Swallowing difficulty    Etiology/Related to:   Dysphagia    As evidenced by:  Inability to consume adequate energy from oral diet with weight loss over the last 6 months    Treatment Strategy:   1. PEG with tubefeed for nutrition  support  2. Clinimix until PEG placed    Nutrition Diagnosis Status:   New

## 2017-03-07 NOTE — PROGRESS NOTES
[facility] -   Hematology/Oncology  Progress Note    Patient Name: Frederick J Lejeune  Admission Date: 3/3/2017  Hospital Length of Stay: 3 days  Code Status: Full Code     Subjective:     HPI:  History of Present Illness:  Frederick J Lejeune is a 66 y.o. male with GERD has a diagnosis of squamous cell carcinoma, PDL-1 positive.  He initlaly presneted  1 month history of weight loss, chest pain, dysphagia, odynophagia found to have esophageal narrowing and RLL lung mass.He has had difficulty eating due to odynophagia for the past month. He also states he has L groin pain due to what he thought was a hernia, which has since been found to be firm lymphadenopathy. Patient underwent EGD 10/19, which was notable for extrinsic compression in middle third of the esophagus as well as findings suggestive of Palma's esophagus, which were biopsied. 10/20, patient underwent EUS notable for lymphadenopathy in the paratracheal, mediastinal, and subcarinal region. FNA performed. Patient underwent bronchoscopy on 10/21 which was notable for a mass in the right mainstem bronchus, biopsy revealed lung cancer, squamous cell carcinoma.   Patient also completed palliative radiation to relieve right mainstem bronchus obstruction. He then was treated with weekly Carbo-Taxol x 6-7 weeks, which was discontinued due to disease progression on imaging in early 1/2017. He is currently receiving Pembrolizumab every 3 weeks.     The patient recently had placement of an esophageal stent due to obstruction.  Since having the stent placed, he has continue with severe pain on swallowing as well as a choking sensation every time he eats.  He has had studies  that shows the stent to the patient. He has been having aspirations by radiographic swallowing studies.  It is planned for him to have an EGd and feeding tube lalced for nutrition.  Plan is to continue Keytruda treatment once discharged    He remains very SOB, more so in the last  few weeks             Past Medical History:  COPD, severe  GERD  Lumbar vertebral fracture s/p surgery in 1/2016 / Chronic low back pain  H/o intracranial hemorrhage     Social History   Marital status:       Spouse name: ganesh   Number of children: 2   Years of education: N/A     Occupational History     [employer]     Social History Main Topics   Smoking status: Former Smoker      Packs/day: 1.00      Years: 50.00      Types: Cigarettes      Quit date: 5/10/2016   Smokeless tobacco: Former User      Types: Snuff      Quit date: 5/10/2016   Alcohol use No   Drug use: No   Sexual activity: Yes      Partners: Female     Other Topics Concern   Not on file     Social History Narrative        Family History: family history includes Cancer in his father; Emphysema in his mother.           Interval History:   Complains of severe nausea.  Says he is very SOb.  Nurses report difficulty with venous access. linda has refused to let them use [facility]    Oncology Treatment Plan:   [facility] MK [id number] KEYNOTE [id number]    Medications:  Continuous Infusions:   Amino acid 4.25% - dextrose 10% (CLINIMIX-E) solution with additives (1L provides 42.5 gm AA, 100 gm CHO (340 kcal/L dextrose), Na 35, K 30, Mg 5, Ca 4.5, Acetate 70, Cl 39, Phos 15) 80 mL/hr at 03/07/17 0255     Scheduled Meds:   albuterol-ipratropium 2.5mg-0.5mg/3mL  3 mL Nebulization Q4H    arformoterol  15 mcg Nebulization BID    budesonide  0.5 mg Nebulization BID    ceFEPime (MAXIPIME) IVPB  2 g Intravenous Q8H    enoxaparin  40 mg Subcutaneous Daily    famotidine  20 mg Intravenous BID    methylPREDNISolone sodium succinate  40 mg Intravenous Q8H     PRN Meds:albuterol-ipratropium 2.5mg-0.5mg/3mL, guaifenesin 100 mg/5 ml, hydrALAZINE, HYDROmorphone, ondansetron     Review of Systems   Constitutional: Negative.  Negative for fatigue.   Eyes: Negative.    Respiratory:        Progressive SOB   Cardiovascular: Negative.  Negative for chest pain.    Gastrointestinal: Negative for abdominal pain and nausea.        Pain on swallowing  Severe nausea   Genitourinary: Negative.  Negative for hematuria.   Musculoskeletal: Negative.    Skin: Negative.    Neurological: Negative.    Psychiatric/Behavioral: Negative.      Objective:     Vital Signs (Most Recent):  Temp: 97.8 °F (36.6 °C) (03/07/17 0430)  Pulse: 100 (03/07/17 0506)  Resp: 20 (03/07/17 0430)  BP: (!) 137/90 (03/07/17 0430)  SpO2: 99 % (03/07/17 0430) Vital Signs (24h Range):  Temp:  [97.7 °F (36.5 °C)-98.1 °F (36.7 °C)] 97.8 °F (36.6 °C)  Pulse:  [] 100  Resp:  [18-25] 20  SpO2:  [96 %-99 %] 99 %  BP: (124-138)/(80-90) 137/90     Weight: 49.9 kg (110 lb)  Body mass index is 20.12 kg/(m^2).  Body surface area is 1.48 meters squared.      Intake/Output Summary (Last 24 hours) at 03/07/17 0653  Last data filed at 03/07/17 0530   Gross per 24 hour   Intake          1767.33 ml   Output             1150 ml   Net           617.33 ml       Physical Exam   Constitutional: He is oriented to person, place, and time. He appears well-developed.   In bed with falcon of bed up close to 70 degrees  tachypneic   HENT:   Head: Normocephalic.   Eyes: Conjunctivae are normal. Pupils are equal, round, and reactive to light.   Neck: Normal range of motion.   Cardiovascular: Normal rate and regular rhythm.    Pulmonary/Chest: Effort normal and breath sounds normal.   Abdominal: Soft.   Musculoskeletal: Normal range of motion.   Neurological: He is alert and oriented to person, place, and time.   Skin: Skin is warm.   Psychiatric: He has a normal mood and affect. His behavior is normal. Judgment and thought content normal.       Significant Labs:   BMP:   Recent Labs  Lab 03/05/17  1038 03/06/17  1503   * 157*   * 149*   K 3.5 3.9    109   CO2 29 32*   BUN 12 20   CREATININE 0.6 0.6   CALCIUM 10.0 10.1   MG 2.2  --    , CBC:   Recent Labs  Lab 03/05/17  1038 03/06/17  1503 03/07/17  0459   WBC 8.73 11.41  12.39   HGB 10.7* 10.6* 10.6*   HCT 32.9* 32.7* 33.7*    270 271    :   Recent Labs  Lab 03/05/17  1038 03/06/17  1503   * 149*   K 3.5 3.9    109   CO2 29 32*   * 157*   BUN 12 20   CREATININE 0.6 0.6   CALCIUM 10.0 10.1   PROT  --  6.4   ALBUMIN  --  2.2*   BILITOT  --  0.3   ALKPHOS  --  182*   AST  --  62*   ALT  --  128*   ANIONGAP 9 8   EGFRNONAA >60 >60       Diagnostic Results:  I have reviewed and interpreted all pertinent imaging results/findings within the past 24 hours.    Assessment/Plan:     * Esophageal dysphagia  Stent placement in the esopgas ahs not helped and is casuing more symptoms as x the impression of the patient. Discussed with Dr Anguiano. Stent can not be removed  Plan is for him to have a PEG to assist with nutrition    Chronic respiratory failure with hypoxia  Multifacrtorial with cancer, radiation pneumonits, pleural effusion and probable pneumonia all playing a part  He complains that the SOB is getting worse.  He sleeps with head of bed up.  Will order Ct chest and ask Pulmonary to see. CXR was recently reported as having pleural effusions    Primary malignant neoplasm of right lung metastatic to other site  Plan is for him to go back on keyruda once discharged  Discussed with patient that nurse are having problems with IV access and getting labs drawn. Told is very reasonable in this situation to access the mediport. Patient has agreed      Thank you for your consult. I will follow-up with patient. Please contact us if you have any additional questions.     Pancho Iraheta MD  Hematology/Oncology  [facility]

## 2017-03-07 NOTE — SUBJECTIVE & OBJECTIVE
Interval History:   Complains of severe nausea.  Says he is very SOb.  Nurses report difficulty with venous access. linda has refused to let them use [facility]    Oncology Treatment Plan:   [facility] MK [id number] KEYNOTE 240    Medications:  Continuous Infusions:   Amino acid 4.25% - dextrose 10% (CLINIMIX-E) solution with additives (1L provides 42.5 gm AA, 100 gm CHO (340 kcal/L dextrose), Na 35, K 30, Mg 5, Ca 4.5, Acetate 70, Cl 39, Phos 15) 80 mL/hr at 03/07/17 0255     Scheduled Meds:   albuterol-ipratropium 2.5mg-0.5mg/3mL  3 mL Nebulization Q4H    arformoterol  15 mcg Nebulization BID    budesonide  0.5 mg Nebulization BID    ceFEPime (MAXIPIME) IVPB  2 g Intravenous Q8H    enoxaparin  40 mg Subcutaneous Daily    famotidine  20 mg Intravenous BID    methylPREDNISolone sodium succinate  40 mg Intravenous Q8H     PRN Meds:albuterol-ipratropium 2.5mg-0.5mg/3mL, guaifenesin 100 mg/5 ml, hydrALAZINE, HYDROmorphone, ondansetron     Review of Systems   Constitutional: Negative.  Negative for fatigue.   Eyes: Negative.    Respiratory:        Progressive SOB   Cardiovascular: Negative.  Negative for chest pain.   Gastrointestinal: Negative for abdominal pain and nausea.        Pain on swallowing  Severe nausea   Genitourinary: Negative.  Negative for hematuria.   Musculoskeletal: Negative.    Skin: Negative.    Neurological: Negative.    Psychiatric/Behavioral: Negative.      Objective:     Vital Signs (Most Recent):  Temp: 97.8 °F (36.6 °C) (03/07/17 0430)  Pulse: 100 (03/07/17 0506)  Resp: 20 (03/07/17 0430)  BP: (!) 137/90 (03/07/17 0430)  SpO2: 99 % (03/07/17 0430) Vital Signs (24h Range):  Temp:  [97.7 °F (36.5 °C)-98.1 °F (36.7 °C)] 97.8 °F (36.6 °C)  Pulse:  [] 100  Resp:  [18-25] 20  SpO2:  [96 %-99 %] 99 %  BP: (124-138)/(80-90) 137/90     Weight: 49.9 kg (110 lb)  Body mass index is 20.12 kg/(m^2).  Body surface area is 1.48 meters squared.      Intake/Output Summary (Last 24 hours) at 03/07/17  0653  Last data filed at 03/07/17 0530   Gross per 24 hour   Intake          1767.33 ml   Output             1150 ml   Net           617.33 ml       Physical Exam   Constitutional: He is oriented to person, place, and time. He appears well-developed.   In bed with falcon of bed up close to 70 degrees  tachypneic   HENT:   Head: Normocephalic.   Eyes: Conjunctivae are normal. Pupils are equal, round, and reactive to light.   Neck: Normal range of motion.   Cardiovascular: Normal rate and regular rhythm.    Pulmonary/Chest: Effort normal and breath sounds normal.   Abdominal: Soft.   Musculoskeletal: Normal range of motion.   Neurological: He is alert and oriented to person, place, and time.   Skin: Skin is warm.   Psychiatric: He has a normal mood and affect. His behavior is normal. Judgment and thought content normal.       Significant Labs:   BMP:   Recent Labs  Lab 03/05/17  1038 03/06/17  1503   * 157*   * 149*   K 3.5 3.9    109   CO2 29 32*   BUN 12 20   CREATININE 0.6 0.6   CALCIUM 10.0 10.1   MG 2.2  --    , CBC:   Recent Labs  Lab 03/05/17  1038 03/06/17  1503 03/07/17  0459   WBC 8.73 11.41 12.39   HGB 10.7* 10.6* 10.6*   HCT 32.9* 32.7* 33.7*    270 271    :   Recent Labs  Lab 03/05/17  1038 03/06/17  1503   * 149*   K 3.5 3.9    109   CO2 29 32*   * 157*   BUN 12 20   CREATININE 0.6 0.6   CALCIUM 10.0 10.1   PROT  --  6.4   ALBUMIN  --  2.2*   BILITOT  --  0.3   ALKPHOS  --  182*   AST  --  62*   ALT  --  128*   ANIONGAP 9 8   EGFRNONAA >60 >60       Diagnostic Results:  I have reviewed and interpreted all pertinent imaging results/findings within the past 24 hours.

## 2017-03-07 NOTE — PT/OT/SLP EVAL
Physical Therapy  Evaluation    Frederick J Lejeune   MRN: [id number]   Admitting Diagnosis: Esophageal dysphagia    PT Received On: 17  PT Start Time: 0810     PT Stop Time: 0835    PT Total Time (min): 25 min       Billable Minutes:  Evaluation 15 and Therapeutic Activity 10    Diagnosis: Esophageal dysphagia  P.T. DX: DEBILITY    Past Medical History:   Diagnosis Date    Carcinoma of right lung     Stage IV (bilateral pulmonary nodules, R supraclavic lad, R mainstem bronchus obstruction, LLL mass)    COPD (chronic obstructive pulmonary disease)     GERD (gastroesophageal reflux disease)     Lumbar vertebral fracture     Pulmonary fibrosis     severe      Past Surgical History:   Procedure Laterality Date    ABSCESS DRAINAGE      APPENDECTOMY      BACK SURGERY      ESOPHAGUS SURGERY      stent placement    HAND SURGERY Left        Referring physician: MOSHE  Date referred to PT: 3/7/2017      General Precautions: Standard, fall  Orthopedic Precautions:     Braces:              Patient History:  Lives With: spouse  Living Arrangements: house  Home Accessibility: stairs to enter home  Home Layout: Able to live on 1st floor  Number of Stairs to Enter Home: 4  Transportation Available: family or friend will provide  Living Environment Comment: PT WAS MOD I WITH SPC WITH WIFE AT HOME. PT DRIVES  Equipment Currently Used at Home: cane, straight, oxygen  DME owned (not currently used): rolling walker and bedside commode    Previous Level of Function:  Ambulation Skills: needs device  Transfer Skills: needs device  ADL Skills: needs device    Subjective:  Communicated with NURSE WILKINSON AND [facility] CHART REVIEW  prior to session.   PT AGREED TO EVAL AND TX   Chief Complaint: SOB    Patient goals: INC STRENGTH    Pain Ratin/10         Location: chest     Pain Rating Post-Intervention: 7/10    Objective:   Patient found with: peripheral IV, oxygen     Cognitive Exam:  Oriented to: Person, Place, Time and  Situation    Follows Commands/attention: Follows multistep  commands  Communication: clear/fluent  Safety awareness/insight to disability: intact    Physical Exam:  Postural examination/scapula alignment: No postural abnormalities identified    Skin integrity: Visible skin intact  Edema: None noted     Sensation:   Intact      Lower Extremity Range of Motion:  Right Lower Extremity: WFL  Left Lower Extremity: WFL    Lower Extremity Strength:  Right Lower Extremity: WFL  Left Lower Extremity: WFL      Functional Mobility:  Bed Mobility:  Rolling/Turning to Left: Stand by assistance  Scooting/Bridging: Stand by Assistance  Supine to Sit: Stand by Assistance    Transfers:  Sit <> Stand Assistance: Stand By Assistance  Sit <> Stand Assistive Device: No Assistive Device  Bed <> Chair Technique: Stand Pivot  Bed <> Chair Assistance: Contact Guard Assistance  Bed <> Chair Assistive Device: No Assistive Device    Gait:   Gait Distance: PT TOO 2 STEPS FORWARD WITH CGA NO AD  Gait Assistive Device: No device  Gait Pattern: swing-to gait    Balance:   Static Sit: NORMAL: No deviations seen in posture held statically  Dynamic Sit: GOOD+: Maintains balance through MAXIMAL excursions of active trunk motion  Static Stand: FAIR: Maintains without assist but unable to take challenges  Dynamic stand: FAIR: Needs CONTACT GUARD during gait    Therapeutic Activities and Exercises:  PT T/F TO CHAIR AND EDUCATED ON ROLE OF P.T.     AM-PAC 6 CLICK MOBILITY  How much help from another person does this patient currently need?   1 = Unable, Total/Dependent Assistance  2 = A lot, Maximum/Moderate Assistance  3 = A little, Minimum/Contact Guard/Supervision  4 = None, Modified [place]/Independent          AM-PAC Raw Score CMS G-Code Modifier Level of Impairment Assistance   6 % Total / Unable   7 - 9 CM 80 - 100% Maximal Assist   10 - 14 CL 60 - 80% Moderate Assist   15 - 19 CK 40 - 60% Moderate Assist   20 - 22 CJ 20 - 40% Minimal  Assist   23 CI 1-20% SBA / CGA   24 CH 0% Independent/ Mod I     Patient left up in chair with call button in reach.    Assessment:   Frederick J Lejeune is a 66 y.o. male with a medical diagnosis of Esophageal dysphagia and presents with DEBILITY AND WILL BENEFIT FROM P.T. TO ADDRESS IMPAIRMENTS LISTED.    Rehab identified problem list/impairments: Rehab identified problem list/impairments: weakness, impaired functional mobilty, impaired balance, gait instability, impaired endurance, decreased coordination, decreased lower extremity function, pain, decreased ROM, decreased upper extremity function    Rehab potential is excellent.    Activity tolerance: Fair    Discharge recommendations: Discharge Facility/Level Of Care Needs: nursing facility, skilled     Barriers to discharge: Barriers to Discharge: None    Equipment recommendations:       GOALS:   Physical Therapy Goals        Problem: Physical Therapy Goal    Goal Priority Disciplines Outcome Goal Variances Interventions   Physical Therapy Goal     PT/OT, PT      Description:  PT WILL BE SEEN FOR P.T. FOR A MIN OF 5 OUT OF 7 DAYS A WEEK  LTG: 3/14/17  1. PT WILL COMPLETE BED MOBILITY IND  2. PT WILL STAND WITH RW FOR GT X 50' WITH CGA.  3. PT WILL T/F TO CHAIR WITH S.  4. PT WILL COMPLETE B LE TE X 20 REPS FOR STRENGTHENING.               PLAN:    Patient to be seen   to address the above listed problems via gait training, therapeutic activities, therapeutic exercises  Plan of Care expires: 03/14/17  Plan of Care reviewed with: patient    Functional Assessment Tool Used: [place] AM PAC  Score: CK  Functional Limitation: Mobility: Walking and moving around  Mobility: Walking and Moving Around Current Status (): CK  Mobility: Walking and Moving Around Goal Status (): CASSIE Robledo, PT  03/07/2017

## 2017-03-07 NOTE — PLAN OF CARE
Problem: Patient Care Overview  Goal: Plan of Care Review  Outcome: Ongoing (interventions implemented as appropriate)  Patient doing well this shift. Clinimix and antibiotics infusing as ordered.  Vital signs stable.  Pain medication requested every 2 hours as ordered prn for discomfort in chest area related to stent that was placed.  2 different IVs had to be started due to swelling with Clinimix.  Patient stated that he did not want his port accessed because Dr. Espinosa does not like it accessed when in the hospital.  No acute distress noted.  Will continue to monitor. 24 hour chart check performed.

## 2017-03-07 NOTE — ASSESSMENT & PLAN NOTE
Stent placement in the esopgahus ahs not helped and is casuing more symptoms as x the impression of the patient. Discussed with Dr Anguiano. Stent can not be removed  Plan is for him to have a PEG to assist with nutrition

## 2017-03-07 NOTE — ASSESSMENT & PLAN NOTE
GI consulted  -esophagram showed patent stent and severe aspiration  -not stable for PEG placement due to respiratory status  -receiving IV Dilaudid 1 mg q 2 hours    AJS:  Continues to have difficult to control nausea.  Increased frequency of Zofran and added Phenergan as needed.  Also added Ativan 0.5 mg every 8 hours as needed but will wait until after CT PE to use.  Spoke with Nurse Talley.

## 2017-03-07 NOTE — ASSESSMENT & PLAN NOTE
Nutrition Problem:   Swallowing difficulty    Etiology/Related to:   Dysphagia    As evidenced by:  Inability to consume adequate energy from oral diet with weight loss over the last 6 months    Treatment Strategy:   1. PEG with tubefeed for nutrition support  2. Clinimix until PEG placed    Nutrition Diagnosis Status:   New

## 2017-03-07 NOTE — ASSESSMENT & PLAN NOTE
Nebulizers and IV steriods  -cardiac monitor    AJS:  Increasing shortness of breath and oxygen requirements over the last 24 hours.  Check CTA chest considering his increased risk of thrombosis in malignancy and consult to Pulmonology.

## 2017-03-07 NOTE — PROGRESS NOTES
Patient with increasing oxygen needs overnight. Looks comfortable. Mild wheezing LLL, but otherwise clear. Will await results of CTA. Discussed with Dr. Bourgeois. ?PEG today depending on results and clinic course.

## 2017-03-08 PROBLEM — J86.0: Status: ACTIVE | Noted: 2017-03-08

## 2017-03-08 PROBLEM — J90 PLEURAL EFFUSION, BILATERAL: Status: ACTIVE | Noted: 2017-03-08

## 2017-03-08 LAB
APPEARANCE FLD: NORMAL
BASOPHILS # BLD AUTO: 0.01 K/UL
BASOPHILS NFR BLD: 0.1 %
BODY FLD TYPE: NORMAL
COLOR FLD: YELLOW
DIFFERENTIAL METHOD: ABNORMAL
EOSINOPHIL # BLD AUTO: 0 K/UL
EOSINOPHIL NFR BLD: 0 %
ERYTHROCYTE [DISTWIDTH] IN BLOOD BY AUTOMATED COUNT: 14.4 %
GLUCOSE SERPL-MCNC: 156 MG/DL
HCT VFR BLD AUTO: 33.7 %
HGB BLD-MCNC: 10.5 G/DL
LDH SERPL L TO P-CCNC: 214 U/L
LYMPHOCYTES # BLD AUTO: 1.1 K/UL
LYMPHOCYTES NFR BLD: 8 %
LYMPHOCYTES NFR FLD MANUAL: 10 %
MCH RBC QN AUTO: 34.2 PG
MCHC RBC AUTO-ENTMCNC: 31.2 %
MCV RBC AUTO: 110 FL
MESOTHL CELL NFR FLD MANUAL: 4 %
MONOCYTES # BLD AUTO: 0.5 K/UL
MONOCYTES NFR BLD: 3.6 %
MONOS+MACROS NFR FLD MANUAL: 67 %
NEUTROPHILS # BLD AUTO: 12.6 K/UL
NEUTROPHILS NFR BLD: 88.3 %
NEUTROPHILS NFR FLD MANUAL: 19 %
PLATELET # BLD AUTO: 261 K/UL
PMV BLD AUTO: 10.9 FL
PROT SERPL-MCNC: 7.3 G/DL
PROT SERPL-MCNC: 7.3 G/DL
RBC # BLD AUTO: 3.07 M/UL
WBC # BLD AUTO: 14.27 K/UL
WBC # FLD: 1292 /CU MM

## 2017-03-08 PROCEDURE — 25000003 PHARM REV CODE 250: Performed by: NURSE PRACTITIONER

## 2017-03-08 PROCEDURE — 88341 IMHCHEM/IMCYTCHM EA ADD ANTB: CPT | Mod: 26,,, | Performed by: PATHOLOGY

## 2017-03-08 PROCEDURE — 63600175 PHARM REV CODE 636 W HCPCS: Performed by: INTERNAL MEDICINE

## 2017-03-08 PROCEDURE — 88342 IMHCHEM/IMCYTCHM 1ST ANTB: CPT | Mod: 26,,, | Performed by: PATHOLOGY

## 2017-03-08 PROCEDURE — 99233 SBSQ HOSP IP/OBS HIGH 50: CPT | Mod: ,,, | Performed by: INTERNAL MEDICINE

## 2017-03-08 PROCEDURE — 83615 LACTATE (LD) (LDH) ENZYME: CPT | Mod: 91

## 2017-03-08 PROCEDURE — 21400001 HC TELEMETRY ROOM

## 2017-03-08 PROCEDURE — 82945 GLUCOSE OTHER FLUID: CPT

## 2017-03-08 PROCEDURE — 97110 THERAPEUTIC EXERCISES: CPT

## 2017-03-08 PROCEDURE — 94640 AIRWAY INHALATION TREATMENT: CPT

## 2017-03-08 PROCEDURE — 87102 FUNGUS ISOLATION CULTURE: CPT

## 2017-03-08 PROCEDURE — 82947 ASSAY GLUCOSE BLOOD QUANT: CPT

## 2017-03-08 PROCEDURE — 85025 COMPLETE CBC W/AUTO DIFF WBC: CPT

## 2017-03-08 PROCEDURE — 32555 ASPIRATE PLEURA W/ IMAGING: CPT | Mod: RT,,, | Performed by: INTERNAL MEDICINE

## 2017-03-08 PROCEDURE — 84311 SPECTROPHOTOMETRY: CPT

## 2017-03-08 PROCEDURE — 36415 COLL VENOUS BLD VENIPUNCTURE: CPT

## 2017-03-08 PROCEDURE — 99900035 HC TECH TIME PER 15 MIN (STAT)

## 2017-03-08 PROCEDURE — 63600175 PHARM REV CODE 636 W HCPCS: Performed by: EMERGENCY MEDICINE

## 2017-03-08 PROCEDURE — 88112 CYTOPATH CELL ENHANCE TECH: CPT | Mod: 26,,, | Performed by: PATHOLOGY

## 2017-03-08 PROCEDURE — 88305 TISSUE EXAM BY PATHOLOGIST: CPT | Mod: 26,,, | Performed by: PATHOLOGY

## 2017-03-08 PROCEDURE — 0W993ZZ DRAINAGE OF RIGHT PLEURAL CAVITY, PERCUTANEOUS APPROACH: ICD-10-PCS | Performed by: INTERNAL MEDICINE

## 2017-03-08 PROCEDURE — 88305 TISSUE EXAM BY PATHOLOGIST: CPT | Performed by: PATHOLOGY

## 2017-03-08 PROCEDURE — 89051 BODY FLUID CELL COUNT: CPT

## 2017-03-08 PROCEDURE — 94799 UNLISTED PULMONARY SVC/PX: CPT

## 2017-03-08 PROCEDURE — 27000221 HC OXYGEN, UP TO 24 HOURS

## 2017-03-08 PROCEDURE — 82042 OTHER SOURCE ALBUMIN QUAN EA: CPT

## 2017-03-08 PROCEDURE — 84155 ASSAY OF PROTEIN SERUM: CPT

## 2017-03-08 PROCEDURE — 94761 N-INVAS EAR/PLS OXIMETRY MLT: CPT

## 2017-03-08 PROCEDURE — 25000242 PHARM REV CODE 250 ALT 637 W/ HCPCS: Performed by: NURSE PRACTITIONER

## 2017-03-08 PROCEDURE — 99233 SBSQ HOSP IP/OBS HIGH 50: CPT | Mod: 25,,, | Performed by: INTERNAL MEDICINE

## 2017-03-08 PROCEDURE — 25000003 PHARM REV CODE 250: Performed by: INTERNAL MEDICINE

## 2017-03-08 PROCEDURE — 63600175 PHARM REV CODE 636 W HCPCS: Performed by: NURSE PRACTITIONER

## 2017-03-08 PROCEDURE — 97116 GAIT TRAINING THERAPY: CPT

## 2017-03-08 PROCEDURE — 82150 ASSAY OF AMYLASE: CPT

## 2017-03-08 PROCEDURE — 83615 LACTATE (LD) (LDH) ENZYME: CPT

## 2017-03-08 PROCEDURE — 84157 ASSAY OF PROTEIN OTHER: CPT

## 2017-03-08 PROCEDURE — 87116 MYCOBACTERIA CULTURE: CPT

## 2017-03-08 RX ORDER — LIDOCAINE HYDROCHLORIDE 10 MG/ML
1 INJECTION INFILTRATION; PERINEURAL
Status: COMPLETED | OUTPATIENT
Start: 2017-03-08 | End: 2017-03-08

## 2017-03-08 RX ORDER — LIDOCAINE HYDROCHLORIDE 10 MG/ML
1 INJECTION INFILTRATION; PERINEURAL ONCE
Status: DISCONTINUED | OUTPATIENT
Start: 2017-03-08 | End: 2017-03-17 | Stop reason: HOSPADM

## 2017-03-08 RX ORDER — SODIUM CHLORIDE, SODIUM LACTATE, POTASSIUM CHLORIDE, CALCIUM CHLORIDE 600; 310; 30; 20 MG/100ML; MG/100ML; MG/100ML; MG/100ML
INJECTION, SOLUTION INTRAVENOUS CONTINUOUS
Status: DISCONTINUED | OUTPATIENT
Start: 2017-03-08 | End: 2017-03-09

## 2017-03-08 RX ADMIN — HYDROMORPHONE HYDROCHLORIDE 1 MG: 1 INJECTION, SOLUTION INTRAMUSCULAR; INTRAVENOUS; SUBCUTANEOUS at 12:03

## 2017-03-08 RX ADMIN — CEFEPIME HYDROCHLORIDE 2 G: 2 INJECTION, SOLUTION INTRAVENOUS at 11:03

## 2017-03-08 RX ADMIN — HYDROMORPHONE HYDROCHLORIDE 1 MG: 1 INJECTION, SOLUTION INTRAMUSCULAR; INTRAVENOUS; SUBCUTANEOUS at 01:03

## 2017-03-08 RX ADMIN — PROMETHAZINE HYDROCHLORIDE 12.5 MG: 25 INJECTION, SOLUTION INTRAMUSCULAR; INTRAVENOUS at 10:03

## 2017-03-08 RX ADMIN — METHYLPREDNISOLONE SODIUM SUCCINATE 40 MG: 40 INJECTION, POWDER, FOR SOLUTION INTRAMUSCULAR; INTRAVENOUS at 09:03

## 2017-03-08 RX ADMIN — FAMOTIDINE 20 MG: 20 INJECTION, SOLUTION INTRAVENOUS at 08:03

## 2017-03-08 RX ADMIN — ONDANSETRON 4 MG: 2 INJECTION INTRAMUSCULAR; INTRAVENOUS at 02:03

## 2017-03-08 RX ADMIN — ARFORMOTEROL TARTRATE 15 MCG: 15 SOLUTION RESPIRATORY (INHALATION) at 08:03

## 2017-03-08 RX ADMIN — ONDANSETRON 4 MG: 2 INJECTION INTRAMUSCULAR; INTRAVENOUS at 05:03

## 2017-03-08 RX ADMIN — ONDANSETRON 4 MG: 2 INJECTION INTRAMUSCULAR; INTRAVENOUS at 09:03

## 2017-03-08 RX ADMIN — HYDROMORPHONE HYDROCHLORIDE 1 MG: 1 INJECTION, SOLUTION INTRAMUSCULAR; INTRAVENOUS; SUBCUTANEOUS at 10:03

## 2017-03-08 RX ADMIN — ASCORBIC ACID, VITAMIN A PALMITATE, CHOLECALCIFEROL, THIAMINE HYDROCHLORIDE, RIBOFLAVIN-5 PHOSPHATE SODIUM, PYRIDOXINE HYDROCHLORIDE, NIACINAMIDE, DEXPANTHENOL, ALPHA-TOCOPHEROL ACETATE, VITAMIN K1, FOLIC ACID, BIOTIN, CYANOCOBALAMIN: 200; 3300; 200; 6; 3.6; 6; 40; 15; 10; 150; 600; 60; 5 INJECTION, SOLUTION INTRAVENOUS at 08:03

## 2017-03-08 RX ADMIN — HYDROMORPHONE HYDROCHLORIDE 1 MG: 1 INJECTION, SOLUTION INTRAMUSCULAR; INTRAVENOUS; SUBCUTANEOUS at 02:03

## 2017-03-08 RX ADMIN — LORAZEPAM 0.5 MG: 2 INJECTION, SOLUTION INTRAMUSCULAR; INTRAVENOUS at 03:03

## 2017-03-08 RX ADMIN — HYDROMORPHONE HYDROCHLORIDE 1 MG: 1 INJECTION, SOLUTION INTRAMUSCULAR; INTRAVENOUS; SUBCUTANEOUS at 08:03

## 2017-03-08 RX ADMIN — HYDROMORPHONE HYDROCHLORIDE 1 MG: 1 INJECTION, SOLUTION INTRAMUSCULAR; INTRAVENOUS; SUBCUTANEOUS at 05:03

## 2017-03-08 RX ADMIN — LIDOCAINE HYDROCHLORIDE 1 ML: 10 INJECTION, SOLUTION INFILTRATION; PERINEURAL at 10:03

## 2017-03-08 RX ADMIN — MOXIFLOXACIN HYDROCHLORIDE 400 MG: 400 INJECTION, SOLUTION INTRAVENOUS at 12:03

## 2017-03-08 RX ADMIN — FUROSEMIDE 40 MG: 10 INJECTION, SOLUTION INTRAVENOUS at 08:03

## 2017-03-08 RX ADMIN — IPRATROPIUM BROMIDE AND ALBUTEROL SULFATE 3 ML: .5; 3 SOLUTION RESPIRATORY (INHALATION) at 04:03

## 2017-03-08 RX ADMIN — BUDESONIDE 0.5 MG: 0.5 INHALANT RESPIRATORY (INHALATION) at 08:03

## 2017-03-08 RX ADMIN — ENOXAPARIN SODIUM 40 MG: 100 INJECTION SUBCUTANEOUS at 11:03

## 2017-03-08 RX ADMIN — METHYLPREDNISOLONE SODIUM SUCCINATE 40 MG: 40 INJECTION, POWDER, FOR SOLUTION INTRAMUSCULAR; INTRAVENOUS at 05:03

## 2017-03-08 RX ADMIN — FAMOTIDINE 20 MG: 20 INJECTION, SOLUTION INTRAVENOUS at 09:03

## 2017-03-08 RX ADMIN — IPRATROPIUM BROMIDE AND ALBUTEROL SULFATE 3 ML: .5; 3 SOLUTION RESPIRATORY (INHALATION) at 12:03

## 2017-03-08 RX ADMIN — METHYLPREDNISOLONE SODIUM SUCCINATE 40 MG: 40 INJECTION, POWDER, FOR SOLUTION INTRAMUSCULAR; INTRAVENOUS at 02:03

## 2017-03-08 RX ADMIN — FUROSEMIDE 40 MG: 10 INJECTION, SOLUTION INTRAVENOUS at 07:03

## 2017-03-08 RX ADMIN — HYDROMORPHONE HYDROCHLORIDE 1 MG: 1 INJECTION, SOLUTION INTRAMUSCULAR; INTRAVENOUS; SUBCUTANEOUS at 07:03

## 2017-03-08 RX ADMIN — CEFEPIME HYDROCHLORIDE 2 G: 2 INJECTION, SOLUTION INTRAVENOUS at 05:03

## 2017-03-08 RX ADMIN — IPRATROPIUM BROMIDE AND ALBUTEROL SULFATE 3 ML: .5; 3 SOLUTION RESPIRATORY (INHALATION) at 08:03

## 2017-03-08 RX ADMIN — HYDROMORPHONE HYDROCHLORIDE 1 MG: 1 INJECTION, SOLUTION INTRAMUSCULAR; INTRAVENOUS; SUBCUTANEOUS at 03:03

## 2017-03-08 RX ADMIN — BUDESONIDE 0.5 MG: 0.5 INHALANT RESPIRATORY (INHALATION) at 07:03

## 2017-03-08 RX ADMIN — CEFEPIME HYDROCHLORIDE 2 G: 2 INJECTION, SOLUTION INTRAVENOUS at 01:03

## 2017-03-08 RX ADMIN — IPRATROPIUM BROMIDE AND ALBUTEROL SULFATE 3 ML: .5; 3 SOLUTION RESPIRATORY (INHALATION) at 07:03

## 2017-03-08 NOTE — PROGRESS NOTES
Pt had 20 beat run on v-tach. Pt asymptomatic. Pt's rhythm is now steady in the low 100s. Dr. Kraft notified. No new orders.

## 2017-03-08 NOTE — PROGRESS NOTES
Dr. 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  I spoke with patient and he would like to discuss in AM and does not want to be transferred tonight.

## 2017-03-08 NOTE — SUBJECTIVE & OBJECTIVE
Interval History: CT Chest showed worsening bilateral pleural effusions. Patient on IV diuresis.    Review of Systems   Constitutional: Positive for activity change, appetite change and fatigue. Negative for fever.   HENT: Negative.  Negative for congestion, nosebleeds and sore throat.         Dry mouth   Eyes: Negative.  Negative for photophobia, redness and visual disturbance.   Respiratory: Positive for cough and shortness of breath. Negative for wheezing.    Cardiovascular: Negative.  Negative for chest pain, palpitations and leg swelling.   Gastrointestinal: Negative for abdominal pain, constipation, diarrhea, nausea (dysphagia) and vomiting.        Esophageal pain 8/10   Endocrine: Negative.  Negative for polydipsia, polyphagia and polyuria.   Genitourinary: Negative.  Negative for dysuria, flank pain, frequency and urgency.   Musculoskeletal: Negative.  Negative for arthralgias, back pain and joint swelling.   Skin: Negative.  Negative for color change, pallor and rash.   Allergic/Immunologic: Negative.  Negative for environmental allergies, food allergies and immunocompromised state.   Neurological: Negative.  Negative for dizziness, syncope, weakness, light-headedness, numbness and headaches.   Hematological: Negative.  Negative for adenopathy. Does not bruise/bleed easily.   Psychiatric/Behavioral: Negative.  Negative for confusion and hallucinations. The patient is not nervous/anxious.    All other systems reviewed and are negative.    Objective:     Vital Signs (Most Recent):  Temp: 97.3 °F (36.3 °C) (03/07/17 1555)  Pulse: 108 (03/07/17 1601)  Resp: 20 (03/07/17 1601)  BP: 114/79 (03/07/17 1555)  SpO2: 98 % (03/07/17 1601) Vital Signs (24h Range):  Temp:  [97.3 °F (36.3 °C)-98.9 °F (37.2 °C)] 97.3 °F (36.3 °C)  Pulse:  [] 108  Resp:  [16-22] 20  SpO2:  [96 %-99 %] 98 %  BP: (101-138)/(71-90) 114/79     Weight: 49.9 kg (110 lb)  Body mass index is 20.12 kg/(m^2).    Intake/Output Summary (Last 24  hours) at 03/07/17 1848  Last data filed at 03/07/17 1805   Gross per 24 hour   Intake             2318 ml   Output             2050 ml   Net              268 ml      Physical Exam   Constitutional: He is oriented to person, place, and time. No distress.   Frail, thinly built, cachectic, ill appearing   HENT:   Head: Normocephalic and atraumatic.   Eyes: Conjunctivae and EOM are normal. Pupils are equal, round, and reactive to light. No scleral icterus.   Neck: Normal range of motion. Neck supple. No thyromegaly present.   Cardiovascular: Normal rate, regular rhythm and normal heart sounds.    No murmur heard.  Pulmonary/Chest: No respiratory distress. He has no wheezes. He has no rales. He exhibits no tenderness.   Diminished breathe sounds R>L   Abdominal: Soft. Bowel sounds are normal. There is no tenderness.   Musculoskeletal: Normal range of motion. He exhibits no edema or tenderness.   Neurological: He is alert and oriented to person, place, and time. No cranial nerve deficit. He exhibits normal muscle tone. Coordination normal.   Skin: Skin is warm and dry. He is not diaphoretic.   Temporal wasting   Psychiatric: He has a normal mood and affect. His behavior is normal.   Nursing note and vitals reviewed.      Significant Labs:   CBC:   Recent Labs  Lab 03/06/17  1503 03/07/17  0459   WBC 11.41 12.39   HGB 10.6* 10.6*   HCT 32.7* 33.7*    271     CMP:   Recent Labs  Lab 03/06/17  1503   *   K 3.9      CO2 32*   *   BUN 20   CREATININE 0.6   CALCIUM 10.1   PROT 6.4   ALBUMIN 2.2*   BILITOT 0.3   ALKPHOS 182*   AST 62*   *   ANIONGAP 8   EGFRNONAA >60       Significant Imaging: I have reviewed all pertinent imaging results/findings within the past 24 hours.   Imaging Results         CTA Chest Non Coronary (Final result) Result time:  03/07/17 16:13:13    Final result by Gen Murray MD (03/07/17 16:13:13)    Impression:        No evidence of pulmonary embolus.    Stable  metastatic lung cancer.  Enlarging bilateral pleural effusions.    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      All CT scans at this facility use dose modulation, iterative reconstruction and/or weight based dosing when appropriate to reduce radiation dose to as low as reasonably achievable.       Electronically signed by: CATHY DELANEY MD  Date:     03/07/17  Time:    16:13     Narrative:    Exam: CTA CHEST NON CORONARY    Clinical History:   Increasing shortness of breath and hypoxemia.  Active malignancy. Rule out pulmonary embolus.    Technique: Axial CTA images performed through the chest after the administration of 100 cc intravenous contrast. Maximum intensity projections were performed and interpreted.    Comparison: CT chest and PET/CT on February 22, 2017 and January 11, 2017.  CT chest on December 20, 2016.      Findings:    There is no evidence of pulmonary embolus. Pulmonary artery caliber is normal.     Since the previous study, there has been placement of an esophageal stent with evidence of extraluminal air along the right lateral margin of the stent.  There is a small air tract between the right anterior margin of the stent communicating with the right mainstem bronchus consistent with a tracheoesophageal fistula.  This was confirmed on a esophagram performed at March 5, 2017.  There is aspirated barium demonstrated throughout the lower lungs.      Enlargement of the large right and moderate left pleural effusions.  Stable low-density mass in the right lower lobe measuring approximately 6 centimeters in greatest dimension.  No significant change in the size of the bilateral pulmonary nodules.  Representative pulmonary nodules in the left lung at the lower lobe measures 2.3 x 1.9 cm and 2.1 x 1.9 cm.  Representative lesion in the right upper lobe measures 1.1 cm in greatest  dimension.    Poorly defined soft tissue mass encases the right mainstem bronchus and bronchus intermedius.  There is a focal luminal narrowing of the right mainstem bronchus best demonstrated on axial image 94 of series 2.  Poorly defined soft tissue density mass also encases the left mainstem bronchus without narrowing.    Aortic atherosclerosis.  Coronary artery calcifications.    Severe emphysema.    Reflux of contrast into the hepatic veins consistent with right heart dysfunction.    Bilateral supraclavicular lymphadenopathy measuring 4.0 x 3.5 cm on the right and 1.7 x 1.5 cm and the left.    Lytic metastatic lesion in the left humeral head measuring 2.1 cm.            FL Esophagram Complete (Final result) Result time:  03/05/17 09:02:30    Final result by Avni Rivers III, MD (03/05/17 09:02:30)    Impression:        1. The stent is widely patent. Positioning appears grossly satisfactory.    2. The patient's had severe aspiration during the examination which she states is what happens to him on a routine basis. The exam was terminated at this point due to the severe aspiration.      Electronically signed by: AVNI RIVERS MD  Date:     03/05/17  Time:    09:02     Narrative:    Esophagram.    Compared to patient's prior pre-stent study. Esophageal stent has been placed grossly in satisfactory position and appears widely patent. No distal esophageal stricture. Barium did pass the stent into the distal esophagus and stomach without difficulty.    Of note however, upon the first swallow, the patient aspirated a large volume of barium. Due to this the exam was cut short. The patient was given a small amount of additional barium to swallow again with evidence of aspiration. The exam was terminated at this point.            X-Ray Chest PA And Lateral (Final result) Result time:  03/04/17 07:41:51    Final result by Avni Rivers III, MD (03/04/17 07:41:51)    Impression:         1. Slight  improvement since February 28. Slight decrease in the effusions. Suspect minimal/mild congestion superimposed on chronic change but diminished since February. Esophageal stent again noted. No new abnormalities.      Electronically signed by: SHARIF PACKER MD  Date:     03/04/17  Time:    07:41     Narrative:    Two-view chest x-ray.    Clinical indication: Chest Pain     Heart size is unchanged compared to February.. Right-sided Mediport type catheter again noted in position as well as an esophageal stent. There is again moderate coarsening of the interstitial markings with small effusions suggested, best seen on the lateral image. These have decreased however compared to February. The interstitial markings are not quite as prominent suggesting decreased congestion superimposed on chronic change.

## 2017-03-08 NOTE — PT/OT/SLP PROGRESS
Occupational Therapy      Figueroaick J Lejeune  MRN: [id number]    Patient not seen today secondary to pt refused, c/o not feeling well  Will follow-up at next visit. Attempted: 15:25     Domonique Galeas OT  3/8/2017

## 2017-03-08 NOTE — PROGRESS NOTES
[facility] Hospital Medicine  Progress Note    Patient Name: Frederick J Lejeune  MRN: [id number]  Patient Class: IP- Inpatient   Admission Date: 3/3/2017  Length of Stay: 3 days  Attending Physician: Derek Bourgeois MD  Primary Care Provider: Kenrick Muñoz MD        Subjective:     Principal Problem:Esophageal dysphagia    HPI:  Frederick J Lejeune is a 66 y.o. male with PMHx of GERD, weight loss, dysphagia, odynophagia, RLL lung mass squamous cell carcinoma, who presented to ER with continued disphagia. He has been recently hospitalized from Dr. Espinosa's office with c/o dysphagia, weakness, dizziness, chest pain. He has had difficulty eating due to odynophagia for the past month. Patient has completed palliative radiation to relieve right mainstem bronchus obstruction. He then was treated with weekly Carbo-Taxol x 6-7 weeks, which was discontinued due to disease progression on imaging in early 1/2017. He is currently receiving Pembrolizumab every 3 weeks. He was recently evaluated by Dr. Cheng, at last hospitalization, who recommended further evaluation with esophagram, that was done on 2/24/17 with Esophageal stent placement. Per discharge summary, Dr. Cheng stated if patient continues to have severe dysphagia or chest pain then he could remove the stent and place PEG tube. He was discharged on 2/28/17.    Patient returns to the hospital 3 days after discharge with symptoms of inability to keep anything down. Getting dehydrated, He is agreeable for PEG placement.      Hospital Course:  GI, Dr. Anguiano, was consulted. Esophagogram showed patent stent, he showed signs of severe aspiration. Patient made NPO and IV Cefepime started. Patient continues with mild respiratory distress and placed on Oxygen, IV steroids, nebulizers, IS, acapella. Discussed code status twice with patient and he wants to remain a full code. He also requested more IV pain medicine. Discussed with Dr. Bourgeois and Dr. Anguiano.  "Patient states, "under NO circumstances can you call my family." Pulmonary was consulted for continued hypoxia requiring 4 liters O2. IV Diuretics started. CT chest showed enlarging bilateral pleural effusions, stable metastatic lung CA, and evidence of tracheoesophageal fistula. Dr. Anguiano spoke with the Radiologist explaining patient had esophagram with barium a few days ago. Radiologist said that could be what he saw and will revise his CT report. Patient not stable to receive PEG. Discussed with Dr. Marie thoracentesis on the right, he will reassess tomorrow. Patient given an update with Dr. Bourgeois, Dr. Anguiano, and NP. Wife to come tomorrow.     Interval History: CT Chest showed worsening bilateral pleural effusions. Patient on IV diuresis.    Review of Systems   Constitutional: Positive for activity change, appetite change and fatigue. Negative for fever.   HENT: Negative.  Negative for congestion, nosebleeds and sore throat.         Dry mouth   Eyes: Negative.  Negative for photophobia, redness and visual disturbance.   Respiratory: Positive for cough and shortness of breath. Negative for wheezing.    Cardiovascular: Negative.  Negative for chest pain, palpitations and leg swelling.   Gastrointestinal: Negative for abdominal pain, constipation, diarrhea, nausea (dysphagia) and vomiting.        Esophageal pain 8/10   Endocrine: Negative.  Negative for polydipsia, polyphagia and polyuria.   Genitourinary: Negative.  Negative for dysuria, flank pain, frequency and urgency.   Musculoskeletal: Negative.  Negative for arthralgias, back pain and joint swelling.   Skin: Negative.  Negative for color change, pallor and rash.   Allergic/Immunologic: Negative.  Negative for environmental allergies, food allergies and immunocompromised state.   Neurological: Negative.  Negative for dizziness, syncope, weakness, light-headedness, numbness and headaches.   Hematological: Negative.  Negative for adenopathy. Does not " bruise/bleed easily.   Psychiatric/Behavioral: Negative.  Negative for confusion and hallucinations. The patient is not nervous/anxious.    All other systems reviewed and are negative.    Objective:     Vital Signs (Most Recent):  Temp: 97.3 °F (36.3 °C) (03/07/17 1555)  Pulse: 108 (03/07/17 1601)  Resp: 20 (03/07/17 1601)  BP: 114/79 (03/07/17 1555)  SpO2: 98 % (03/07/17 1601) Vital Signs (24h Range):  Temp:  [97.3 °F (36.3 °C)-98.9 °F (37.2 °C)] 97.3 °F (36.3 °C)  Pulse:  [] 108  Resp:  [16-22] 20  SpO2:  [96 %-99 %] 98 %  BP: (101-138)/(71-90) 114/79     Weight: 49.9 kg (110 lb)  Body mass index is 20.12 kg/(m^2).    Intake/Output Summary (Last 24 hours) at 03/07/17 1848  Last data filed at 03/07/17 1805   Gross per 24 hour   Intake             2318 ml   Output             2050 ml   Net              268 ml      Physical Exam   Constitutional: He is oriented to person, place, and time. No distress.   Frail, thinly built, cachectic, ill appearing   HENT:   Head: Normocephalic and atraumatic.   Eyes: Conjunctivae and EOM are normal. Pupils are equal, round, and reactive to light. No scleral icterus.   Neck: Normal range of motion. Neck supple. No thyromegaly present.   Cardiovascular: Normal rate, regular rhythm and normal heart sounds.    No murmur heard.  Pulmonary/Chest: No respiratory distress. He has no wheezes. He has no rales. He exhibits no tenderness.   Diminished breathe sounds R>L   Abdominal: Soft. Bowel sounds are normal. There is no tenderness.   Musculoskeletal: Normal range of motion. He exhibits no edema or tenderness.   Neurological: He is alert and oriented to person, place, and time. No cranial nerve deficit. He exhibits normal muscle tone. Coordination normal.   Skin: Skin is warm and dry. He is not diaphoretic.   Temporal wasting   Psychiatric: He has a normal mood and affect. His behavior is normal.   Nursing note and vitals reviewed.      Significant Labs:   CBC:   Recent Labs  Lab  03/06/17  1503 03/07/17  0459   WBC 11.41 12.39   HGB 10.6* 10.6*   HCT 32.7* 33.7*    271     CMP:   Recent Labs  Lab 03/06/17  1503   *   K 3.9      CO2 32*   *   BUN 20   CREATININE 0.6   CALCIUM 10.1   PROT 6.4   ALBUMIN 2.2*   BILITOT 0.3   ALKPHOS 182*   AST 62*   *   ANIONGAP 8   EGFRNONAA >60       Significant Imaging: I have reviewed all pertinent imaging results/findings within the past 24 hours.   Imaging Results         CTA Chest Non Coronary (Final result) Result time:  03/07/17 16:13:13    Final result by Cathy Murray MD (03/07/17 16:13:13)    Impression:        No evidence of pulmonary embolus.    Stable metastatic lung cancer.  Enlarging bilateral pleural effusions.    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      All CT scans at this facility use dose modulation, iterative reconstruction and/or weight based dosing when appropriate to reduce radiation dose to as low as reasonably achievable.       Electronically signed by: CATHY MURRAY MD  Date:     03/07/17  Time:    16:13     Narrative:    Exam: CTA CHEST NON CORONARY    Clinical History:   Increasing shortness of breath and hypoxemia.  Active malignancy. Rule out pulmonary embolus.    Technique: Axial CTA images performed through the chest after the administration of 100 cc intravenous contrast. Maximum intensity projections were performed and interpreted.    Comparison: CT chest and PET/CT on February 22, 2017 and January 11, 2017.  CT chest on December 20, 2016.      Findings:    There is no evidence of pulmonary embolus. Pulmonary artery caliber is normal.     Since the previous study, there has been placement of an esophageal stent with evidence of extraluminal air along the right lateral margin of the stent.  There is a small air tract between the right anterior margin of the stent communicating with  the right mainstem bronchus consistent with a tracheoesophageal fistula.  This was confirmed on a esophagram performed at March 5, 2017.  There is aspirated barium demonstrated throughout the lower lungs.      Enlargement of the large right and moderate left pleural effusions.  Stable low-density mass in the right lower lobe measuring approximately 6 centimeters in greatest dimension.  No significant change in the size of the bilateral pulmonary nodules.  Representative pulmonary nodules in the left lung at the lower lobe measures 2.3 x 1.9 cm and 2.1 x 1.9 cm.  Representative lesion in the right upper lobe measures 1.1 cm in greatest dimension.    Poorly defined soft tissue mass encases the right mainstem bronchus and bronchus intermedius.  There is a focal luminal narrowing of the right mainstem bronchus best demonstrated on axial image 94 of series 2.  Poorly defined soft tissue density mass also encases the left mainstem bronchus without narrowing.    Aortic atherosclerosis.  Coronary artery calcifications.    Severe emphysema.    Reflux of contrast into the hepatic veins consistent with right heart dysfunction.    Bilateral supraclavicular lymphadenopathy measuring 4.0 x 3.5 cm on the right and 1.7 x 1.5 cm and the left.    Lytic metastatic lesion in the left humeral head measuring 2.1 cm.            FL Esophagram Complete (Final result) Result time:  03/05/17 09:02:30    Final result by Avni Rivers III, MD (03/05/17 09:02:30)    Impression:        1. The stent is widely patent. Positioning appears grossly satisfactory.    2. The patient's had severe aspiration during the examination which she states is what happens to him on a routine basis. The exam was terminated at this point due to the severe aspiration.      Electronically signed by: AVNI RIVERS MD  Date:     03/05/17  Time:    09:02     Narrative:    Esophagram.    Compared to patient's prior pre-stent study. Esophageal stent has been  placed grossly in satisfactory position and appears widely patent. No distal esophageal stricture. Barium did pass the stent into the distal esophagus and stomach without difficulty.    Of note however, upon the first swallow, the patient aspirated a large volume of barium. Due to this the exam was cut short. The patient was given a small amount of additional barium to swallow again with evidence of aspiration. The exam was terminated at this point.            X-Ray Chest PA And Lateral (Final result) Result time:  03/04/17 07:41:51    Final result by Avni Rivers III, MD (03/04/17 07:41:51)    Impression:         1. Slight improvement since February 28. Slight decrease in the effusions. Suspect minimal/mild congestion superimposed on chronic change but diminished since February. Esophageal stent again noted. No new abnormalities.      Electronically signed by: AVNI RIVERS MD  Date:     03/04/17  Time:    07:41     Narrative:    Two-view chest x-ray.    Clinical indication: Chest Pain     Heart size is unchanged compared to February.. Right-sided Mediport type catheter again noted in position as well as an esophageal stent. There is again moderate coarsening of the interstitial markings with small effusions suggested, best seen on the lateral image. These have decreased however compared to February. The interstitial markings are not quite as prominent suggesting decreased congestion superimposed on chronic change.            Assessment/Plan:      * Esophageal dysphagia  GI consulted  -esophagram showed patent stent and severe aspiration  -not stable for PEG placement due to respiratory status  -receiving IV Dilaudid 1 mg q 2 hours    AJS:  Continues to have difficult to control nausea.  Increased frequency of Zofran and added Phenergan as needed.  Also added Ativan 0.5 mg every 8 hours as needed but will wait until after CT PE to use.  Spoke with Nurse Talley.    Primary malignant neoplasm of right  lung metastatic to other site  - Follow up with oncology as out-patient  -Discussed with DR Espinosa, patient's primary oncologist.    Chronic respiratory failure with hypoxia  Nebulizers and IV steriods  -cardiac monitor    AJS:  Increasing shortness of breath and oxygen requirements over the last 24 hours.  Check CTA chest considering his increased risk of thrombosis in malignancy and consult to Pulmonology.    COPD, very severe  Pulmonary following      Palma's esophagus without dysplasia  See above      Severe protein-calorie malnutrition  - Dietitian evaluation  - IV hydration for now  -NPO  -IV clinimix      Oropharyngeal dysphagia  See above      Pneumonia due to infectious organism  Aspiration pneumonia on IV ABx.      Diffuse interstitial pulmonary fibrosis  Pulmonary following      VTE Risk Mitigation         Ordered     enoxaparin injection 40 mg  Daily     Route:  Subcutaneous        03/04/17 0217     Place sequential compression device  Until discontinued      03/04/17 0222     Medium Risk of VTE  Once      03/04/17 0217          Kathleen Campos NP  Department of Hospital Medicine   [facility]

## 2017-03-08 NOTE — PROCEDURES
"Frederick J Lejeune is a 66 y.o. male patient.    Temp: 98.1 °F (36.7 °C) (17)  Pulse: 98 (17)  Resp: 20 (17)  BP: 122/77 (17)  SpO2: 99 % (17 0759)  Weight: 49.7 kg (109 lb 9.1 oz) (17)  Height: 5' 2" (157.5 cm) (17 022)       Thoracentesis  Date/Time: 3/8/2017 11:05 AM  Location procedure was performed: [facility]  Performed by: ALISHA DIALLO  Authorized by: ALISHA DIALLO   Pre-operative diagnosis: Pleural effusion - right  Post-operative diagnosis: Same  Consent Done: Yes  Consent: Verbal consent obtained. Written consent obtained.  Consent given by: patient  Patient understanding: patient states understanding of the procedure being performed  Patient consent: the patient's understanding of the procedure matches consent given  Procedure consent: procedure consent matches procedure scheduled  Relevant documents: relevant documents present and verified  Test results: test results available and properly labeled  Site marked: the operative site was marked  Imaging studies: imaging studies available  Patient identity confirmed: MRN, verbally with patient and name  Time out: Immediately prior to procedure a "time out" was called to verify the correct patient, procedure, equipment, support staff and site/side marked as required.  Procedure purpose: diagnostic and therapeutic  Indications: pleural effusion  Preparation: Patient was prepped and draped in the usual sterile fashion.  Local anesthesia used: yes    Anesthesia:  Local anesthesia used: yes  Local Anesthetic: lidocaine 1% without epinephrine   Anesthetic total: 10 mL  Patient sedated: no  Preparation: skin prepped with ChloraPrep  Patient position: sitting  Ultrasound guidance: yes  Location: right posterior  Intercostal space: 8th  Puncture method: over-the-needle catheter  Needle size: 18  Catheter size: 8 Khmer  Number of attempts: 1  Drainage amount: 560 ml  Drainage " characteristics: serous  Patient tolerance: Patient tolerated the procedure well with no immediate complications  Chest x-ray performed: yes  Chest x-ray interpreted by me.  Complications: No  Estimated blood loss (mL): 5  Specimens: No  Implants: No  Comments: No pneumonthorax identified.    Specimens:   Pleural fluid cell count  Pleural fluid LDH  Pleural fluid Protein  Pleural fluid Microbiology - Bacterial, fungal and AFB  Pleural fluid cytology          Andres Marie  3/8/2017

## 2017-03-08 NOTE — SUBJECTIVE & OBJECTIVE
Interval History:   yesterday he had a CTA of the chest to evaluate his worsneing SOB  The CTA showed an air tract between the trachea and esophagus consistent with an tracheo-esophageal fistula.  He has evidenec of a recent barium aspiration.  He has worsening bilateral pleural effusions    Oncology Treatment Plan:   [facility] MK 3475 KEYNOTE 240    Medications:  Continuous Infusions:   Amino acid 4.25% - dextrose 10% (CLINIMIX-E) solution with additives (1L provides 42.5 gm AA, 100 gm CHO (340 kcal/L dextrose), Na 35, K 30, Mg 5, Ca 4.5, Acetate 70, Cl 39, Phos 15) 80 mL/hr at 03/07/17 2159     Scheduled Meds:   albuterol-ipratropium 2.5mg-0.5mg/3mL  3 mL Nebulization Q4H    arformoterol  15 mcg Nebulization BID    budesonide  0.5 mg Nebulization BID    ceFEPime (MAXIPIME) IVPB  2 g Intravenous Q8H    enoxaparin  40 mg Subcutaneous Daily    famotidine  20 mg Intravenous BID    furosemide  40 mg Intravenous BID    methylPREDNISolone sodium succinate  40 mg Intravenous Q8H    moxifloxacin  400 mg Intravenous Q24H    ondansetron  4 mg Intravenous Q8H     PRN Meds:albuterol-ipratropium 2.5mg-0.5mg/3mL, guaifenesin 100 mg/5 ml, hydrALAZINE, HYDROmorphone, lorazepam, promethazine (PHENERGAN) IVPB     Review of Systems   Respiratory:        Progressive sob     Objective:     Vital Signs (Most Recent):  Temp: 97.5 °F (36.4 °C) (03/07/17 2340)  Pulse: 101 (03/08/17 0423)  Resp: 19 (03/08/17 0423)  BP: 106/75 (03/07/17 2340)  SpO2: 98 % (03/08/17 0423) Vital Signs (24h Range):  Temp:  [97.3 °F (36.3 °C)-98.9 °F (37.2 °C)] 97.5 °F (36.4 °C)  Pulse:  [] 101  Resp:  [16-22] 19  SpO2:  [95 %-99 %] 98 %  BP: (100-134)/(71-87) 106/75     Weight: 49.9 kg (110 lb)  Body mass index is 20.12 kg/(m^2).  Body surface area is 1.48 meters squared.      Intake/Output Summary (Last 24 hours) at 03/08/17 0634  Last data filed at 03/08/17 0546   Gross per 24 hour   Intake          2145.34 ml   Output             3450 ml   Net          -1304.66 ml       Physical Exam   Constitutional: He is oriented to person, place, and time. He appears well-developed and well-nourished.   HENT:   Head: Normocephalic.   Mouth/Throat: No oropharyngeal exudate.   Eyes: Pupils are equal, round, and reactive to light.   Neck: No thyromegaly present.   Cardiovascular: Normal rate, regular rhythm and normal heart sounds.  Exam reveals no gallop.    No murmur heard.  Pulmonary/Chest: No respiratory distress. He has no rales.   Slight tachypnea   Abdominal: Soft. Bowel sounds are normal. He exhibits no distension and no mass. There is no rebound and no guarding.   Musculoskeletal: Normal range of motion. He exhibits no edema.   Lymphadenopathy:     He has no cervical adenopathy.   Neurological: He is alert and oriented to person, place, and time.   Skin: Skin is warm and dry.   Psychiatric:   patient very irritable, indicated he did not wasn't to talk about all this right now       Significant Labs:   BMP:   Recent Labs  Lab 03/06/17  1503   *   *   K 3.9      CO2 32*   BUN 20   CREATININE 0.6   CALCIUM 10.1   , CBC:   Recent Labs  Lab 03/06/17  1503 03/07/17  0459 03/08/17  0446   WBC 11.41 12.39 14.27*   HGB 10.6* 10.6* 10.5*   HCT 32.7* 33.7* 33.7*    271 261    and CMP:   Recent Labs  Lab 03/06/17  1503   *   K 3.9      CO2 32*   *   BUN 20   CREATININE 0.6   CALCIUM 10.1   PROT 6.4   ALBUMIN 2.2*   BILITOT 0.3   ALKPHOS 182*   AST 62*   *   ANIONGAP 8   EGFRNONAA >60       Diagnostic Results:  I have reviewed and interpreted all pertinent imaging results/findings within the past 24 hours.

## 2017-03-08 NOTE — PLAN OF CARE
"Problem: Patient Care Overview  Goal: Plan of Care Review  PT REQUIRES [facility] FOR T/F TO CHAIR   Outcome: Ongoing (interventions implemented as appropriate)  Pt stated that he has been shaking for the last several hours. I asked him if he was ok and if he was anxious about something. He stated,"I'm not having surgery. Y'all people just think that you can tell me that I'm gonna have surgery. Well, I can tell you that I'm not doing it."  I educated him in the fact that he has the right to refuse any treatment he does not want and he should at least listen to the doctors before he makes a decision. After conversation, PRN Ativan was administered. Pt verbalized relief. Pt free from falls. Will continue to monitor.       "

## 2017-03-08 NOTE — PROGRESS NOTES
Progress Note  Pulmonology    Admit Date: 3/3/2017   LOS: 4 days     SUBJECTIVE:     Follow-up For: Progressive dyspnea.      Active Hospital Problems    Diagnosis    *Primary malignant neoplasm of right lung metastatic to other site    Broncho-esophageal fistula    Pleural effusion, bilateral    Diffuse interstitial pulmonary fibrosis    Oropharyngeal dysphagia    Pneumonia due to infectious organism    Severe protein-calorie malnutrition    Chronic respiratory failure with hypoxia    Palma's esophagus without dysplasia    Esophageal dysphagia    COPD, very severe     Problem list reviewed.     Interval history: Seen and examined at bedside. CT chest revealed bilateral pleural effusions and TOF. He reports that his dyspnea with exertion is unchanged. He has been ambulating the [employer] with moderate dyspnea ; an improvement over his admission status.     Continuous Infusions:   Amino acid 4.25% - dextrose 10% (CLINIMIX-E) solution with additives (1L provides 42.5 gm AA, 100 gm CHO (340 kcal/L dextrose), Na 35, K 30, Mg 5, Ca 4.5, Acetate 70, Cl 39, Phos 15) 80 mL/hr at 03/07/17 2159     Scheduled Meds:   albuterol-ipratropium 2.5mg-0.5mg/3mL  3 mL Nebulization Q4H    arformoterol  15 mcg Nebulization BID    budesonide  0.5 mg Nebulization BID    ceFEPime (MAXIPIME) IVPB  2 g Intravenous Q8H    enoxaparin  40 mg Subcutaneous Daily    famotidine  20 mg Intravenous BID    furosemide  40 mg Intravenous BID    lidocaine HCL 10 mg/ml (1%)  1 mL Intradermal Once    lidocaine HCL 10 mg/ml (1%)  1 mL Other Once Pre-Op    methylPREDNISolone sodium succinate  40 mg Intravenous Q8H    moxifloxacin  400 mg Intravenous Q24H    ondansetron  4 mg Intravenous Q8H       Review of Systems:  Constitutional: no fever or chills  Respiratory: positive for dyspnea on exertion  Cardiovascular: no chest pain or palpitations    OBJECTIVE:     Vital Signs Range (Last 24H):  Temp:  [97.3 °F (36.3 °C)-98.9 °F (37.2  °C)]   Pulse:  []   Resp:  [16-20]   BP: (100-122)/(71-79)   SpO2:  [95 %-99 %]     I & O (Last 24H):  Intake/Output Summary (Last 24 hours) at 03/08/17 0930  Last data filed at 03/08/17 0546   Gross per 24 hour   Intake          1925.34 ml   Output             3250 ml   Net         -1324.66 ml     Physical Exam:  General: no distress  Neck: no jugular venous distention  Lungs:  normal respiratory effort and rhonchi bibasilar  Chest Wall: no tenderness  Heart: regular rate and rhythm and no murmur  Abdomen: soft, non-tender non-distended; bowel sounds normal  Extremities: no cyanosis or edema, or clubbing  Neurologic: alert, oriented, thought content appropriate    Laboratory:  CBC:   Recent Labs  Lab 03/08/17  0446   WBC 14.27*   RBC 3.07*   HGB 10.5*   HCT 33.7*      *   MCH 34.2*   MCHC 31.2*     CMP:   Recent Labs  Lab 03/06/17  1503   *   CALCIUM 10.1   ALBUMIN 2.2*   PROT 6.4   *   K 3.9   CO2 32*      BUN 20   CREATININE 0.6   ALKPHOS 182*   *   AST 62*   BILITOT 0.3         CTA Chest Non Coronary:    There is no evidence of pulmonary embolus. Pulmonary artery caliber is normal.     Since the previous study, there has been placement of an esophageal stent with evidence of extraluminal air along the right lateral margin of the stent.  There is a small air tract between the right anterior margin of the stent communicating with the right mainstem bronchus consistent with a tracheoesophageal fistula.  This was confirmed on a esophagram performed at March 5, 2017.  There is aspirated barium demonstrated throughout the lower lungs.      Enlargement of the large right and moderate left pleural effusions.  Stable low-density mass in the right lower lobe measuring approximately 6 centimeters in greatest dimension.  No significant change in the size of the bilateral pulmonary nodules.  Representative pulmonary nodules in the left lung at the lower lobe measures 2.3 x 1.9 cm  and 2.1 x 1.9 cm.  Representative lesion in the right upper lobe measures 1.1 cm in greatest dimension.    Poorly defined soft tissue mass encases the right mainstem bronchus and bronchus intermedius.  There is a focal luminal narrowing of the right mainstem bronchus best demonstrated on axial image 94 of series 2.  Poorly defined soft tissue density mass also encases the left mainstem bronchus without narrowing.    Aortic atherosclerosis.  Coronary artery calcifications.    Severe emphysema.    Reflux of contrast into the hepatic veins consistent with right heart dysfunction.    Bilateral supraclavicular lymphadenopathy measuring 4.0 x 3.5 cm on the right and 1.7 x 1.5 cm and the left.    Lytic metastatic lesion in the left humeral head measuring 2.1 cm.      ASSESSMENT/PLAN:     Problem   Pleural Effusion, Bilateral   Primary Malignant Neoplasm of Right Lung Metastatic to Other Site   Chronic Respiratory Failure With Hypoxia   Esophageal Dysphagia   Copd, Very Severe       Plan: Continue Current.      Diagnostic and therapeutic thoracentesis:    Complications  of the procedure discussed in detail with patient. Complications including but not limited to infection that may require hospital  admission, bleeding that may require blood transfusion and or hospital  admission, perforation of the lung which may require surgery. Patient expressed and verbalized understanding.  Alternate treatments and material risks associated with such alternatives were  discussed with pateint. These include radiologic surveillance  with  minimal risk and surgery with an indeterminate risk. The material risks  of refusing the procedure was discussed in detail. This includes no  diagnosis or confirmation of diagnosis and rendering of appropriate treatment the risk of which depends on the nature of the diagnosed illness. Patient expressed and verbalized understanding. Pleural fluid will be sent for chemistry, microbiology and  cytology.    Counseling/Consultation:I discussed the case with primary care team.  Thank you for allowing me to participate in this patients care  We will continue to follow with you.

## 2017-03-08 NOTE — PLAN OF CARE
Problem: Patient Care Overview  Goal: Plan of Care Review  PT REQUIRES CGA FOR T/F TO CHAIR   Outcome: Ongoing (interventions implemented as appropriate)  PT REQUIRES CGA FOR GT 2X60' WITH RW AND O2 IN TOW

## 2017-03-08 NOTE — ASSESSMENT & PLAN NOTE
Ct report showing an air tract between the esophagus and trachea suggesting a fistula.  Would appreciate input of Pulmoanry to see if this is something that Thoracic surgery can jo with, maybe at [facility]

## 2017-03-08 NOTE — ASSESSMENT & PLAN NOTE
He continues to be very SOB.  He has enlarging pleural effusions. Pulmonary to see today for probable thoracentesis.  Has chronic aspiration, a tracheo-esophageal fistula,active lung cancer, some degree of radiation pneumonitis and COPD all contributing    I tried to discuss the findings on yesterday Ct with the patient  He was very irritable, dismissive and said all this was too much to discuss at the time  Will have Pulmonaary Gi/ Hospital medicine go over all this later on today  Issues at hand are:  1-placement of PEG tube whenever pulmonary function allows it  2-discuss thoracentesis to try to improve SOB  3-if the finding of thee tracheo-esophageal is real ( there is an air tract reported between the 2 by the radiologist), is there is anything that thoracic surgery or any of the other specialties can offer this gentleman with so poor performance status .

## 2017-03-08 NOTE — PT/OT/SLP PROGRESS
Physical Therapy  Treatment    Frederick J Lejeune   MRN: [id number]   Admitting Diagnosis: Primary malignant neoplasm of right lung metastatic to other site    PT Received On: 17  PT Start Time: 832     PT Stop Time: 855    PT Total Time (min): 23 min       Billable Minutes:  Gait [id number] and Therapeutic Exercise 10    Treatment Type: Treatment  PT/PTA: PT             General Precautions: Standard, fall  Orthopedic Precautions:     Braces:           Subjective:  Communicated with NURSE RODRIGUEZ AND EPIC CHART REVIEW  prior to session.   PT AGREED TO TX     Pain Ratin/10        Location: chest     Pain Rating Post-Intervention: 4/10    Objective:   Patient found with: peripheral IV, oxygen    Functional Mobility:  Bed Mobility:   Rolling/Turning to Left: Modified independent  Scooting/Bridging: Modified Independent  Supine to Sit: Modified Independent    Transfers:  Sit <> Stand Assistance: Contact Guard Assistance  Sit <> Stand Assistive Device: Rolling Walker  Bed <> Chair Technique: Stand Pivot  Bed <> Chair Assistance: Contact Guard Assistance  Bed <> Chair Assistive Device: Rolling Walker    Gait:   Gait Distance: PT GT TRAINED 2X60' WITH ONE SEATED REST BREAK  Assistance 1: Contact Guard Assistance  Gait Assistive Device: Rolling walker  Gait Pattern: swing-through gait  Gait Deviation(s): decreased mindy    Balance:   Static Sit: GOOD+: Takes MAXIMAL challenges from all directions.    Dynamic Sit: GOOD-: Maintains balance through MODERATE excursions of active trunk movement,     Static Stand: FAIR: Maintains without assist but unable to take challenges  Dynamic stand: FAIR: Needs CONTACT GUARD during gait     Therapeutic Activities and Exercises:  PT GT TRAINED AND RETURNED TO RM T/F TO CHAIR WITH CGA. PT COMPLETED SEATED TE 2X10 REPS FOR AP, TKE, AND MIP. PT LEFT SEATED WITH ALL NEED SMET      AM-PAC 6 CLICK MOBILITY  How much help from another person does this patient currently need?   1 = Unable,  Total/Dependent Assistance  2 = A lot, Maximum/Moderate Assistance  3 = A little, Minimum/Contact Guard/Supervision  4 = None, Modified Pope/Independent         AM-PAC Raw Score CMS G-Code Modifier Level of Impairment Assistance   6 % Total / Unable   7 - 9 CM 80 - 100% Maximal Assist   10 - 14 CL 60 - 80% Moderate Assist   15 - 19 CK 40 - 60% Moderate Assist   20 - 22 CJ 20 - 40% Minimal Assist   23 CI 1-20% SBA / CGA   24 CH 0% Independent/ Mod I     Patient left up in chair with call button in reach.    Assessment:  PT PROGRESSING WITH GT     Rehab identified problem list/impairments: Rehab identified problem list/impairments: weakness, impaired balance, gait instability, impaired self care skills, impaired endurance, pain, decreased lower extremity function, impaired functional mobilty    Rehab potential is good.    Activity tolerance: Good    Discharge recommendations: Discharge Facility/Level Of Care Needs: nursing facility, skilled     Barriers to discharge: Barriers to Discharge: None    Equipment recommendations:       GOALS:   Physical Therapy Goals        Problem: Physical Therapy Goal    Goal Priority Disciplines Outcome Goal Variances Interventions   Physical Therapy Goal     PT/OT, PT      Description:  PT WILL BE SEEN FOR P.T. FOR A MIN OF 5 OUT OF 7 DAYS A WEEK  LTG: 3/14/17  1. PT WILL COMPLETE BED MOBILITY IND  2. PT WILL STAND WITH RW FOR GT X 50' WITH CGA.  3. PT WILL T/F TO CHAIR WITH S.  4. PT WILL COMPLETE B LE TE X 20 REPS FOR STRENGTHENING.               PLAN:    Patient to be seen    to address the above listed problems via gait training, therapeutic activities, therapeutic exercises  Plan of Care expires: 03/14/17  Plan of Care reviewed with: patient         Jojo Robledo, PT  03/08/2017

## 2017-03-08 NOTE — PROGRESS NOTES
[facility] -   Hematology/Oncology  Progress Note    Patient Name: Frederick J Lejeune  Admission Date: 3/3/2017  Hospital Length of Stay: 4 days  Code Status: Full Code     Subjective:     HPI:  History of Present Illness:  Frederick J Lejeune is a 66 y.o. male with GERD has a diagnosis of squamous cell carcinoma, PDL-1 positive.  He initlaly presneted  1 month history of weight loss, chest pain, dysphagia, odynophagia found to have esophageal narrowing and RLL lung mass.He has had difficulty eating due to odynophagia for the past month. He also states he has L groin pain due to what he thought was a hernia, which has since been found to be firm lymphadenopathy. Patient underwent EGD 10/19, which was notable for extrinsic compression in middle third of the esophagus as well as findings suggestive of Palma's esophagus, which were biopsied. 10/20, patient underwent EUS notable for lymphadenopathy in the paratracheal, mediastinal, and subcarinal region. FNA performed. Patient underwent bronchoscopy on 10/21 which was notable for a mass in the right mainstem bronchus, biopsy revealed lung cancer, squamous cell carcinoma.   Patient also completed palliative radiation to relieve right mainstem bronchus obstruction. He then was treated with weekly Carbo-Taxol x 6-7 weeks, which was discontinued due to disease progression on imaging in early 1/2017. He is currently receiving Pembrolizumab every 3 weeks.     The patient recently had placement of an esophageal stent due to obstruction.  Since having the stent placed, he has continue with severe pain on swallowing as well as a choking sensation every time he eats.  He has had studies  that shows the stent to the patient. He has been having aspirations by radiographic swallowing studies.  It is planned for him to have an EGd and feeding tube lalced for nutrition.  Plan is to continue Keytruda treatment once discharged    He remains very SOB, more so in the last  few weeks             Past Medical History:  COPD, severe  GERD  Lumbar vertebral fracture s/p surgery in 1/2016 / Chronic low back pain  H/o intracranial hemorrhage     Social History   Marital status:       Spouse name: ganesh   Number of children: 2   Years of education: N/A     Occupational History     [employer]     Social History Main Topics   Smoking status: Former Smoker      Packs/day: 1.00      Years: 50.00      Types: Cigarettes      Quit date: 5/10/2016   Smokeless tobacco: Former User      Types: Snuff      Quit date: 5/10/2016   Alcohol use No   Drug use: No   Sexual activity: Yes      Partners: Female     Other Topics Concern   Not on file     Social History Narrative        Family History: family history includes Cancer in his father; Emphysema in his mother.           Interval History:   yesterday he had a CTA of the chest to evaluate his worsneing SOB  The CTA showed an air tract between the trachea and esophagus consistent with an tracheo-esophageal fistula.  He has evidenec of a recent barium aspiration.  He has worsening bilateral pleural effusions    Oncology Treatment Plan:   [facility] MK 3475 KEYNOTE 240    Medications:  Continuous Infusions:   Amino acid 4.25% - dextrose 10% (CLINIMIX-E) solution with additives (1L provides 42.5 gm AA, 100 gm CHO (340 kcal/L dextrose), Na 35, K 30, Mg 5, Ca 4.5, Acetate 70, Cl 39, Phos 15) 80 mL/hr at 03/07/17 3263     Scheduled Meds:   albuterol-ipratropium 2.5mg-0.5mg/3mL  3 mL Nebulization Q4H    arformoterol  15 mcg Nebulization BID    budesonide  0.5 mg Nebulization BID    ceFEPime (MAXIPIME) IVPB  2 g Intravenous Q8H    enoxaparin  40 mg Subcutaneous Daily    famotidine  20 mg Intravenous BID    furosemide  40 mg Intravenous BID    methylPREDNISolone sodium succinate  40 mg Intravenous Q8H    moxifloxacin  400 mg Intravenous Q24H    ondansetron  4 mg Intravenous Q8H     PRN Meds:albuterol-ipratropium 2.5mg-0.5mg/3mL,  guaifenesin 100 mg/5 ml, hydrALAZINE, HYDROmorphone, lorazepam, promethazine (PHENERGAN) IVPB     Review of Systems   Respiratory:        Progressive sob     Objective:     Vital Signs (Most Recent):  Temp: 97.5 °F (36.4 °C) (03/07/17 2340)  Pulse: 101 (03/08/17 0423)  Resp: 19 (03/08/17 0423)  BP: 106/75 (03/07/17 2340)  SpO2: 98 % (03/08/17 0423) Vital Signs (24h Range):  Temp:  [97.3 °F (36.3 °C)-98.9 °F (37.2 °C)] 97.5 °F (36.4 °C)  Pulse:  [] 101  Resp:  [16-22] 19  SpO2:  [95 %-99 %] 98 %  BP: (100-134)/(71-87) 106/75     Weight: 49.9 kg (110 lb)  Body mass index is 20.12 kg/(m^2).  Body surface area is 1.48 meters squared.      Intake/Output Summary (Last 24 hours) at 03/08/17 0634  Last data filed at 03/08/17 0546   Gross per 24 hour   Intake          2145.34 ml   Output             3450 ml   Net         -1304.66 ml       Physical Exam   Constitutional: He is oriented to person, place, and time. He appears well-developed and well-nourished.   HENT:   Head: Normocephalic.   Mouth/Throat: No oropharyngeal exudate.   Eyes: Pupils are equal, round, and reactive to light.   Neck: No thyromegaly present.   Cardiovascular: Normal rate, regular rhythm and normal heart sounds.  Exam reveals no gallop.    No murmur heard.  Pulmonary/Chest: No respiratory distress. He has no rales.   Slight tachypnea   Abdominal: Soft. Bowel sounds are normal. He exhibits no distension and no mass. There is no rebound and no guarding.   Musculoskeletal: Normal range of motion. He exhibits no edema.   Lymphadenopathy:     He has no cervical adenopathy.   Neurological: He is alert and oriented to person, place, and time.   Skin: Skin is warm and dry.   Psychiatric:   patient very irritable, indicated he did not wasn't to talk about all this right now       Significant Labs:   BMP:   Recent Labs  Lab 03/06/17  1503   *   *   K 3.9      CO2 32*   BUN 20   CREATININE 0.6   CALCIUM 10.1   , CBC:   Recent Labs  Lab  03/06/17  1503 03/07/17  0459 03/08/17  0446   WBC 11.41 12.39 14.27*   HGB 10.6* 10.6* 10.5*   HCT 32.7* 33.7* 33.7*    271 261    and CMP:   Recent Labs  Lab 03/06/17  1503   *   K 3.9      CO2 32*   *   BUN 20   CREATININE 0.6   CALCIUM 10.1   PROT 6.4   ALBUMIN 2.2*   BILITOT 0.3   ALKPHOS 182*   AST 62*   *   ANIONGAP 8   EGFRNONAA >60       Diagnostic Results:  I have reviewed and interpreted all pertinent imaging results/findings within the past 24 hours.    Assessment/Plan:     Chronic respiratory failure with hypoxia  He continues to be very SOB.  He has enlarging pleural effusions. Pulmonary to see today for probable thoracentesis.  Has chronic aspiration, a tracheo-esophageal fistula,active lung cancer, some degree of radiation pneumonitis and COPD all contributing    I tried to discuss the findings on yesterday Ct with the patient  He was very irritable, dismissive and said all this was too much to discuss at the time  Will have Pulmonaary Gi/ Hospital medicine go over all this later on today  Issues at hand are:  1-placement of PEG tube whenever pulmonary function allows it  2-discuss thoracentesis to try to improve SOB  3-if the finding of thee tracheo-esophageal is real ( there is an air tract reported between the 2 by the radiologist), is there is anything that thoracic surgery or any of the other specialties can offer this gentleman with so poor performance status .    Broncho-esophageal fistula  Ct report showing an air tract between the esophagus and trachea suggesting a fistula.  Would appreciate input of Pulmoanry to see if this is something that Thoracic surgery can jo with, maybe at [facility]      Thank you for your consult. I will follow-up with patient. Please contact us if you have any additional questions.     Pancho Iraheta MD  Hematology/Oncology  [facility] -

## 2017-03-09 ENCOUNTER — SURGERY (OUTPATIENT)
Age: 67
End: 2017-03-09

## 2017-03-09 VITALS — RESPIRATION RATE: 30 BRPM

## 2017-03-09 LAB
ALBUMIN FLD-MCNC: 2.2 G/DL
ALBUMIN SERPL BCP-MCNC: 2.6 G/DL
ALP SERPL-CCNC: 169 U/L
ALT SERPL W/O P-5'-P-CCNC: 58 U/L
AMYLASE, BODY FLUID: 5 U/L
ANION GAP SERPL CALC-SCNC: 7 MMOL/L
AST SERPL-CCNC: 20 U/L
BASOPHILS # BLD AUTO: 0.01 K/UL
BASOPHILS NFR BLD: 0.1 %
BILIRUB SERPL-MCNC: 0.7 MG/DL
BODY FLUID SOURCE AMYLASE: NORMAL
BODY FLUID SOURCE, LDH: NORMAL
BUN SERPL-MCNC: 31 MG/DL
CALCIUM SERPL-MCNC: 9.9 MG/DL
CHLORIDE SERPL-SCNC: 105 MMOL/L
CO2 SERPL-SCNC: 35 MMOL/L
CREAT SERPL-MCNC: 0.7 MG/DL
DIFFERENTIAL METHOD: ABNORMAL
EOSINOPHIL # BLD AUTO: 0 K/UL
EOSINOPHIL NFR BLD: 0.1 %
ERYTHROCYTE [DISTWIDTH] IN BLOOD BY AUTOMATED COUNT: 14.3 %
EST. GFR  (AFRICAN AMERICAN): >60 ML/MIN/1.73 M^2
EST. GFR  (NON AFRICAN AMERICAN): >60 ML/MIN/1.73 M^2
GLUCOSE FLD-MCNC: 146 MG/DL
GLUCOSE SERPL-MCNC: 157 MG/DL
HCT VFR BLD AUTO: 34.3 %
HGB BLD-MCNC: 10.9 G/DL
LDH FLD L TO P-CCNC: 134 U/L
LYMPHOCYTES # BLD AUTO: 0.8 K/UL
LYMPHOCYTES NFR BLD: 5.4 %
MCH RBC QN AUTO: 34.8 PG
MCHC RBC AUTO-ENTMCNC: 31.8 %
MCV RBC AUTO: 110 FL
MONOCYTES # BLD AUTO: 0.5 K/UL
MONOCYTES NFR BLD: 3.1 %
NEUTROPHILS # BLD AUTO: 13.8 K/UL
NEUTROPHILS NFR BLD: 91.3 %
PATH INTERP FLD-IMP: NORMAL
PLATELET # BLD AUTO: 247 K/UL
PMV BLD AUTO: 11.3 FL
POTASSIUM SERPL-SCNC: 4.1 MMOL/L
PROT FLD-MCNC: 3.5 G/DL
PROT SERPL-MCNC: 6.6 G/DL
RBC # BLD AUTO: 3.13 M/UL
SODIUM SERPL-SCNC: 147 MMOL/L
SPECIMEN SOURCE: NORMAL
WBC # BLD AUTO: 15.05 K/UL

## 2017-03-09 PROCEDURE — 94640 AIRWAY INHALATION TREATMENT: CPT

## 2017-03-09 PROCEDURE — 27000221 HC OXYGEN, UP TO 24 HOURS

## 2017-03-09 PROCEDURE — 0DH63UZ INSERTION OF FEEDING DEVICE INTO STOMACH, PERCUTANEOUS APPROACH: ICD-10-PCS | Performed by: INTERNAL MEDICINE

## 2017-03-09 PROCEDURE — 25000003 PHARM REV CODE 250: Performed by: INTERNAL MEDICINE

## 2017-03-09 PROCEDURE — 99233 SBSQ HOSP IP/OBS HIGH 50: CPT | Mod: ,,, | Performed by: INTERNAL MEDICINE

## 2017-03-09 PROCEDURE — 97110 THERAPEUTIC EXERCISES: CPT

## 2017-03-09 PROCEDURE — 63600175 PHARM REV CODE 636 W HCPCS: Performed by: NURSE PRACTITIONER

## 2017-03-09 PROCEDURE — 25000003 PHARM REV CODE 250: Performed by: NURSE ANESTHETIST, CERTIFIED REGISTERED

## 2017-03-09 PROCEDURE — 97803 MED NUTRITION INDIV SUBSEQ: CPT

## 2017-03-09 PROCEDURE — 43246 EGD PLACE GASTROSTOMY TUBE: CPT | Performed by: INTERNAL MEDICINE

## 2017-03-09 PROCEDURE — 43246 EGD PLACE GASTROSTOMY TUBE: CPT | Mod: ,,, | Performed by: INTERNAL MEDICINE

## 2017-03-09 PROCEDURE — 99900035 HC TECH TIME PER 15 MIN (STAT)

## 2017-03-09 PROCEDURE — 63600175 PHARM REV CODE 636 W HCPCS: Performed by: INTERNAL MEDICINE

## 2017-03-09 PROCEDURE — 25000242 PHARM REV CODE 250 ALT 637 W/ HCPCS: Performed by: NURSE PRACTITIONER

## 2017-03-09 PROCEDURE — 43760 *HC REPLACEMENT GASTROS TUBE: CPT | Performed by: INTERNAL MEDICINE

## 2017-03-09 PROCEDURE — 80053 COMPREHEN METABOLIC PANEL: CPT

## 2017-03-09 PROCEDURE — 85025 COMPLETE CBC W/AUTO DIFF WBC: CPT

## 2017-03-09 PROCEDURE — 21400001 HC TELEMETRY ROOM

## 2017-03-09 PROCEDURE — 25000003 PHARM REV CODE 250: Performed by: NURSE PRACTITIONER

## 2017-03-09 PROCEDURE — 94761 N-INVAS EAR/PLS OXIMETRY MLT: CPT

## 2017-03-09 PROCEDURE — 37000008 HC ANESTHESIA 1ST 15 MINUTES: Performed by: INTERNAL MEDICINE

## 2017-03-09 PROCEDURE — 37000009 HC ANESTHESIA EA ADD 15 MINS: Performed by: INTERNAL MEDICINE

## 2017-03-09 PROCEDURE — 63600175 PHARM REV CODE 636 W HCPCS: Performed by: NURSE ANESTHETIST, CERTIFIED REGISTERED

## 2017-03-09 RX ORDER — PROPOFOL 10 MG/ML
VIAL (ML) INTRAVENOUS
Status: DISCONTINUED | OUTPATIENT
Start: 2017-03-09 | End: 2017-03-09

## 2017-03-09 RX ORDER — SODIUM CHLORIDE, SODIUM LACTATE, POTASSIUM CHLORIDE, CALCIUM CHLORIDE 600; 310; 30; 20 MG/100ML; MG/100ML; MG/100ML; MG/100ML
INJECTION, SOLUTION INTRAVENOUS CONTINUOUS PRN
Status: DISCONTINUED | OUTPATIENT
Start: 2017-03-09 | End: 2017-03-09

## 2017-03-09 RX ORDER — LIDOCAINE HCL/PF 100 MG/5ML
SYRINGE (ML) INTRAVENOUS
Status: DISCONTINUED | OUTPATIENT
Start: 2017-03-09 | End: 2017-03-09

## 2017-03-09 RX ORDER — PHENYLEPHRINE HYDROCHLORIDE 10 MG/ML
INJECTION INTRAVENOUS
Status: DISCONTINUED | OUTPATIENT
Start: 2017-03-09 | End: 2017-03-09

## 2017-03-09 RX ADMIN — CEFEPIME HYDROCHLORIDE 2 G: 2 INJECTION, SOLUTION INTRAVENOUS at 05:03

## 2017-03-09 RX ADMIN — HYDROMORPHONE HYDROCHLORIDE 1 MG: 1 INJECTION, SOLUTION INTRAMUSCULAR; INTRAVENOUS; SUBCUTANEOUS at 10:03

## 2017-03-09 RX ADMIN — HYDROMORPHONE HYDROCHLORIDE 1 MG: 1 INJECTION, SOLUTION INTRAMUSCULAR; INTRAVENOUS; SUBCUTANEOUS at 06:03

## 2017-03-09 RX ADMIN — METHYLPREDNISOLONE SODIUM SUCCINATE 40 MG: 40 INJECTION, POWDER, FOR SOLUTION INTRAMUSCULAR; INTRAVENOUS at 10:03

## 2017-03-09 RX ADMIN — HYDROMORPHONE HYDROCHLORIDE 1 MG: 1 INJECTION, SOLUTION INTRAMUSCULAR; INTRAVENOUS; SUBCUTANEOUS at 12:03

## 2017-03-09 RX ADMIN — SODIUM CHLORIDE, SODIUM LACTATE, POTASSIUM CHLORIDE, AND CALCIUM CHLORIDE: 600; 310; 30; 20 INJECTION, SOLUTION INTRAVENOUS at 12:03

## 2017-03-09 RX ADMIN — HYDROMORPHONE HYDROCHLORIDE 1 MG: 1 INJECTION, SOLUTION INTRAMUSCULAR; INTRAVENOUS; SUBCUTANEOUS at 02:03

## 2017-03-09 RX ADMIN — HYDROMORPHONE HYDROCHLORIDE 1 MG: 1 INJECTION, SOLUTION INTRAMUSCULAR; INTRAVENOUS; SUBCUTANEOUS at 07:03

## 2017-03-09 RX ADMIN — MOXIFLOXACIN HYDROCHLORIDE 400 MG: 400 INJECTION, SOLUTION INTRAVENOUS at 11:03

## 2017-03-09 RX ADMIN — CEFEPIME HYDROCHLORIDE 2 G: 2 INJECTION, SOLUTION INTRAVENOUS at 10:03

## 2017-03-09 RX ADMIN — HYDROMORPHONE HYDROCHLORIDE 1 MG: 1 INJECTION, SOLUTION INTRAMUSCULAR; INTRAVENOUS; SUBCUTANEOUS at 11:03

## 2017-03-09 RX ADMIN — FUROSEMIDE 40 MG: 10 INJECTION, SOLUTION INTRAVENOUS at 09:03

## 2017-03-09 RX ADMIN — PROPOFOL 20 MG: 10 INJECTION, EMULSION INTRAVENOUS at 01:03

## 2017-03-09 RX ADMIN — ONDANSETRON 4 MG: 2 INJECTION INTRAMUSCULAR; INTRAVENOUS at 10:03

## 2017-03-09 RX ADMIN — IPRATROPIUM BROMIDE AND ALBUTEROL SULFATE 3 ML: .5; 3 SOLUTION RESPIRATORY (INHALATION) at 09:03

## 2017-03-09 RX ADMIN — ASCORBIC ACID, VITAMIN A PALMITATE, CHOLECALCIFEROL, THIAMINE HYDROCHLORIDE, RIBOFLAVIN-5 PHOSPHATE SODIUM, PYRIDOXINE HYDROCHLORIDE, NIACINAMIDE, DEXPANTHENOL, ALPHA-TOCOPHEROL ACETATE, VITAMIN K1, FOLIC ACID, BIOTIN, CYANOCOBALAMIN: 200; 3300; 200; 6; 3.6; 6; 40; 15; 10; 150; 600; 60; 5 INJECTION, SOLUTION INTRAVENOUS at 10:03

## 2017-03-09 RX ADMIN — ONDANSETRON 4 MG: 2 INJECTION INTRAMUSCULAR; INTRAVENOUS at 06:03

## 2017-03-09 RX ADMIN — PHENYLEPHRINE HYDROCHLORIDE 200 MCG: 10 INJECTION INTRAVENOUS at 01:03

## 2017-03-09 RX ADMIN — ARFORMOTEROL TARTRATE 15 MCG: 15 SOLUTION RESPIRATORY (INHALATION) at 07:03

## 2017-03-09 RX ADMIN — BUDESONIDE 0.5 MG: 0.5 INHALANT RESPIRATORY (INHALATION) at 07:03

## 2017-03-09 RX ADMIN — FAMOTIDINE 20 MG: 20 INJECTION, SOLUTION INTRAVENOUS at 10:03

## 2017-03-09 RX ADMIN — HYDROMORPHONE HYDROCHLORIDE 1 MG: 1 INJECTION, SOLUTION INTRAMUSCULAR; INTRAVENOUS; SUBCUTANEOUS at 09:03

## 2017-03-09 RX ADMIN — IPRATROPIUM BROMIDE AND ALBUTEROL SULFATE 3 ML: .5; 3 SOLUTION RESPIRATORY (INHALATION) at 12:03

## 2017-03-09 RX ADMIN — HYDROMORPHONE HYDROCHLORIDE 1 MG: 1 INJECTION, SOLUTION INTRAMUSCULAR; INTRAVENOUS; SUBCUTANEOUS at 05:03

## 2017-03-09 RX ADMIN — BUDESONIDE 0.5 MG: 0.5 INHALANT RESPIRATORY (INHALATION) at 09:03

## 2017-03-09 RX ADMIN — METHYLPREDNISOLONE SODIUM SUCCINATE 40 MG: 40 INJECTION, POWDER, FOR SOLUTION INTRAMUSCULAR; INTRAVENOUS at 06:03

## 2017-03-09 RX ADMIN — METHYLPREDNISOLONE SODIUM SUCCINATE 40 MG: 40 INJECTION, POWDER, FOR SOLUTION INTRAMUSCULAR; INTRAVENOUS at 02:03

## 2017-03-09 RX ADMIN — CEFEPIME HYDROCHLORIDE 2 G: 2 INJECTION, SOLUTION INTRAVENOUS at 02:03

## 2017-03-09 RX ADMIN — IPRATROPIUM BROMIDE AND ALBUTEROL SULFATE 3 ML: .5; 3 SOLUTION RESPIRATORY (INHALATION) at 01:03

## 2017-03-09 RX ADMIN — IPRATROPIUM BROMIDE AND ALBUTEROL SULFATE 3 ML: .5; 3 SOLUTION RESPIRATORY (INHALATION) at 05:03

## 2017-03-09 RX ADMIN — FAMOTIDINE 20 MG: 20 INJECTION, SOLUTION INTRAVENOUS at 09:03

## 2017-03-09 RX ADMIN — IPRATROPIUM BROMIDE AND ALBUTEROL SULFATE 3 ML: .5; 3 SOLUTION RESPIRATORY (INHALATION) at 07:03

## 2017-03-09 RX ADMIN — HYDROMORPHONE HYDROCHLORIDE 1 MG: 1 INJECTION, SOLUTION INTRAMUSCULAR; INTRAVENOUS; SUBCUTANEOUS at 03:03

## 2017-03-09 RX ADMIN — ARFORMOTEROL TARTRATE 15 MCG: 15 SOLUTION RESPIRATORY (INHALATION) at 09:03

## 2017-03-09 RX ADMIN — ONDANSETRON 4 MG: 2 INJECTION INTRAMUSCULAR; INTRAVENOUS at 02:03

## 2017-03-09 RX ADMIN — LIDOCAINE HYDROCHLORIDE 40 MG: 20 INJECTION, SOLUTION INTRAVENOUS at 01:03

## 2017-03-09 RX ADMIN — PROPOFOL 50 MG: 10 INJECTION, EMULSION INTRAVENOUS at 01:03

## 2017-03-09 NOTE — PLAN OF CARE
Along with [facility]\"" , Zeynep SARABIA, met with patient in room and discussed his decisions regarding next steps.  Patient states that he will be having PEG Tube placed today and wants to discuss discharge planning after surgery.  Two sons and daughter-in-law entered patient's hospital room.  Due to respiratory therapy in room to give patient breathing treatment, obtained verbal consent from patient to discuss discharge planning with two sons and daughter-in-law.  Sons shared family dynamics and indicate that they will be present for patient.  Sons further indicate that hospice as a possible option was discussed with patient and family.  Provided family with this 's contact information and requested that they contact me should any needs arise.    PLAN:  Continue to follow and determine patient's discharge wants following PEG Tube placement       03/09/17 1325   Discharge Reassessment   Assessment Type Discharge Planning Reassessment   Can the patient answer the patient profile reliably? Yes, cognitively intact   How does the patient rate their overall health at the present time? Poor   Describe the patient's ability to walk at the present time. Minor restrictions or changes   How often would a person be available to care for the patient? Occasionally   Number of comorbid conditions (as recorded on the chart) Three   During the past month, has the patient often been bothered by feeling down, depressed or hopeless? No   During the past month, has the patient often been bothered by little interest or pleasure in doing things? No   Discharge plan remains the same: No   Provided patient/caregiver education on the expected discharge date and the discharge plan Yes   Discharge Plan A Skilled Nursing Facility   Discharge Plan B Home with family;Home Health;Hospice/home   Change in patient condition or support system (Two adult sons, Zacarias and Alethea, now present at hospital with patient)    Patient choice form signed by patient/caregiver N/A   Explained to the the patient/caregiver why the discharge planned changed: Yes   Involved the patient/caregiver in establishing a new discharge plan: Yes

## 2017-03-09 NOTE — PLAN OF CARE
PATIENT CONNECTED BACK TO TELEMETRY MONITOR, ADJUSTED FOR COMFORT IN BED, O2 PLACED AT 2L/NC (PER PRIOR DOSE), AND REPORT GIVEN TO ELLIE RODRIGUEZ.  NO QUESTIONS AT THIS TIME.  PATIENT AND FAMILY ALLOWED OPPORTUNITY FOR QUESTIONS.  FLOOR NURSE INFORMED OF PATIENT'S REQUEST FOR PAIN MED IF ABLE TO HAVE AT THIS TIME D/T PEG TUBE POST-OPERATIVE PAIN.

## 2017-03-09 NOTE — INTERVAL H&P NOTE
The patient has been examined and the H&P has been reviewed:    I concur with the findings and no changes have occurred since H&P was written.    Anesthesia/Surgery risks, benefits and alternative options discussed and understood by patient/family.          Active Hospital Problems    Diagnosis  POA    *Primary malignant neoplasm of right lung metastatic to other site [C34.91]  Yes     Priority: 1 - High     Chronic    Esophageal dysphagia [R13.14]  Yes     Priority: 1 - High     Chronic    Palma's esophagus without dysplasia [K22.70]  Yes     Priority: 2      Chronic    Broncho-esophageal fistula [J86.0]  Yes    Pleural effusion, bilateral [J90]  Yes    Diffuse interstitial pulmonary fibrosis [J84.10]  Yes    Oropharyngeal dysphagia [R13.12]  Yes    Pneumonia due to infectious organism [J18.9]  Yes    Severe protein-calorie malnutrition [E43]  Yes    Cachexia [R64]  Yes    Chronic respiratory failure with hypoxia [J96.11]  Yes     Chronic    COPD, very severe [J44.9]  Yes     Chronic      Resolved Hospital Problems    Diagnosis Date Resolved POA    Severe esophageal dysplasia [K22.711] 03/04/2017 Yes

## 2017-03-09 NOTE — PLAN OF CARE
Problem: Patient Care Overview  Goal: Plan of Care Review  PT REQUIRES CGA FOR T/F TO CHAIR   Outcome: Ongoing (interventions implemented as appropriate)  Fall prevention precautions maintained, pt remained free of falls throughout shift, call bell and personal items within reach, pt's pain moderately controlled by prn pain medication. 24 hour chart check completed. Will continue to monitor

## 2017-03-09 NOTE — PT/OT/SLP PROGRESS
"Occupational Therapy      Frederick J Lejeune  MRN: [id number]    S: PT COOPERATIVE WITH THERAPEUTIC EXERCISE, HOWEVER REFUSED OTHER ACTIVITIES. PT STATED " I HAD ENOUGH." PT REPORTED FEELING DIZZY AND IN PAIN.  O:NURSE MICHAEL PROVIDED PT WITH  PAIN MEDS PRIOR TO THERAPY SESSION. PT REQ SBA WITH SUPINE<>SIT, ROLLING L<>R, AND SCOOTING EOB AND BACKWARD. PT PERFORMED 1 SET X 15 REPS B UE/LE EXERCISE IN ALL AVAILABLE PAIN-FREE RANGE AND PLANES SEATED WITH GOOD SITTING BALANCE  A: PT DEMONSTRATES INCREMENTAL GAIN WITH B UE STRENGTH/ENDURANCE AS EVIDENCE OF COMPLETING HEP. HOWEVER, PT TX SESSION LIMITED DUE TO REFUSAL OF LIMITED PARTICIPATION.  P: CONTINUE WITH POC    Mildred Euceda, OT   1 [address]  [phone]  3/9/2017  "

## 2017-03-09 NOTE — ANESTHESIA POSTPROCEDURE EVALUATION
"Anesthesia Post Evaluation    Patient: Frederick J Lejeune    Procedure(s) Performed: Procedure(s) (LRB):  ESOPHAGOGASTRODUODENOSCOPY (EGD) (Left)    Final Anesthesia Type: MAC  Patient location during evaluation: PACU  Patient participation: Yes- Able to Participate  Level of consciousness: awake and alert  Post-procedure vital signs: reviewed and stable  Pain management: adequate  Airway patency: patent  PONV status at discharge: No PONV  Anesthetic complications: no      Cardiovascular status: blood pressure returned to baseline  Respiratory status: unassisted  Hydration status: euvolemic  Follow-up not needed.        Visit Vitals    /65    Pulse 101    Temp 37 °C (98.6 °F)    Resp 14    Ht 5' 2" (1.575 m)    Wt 49 kg (108 lb)    SpO2 95%    BMI 19.75 kg/m2       Pain/Mickie Score: Pain Assessment Performed: Yes (3/9/2017 12:49 PM)  Presence of Pain: complains of pain/discomfort (3/9/2017 12:49 PM)  Pain Rating Prior to Med Admin: 7 (3/9/2017 11:37 AM)  Pain Rating Post Med Admin: 6 (3/9/2017 12:07 PM)  Mickie Score: 10 (3/9/2017  1:31 PM)      "

## 2017-03-09 NOTE — PROGRESS NOTES
[facility] Hospital Medicine  Progress Note    Patient Name: Frederick J Lejeune  MRN: [id number]  Patient Class: IP- Inpatient   Admission Date: 3/3/2017  Length of Stay: 4 days  Attending Physician: Derek Bourgeois MD  Primary Care Provider: Kenrick Muñoz MD        Subjective:     Principal Problem:Primary malignant neoplasm of right lung metastatic to other site    HPI:  Frederick J Lejeune is a 66 y.o. male with PMHx of GERD, weight loss, dysphagia, odynophagia, RLL lung mass squamous cell carcinoma, who presented to ER with continued disphagia. He has been recently hospitalized from Dr. Espinosa's office with c/o dysphagia, weakness, dizziness, chest pain. He has had difficulty eating due to odynophagia for the past month. Patient has completed palliative radiation to relieve right mainstem bronchus obstruction. He then was treated with weekly Carbo-Taxol x 6-7 weeks, which was discontinued due to disease progression on imaging in early 1/2017. He is currently receiving Pembrolizumab every 3 weeks. He was recently evaluated by Dr. Cheng, at last hospitalization, who recommended further evaluation with esophagram, that was done on 2/24/17 with Esophageal stent placement. Per discharge summary, Dr. Cheng stated if patient continues to have severe dysphagia or chest pain then he could remove the stent and place PEG tube. He was discharged on 2/28/17.    Patient returns to the hospital 3 days after discharge with symptoms of inability to keep anything down. Getting dehydrated, He is agreeable for PEG placement.      Hospital Course:  GI, Dr. Anguiano, was consulted. Esophagogram showed patent stent, he showed signs of severe aspiration. Patient made NPO and IV Cefepime started. Patient continues with mild respiratory distress and placed on Oxygen, IV steroids, nebulizers, IS, acapella. Discussed code status twice with patient and he wants to remain a full code. He also requested more IV pain  "medicine. Discussed with Dr. Bourgeois and Dr. Anguiano. Patient states, "under NO circumstances can you call my family." Pulmonary was consulted for continued hypoxia requiring 4 liters O2. IV Diuretics started. CT chest showed enlarging bilateral pleural effusions, stable metastatic lung CA, and evidence of tracheoesophageal fistula. Dr. Anguiano spoke with the Radiologist explaining patient had esophagram with barium a few days ago. Radiologist said that could be what he saw and will revise his CT report, but addendum didn't r/o fistula. Dr. Marie performed thoracentesis on the right removing 560cc. No family at bedside. Scheduled multidisciplinary rounds 3/9/17.    Interval History: right thoracentesis removed 560ml.    Review of Systems   Constitutional: Positive for activity change, appetite change and fatigue. Negative for fever.   HENT: Negative.  Negative for congestion, nosebleeds and sore throat.    Eyes: Negative.  Negative for photophobia, redness and visual disturbance.   Respiratory: Negative for cough, shortness of breath and wheezing.    Cardiovascular: Negative.  Negative for chest pain, palpitations and leg swelling.   Gastrointestinal: Negative for abdominal pain, constipation, diarrhea, nausea (dysphagia) and vomiting.   Endocrine: Negative.  Negative for polydipsia, polyphagia and polyuria.   Genitourinary: Negative.  Negative for dysuria, flank pain, frequency and urgency.   Musculoskeletal: Negative.  Negative for arthralgias, back pain and joint swelling.   Skin: Negative.  Negative for color change, pallor and rash.   Allergic/Immunologic: Negative.  Negative for environmental allergies, food allergies and immunocompromised state.   Neurological: Negative.  Negative for dizziness, syncope, weakness, light-headedness, numbness and headaches.   Hematological: Negative.  Negative for adenopathy. Does not bruise/bleed easily.   Psychiatric/Behavioral: Negative.  Negative for confusion and " hallucinations. The patient is not nervous/anxious.    All other systems reviewed and are negative.    Objective:     Vital Signs (Most Recent):  Temp: 97.7 °F (36.5 °C) (03/08/17 1630)  Pulse: 109 (03/08/17 1630)  Resp: 18 (03/08/17 1630)  BP: 106/61 (03/08/17 1630)  SpO2: 97 % (03/08/17 1733) Vital Signs (24h Range):  Temp:  [97.5 °F (36.4 °C)-98.1 °F (36.7 °C)] 97.7 °F (36.5 °C)  Pulse:  [] 109  Resp:  [16-20] 18  SpO2:  [95 %-99 %] 97 %  BP: ()/(61-78) 106/61     Weight: 49.7 kg (109 lb 9.1 oz)  Body mass index is 20.04 kg/(m^2).    Intake/Output Summary (Last 24 hours) at 03/08/17 1904  Last data filed at 03/08/17 1824   Gross per 24 hour   Intake          2153.34 ml   Output             2600 ml   Net          -446.66 ml      Physical Exam   Constitutional: He is oriented to person, place, and time. He appears well-developed.   Cachectic, ill appearing   HENT:   Head: Normocephalic.   Mouth/Throat: No oropharyngeal exudate.   Eyes: Pupils are equal, round, and reactive to light.   Neck: No thyromegaly present.   Cardiovascular: Normal rate, regular rhythm and normal heart sounds.  Exam reveals no gallop.    No murmur heard.  Pulmonary/Chest: Effort normal and breath sounds normal. No respiratory distress. He has no rales.   Abdominal: Soft. Bowel sounds are normal. He exhibits no distension and no mass. There is no rebound and no guarding.   Musculoskeletal: Normal range of motion. He exhibits no edema.   Lymphadenopathy:     He has no cervical adenopathy.   Neurological: He is alert and oriented to person, place, and time.   Skin: Skin is warm and dry.   Psychiatric:   Quiet mood       Significant Labs:   CBC:   Recent Labs  Lab 03/07/17  0459 03/08/17  0446   WBC 12.39 14.27*   HGB 10.6* 10.5*   HCT 33.7* 33.7*    261     CMP:   Recent Labs  Lab 03/08/17  0950   *   PROT 7.3  7.3       Significant Imaging: I have reviewed all pertinent imaging results/findings within the past 24  hours.    Assessment/Plan:      Esophageal dysphagia  GI consulted  -esophagram showed patent stent and severe aspiration  -not stable for PEG placement due to respiratory status  -receiving IV Dilaudid 1 mg q 2 hours    AJS:  Continues to have difficult to control nausea.  Increased frequency of Zofran and added Phenergan as needed.  Also added Ativan 0.5 mg every 8 hours as needed but will wait until after CT PE to use.  Spoke with Nurse Talley.    * Primary malignant neoplasm of right lung metastatic to other site  - Follow up with oncology as out-patient  -Discussed with DR Espinosa, patient's primary oncologist.    Chronic respiratory failure with hypoxia  Nebulizers and IV steriods  -cardiac monitor  -right thoracentesis removed 560ml 3/8/2017    AJS:  Increasing shortness of breath and oxygen requirements over the last 24 hours.  Check CTA chest considering his increased risk of thrombosis in malignancy and consult to Pulmonology.    COPD, very severe  Pulmonary following      Palma's esophagus without dysplasia  See above      Severe protein-calorie malnutrition  - Dietitian evaluation  - IV hydration for now  -NPO  -IV clinimix      Oropharyngeal dysphagia  See above      Pneumonia due to infectious organism  Aspiration pneumonia on IV ABx.      Diffuse interstitial pulmonary fibrosis  Pulmonary following      Broncho-esophageal fistula  -patient doesn't want to go to [place] for eval      Pleural effusion, bilateral  -right thoracentesis removed 560ml 3/8/17      VTE Risk Mitigation         Ordered     enoxaparin injection 40 mg  Daily     Route:  Subcutaneous        03/04/17 0217     Place sequential compression device  Until discontinued      03/04/17 0222     Medium Risk of VTE  Once      03/04/17 0217          Kathleen Campos NP  Department of Hospital Medicine   [facility]

## 2017-03-09 NOTE — PLAN OF CARE
Problem: Patient Care Overview  Goal: Plan of Care Review  PT REQUIRES CGA FOR T/F TO CHAIR   Outcome: Ongoing (interventions implemented as appropriate)  PT IS MOD I WITH BED MOBILITY

## 2017-03-09 NOTE — PLAN OF CARE
Problem: Patient Care Overview  Goal: Plan of Care Review  PT REQUIRES CGA FOR T/F TO CHAIR   Outcome: Ongoing (interventions implemented as appropriate)  Fall precautions maintained, pt free from injuries/fall, ambulates, sits up in chair. Very sad and depressed, no family visited, pt stated he called them. NPO. C/o midline chest pain, moderately relieved prn dilaudid push. NSR - ST on tele. POC and meds reviewed w/ pt, pt verbalizes understanding. Chart check done. Will cont to monitor.

## 2017-03-09 NOTE — PLAN OF CARE
Problem: Patient Care Overview  Goal: Plan of Care Review  PT REQUIRES CGA FOR T/F TO CHAIR   Outcome: Ongoing (interventions implemented as appropriate)  Fall precautions maintained, pt free from injuries/fall, repositions self in bed, up w/ assist. C/o midline chest pain and pain on peg tube placement area, moderately relieved by rest, quiet environment, repositioning, and prn pain meds. TPN still going on subclavian port. Abx given as prescribed. Pt remains npo. Wife and son currently at bedside. POC and meds reviewed w/ pt, pt verbalizes understanding. Chart check done. Will cont to monitor.

## 2017-03-09 NOTE — H&P (VIEW-ONLY)
[facility]   Gastroenterology  Consult Note    Patient Name: Frederick J Lejeune  MRN: [id number]  Admission Date: 3/3/2017  Hospital Length of Stay: 0 days  Code Status: Full Code   Attending Provider: Tamara Guerrero MD   Consulting Provider: Mario Shah MD  Primary Care Physician: Kenrick Muñoz MD  Principal Problem:Primary malignant neoplasm of right lung metastatic to other site    Last Recorded LACE+ Scores     None          Inpatient consult to Gastroenterology  Consult performed by: MARIO SHAH  Consult ordered by: KO GHOSH  Reason for consult: Unable to eat due to lung mass  Assessment/Recommendations: Patient Active Problem List    Primary malignant neoplasm of right lung metastatic to other site         Priority: 1 - High (4)         Date Noted: 11/17/2016      Esophageal dysphagia         Priority: 1 - High (4)         Date Noted: 10/19/2016      Palma's esophagus without dysplasia         Priority: 2  (5)         Date Noted: 10/19/2016      Severe protein-calorie malnutrition         Date Noted: 03/04/2017      Esophageal obstruction         Date Noted: 02/22/2017      Dehydration, mild         Date Noted: 12/24/2016      Dizziness         Date Noted: 12/11/2016      Left groin pain      ACS (acute coronary syndrome)         Date Noted: 12/06/2016      Cachexia         Date Noted: 11/08/2016      COPD exacerbation         Date Noted: 11/05/2016      Chronic respiratory failure with hypoxia         Date Noted: 11/05/2016      Chest pain         Date Noted: 11/05/2016      Dyslipidemia         Date Noted: 11/05/2016      Chronic bronchitis         Date Noted: 11/01/2016      Bone metastases         Date Noted: 10/28/2016      Chronic neoplasm-related pain         Date Noted: 10/28/2016      Anxiety about health         Date Noted: 10/28/2016      Lung cancer, main bronchus right         Date Noted: 10/21/2016      Hiatal hernia         Date Noted: 10/19/2016      COPD, very  severe         Date Noted: 04/08/2016            Subjective:     HPI:  Mr. Lejeune is a well known patient to our service who has lung cancer and it has been compressing the esophageal cavity, causing difficulty breathing. He had an esophageal stent placed by Dr. Cheng a few days ago and since then he has been complaining with severe pain and not been able to eat. Initially he had refused having a PEG tube but he has changed his mind now. I have reviewed the recent chest X-rays and the stent appears patent and in good place, but he is not able to even drink water. He comes back to the hospital complaining with pain and unable to eat. I had a lengthy discussion with the patient about his options:  - Removing the stent  - Removing the stent and placing a PEG  - Placing a PEG  - Doing nothing   I recommend that we go ahead and order an [place] Esophagogram to determine the patency of the stent; if patent we will go ahead and place a PEG tube, guided via endoscopy. If the stent is not patent, he will need to have a surgically placed PEG. I am concerned that external compression from the lung mass is collapsing the esophagus and stent and we will not be able to pass the instrument.       Past Medical History:   Diagnosis Date    Cancer     lung    COPD (chronic obstructive pulmonary disease)     GERD (gastroesophageal reflux disease)     Lumbar vertebral fracture        Past Surgical History:   Procedure Laterality Date    ABSCESS DRAINAGE      APPENDECTOMY      BACK SURGERY      ESOPHAGUS SURGERY      HAND SURGERY Left        Review of patient's allergies indicates:   Allergen Reactions    Ambien [zolpidem] Other (See Comments)     Makes me extremely hyped up like im going crazy.    Zoloft [sertraline] Other (See Comments)     unknown     Family History     Problem Relation (Age of Onset)    Cancer Father    Emphysema Mother        Social History Main Topics    Smoking status: Former Smoker     Packs/day: 1.00      Years: 50.00     Types: Cigarettes     Quit date: 5/10/2016    Smokeless tobacco: Former User     Types: Snuff     Quit date: 5/10/2016    Alcohol use No    Drug use: No    Sexual activity: Yes     Partners: Female     Review of Systems   Constitutional: Positive for activity change, appetite change, fatigue and unexpected weight change.   HENT: Positive for trouble swallowing.    Eyes: Negative for photophobia, discharge and itching.   Respiratory: Positive for cough, choking, chest tightness, shortness of breath and wheezing.    Cardiovascular: Positive for chest pain.   Gastrointestinal: Negative for abdominal pain, anal bleeding, blood in stool and constipation.   Endocrine: Negative for cold intolerance, heat intolerance, polydipsia and polyphagia.   Genitourinary: Negative for dysuria, frequency and hematuria.   Musculoskeletal: Positive for back pain and myalgias.   Skin: Negative for color change, pallor and rash.   Neurological: Negative for dizziness, facial asymmetry, light-headedness and headaches.   Hematological: Negative for adenopathy. Does not bruise/bleed easily.   Psychiatric/Behavioral: Negative for agitation, behavioral problems and confusion.     Objective:     Vital Signs (Most Recent):  Temp: 99.2 °F (37.3 °C) (03/04/17 1209)  Pulse: 103 (03/04/17 1252)  Resp: 20 (03/04/17 1252)  BP: 105/74 (03/04/17 1209)  SpO2: 95 % (03/04/17 1252) Vital Signs (24h Range):  Temp:  [98.5 °F (36.9 °C)-99.2 °F (37.3 °C)] 99.2 °F (37.3 °C)  Pulse:  [] 103  Resp:  [16-28] 20  SpO2:  [94 %-100 %] 95 %  BP: ()/(64-77) 105/74     Weight: 49.9 kg (110 lb) (03/04/17 0227)  Body mass index is 20.12 kg/(m^2).      Intake/Output Summary (Last 24 hours) at 03/04/17 1255  Last data filed at 03/04/17 1210   Gross per 24 hour   Intake           518.67 ml   Output              900 ml   Net          -381.33 ml       Lines/Drains/Airways     Central Venous Catheter Line                 Port A Cath Single  Lumen right subclavian -- days          Peripheral Intravenous Line                 Peripheral IV - Single Lumen 03/03/17 2349 Left Antecubital less than 1 day                Physical Exam   Constitutional: He is oriented to person, place, and time. He appears well-developed.   HENT:   Head: Normocephalic and atraumatic.   Eyes: EOM are normal. Pupils are equal, round, and reactive to light.   Neck: Normal range of motion.   Cardiovascular: Normal rate and regular rhythm.    Pulmonary/Chest: He is in respiratory distress. He has rales.   Abdominal: Soft.   Musculoskeletal: Normal range of motion.   Neurological: He is alert and oriented to person, place, and time.   Skin: Skin is warm and dry.   Vitals reviewed.      Significant Labs:  Amylase: No results for input(s): AMYLASE in the last 48 hours.  CBC:   Recent Labs  Lab 03/03/17 2340   WBC 10.69   HGB 10.8*   HCT 33.5*        BMP:   Recent Labs  Lab 03/03/17 2340         K 4.2      CO2 31*   BUN 11   CREATININE 0.7   CALCIUM 10.1     Coagulation:   Recent Labs  Lab 03/03/17  2340   INR 1.1     Lipase: No results for input(s): LIPASE in the last 48 hours.  Liver Function Test:   Recent Labs  Lab 03/03/17 2340   ALT 82*   AST 68*   ALKPHOS 214*   BILITOT 0.7   PROT 6.9   ALBUMIN 2.3*       Significant Imaging:  Imaging results within the past 24 hours have been reviewed.    Assessment/Plan:     * Primary malignant neoplasm of right lung metastatic to other site  Mass causing external compression of the esophagus. See above for plan.     Esophageal dysphagia  I recommend that we go ahead and order an [place] Esophagogram to determine the patency of the stent; if patent we will go ahead and place a PEG tube, guided via endoscopy. If the stent is not patent, he will need to have a surgically placed PEG. I am concerned that external compression from the lung mass is collapsing the esophagus and stent and we will not be able to pass the  instrument.       Palma's esophagus without dysplasia  Not relevant at this point.       VTE Risk Mitigation         Ordered     enoxaparin injection 40 mg  Daily     Route:  Subcutaneous        03/04/17 0217     Place sequential compression device  Until discontinued      03/04/17 0222     Medium Risk of VTE  Once      03/04/17 0217          Thank you for your consult. I will follow-up with patient. Please contact us if you have any additional questions.    Mario Anguiano MD  Gastroenterology  [facility]

## 2017-03-09 NOTE — ASSESSMENT & PLAN NOTE
He wants to be able to be fed.  At this time, i think the next step is to have PEG tube as soon as Pulmonary clears it, and deal with the issue of the probable fistula later on

## 2017-03-09 NOTE — SUBJECTIVE & OBJECTIVE
Interval History: right thoracentesis removed 560ml.    Review of Systems   Constitutional: Positive for activity change, appetite change and fatigue. Negative for fever.   HENT: Negative.  Negative for congestion, nosebleeds and sore throat.    Eyes: Negative.  Negative for photophobia, redness and visual disturbance.   Respiratory: Negative for cough, shortness of breath and wheezing.    Cardiovascular: Negative.  Negative for chest pain, palpitations and leg swelling.   Gastrointestinal: Negative for abdominal pain, constipation, diarrhea, nausea (dysphagia) and vomiting.   Endocrine: Negative.  Negative for polydipsia, polyphagia and polyuria.   Genitourinary: Negative.  Negative for dysuria, flank pain, frequency and urgency.   Musculoskeletal: Negative.  Negative for arthralgias, back pain and joint swelling.   Skin: Negative.  Negative for color change, pallor and rash.   Allergic/Immunologic: Negative.  Negative for environmental allergies, food allergies and immunocompromised state.   Neurological: Negative.  Negative for dizziness, syncope, weakness, light-headedness, numbness and headaches.   Hematological: Negative.  Negative for adenopathy. Does not bruise/bleed easily.   Psychiatric/Behavioral: Negative.  Negative for confusion and hallucinations. The patient is not nervous/anxious.    All other systems reviewed and are negative.    Objective:     Vital Signs (Most Recent):  Temp: 97.7 °F (36.5 °C) (03/08/17 1630)  Pulse: 109 (03/08/17 1630)  Resp: 18 (03/08/17 1630)  BP: 106/61 (03/08/17 1630)  SpO2: 97 % (03/08/17 1733) Vital Signs (24h Range):  Temp:  [97.5 °F (36.4 °C)-98.1 °F (36.7 °C)] 97.7 °F (36.5 °C)  Pulse:  [] 109  Resp:  [16-20] 18  SpO2:  [95 %-99 %] 97 %  BP: ()/(61-78) 106/61     Weight: 49.7 kg (109 lb 9.1 oz)  Body mass index is 20.04 kg/(m^2).    Intake/Output Summary (Last 24 hours) at 03/08/17 0584  Last data filed at 03/08/17 1824   Gross per 24 hour   Intake           2153.34 ml   Output             2600 ml   Net          -446.66 ml      Physical Exam   Constitutional: He is oriented to person, place, and time. He appears well-developed.   Cachectic, ill appearing   HENT:   Head: Normocephalic.   Mouth/Throat: No oropharyngeal exudate.   Eyes: Pupils are equal, round, and reactive to light.   Neck: No thyromegaly present.   Cardiovascular: Normal rate, regular rhythm and normal heart sounds.  Exam reveals no gallop.    No murmur heard.  Pulmonary/Chest: Effort normal and breath sounds normal. No respiratory distress. He has no rales.   Abdominal: Soft. Bowel sounds are normal. He exhibits no distension and no mass. There is no rebound and no guarding.   Musculoskeletal: Normal range of motion. He exhibits no edema.   Lymphadenopathy:     He has no cervical adenopathy.   Neurological: He is alert and oriented to person, place, and time.   Skin: Skin is warm and dry.   Psychiatric:   Quiet mood       Significant Labs:   CBC:   Recent Labs  Lab 03/07/17  0459 03/08/17  0446   WBC 12.39 14.27*   HGB 10.6* 10.5*   HCT 33.7* 33.7*    261     CMP:   Recent Labs  Lab 03/08/17  0950   *   PROT 7.3  7.3       Significant Imaging: I have reviewed all pertinent imaging results/findings within the past 24 hours.

## 2017-03-09 NOTE — ASSESSMENT & PLAN NOTE
Ct report showing an air tract between the esophagus and trachea suggesting a fistula.  patient is not interested in surgical procedures at this time

## 2017-03-09 NOTE — PROGRESS NOTES
[facility] -   Hematology/Oncology  Progress Note    Patient Name: Frederick J Lejeune  Admission Date: 3/3/2017  Hospital Length of Stay: 5 days  Code Status: Full Code     Subjective:     HPI:  History of Present Illness:  Frederick J Lejeune is a 66 y.o. male with GERD has a diagnosis of squamous cell carcinoma, PDL-1 positive.  He initlaly presneted  1 month history of weight loss, chest pain, dysphagia, odynophagia found to have esophageal narrowing and RLL lung mass.He has had difficulty eating due to odynophagia for the past month. He also states he has L groin pain due to what he thought was a hernia, which has since been found to be firm lymphadenopathy. Patient underwent EGD 10/19, which was notable for extrinsic compression in middle third of the esophagus as well as findings suggestive of Palma's esophagus, which were biopsied. 10/20, patient underwent EUS notable for lymphadenopathy in the paratracheal, mediastinal, and subcarinal region. FNA performed. Patient underwent bronchoscopy on 10/21 which was notable for a mass in the right mainstem bronchus, biopsy revealed lung cancer, squamous cell carcinoma.   Patient also completed palliative radiation to relieve right mainstem bronchus obstruction. He then was treated with weekly Carbo-Taxol x 6-7 weeks, which was discontinued due to disease progression on imaging in early 1/2017. He is currently receiving Pembrolizumab every 3 weeks.     The patient recently had placement of an esophageal stent due to obstruction.  Since having the stent placed, he has continue with severe pain on swallowing as well as a choking sensation every time he eats.  He has had studies  that shows the stent to the patient. He has been having aspirations by radiographic swallowing studies.  It is planned for him to have an EGd and feeding tube lalced for nutrition.  Plan is to continue Keytruda treatment once discharged    He remains very SOB, more so in the last  few weeks             Past Medical History:  COPD, severe  GERD  Lumbar vertebral fracture s/p surgery in 1/2016 / Chronic low back pain  H/o intracranial hemorrhage     Social History   Marital status:       Spouse name: ganesh   Number of children: 2   Years of education: N/A     Occupational History     [employer]     Social History Main Topics   Smoking status: Former Smoker      Packs/day: 1.00      Years: 50.00      Types: Cigarettes      Quit date: 5/10/2016   Smokeless tobacco: Former User      Types: Snuff      Quit date: 5/10/2016   Alcohol use No   Drug use: No   Sexual activity: Yes      Partners: Female     Other Topics Concern   Not on file     Social History Narrative        Family History: family history includes Cancer in his father; Emphysema in his mother.           Interval History:   Patient indicates he does not want anyone to talk to him about surgery right now. He wants to be fed and wants the PEG tube in as soon as possible.he is aware that he is getting TPN but insists that is not the same.  The report of the radiologist has been reviewed, and is somewhat confusing. There seems to be a fistula but is unc;lear from where to where.  Had 560 cc of pleural fluids removed yesterday    Oncology Treatment Plan:   OCI MK 3475 KEYNOTE 240    Medications:  Continuous Infusions:   Amino acid 4.25% - dextrose 10% (CLINIMIX-E) solution with additives (1L provides 42.5 gm AA, 100 gm CHO (340 kcal/L dextrose), Na 35, K 30, Mg 5, Ca 4.5, Acetate 70, Cl 39, Phos 15) 80 mL/hr at 03/08/17 2054    lactated ringers       Scheduled Meds:   albuterol-ipratropium 2.5mg-0.5mg/3mL  3 mL Nebulization Q4H    arformoterol  15 mcg Nebulization BID    budesonide  0.5 mg Nebulization BID    ceFEPime (MAXIPIME) IVPB  2 g Intravenous Q8H    enoxaparin  40 mg Subcutaneous Daily    famotidine  20 mg Intravenous BID    furosemide  40 mg Intravenous BID    lidocaine HCL 10 mg/ml (1%)  1 mL  Intradermal Once    methylPREDNISolone sodium succinate  40 mg Intravenous Q8H    moxifloxacin  400 mg Intravenous Q24H    ondansetron  4 mg Intravenous Q8H     PRN Meds:albuterol-ipratropium 2.5mg-0.5mg/3mL, guaifenesin 100 mg/5 ml, hydrALAZINE, HYDROmorphone, lorazepam, promethazine (PHENERGAN) IVPB     Review of Systems   Constitutional: Negative.  Negative for fatigue.        Complains of being very hungry   Eyes: Negative.    Cardiovascular: Negative.  Negative for chest pain.   Gastrointestinal: Negative for abdominal pain and nausea.   Genitourinary: Negative.  Negative for hematuria.   Musculoskeletal: Negative.    Skin: Negative.    Neurological: Negative.    Psychiatric/Behavioral: Negative.      Objective:     Vital Signs (Most Recent):  Temp: 97.9 °F (36.6 °C) (03/09/17 0517)  Pulse: 105 (03/09/17 0517)  Resp: (!) 22 (03/09/17 0517)  BP: 112/82 (03/09/17 0517)  SpO2: 97 % (03/09/17 0517) Vital Signs (24h Range):  Temp:  [97.7 °F (36.5 °C)-98.4 °F (36.9 °C)] 97.9 °F (36.6 °C)  Pulse:  [] 105  Resp:  [16-22] 22  SpO2:  [96 %-99 %] 97 %  BP: ()/(61-82) 112/82     Weight: 49.7 kg (109 lb 9.1 oz)  Body mass index is 20.04 kg/(m^2).  Body surface area is 1.47 meters squared.      Intake/Output Summary (Last 24 hours) at 03/09/17 0601  Last data filed at 03/09/17 0516   Gross per 24 hour   Intake          2270.67 ml   Output             1750 ml   Net           520.67 ml       Physical Exam   Constitutional: He is oriented to person, place, and time. He appears well-developed and well-nourished.   HENT:   Head: Normocephalic.   Mouth/Throat: No oropharyngeal exudate.   Eyes: Pupils are equal, round, and reactive to light.   Neck: No thyromegaly present.   Cardiovascular: Normal rate, regular rhythm and normal heart sounds.  Exam reveals no gallop.    No murmur heard.  Pulmonary/Chest: No respiratory distress. He has no wheezes. He has no rales.   Abdominal: Soft. Bowel sounds are normal. He  exhibits no distension and no mass. There is no rebound and no guarding.   Musculoskeletal: Normal range of motion. He exhibits no edema.   Lymphadenopathy:     He has no cervical adenopathy.   Neurological: He is alert and oriented to person, place, and time.   Skin: Skin is warm and dry.   Psychiatric: He has a normal mood and affect. His behavior is normal.       Significant Labs:   BMP:   Recent Labs  Lab 03/08/17  0950   *   , CBC:   Recent Labs  Lab 03/08/17  0446 03/09/17  0330   WBC 14.27* 15.05*   HGB 10.5* 10.9*   HCT 33.7* 34.3*    247    and CMP:   Recent Labs  Lab 03/08/17  0950   *   PROT 7.3  7.3       Diagnostic Results:  I have reviewed and interpreted all pertinent imaging results/findings within the past 24 hours.    Assessment/Plan:     Chronic respiratory failure with hypoxia  He continues to be very SOB.  He has enlarging pleural effusions. Pulmonarydid thoracentesis yesterday. He says he feels about the same Has chronic aspiration, a tracheo-esophageal fistula,active lung cancer, some degree of radiation pneumonitis and COPD all contributing    I discussed the findings on yesterday Ct with the patient  He would cut me short, saying he is only interested in being fed and did not want anyone to talk to him about surgery now        Cachexia  He wants to be able to be fed.  At this time, i think the next step is to have PEG tube as soon as Pulmonary clears it, and deal with the issue of the probable fistula later on    Broncho-esophageal fistula  Ct report showing an air tract between the esophagus and trachea suggesting a fistula.  patient is not interested in surgical procedures at this time      Thank you for your consult. I will follow the patietn with you.Please contact me if you have any questions     Pancho Iraheta MD  Hematology/Oncology  [facility] -

## 2017-03-09 NOTE — BRIEF OP NOTE
[facility]  Brief Operative Note    SUMMARY     Surgery Date: 3/9/2017     Surgeon(s) and Role:     * Mario Anguiano MD - Primary    Assisting Surgeon: None    Pre-op Diagnosis:  Oropharyngeal dysphagia [R13.12]  Esophageal dysphagia [R13.14]  Esophageal obstruction [K22.2]  Palma's esophagus without dysplasia [K22.70]    Post-op Diagnosis:  Post-Op Diagnosis Codes:     * Oropharyngeal dysphagia [R13.12]     * Esophageal dysphagia [R13.14]     * Esophageal obstruction [K22.2]     * Palma's esophagus without dysplasia [K22.70]    Procedure(s) (LRB):  ESOPHAGOGASTRODUODENOSCOPY (EGD) (Left)    Anesthesia: Local MAC    Description of Procedure: EGD and PEG placement.     Description of the findings of the procedure:   A PEG was placed with no major issues. Esophageal stent is patent and in place.     Estimated Blood Loss: None.          Specimens:   Specimen     None

## 2017-03-09 NOTE — ANESTHESIA PREPROCEDURE EVALUATION
03/09/2017  Frederick J Lejeune is a 66 y.o., male.  Patient Active Problem List   Diagnosis    COPD, very severe    Esophageal dysphagia    Hiatal hernia    Palma's esophagus without dysplasia    Lung cancer, main bronchus right    Acute on chronic respiratory failure with hypoxia and hypercapnia    Bone metastases    Chronic neoplasm-related pain    Anxiety about health    Chronic bronchitis    Chronic respiratory failure with hypoxia    Chest pain    Dyslipidemia    Cachexia    Primary malignant neoplasm of right lung metastatic to other site    ACS (acute coronary syndrome)    Left groin pain    Dizziness    Dehydration, mild    Esophageal obstruction    Severe protein-calorie malnutrition    Oropharyngeal dysphagia    Pneumonia due to infectious organism    Diffuse interstitial pulmonary fibrosis    Broncho-esophageal fistula    Pleural effusion, bilateral       [facility] Anesthesia Evaluation    I have reviewed the Patient Summary Reports.    I have reviewed the Nursing Notes.   I have reviewed the Medications.     Review of Systems  Anesthesia Hx:  No problems with previous Anesthesia    Social:  Former Smoker    Hematology/Oncology:  Hematology Normal        EENT/Dental:EENT/Dental Normal   Pulmonary:   Pneumonia COPD, moderate    Hepatic/GI:   Hiatal Hernia, GERD, well controlled    Musculoskeletal:  Musculoskeletal Normal    Neurological:   Neuromuscular Disease,    Endocrine:  Endocrine Normal    Dermatological:  Skin Normal    Psych:  Psychiatric Normal           Physical Exam  General:  Cachexia    Airway/Jaw/Neck:  Airway Findings: Mouth Opening: Normal Tongue: Normal  General Airway Assessment: Adult       Chest/Lungs:  Chest/Lungs Findings: Clear to auscultation     Heart/Vascular:  Heart Findings: Rate: Normal             Anesthesia Plan  Type of Anesthesia, risks &  benefits discussed:  Anesthesia Type:  MAC  Patient's Preference:   Intra-op Monitoring Plan:   Intra-op Monitoring Plan Comments:   Post Op Pain Control Plan:   Post Op Pain Control Plan Comments:   Induction:   IV  Beta Blocker:  Patient is not currently on a Beta-Blocker (No further documentation required).       Informed Consent: Patient understands risks and agrees with Anesthesia plan.  Questions answered. Anesthesia consent signed with patient.  ASA Score: 4     Day of Surgery Review of History & Physical: I have interviewed and examined the patient. I have reviewed the patient's H&P dated:  There are no significant changes.          Ready For Surgery From Anesthesia Perspective.

## 2017-03-09 NOTE — NURSING
Pt requested the peg tube placement to be postponed until Dr. Anguiano spoke to his son. Per endo nurse, she will notify Dr. Anguiano.

## 2017-03-09 NOTE — PROGRESS NOTES
Progress Note  Pulmonology    Admit Date: 3/3/2017   LOS: 5 days     SUBJECTIVE:     Follow-up For: Dyspnea    Interval History: Seen and examined at bedside. No acute interval developments. Thoracentesis yesterday - 560 mls : Exudative by Lights criteria. Pleural fluid cytology pending. He reports that he feels slightly better.     Continuous Infusions:   Amino acid 4.25% - dextrose 10% (CLINIMIX-E) solution with additives (1L provides 42.5 gm AA, 100 gm CHO (340 kcal/L dextrose), Na 35, K 30, Mg 5, Ca 4.5, Acetate 70, Cl 39, Phos 15) Stopped (03/09/17 0926)    lactated ringers       Scheduled Meds:   albuterol-ipratropium 2.5mg-0.5mg/3mL  3 mL Nebulization Q4H    arformoterol  15 mcg Nebulization BID    budesonide  0.5 mg Nebulization BID    ceFEPime (MAXIPIME) IVPB  2 g Intravenous Q8H    famotidine  20 mg Intravenous BID    lidocaine HCL 10 mg/ml (1%)  1 mL Intradermal Once    methylPREDNISolone sodium succinate  40 mg Intravenous Q8H    moxifloxacin  400 mg Intravenous Q24H    ondansetron  4 mg Intravenous Q8H       Review of Systems:  Constitutional: no fever or chills  Respiratory: positive for dyspnea on exertion  Cardiovascular: no chest pain or palpitations    OBJECTIVE:     Vital Signs Range (Last 24H):  Temp:  [97.7 °F (36.5 °C)-98.4 °F (36.9 °C)]   Pulse:  []   Resp:  [17-22]   BP: ()/(61-82)   SpO2:  [95 %-99 %]     I & O (Last 24H):  Intake/Output Summary (Last 24 hours) at 03/09/17 1054  Last data filed at 03/09/17 0516   Gross per 24 hour   Intake          2270.67 ml   Output             1750 ml   Net           520.67 ml     Physical Exam:  General: no distress  Neck: no jugular venous distention  Lungs:  clear to auscultation bilaterally, normal respiratory effort and normal percussion bilaterally  Chest Wall: no tenderness  Heart: regular rate and rhythm and no murmur  Abdomen: soft, non-tender non-distended; bowel sounds normal  Extremities: no cyanosis or edema, or  clubbing  Neurologic: alert, oriented, thought content appropriate    Laboratory:  CBC:   Recent Labs  Lab 03/09/17  0330   WBC 15.05*   RBC 3.13*   HGB 10.9*   HCT 34.3*      *   MCH 34.8*   MCHC 31.8*     CMP:   Recent Labs  Lab 03/09/17  0817   *   CALCIUM 9.9   ALBUMIN 2.6*   PROT 6.6   *   K 4.1   CO2 35*      BUN 31*   CREATININE 0.7   ALKPHOS 169*   ALT 58*   AST 20   BILITOT 0.7         Chest X-Ray:No evidence of pneumothorax status post thoracentesis.  Decreased size of the bilateral pleural effusions.  Otherwise no significant change compared to the recent chest CT. The cardiac silhouette is within normal limits.      ASSESSMENT/PLAN:     Active Hospital Problems    Diagnosis    *Primary malignant neoplasm of right lung metastatic to other site    Broncho-esophageal fistula    Pleural effusion, bilateral    Diffuse interstitial pulmonary fibrosis    Oropharyngeal dysphagia    Pneumonia due to infectious organism    Severe protein-calorie malnutrition    Cachexia    Chronic respiratory failure with hypoxia    Palma's esophagus without dysplasia    Esophageal dysphagia    COPD, very severe       Plan: Continue Current. OK for PEG placement from respiratory point of view.     Counseling/Consultation:I discussed the case with primary care team.  Thank you for allowing me to participate in this patients care  We will continue to follow with you.

## 2017-03-09 NOTE — ASSESSMENT & PLAN NOTE
He continues to be very SOB.  He has enlarging pleural effusions. Pulmonarydid thoracentesis yesterday. He says he feels about the same Has chronic aspiration, a tracheo-esophageal fistula,active lung cancer, some degree of radiation pneumonitis and COPD all contributing    I discussed the findings on yesterday Ct with the patient  He would cut me short, saying he is only interested in being fed and did not want anyone to talk to him about surgery now

## 2017-03-09 NOTE — ASSESSMENT & PLAN NOTE
Nebulizers and IV steriods  -cardiac monitor  -right thoracentesis removed 560ml 3/8/2017    AJS:  Increasing shortness of breath and oxygen requirements over the last 24 hours.  Check CTA chest considering his increased risk of thrombosis in malignancy and consult to Pulmonology.

## 2017-03-09 NOTE — NURSING
Son arrived, does not know about pt's condition, requested to speak with the physician. Sobeida Campos notified, she called Dr. Bourgeois.

## 2017-03-09 NOTE — SUBJECTIVE & OBJECTIVE
Interval History:   Patient indicates he does not want anyone to talk to him about surgery right now. He wants to be fed and wants the PEG tube in as soon as possible.he is aware that he is getting TPN but insists that is not the same.  The report of the radiologist has been reviewed, and is somewhat confusing. There seems to be a fistula but is unc;lear from where to where.  Had 560 cc of pleural fluids removed yesterday    Oncology Treatment Plan:   OCI MK 3475 KEYNOTE 240    Medications:  Continuous Infusions:   Amino acid 4.25% - dextrose 10% (CLINIMIX-E) solution with additives (1L provides 42.5 gm AA, 100 gm CHO (340 kcal/L dextrose), Na 35, K 30, Mg 5, Ca 4.5, Acetate 70, Cl 39, Phos 15) 80 mL/hr at 03/08/17 2054    lactated ringers       Scheduled Meds:   albuterol-ipratropium 2.5mg-0.5mg/3mL  3 mL Nebulization Q4H    arformoterol  15 mcg Nebulization BID    budesonide  0.5 mg Nebulization BID    ceFEPime (MAXIPIME) IVPB  2 g Intravenous Q8H    enoxaparin  40 mg Subcutaneous Daily    famotidine  20 mg Intravenous BID    furosemide  40 mg Intravenous BID    lidocaine HCL 10 mg/ml (1%)  1 mL Intradermal Once    methylPREDNISolone sodium succinate  40 mg Intravenous Q8H    moxifloxacin  400 mg Intravenous Q24H    ondansetron  4 mg Intravenous Q8H     PRN Meds:albuterol-ipratropium 2.5mg-0.5mg/3mL, guaifenesin 100 mg/5 ml, hydrALAZINE, HYDROmorphone, lorazepam, promethazine (PHENERGAN) IVPB     Review of Systems   Constitutional: Negative.  Negative for fatigue.        Complains of being very hungry   Eyes: Negative.    Cardiovascular: Negative.  Negative for chest pain.   Gastrointestinal: Negative for abdominal pain and nausea.   Genitourinary: Negative.  Negative for hematuria.   Musculoskeletal: Negative.    Skin: Negative.    Neurological: Negative.    Psychiatric/Behavioral: Negative.      Objective:     Vital Signs (Most Recent):  Temp: 97.9 °F (36.6 °C) (03/09/17 0517)  Pulse: 105 (03/09/17  0517)  Resp: (!) 22 (03/09/17 0517)  BP: 112/82 (03/09/17 0517)  SpO2: 97 % (03/09/17 0517) Vital Signs (24h Range):  Temp:  [97.7 °F (36.5 °C)-98.4 °F (36.9 °C)] 97.9 °F (36.6 °C)  Pulse:  [] 105  Resp:  [16-22] 22  SpO2:  [96 %-99 %] 97 %  BP: ()/(61-82) 112/82     Weight: 49.7 kg (109 lb 9.1 oz)  Body mass index is 20.04 kg/(m^2).  Body surface area is 1.47 meters squared.      Intake/Output Summary (Last 24 hours) at 03/09/17 0601  Last data filed at 03/09/17 0516   Gross per 24 hour   Intake          2270.67 ml   Output             1750 ml   Net           520.67 ml       Physical Exam   Constitutional: He is oriented to person, place, and time. He appears well-developed and well-nourished.   HENT:   Head: Normocephalic.   Mouth/Throat: No oropharyngeal exudate.   Eyes: Pupils are equal, round, and reactive to light.   Neck: No thyromegaly present.   Cardiovascular: Normal rate, regular rhythm and normal heart sounds.  Exam reveals no gallop.    No murmur heard.  Pulmonary/Chest: No respiratory distress. He has no wheezes. He has no rales.   Abdominal: Soft. Bowel sounds are normal. He exhibits no distension and no mass. There is no rebound and no guarding.   Musculoskeletal: Normal range of motion. He exhibits no edema.   Lymphadenopathy:     He has no cervical adenopathy.   Neurological: He is alert and oriented to person, place, and time.   Skin: Skin is warm and dry.   Psychiatric: He has a normal mood and affect. His behavior is normal.       Significant Labs:   BMP:   Recent Labs  Lab 03/08/17  0950   *   , CBC:   Recent Labs  Lab 03/08/17  0446 03/09/17  0330   WBC 14.27* 15.05*   HGB 10.5* 10.9*   HCT 33.7* 34.3*    247    and CMP:   Recent Labs  Lab 03/08/17  0950   *   PROT 7.3  7.3       Diagnostic Results:  I have reviewed and interpreted all pertinent imaging results/findings within the past 24 hours.

## 2017-03-09 NOTE — CONSULTS
[facility] -   Adult Nutrition  Consult Note    SUMMARY     Recommendations  Recommendation/Intervention: 1. Once PEG is placed start tubefeed Isosource 1.5 at 10ml/hr advance as tolerated to 50ml/hr with 100ml flushes every 3-4 hours or as needed or bolus 5 cans daily. Meets 100% of estimated needs (1800 calories, 81g, 934ml water) Provide 30ml flush before and after each can plus 100ml TID and as needed for hydration .    2. SLP evaluation for safety of pleasure feeding  Goals: Initiate nutrition within 24-48 hours  Nutrition Goal Status: new  Communication of RD Recs: other (comment) (sticky note) and reviewed with RN    Continuum of Care Plan  Home on Isosurce 1.5 bolus 5 can daily with 30ml flush before and after plus 100ml TID and as needed for hydration. Pleasure feeds per SLP recommendations for consistency. (patient questioning whether he can still eat by mouth for pleasure)    Reason for Assessment  Reason for Assessment: RD follow-up  Diagnosis:  (dysphagia)  Relevent Medical History: Lung Ca; esophageal stent; GERD, COPD   Interdisciplinary Rounds: did not attend  General Information Comments: Patient with PEG placement today    Nutrition Prescription Ordered  Current Diet Order: NPO  Current Nutrition Support Formula Ordered:  (Clinimix 4.25/10 at 80ml/hr)    Evaluation of Received Nutrients/Fluid Intake  Parenteral Calories (kcal): 1481  Parenteral Protein (gm): 82  Parenteral Fluid (mL): 1920     Nutrition Risk Screen  Nutrition Risk Screen: dysphagia or difficulty swallowing    Nutrition/Diet History  Typical Food/Fluid Intake: Patient with poor ital intake and weight loss over the last several months, very familiar to nutrition services from previous admissiona. Patient is finally agreeable to PEG placement for nutrition support  Food Preferences: none reported  Factors Affecting Nutritional Intake: difficulty/impaired swallowing, NPO    Labs/Tests/Procedures/Meds  Pertinent Labs  Reviewed: reviewed  Pertinent Labs Comments: Na 147, BUN 31, Gluc 157, Alb 2.6  Pertinent Medications Reviewed: reviewed  Pertinent Medications Comments: methylprednisone; IVF (dextrose)    Physical Findings  Overall Physical Appearance:  (thin)  Oral/Mouth Cavity: tooth/teeth missing  Skin: intact (Rod 20)    Anthropometrics  Height (inches): 62.01 in  Weight Method: Bed Scale  Weight (kg): 50 kg  Ideal Body Weight (IBW), Male: 118.06 lb  % Ideal Body Weight, Male (lb): 93.37 lb  BMI (kg/m2): 20.16  BMI Grade: 18.5-24.9 - normal  Usual Body Weight (UBW), k kg  % Usual Body Weight: 78.12  % Weight Change:  (22% weight loss in 6 months)  Weight Loss: unintentional    Estimated/Assessed Needs  Weight Used For Calorie Calculations: 49.9 kg (110 lb 0.2 oz)   Height (cm): 157.5 cm  35 kcal/kg (kcal): 1746.5 and 40 kcal/kg (kcal):    Weight Used For Protein Calculations: 49.9 kg (110 lb 0.2 oz)  1.2 gm Protein (gm): 60.01 and 1.5 gm Protein (gm): 75.01  Fluid Need Method: RDA Method    Monitor and Evaluation  Food and Nutrient Intake: energy intake, enteral nutrition intake, parenteral nutrition intake  Food and Nutrient Adminstration: diet order, enteral and parenteral nutrition administration  Physical Activity and Function: nutrition-related ADLs and IADLs  Anthropometric Measurements: weight, weight change  Biochemical Data, Medical Tests and Procedures: electrolyte and renal panel, glucose/endocrine profile, lipid profile  Nutrition-Focused Physical Findings: overall appearance    Nutrition Risk  Level of Risk:  (2 x week)    Nutrition Follow-Up  RD Follow-up?: Yes    Assessment and Plan  Esophageal dysphagia  Nutrition Problem:   Swallowing difficulty    Etiology/Related to:   Dysphagia    As evidenced by:  Inability to consume adequate energy from oral diet with weight loss over the last 6 months    Treatment Strategy:   1. PEG with tubefeed for nutrition support  2. Clinimix until PEG  placed    Nutrition Diagnosis Status:   New

## 2017-03-09 NOTE — TRANSFER OF CARE
"Anesthesia Transfer of Care Note    Patient: Frederick J Lejeune    Procedure(s) Performed: Procedure(s) (LRB):  ESOPHAGOGASTRODUODENOSCOPY (EGD) (Left)    Patient location: PACU    Anesthesia Type: MAC    Transport from OR: Transported from OR on room air with adequate spontaneous ventilation    Post pain: adequate analgesia    Post assessment: no apparent anesthetic complications    Post vital signs: stable    Level of consciousness: awake    Nausea/Vomiting: no nausea/vomiting    Complications: none          Last vitals:   Visit Vitals    /65    Pulse 101    Temp 37 °C (98.6 °F)    Resp 14    Ht 5' 2" (1.575 m)    Wt 49 kg (108 lb)    SpO2 95%    BMI 19.75 kg/m2     "

## 2017-03-09 NOTE — ANESTHESIA RELEASE NOTE
"Anesthesia Release from PACU Note    Patient: Frederick J Lejeune    Procedure(s) Performed: Procedure(s) (LRB):  ESOPHAGOGASTRODUODENOSCOPY (EGD) (Left)    Anesthesia type: MAC    Post pain: Adequate analgesia    Post assessment: no apparent anesthetic complications    Last Vitals:   Visit Vitals    /65    Pulse 101    Temp 37 °C (98.6 °F)    Resp 14    Ht 5' 2" (1.575 m)    Wt 49 kg (108 lb)    SpO2 95%    BMI 19.75 kg/m2       Post vital signs: stable    Level of consciousness: awake    Nausea/Vomiting: no nausea/no vomiting    Complications: none    Airway Patency: patent    Respiratory: unassisted    Cardiovascular: stable and blood pressure at baseline    Hydration: euvolemic  "

## 2017-03-10 LAB
ALBUMIN SERPL BCP-MCNC: 2.4 G/DL
ALP SERPL-CCNC: 158 U/L
ALT SERPL W/O P-5'-P-CCNC: 51 U/L
ANION GAP SERPL CALC-SCNC: 7 MMOL/L
AST SERPL-CCNC: 21 U/L
BASOPHILS # BLD AUTO: 0.01 K/UL
BASOPHILS NFR BLD: 0.1 %
BILIRUB SERPL-MCNC: 0.9 MG/DL
BUN SERPL-MCNC: 31 MG/DL
CALCIUM SERPL-MCNC: 9.7 MG/DL
CHLORIDE SERPL-SCNC: 106 MMOL/L
CO2 SERPL-SCNC: 33 MMOL/L
CREAT SERPL-MCNC: 0.8 MG/DL
DIFFERENTIAL METHOD: ABNORMAL
EOSINOPHIL # BLD AUTO: 0 K/UL
EOSINOPHIL NFR BLD: 0.1 %
ERYTHROCYTE [DISTWIDTH] IN BLOOD BY AUTOMATED COUNT: 14 %
EST. GFR  (AFRICAN AMERICAN): >60 ML/MIN/1.73 M^2
EST. GFR  (NON AFRICAN AMERICAN): >60 ML/MIN/1.73 M^2
GLUCOSE SERPL-MCNC: 153 MG/DL
HCT VFR BLD AUTO: 35.3 %
HGB BLD-MCNC: 11 G/DL
LYMPHOCYTES # BLD AUTO: 0.9 K/UL
LYMPHOCYTES NFR BLD: 5.2 %
MCH RBC QN AUTO: 34.3 PG
MCHC RBC AUTO-ENTMCNC: 31.2 %
MCV RBC AUTO: 110 FL
MONOCYTES # BLD AUTO: 0.5 K/UL
MONOCYTES NFR BLD: 2.9 %
NEUTROPHILS # BLD AUTO: 15.4 K/UL
NEUTROPHILS NFR BLD: 91.7 %
PLATELET # BLD AUTO: 220 K/UL
PMV BLD AUTO: 11.2 FL
POTASSIUM SERPL-SCNC: 4.6 MMOL/L
PROT SERPL-MCNC: 6.3 G/DL
RBC # BLD AUTO: 3.21 M/UL
SODIUM SERPL-SCNC: 146 MMOL/L
WBC # BLD AUTO: 16.77 K/UL

## 2017-03-10 PROCEDURE — 97535 SELF CARE MNGMENT TRAINING: CPT

## 2017-03-10 PROCEDURE — 94668 MNPJ CHEST WALL SBSQ: CPT

## 2017-03-10 PROCEDURE — 63600175 PHARM REV CODE 636 W HCPCS: Performed by: NURSE PRACTITIONER

## 2017-03-10 PROCEDURE — 94761 N-INVAS EAR/PLS OXIMETRY MLT: CPT

## 2017-03-10 PROCEDURE — 25000003 PHARM REV CODE 250: Performed by: INTERNAL MEDICINE

## 2017-03-10 PROCEDURE — 99233 SBSQ HOSP IP/OBS HIGH 50: CPT | Mod: ,,, | Performed by: INTERNAL MEDICINE

## 2017-03-10 PROCEDURE — 27000221 HC OXYGEN, UP TO 24 HOURS

## 2017-03-10 PROCEDURE — 99900035 HC TECH TIME PER 15 MIN (STAT)

## 2017-03-10 PROCEDURE — 94640 AIRWAY INHALATION TREATMENT: CPT

## 2017-03-10 PROCEDURE — 94664 DEMO&/EVAL PT USE INHALER: CPT

## 2017-03-10 PROCEDURE — 63600175 PHARM REV CODE 636 W HCPCS: Performed by: INTERNAL MEDICINE

## 2017-03-10 PROCEDURE — 97530 THERAPEUTIC ACTIVITIES: CPT

## 2017-03-10 PROCEDURE — 80053 COMPREHEN METABOLIC PANEL: CPT

## 2017-03-10 PROCEDURE — 85025 COMPLETE CBC W/AUTO DIFF WBC: CPT

## 2017-03-10 PROCEDURE — 21400001 HC TELEMETRY ROOM

## 2017-03-10 PROCEDURE — 25000242 PHARM REV CODE 250 ALT 637 W/ HCPCS: Performed by: NURSE PRACTITIONER

## 2017-03-10 PROCEDURE — 25000003 PHARM REV CODE 250: Performed by: NURSE PRACTITIONER

## 2017-03-10 RX ADMIN — HYDROMORPHONE HYDROCHLORIDE 1 MG: 1 INJECTION, SOLUTION INTRAMUSCULAR; INTRAVENOUS; SUBCUTANEOUS at 08:03

## 2017-03-10 RX ADMIN — MOXIFLOXACIN HYDROCHLORIDE 400 MG: 400 INJECTION, SOLUTION INTRAVENOUS at 11:03

## 2017-03-10 RX ADMIN — METHYLPREDNISOLONE SODIUM SUCCINATE 40 MG: 40 INJECTION, POWDER, FOR SOLUTION INTRAMUSCULAR; INTRAVENOUS at 05:03

## 2017-03-10 RX ADMIN — FAMOTIDINE 20 MG: 20 INJECTION, SOLUTION INTRAVENOUS at 08:03

## 2017-03-10 RX ADMIN — HYDROMORPHONE HYDROCHLORIDE 1 MG: 1 INJECTION, SOLUTION INTRAMUSCULAR; INTRAVENOUS; SUBCUTANEOUS at 05:03

## 2017-03-10 RX ADMIN — IPRATROPIUM BROMIDE AND ALBUTEROL SULFATE 3 ML: .5; 3 SOLUTION RESPIRATORY (INHALATION) at 05:03

## 2017-03-10 RX ADMIN — IPRATROPIUM BROMIDE AND ALBUTEROL SULFATE 3 ML: .5; 3 SOLUTION RESPIRATORY (INHALATION) at 07:03

## 2017-03-10 RX ADMIN — HYDROMORPHONE HYDROCHLORIDE 1 MG: 1 INJECTION, SOLUTION INTRAMUSCULAR; INTRAVENOUS; SUBCUTANEOUS at 10:03

## 2017-03-10 RX ADMIN — ONDANSETRON 4 MG: 2 INJECTION INTRAMUSCULAR; INTRAVENOUS at 02:03

## 2017-03-10 RX ADMIN — HYDROMORPHONE HYDROCHLORIDE 1 MG: 1 INJECTION, SOLUTION INTRAMUSCULAR; INTRAVENOUS; SUBCUTANEOUS at 11:03

## 2017-03-10 RX ADMIN — CEFEPIME HYDROCHLORIDE 2 G: 2 INJECTION, SOLUTION INTRAVENOUS at 06:03

## 2017-03-10 RX ADMIN — HYDROMORPHONE HYDROCHLORIDE 1 MG: 1 INJECTION, SOLUTION INTRAMUSCULAR; INTRAVENOUS; SUBCUTANEOUS at 02:03

## 2017-03-10 RX ADMIN — IPRATROPIUM BROMIDE AND ALBUTEROL SULFATE 3 ML: .5; 3 SOLUTION RESPIRATORY (INHALATION) at 12:03

## 2017-03-10 RX ADMIN — BUDESONIDE 0.5 MG: 0.5 INHALANT RESPIRATORY (INHALATION) at 07:03

## 2017-03-10 RX ADMIN — ARFORMOTEROL TARTRATE 15 MCG: 15 SOLUTION RESPIRATORY (INHALATION) at 07:03

## 2017-03-10 RX ADMIN — HYDROMORPHONE HYDROCHLORIDE 1 MG: 1 INJECTION, SOLUTION INTRAMUSCULAR; INTRAVENOUS; SUBCUTANEOUS at 12:03

## 2017-03-10 RX ADMIN — ONDANSETRON 4 MG: 2 INJECTION INTRAMUSCULAR; INTRAVENOUS at 05:03

## 2017-03-10 RX ADMIN — ONDANSETRON 4 MG: 2 INJECTION INTRAMUSCULAR; INTRAVENOUS at 10:03

## 2017-03-10 RX ADMIN — HYDROMORPHONE HYDROCHLORIDE 1 MG: 1 INJECTION, SOLUTION INTRAMUSCULAR; INTRAVENOUS; SUBCUTANEOUS at 06:03

## 2017-03-10 RX ADMIN — IPRATROPIUM BROMIDE AND ALBUTEROL SULFATE 3 ML: .5; 3 SOLUTION RESPIRATORY (INHALATION) at 11:03

## 2017-03-10 RX ADMIN — METHYLPREDNISOLONE SODIUM SUCCINATE 40 MG: 40 INJECTION, POWDER, FOR SOLUTION INTRAMUSCULAR; INTRAVENOUS at 02:03

## 2017-03-10 RX ADMIN — HYDROMORPHONE HYDROCHLORIDE 1 MG: 1 INJECTION, SOLUTION INTRAMUSCULAR; INTRAVENOUS; SUBCUTANEOUS at 09:03

## 2017-03-10 RX ADMIN — HYDROMORPHONE HYDROCHLORIDE 1 MG: 1 INJECTION, SOLUTION INTRAMUSCULAR; INTRAVENOUS; SUBCUTANEOUS at 07:03

## 2017-03-10 RX ADMIN — METHYLPREDNISOLONE SODIUM SUCCINATE 40 MG: 40 INJECTION, POWDER, FOR SOLUTION INTRAMUSCULAR; INTRAVENOUS at 10:03

## 2017-03-10 RX ADMIN — IPRATROPIUM BROMIDE AND ALBUTEROL SULFATE 3 ML: .5; 3 SOLUTION RESPIRATORY (INHALATION) at 03:03

## 2017-03-10 RX ADMIN — CEFEPIME HYDROCHLORIDE 2 G: 2 INJECTION, SOLUTION INTRAVENOUS at 09:03

## 2017-03-10 RX ADMIN — HYDROMORPHONE HYDROCHLORIDE 1 MG: 1 INJECTION, SOLUTION INTRAMUSCULAR; INTRAVENOUS; SUBCUTANEOUS at 04:03

## 2017-03-10 RX ADMIN — CEFEPIME HYDROCHLORIDE 2 G: 2 INJECTION, SOLUTION INTRAVENOUS at 02:03

## 2017-03-10 NOTE — PLAN OF CARE
Problem: Patient Care Overview  Goal: Plan of Care Review  PT REQUIRES CGA FOR T/F TO CHAIR   Outcome: Ongoing (interventions implemented as appropriate)  Patient AAO x 4 and VSS. Remains free of injury. Fall precautions maintained with bed low and wheels locked.  Clinimex-E administered at 80ml/hr  as ordered via R sublclavian port. Pain to midline chest and peg site moderately managed with prn dialudid every 2 hours.  Chart review for 24 hours completed. Will continue to monitor till discharge.

## 2017-03-10 NOTE — PLAN OF CARE
Problem: Patient Care Overview  Goal: Plan of Care Review  PT REQUIRES CGA FOR T/F TO CHAIR   Outcome: Ongoing (interventions implemented as appropriate)  Fall precautions maintained, pt free from injuries/fall, assisted in repositioning every 2 hrs, up w/ assist, mepilex applied to sacral area. C/o generalized abdominal pain, relieved by prn meds. Peg tube clamped. TPN currently running on subclavian port. Abx given as prescribed. NSR to ST on tele. Denies sob. POC and meds reviewed w/ pt and son, both verbalizes understanding. Son's number added in sticky note. Chart check done. Will cont to monitor.

## 2017-03-10 NOTE — SUBJECTIVE & OBJECTIVE
Interval History:  Weak and tired.  He wants to move forward with PEG placement.  OK to contact family with any information.  Nausea better controlled.  Continues to have chest pain.    Review of Systems   Constitutional: Positive for activity change, appetite change and fatigue. Negative for fever.   HENT: Negative.  Negative for congestion, nosebleeds and sore throat.    Eyes: Negative.  Negative for photophobia, redness and visual disturbance.   Respiratory: Negative for cough, shortness of breath and wheezing.    Cardiovascular: Negative.  Negative for chest pain, palpitations and leg swelling.   Gastrointestinal: Negative for abdominal pain, constipation, diarrhea, nausea (dysphagia) and vomiting.   Endocrine: Negative.  Negative for polydipsia, polyphagia and polyuria.   Genitourinary: Negative.  Negative for dysuria, flank pain, frequency and urgency.   Musculoskeletal: Negative.  Negative for arthralgias, back pain and joint swelling.   Skin: Negative.  Negative for color change, pallor and rash.   Allergic/Immunologic: Negative.  Negative for environmental allergies, food allergies and immunocompromised state.   Neurological: Negative.  Negative for dizziness, syncope, weakness, light-headedness, numbness and headaches.   Hematological: Negative.  Negative for adenopathy. Does not bruise/bleed easily.   Psychiatric/Behavioral: Negative.  Negative for confusion and hallucinations. The patient is not nervous/anxious.    All other systems reviewed and are negative.    Objective:     Vital Signs (Most Recent):  Temp: 98.3 °F (36.8 °C) (03/09/17 2012)  Pulse: 100 (03/09/17 2012)  Resp: 18 (03/09/17 2012)  BP: 118/69 (03/09/17 2012)  SpO2: 99 % (03/09/17 1535) Vital Signs (24h Range):  Temp:  [97.8 °F (36.6 °C)-98.6 °F (37 °C)] 98.3 °F (36.8 °C)  Pulse:  [] 100  Resp:  [11-44] 18  SpO2:  [95 %-99 %] 99 %  BP: ()/(60-82) 118/69     Weight: 49 kg (108 lb)  Body mass index is 19.75  kg/(m^2).    Intake/Output Summary (Last 24 hours) at 03/09/17 2124  Last data filed at 03/09/17 1645   Gross per 24 hour   Intake             1646 ml   Output             1850 ml   Net             -204 ml      Physical Exam   Constitutional: He is oriented to person, place, and time. He appears well-developed.   Cachectic, ill appearing   HENT:   Head: Normocephalic.   Mouth/Throat: No oropharyngeal exudate.   Eyes: Pupils are equal, round, and reactive to light.   Neck: No thyromegaly present.   Cardiovascular: Normal rate, regular rhythm and normal heart sounds.  Exam reveals no gallop.    No murmur heard.  Pulmonary/Chest: Effort normal and breath sounds normal. No respiratory distress. He has no rales.   Abdominal: Soft. Bowel sounds are normal. He exhibits no distension and no mass. There is no rebound and no guarding.   Musculoskeletal: Normal range of motion. He exhibits no edema.   Lymphadenopathy:     He has no cervical adenopathy.   Neurological: He is alert and oriented to person, place, and time.   Skin: Skin is warm and dry.   Psychiatric:   Quiet mood       Significant Labs:   CBC:     Recent Labs  Lab 03/08/17  0446 03/09/17  0330   WBC 14.27* 15.05*   HGB 10.5* 10.9*   HCT 33.7* 34.3*    247     CMP:     Recent Labs  Lab 03/08/17  0950 03/09/17  0817   NA  --  147*   K  --  4.1   CL  --  105   CO2  --  35*   * 157*   BUN  --  31*   CREATININE  --  0.7   CALCIUM  --  9.9   PROT 7.3  7.3 6.6   ALBUMIN  --  2.6*   BILITOT  --  0.7   ALKPHOS  --  169*   AST  --  20   ALT  --  58*   ANIONGAP  --  7*   EGFRNONAA  --  >60       Significant Imaging: I have reviewed all pertinent imaging results/findings within the past 24 hours.

## 2017-03-10 NOTE — ASSESSMENT & PLAN NOTE
He continues to be  SOB.  He had a thoracentesis 2 days ago   Has chronic aspiration, a tracheo-esophageal fistula,active lung cancer, some degree of radiation pneumonitis and COPD all contributing    I

## 2017-03-10 NOTE — PLAN OF CARE
Problem: Patient Care Overview  Goal: Plan of Care Review  PT REQUIRES CGA FOR T/F TO CHAIR   Outcome: Ongoing (interventions implemented as appropriate)  .

## 2017-03-10 NOTE — PROGRESS NOTES
[facility] Hospital Medicine  Progress Note    Patient Name: Frederick J Lejeune  MRN: [id number]  Patient Class: IP- Inpatient   Admission Date: 3/3/2017  Length of Stay: 5 days  Attending Physician: Derek Bourgeois MD  Primary Care Provider: Kenrick Muñoz MD        Subjective:     Principal Problem:Primary malignant neoplasm of right lung metastatic to other site    HPI:  Frederick J Lejeune is a 66 y.o. male with PMHx of GERD, weight loss, dysphagia, odynophagia, RLL lung mass squamous cell carcinoma, who presented to ER with continued disphagia. He has been recently hospitalized from Dr. Espinosa's office with c/o dysphagia, weakness, dizziness, chest pain. He has had difficulty eating due to odynophagia for the past month. Patient has completed palliative radiation to relieve right mainstem bronchus obstruction. He then was treated with weekly Carbo-Taxol x 6-7 weeks, which was discontinued due to disease progression on imaging in early 1/2017. He is currently receiving Pembrolizumab every 3 weeks. He was recently evaluated by Dr. Cheng, at last hospitalization, who recommended further evaluation with esophagram, that was done on 2/24/17 with Esophageal stent placement. Per discharge summary, Dr. Cheng stated if patient continues to have severe dysphagia or chest pain then he could remove the stent and place PEG tube. He was discharged on 2/28/17.    Patient returns to the hospital 3 days after discharge with symptoms of inability to keep anything down. Getting dehydrated, He is agreeable for PEG placement.      Hospital Course:  GI, Dr. Anguiano, was consulted. Esophagogram showed patent stent, he showed signs of severe aspiration. Patient made NPO and IV Cefepime started. Patient continues with mild respiratory distress and placed on Oxygen, IV steroids, nebulizers, IS, acapella. Discussed code status twice with patient and he wants to remain a full code. He also requested more IV pain  "medicine. Discussed with Dr. Bourgeois and Dr. Anguiano. Patient states, "under NO circumstances can you call my family." Pulmonary was consulted for continued hypoxia requiring 4 liters O2. IV Diuretics started. CT chest showed enlarging bilateral pleural effusions, stable metastatic lung CA, and evidence of tracheoesophageal fistula. Dr. Anguiano spoke with the Radiologist explaining patient had esophagram with barium a few days ago. Radiologist said that could be what he saw and will revise his CT report, but addendum didn't r/o fistula. Dr. Marie performed thoracentesis on the right removing 560cc. No family at bedside. Scheduled multidisciplinary rounds 3/9/17.    Interval History:  Weak and tired.  He wants to move forward with PEG placement.  OK to contact family with any information.  Nausea better controlled.  Continues to have chest pain.    Review of Systems   Constitutional: Positive for activity change, appetite change and fatigue. Negative for fever.   HENT: Negative.  Negative for congestion, nosebleeds and sore throat.    Eyes: Negative.  Negative for photophobia, redness and visual disturbance.   Respiratory: Negative for cough, shortness of breath and wheezing.    Cardiovascular: Negative.  Negative for chest pain, palpitations and leg swelling.   Gastrointestinal: Negative for abdominal pain, constipation, diarrhea, nausea (dysphagia) and vomiting.   Endocrine: Negative.  Negative for polydipsia, polyphagia and polyuria.   Genitourinary: Negative.  Negative for dysuria, flank pain, frequency and urgency.   Musculoskeletal: Negative.  Negative for arthralgias, back pain and joint swelling.   Skin: Negative.  Negative for color change, pallor and rash.   Allergic/Immunologic: Negative.  Negative for environmental allergies, food allergies and immunocompromised state.   Neurological: Negative.  Negative for dizziness, syncope, weakness, light-headedness, numbness and headaches.   Hematological: Negative.  " Negative for adenopathy. Does not bruise/bleed easily.   Psychiatric/Behavioral: Negative.  Negative for confusion and hallucinations. The patient is not nervous/anxious.    All other systems reviewed and are negative.    Objective:     Vital Signs (Most Recent):  Temp: 98.3 °F (36.8 °C) (03/09/17 2012)  Pulse: 100 (03/09/17 2012)  Resp: 18 (03/09/17 2012)  BP: 118/69 (03/09/17 2012)  SpO2: 99 % (03/09/17 1535) Vital Signs (24h Range):  Temp:  [97.8 °F (36.6 °C)-98.6 °F (37 °C)] 98.3 °F (36.8 °C)  Pulse:  [] 100  Resp:  [11-44] 18  SpO2:  [95 %-99 %] 99 %  BP: ()/(60-82) 118/69     Weight: 49 kg (108 lb)  Body mass index is 19.75 kg/(m^2).    Intake/Output Summary (Last 24 hours) at 03/09/17 2124  Last data filed at 03/09/17 1645   Gross per 24 hour   Intake             1646 ml   Output             1850 ml   Net             -204 ml      Physical Exam   Constitutional: He is oriented to person, place, and time. He appears well-developed.   Cachectic, ill appearing   HENT:   Head: Normocephalic.   Mouth/Throat: No oropharyngeal exudate.   Eyes: Pupils are equal, round, and reactive to light.   Neck: No thyromegaly present.   Cardiovascular: Normal rate, regular rhythm and normal heart sounds.  Exam reveals no gallop.    No murmur heard.  Pulmonary/Chest: Effort normal and breath sounds normal. No respiratory distress. He has no rales.   Abdominal: Soft. Bowel sounds are normal. He exhibits no distension and no mass. There is no rebound and no guarding.   Musculoskeletal: Normal range of motion. He exhibits no edema.   Lymphadenopathy:     He has no cervical adenopathy.   Neurological: He is alert and oriented to person, place, and time.   Skin: Skin is warm and dry.   Psychiatric:   Quiet mood       Significant Labs:   CBC:     Recent Labs  Lab 03/08/17  0446 03/09/17  0330   WBC 14.27* 15.05*   HGB 10.5* 10.9*   HCT 33.7* 34.3*    247     CMP:     Recent Labs  Lab 03/08/17  0950 03/09/17  0817   NA   --  147*   K  --  4.1   CL  --  105   CO2  --  35*   * 157*   BUN  --  31*   CREATININE  --  0.7   CALCIUM  --  9.9   PROT 7.3  7.3 6.6   ALBUMIN  --  2.6*   BILITOT  --  0.7   ALKPHOS  --  169*   AST  --  20   ALT  --  58*   ANIONGAP  --  7*   EGFRNONAA  --  >60       Significant Imaging: I have reviewed all pertinent imaging results/findings within the past 24 hours.    Assessment/Plan:      * Primary malignant neoplasm of right lung metastatic to other site  - Follow up with oncology as out-patient  -Discussed with DR Espinosa, patient's primary oncologist.    COPD, very severe  Pulmonary following    Esophageal dysphagia  GI consulted and has been following.  Plan to place PEG tube today to start enteral feedings.  Esophagram showed patent stent and severe aspiration.  Receiving IV Dilaudid 1 mg q 2 hours.    Chronic respiratory failure with hypoxia  Nebulizers and IV steroids.  Right thoracentesis removed 560ml 3/8/2017    Cachexia  Continue TPN until enteral nutrition can be established.    Broncho-esophageal fistula  Patient doesn't want to go to [place] for eval.    Pleural effusion, bilateral  Right thoracentesis removed 560ml 3/8/17.    VTE Risk Mitigation         Ordered     Place sequential compression device  Until discontinued      03/04/17 0222     Medium Risk of VTE  Once      03/04/17 0217          Derek Bourgeois MD  Department of Hospital Medicine   [facility]

## 2017-03-10 NOTE — ASSESSMENT & PLAN NOTE
Follow up with oncology as out-patient for treatment with Keytruda.  Discussed with DR Espinosa, patient's primary oncologist.

## 2017-03-10 NOTE — ASSESSMENT & PLAN NOTE
i do no think he is a candidate for systemic treatment at this time. Longitudinal follow up will determine if he is a candidate

## 2017-03-10 NOTE — PLAN OF CARE
Problem: Occupational Therapy Goal  Goal: Occupational Therapy Goal  Goals to be met by: 3/14/17     Patient will increase functional independence with ADLs by performing:    UE Dressing with Stand-by Assistance.  LE Dressing with Minimal Assistance.  Toileting from toilet with Minimal Assistance for hygiene and clothing management.   Toilet transfer to toilet with Contact Guard Assistance.  Upper extremity exercise program x10 reps per handout, with independence for Minimal resistive ex to BUE.   Outcome: Ongoing (interventions implemented as appropriate)  Pt meeting goals despite impaired endurance and pain. Pt dressed LE min A- CGA. Stood at sink and groomed SBA. Left right side-lying in bed with nurse present.

## 2017-03-10 NOTE — ASSESSMENT & PLAN NOTE
Ct report showing an air tract between the esophagus and trachea suggesting a fistula.  Patient is not interested in surgical procedures at this time  Would address later on. I am unsure if there is anything that can be offered for yhis and Thoracic surgery input may be needed

## 2017-03-10 NOTE — ASSESSMENT & PLAN NOTE
GI consulted and has been following.  Esophagram showed patent stent and severe aspiration.  Receiving IV Dilaudid 1 mg q 2 hours.  PEG placed 09 March by GI.

## 2017-03-10 NOTE — PT/OT/SLP PROGRESS
Physical Therapy  Treatment    Frederick J Lejeune   MRN: [id number]   Admitting Diagnosis: Primary malignant neoplasm of right lung metastatic to other site    PT Received On: 03/10/17  PT Start Time: 1011     PT Stop Time: 1021    PT Total Time (min): 10 min       Billable Minutes:  Therapeutic Activity 10    Treatment Type: Treatment  PT/PTA: PT             General Precautions: Standard, fall  Orthopedic Precautions:     Braces:           Subjective:  Communicated with NURSE RODRIGUEZ AND EPIC CHART REVIEW  prior to session.  PT AGREED TO TX HOWEVER REFUSED GT     Pain Ratin/10        Location: abdomen     Pain Rating Post-Intervention: 7/10    Objective:   Patient found with: oxygen, telemetry (PEG)    Functional Mobility:  Bed Mobility:   Rolling/Turning to Left: Supervision  Scooting/Bridging: Supervision  Supine to Sit: Supervision  Sit to Supine: Supervision    Transfers:  Sit <> Stand Assistance: Contact Guard Assistance  Sit <> Stand Assistive Device: No Assistive Device  Bed <> Chair Technique: Stand Pivot  Bed <> Chair Assistance: Contact Guard Assistance  Bed <> Chair Assistive Device: No Assistive Device    Gait:   Gait Distance: PT REFUSED    Balance:   Static Sit: GOOD+: Takes MAXIMAL challenges from all directions.    Dynamic Sit: GOOD: Maintains balance through MODERATE excursions of active trunk movement  Static Stand: FAIR+: Takes MINIMAL challenges from all directions  Dynamic stand: FAIR: Needs CONTACT GUARD during gait     Therapeutic Activities and Exercises:  PT STOOD WITH NO AND AND GT TRAINED X 3' TO SINK FOR GROOMING WITH 1 UE SUPPORT AND CGA>SBA. PT RETURNED SEATED EOB AND SUP IN BED WITH S. PT REFUSED MORE TX AT THIS TIME REPORTED HE WAS TO WEAK. PT IN R SIDELYING WITH HOB ELEVATED >30 DEG.      AM-PAC 6 CLICK MOBILITY  How much help from another person does this patient currently need?   1 = Unable, Total/Dependent Assistance  2 = A lot, Maximum/Moderate Assistance  3 = A little,  Minimum/Contact Guard/Supervision  4 = None, Modified [place]/Independent         AM-PAC Raw Score CMS G-Code Modifier Level of Impairment Assistance   6 % Total / Unable   7 - 9 CM 80 - 100% Maximal Assist   10 - 14 CL 60 - 80% Moderate Assist   15 - 19 CK 40 - 60% Moderate Assist   20 - 22 CJ 20 - 40% Minimal Assist   23 CI 1-20% SBA / CGA   24 CH 0% Independent/ Mod I     Patient left right sidelying with call button in reach and NURSE MICHAEL present.    Assessment:  PT MARJ MIN TX AND CONT REPORTS OF WEAKNESS AND FATIGUE.     Rehab identified problem list/impairments: Rehab identified problem list/impairments: weakness, impaired functional mobilty, gait instability, impaired endurance, pain, decreased lower extremity function, impaired balance    Rehab potential is good.    Activity tolerance: Fair    Discharge recommendations: Discharge Facility/Level Of Care Needs: nursing facility, skilled     Barriers to discharge: Barriers to Discharge: None    Equipment recommendations:       GOALS:   Physical Therapy Goals        Problem: Physical Therapy Goal    Goal Priority Disciplines Outcome Goal Variances Interventions   Physical Therapy Goal     PT/OT, PT      Description:  PT WILL BE SEEN FOR P.T. FOR A MIN OF 5 OUT OF 7 DAYS A WEEK  LTG: 3/14/17  1. PT WILL COMPLETE BED MOBILITY IND  2. PT WILL STAND WITH RW FOR GT X 50' WITH CGA.  3. PT WILL T/F TO CHAIR WITH S.  4. PT WILL COMPLETE B LE TE X 20 REPS FOR STRENGTHENING.               PLAN:    Patient to be seen    to address the above listed problems via gait training, therapeutic activities, therapeutic exercises  Plan of Care expires: 03/14/17  Plan of Care reviewed with: patient         Jojo Robledo, PT  03/10/2017

## 2017-03-10 NOTE — PROGRESS NOTES
[facility] -   Hematology/Oncology  Progress Note    Patient Name: Frederick J Lejeune  Admission Date: 3/3/2017  Hospital Length of Stay: 6 days  Code Status: Full Code     Subjective:     HPI:  History of Present Illness:  Frederick J Lejeune is a 66 y.o. male with GERD has a diagnosis of squamous cell carcinoma, PDL-1 positive.  He initlaly presneted  1 month history of weight loss, chest pain, dysphagia, odynophagia found to have esophageal narrowing and RLL lung mass.He has had difficulty eating due to odynophagia for the past month. He also states he has L groin pain due to what he thought was a hernia, which has since been found to be firm lymphadenopathy. Patient underwent EGD 10/19, which was notable for extrinsic compression in middle third of the esophagus as well as findings suggestive of Palma's esophagus, which were biopsied. 10/20, patient underwent EUS notable for lymphadenopathy in the paratracheal, mediastinal, and subcarinal region. FNA performed. Patient underwent bronchoscopy on 10/21 which was notable for a mass in the right mainstem bronchus, biopsy revealed lung cancer, squamous cell carcinoma.   Patient also completed palliative radiation to relieve right mainstem bronchus obstruction. He then was treated with weekly Carbo-Taxol x 6-7 weeks, which was discontinued due to disease progression on imaging in early 1/2017. He is currently receiving Pembrolizumab every 3 weeks.     The patient recently had placement of an esophageal stent due to obstruction.  Since having the stent placed, he has continue with severe pain on swallowing as well as a choking sensation every time he eats.  He has had studies  that shows the stent to the patient. He has been having aspirations by radiographic swallowing studies.  It is planned for him to have an EGd and feeding tube lalced for nutrition.  Plan is to continue Keytruda treatment once discharged    He remains very SOB, more so in the last  few weeks             Past Medical History:  COPD, severe  GERD  Lumbar vertebral fracture s/p surgery in 1/2016 / Chronic low back pain  H/o intracranial hemorrhage     Social History   Marital status:       Spouse name: ganesh   Number of children: 2   Years of education: N/A     Occupational History     [employer]     Social History Main Topics   Smoking status: Former Smoker      Packs/day: 1.00      Years: 50.00      Types: Cigarettes      Quit date: 5/10/2016   Smokeless tobacco: Former User      Types: Snuff      Quit date: 5/10/2016   Alcohol use No   Drug use: No   Sexual activity: Yes      Partners: Female     Other Topics Concern   Not on file     Social History Narrative        Family History: family history includes Cancer in his father; Emphysema in his mother.           Interval History:   PG tube placed ywsterday and should be starting PEg tibe feeding today.  The question of the fistula between the bronchus and either the esophagus and the mediastinum remains, but he  Wants to have some enteeric feeding before proceeding with work up or treatment ( if there is any).  Oncology Treatment Plan:   [facility] MK 3475 KEYNOTE 240    Medications:  Continuous Infusions:   Amino acid 4.25% - dextrose 10% (CLINIMIX-E) solution with additives (1L provides 42.5 gm AA, 100 gm CHO (340 kcal/L dextrose), Na 35, K 30, Mg 5, Ca 4.5, Acetate 70, Cl 39, Phos 15) 80 mL/hr at 03/09/17 2214     Scheduled Meds:   albuterol-ipratropium 2.5mg-0.5mg/3mL  3 mL Nebulization Q4H    arformoterol  15 mcg Nebulization BID    budesonide  0.5 mg Nebulization BID    ceFEPime (MAXIPIME) IVPB  2 g Intravenous Q8H    famotidine  20 mg Intravenous BID    lidocaine HCL 10 mg/ml (1%)  1 mL Intradermal Once    methylPREDNISolone sodium succinate  40 mg Intravenous Q8H    moxifloxacin  400 mg Intravenous Q24H    ondansetron  4 mg Intravenous Q8H     PRN Meds:albuterol-ipratropium 2.5mg-0.5mg/3mL, guaifenesin 100  mg/5 ml, hydrALAZINE, HYDROmorphone, lorazepam, promethazine (PHENERGAN) IVPB     Review of Systems   Constitutional: Positive for fatigue.   Respiratory: Positive for shortness of breath.    Cardiovascular:        Some chest discomfort     Objective:     Vital Signs (Most Recent):  Temp: 98.2 °F (36.8 °C) (03/10/17 0448)  Pulse: 98 (03/10/17 0508)  Resp: 18 (03/10/17 0508)  BP: (!) 123/48 (03/10/17 0448)  SpO2: (!) 94 % (03/10/17 0448) Vital Signs (24h Range):  Temp:  [97.8 °F (36.6 °C)-98.6 °F (37 °C)] 98.2 °F (36.8 °C)  Pulse:  [] 98  Resp:  [11-44] 18  SpO2:  [94 %-99 %] 94 %  BP: ()/(48-80) 123/48     Weight: 49 kg (108 lb)  Body mass index is 19.75 kg/(m^2).  Body surface area is 1.46 meters squared.      Intake/Output Summary (Last 24 hours) at 03/10/17 0600  Last data filed at 03/10/17 0448   Gross per 24 hour   Intake              890 ml   Output             1100 ml   Net             -210 ml       Physical Exam   Constitutional: He is oriented to person, place, and time. He appears well-developed.   cachetic   HENT:   Head: Normocephalic.   Mouth/Throat: No oropharyngeal exudate.   Eyes: Pupils are equal, round, and reactive to light.   Neck: No thyromegaly present.   Cardiovascular: Normal rate, regular rhythm and normal heart sounds.  Exam reveals no gallop.    No murmur heard.  Pulmonary/Chest: He has no wheezes. He has no rales.   Slightly tachypneic   Abdominal: Soft. He exhibits no distension and no mass. There is no rebound and no guarding.   PEg tube in place   Musculoskeletal: Normal range of motion. He exhibits no edema.   Lymphadenopathy:     He has no cervical adenopathy.   Neurological: He is alert and oriented to person, place, and time.   Skin: Skin is warm and dry.   Psychiatric: He has a normal mood and affect. His behavior is normal.       Significant Labs:   BMP:   Recent Labs  Lab 03/08/17  0950 03/09/17  0817   * 157*   NA  --  147*   K  --  4.1   CL  --  105   CO2   --  35*   BUN  --  31*   CREATININE  --  0.7   CALCIUM  --  9.9   , CBC:   Recent Labs  Lab 03/09/17  0330 03/10/17  0448   WBC 15.05* 16.77*   HGB 10.9* 11.0*   HCT 34.3* 35.3*    220    and CMP:   Recent Labs  Lab 03/08/17  0950 03/09/17  0817   NA  --  147*   K  --  4.1   CL  --  105   CO2  --  35*   * 157*   BUN  --  31*   CREATININE  --  0.7   CALCIUM  --  9.9   PROT 7.3  7.3 6.6   ALBUMIN  --  2.6*   BILITOT  --  0.7   ALKPHOS  --  169*   AST  --  20   ALT  --  58*   ANIONGAP  --  7*   EGFRNONAA  --  >60       Diagnostic Results:  I have reviewed and interpreted all pertinent imaging results/findings within the past 24 hours.    Assessment/Plan:     * Primary malignant neoplasm of right lung metastatic to other site    i do no think he is a candidate for systemic treatment at this time. Longitudinal follow up will determine if he is a candidate    Bone metastases  Recent CTa of chest was reported as showing a lytic lesion in the left femoral head.  No need for intervention at this time    Chronic respiratory failure with hypoxia  He continues to be  SOB.  He had a thoracentesis 2 days ago   Has chronic aspiration, a tracheo-esophageal fistula,active lung cancer, some degree of radiation pneumonitis and COPD all contributing    I        Cachexia  Currently on TPN>  PEg tube in place since yesterday. Should be transitioned to oral feedings today    Broncho-esophageal fistula  Ct report showing an air tract between the esophagus and trachea suggesting a fistula.  Patient is not interested in surgical procedures at this time  Would address later on. I am unsure if there is anything that can be offered for yhis and Thoracic surgery input may be needed    Pleural effusion, bilateral  Better after thoracentesis      Thank you for your consult. I will follow-up with patient. Please contact us if you have any additional questions.     Pancho Iraheta MD  Hematology/Oncology  [facility] -  [facility]

## 2017-03-10 NOTE — SUBJECTIVE & OBJECTIVE
Interval History:  Weak and tired.  PEG tube placed yesterday without complication.  Minimal discomfort at site.    Review of Systems   Constitutional: Positive for activity change, appetite change and fatigue. Negative for fever.   HENT: Negative.  Negative for congestion, nosebleeds and sore throat.    Eyes: Negative.  Negative for photophobia, redness and visual disturbance.   Respiratory: Negative for cough, shortness of breath and wheezing.    Cardiovascular: Negative.  Negative for chest pain, palpitations and leg swelling.   Gastrointestinal: Negative for abdominal pain, constipation, diarrhea, nausea (dysphagia) and vomiting.   Endocrine: Negative.  Negative for polydipsia, polyphagia and polyuria.   Genitourinary: Negative.  Negative for dysuria, flank pain, frequency and urgency.   Musculoskeletal: Negative.  Negative for arthralgias, back pain and joint swelling.   Skin: Negative.  Negative for color change, pallor and rash.   Allergic/Immunologic: Negative.  Negative for environmental allergies, food allergies and immunocompromised state.   Neurological: Negative.  Negative for dizziness, syncope, weakness, light-headedness, numbness and headaches.   Hematological: Negative.  Negative for adenopathy. Does not bruise/bleed easily.   Psychiatric/Behavioral: Negative.  Negative for confusion and hallucinations. The patient is not nervous/anxious.    All other systems reviewed and are negative.    Objective:     Vital Signs (Most Recent):  Temp: 98.6 °F (37 °C) (03/10/17 1556)  Pulse: 100 (03/10/17 1556)  Resp: 18 (03/10/17 1556)  BP: 106/68 (03/10/17 1556)  SpO2: 96 % (03/10/17 1556) Vital Signs (24h Range):  Temp:  [97.4 °F (36.3 °C)-98.6 °F (37 °C)] 98.6 °F (37 °C)  Pulse:  [] 100  Resp:  [17-20] 18  SpO2:  [94 %-97 %] 96 %  BP: (106-128)/(48-80) 106/68     Weight: 49 kg (108 lb)  Body mass index is 19.75 kg/(m^2).    Intake/Output Summary (Last 24 hours) at 03/10/17 3181  Last data filed at  03/10/17 1631   Gross per 24 hour   Intake          1943.34 ml   Output             1150 ml   Net           793.34 ml      Physical Exam   Constitutional: He is oriented to person, place, and time. He appears well-developed.   Cachectic, ill appearing   HENT:   Head: Normocephalic.   Mouth/Throat: No oropharyngeal exudate.   Eyes: Pupils are equal, round, and reactive to light.   Neck: No thyromegaly present.   Cardiovascular: Normal rate, regular rhythm and normal heart sounds.  Exam reveals no gallop.    No murmur heard.  Pulmonary/Chest: Effort normal and breath sounds normal. No respiratory distress. He has no rales.   Abdominal: Soft. Bowel sounds are normal. He exhibits no distension and no mass. There is no rebound and no guarding.   Musculoskeletal: Normal range of motion. He exhibits no edema.   Lymphadenopathy:     He has no cervical adenopathy.   Neurological: He is alert and oriented to person, place, and time.   Skin: Skin is warm and dry.   Psychiatric:   Quiet mood       Significant Labs:   CBC:     Recent Labs  Lab 03/09/17  0330 03/10/17  0448   WBC 15.05* 16.77*   HGB 10.9* 11.0*   HCT 34.3* 35.3*    220     CMP:     Recent Labs  Lab 03/09/17  0817 03/10/17  0448   * 146*   K 4.1 4.6    106   CO2 35* 33*   * 153*   BUN 31* 31*   CREATININE 0.7 0.8   CALCIUM 9.9 9.7   PROT 6.6 6.3   ALBUMIN 2.6* 2.4*   BILITOT 0.7 0.9   ALKPHOS 169* 158*   AST 20 21   ALT 58* 51*   ANIONGAP 7* 7*   EGFRNONAA >60 >60       Significant Imaging: I have reviewed all pertinent imaging results/findings within the past 24 hours.

## 2017-03-10 NOTE — ASSESSMENT & PLAN NOTE
GI consulted and has been following.  Plan to place PEG tube today to start enteral feedings.  Esophagram showed patent stent and severe aspiration.  Receiving IV Dilaudid 1 mg q 2 hours.

## 2017-03-10 NOTE — NURSING
Pt assisted in repositioning every 2 hrs (during rounds), pt able to turn self, needs a lot of encouragement, pillow placed for support, mepilex applied on sacral area.

## 2017-03-10 NOTE — ASSESSMENT & PLAN NOTE
Currently on TPN>  PEg tube in place since yesterday. Should be transitioned to oral feedings today

## 2017-03-10 NOTE — PLAN OF CARE
Problem: Patient Care Overview  Goal: Plan of Care Review  PT REQUIRES [facility]GA FOR T/F TO CHAIR   Outcome: Ongoing (interventions implemented as appropriate)  PT REQUIRES [facility] FOR T/F AND STANDING FOR GROOMING

## 2017-03-10 NOTE — PLAN OF CARE
Followed up with patient.  Patient had PEG Tube placed and oncologist's Progress Note dated 03/10/17 indicates patient will be starting Keytruda post-hospitalization.      Met with patient in room.  Patient frustrated, stating that he has not been able to rest; has not yet been fed via PEG Tube yet and wants to talk with MD tomorrow to have a better understanding of next steps for him.        PLAN:  Request that weekend [facility] , [facility], follow up with patient regarding possible SNF placement for patient

## 2017-03-10 NOTE — PT/OT/SLP PROGRESS
Occupational Therapy  Treatment    Frederick J Lejeune   MRN: [id number]   Admitting Diagnosis: Primary malignant neoplasm of right lung metastatic to other site    OT Date of Treatment: 03/10/17   OT Start Time: 0940  OT Stop Time: 0950  OT Total Time (min): 10 min    Billable Minutes:  Self Care/Home Management 10    General Precautions: Standard, fall  Orthopedic Precautions: N/A  Braces: N/A         Subjective:  Communicated with RN prior to session.      Pain Rating:  (C/o pain, no number provided)        Location: chest  Pain Addressed: Cessation of Activity, Nurse notified  Pain Rating Post-Intervention: 5/10    Objective:  Patient found with: oxygen, telemetry, peripheral IV     Functional Mobility:  Bed Mobility:  Rolling/Turning to Left: Stand by assistance  Rolling/Turning Right: Stand by assistance  Scooting/Bridging: Stand by Assistance  Supine to Sit: Stand by Assistance  Sit to Supine: Stand by Assistance    Transfers:   Sit <> Stand Assistance: Contact Guard Assistance  Sit <> Stand Assistive Device: No Assistive Device    Functional Ambulation: Ambulated to sink and back to bed, [facility].    Activities of Daily Living:    UE Dressing Level of Assistance: Minimum assistance    LE Dressing Level of Assistance: Contact guard    Grooming Position: Standing at sink  Grooming Level of Assistance: Contact guard assistance, Stand by assistance         Balance:   Static Sit: GOOD-: Takes MODERATE challenges from all directions but inconsistently  Dynamic Sit: FAIR+: Maintains balance through MINIMAL excursions of active trunk motion  Static Stand: FAIR: Maintains without assist but unable to take challenges  Dynamic stand: FAIR: Needs CONTACT GUARD during gait        AM-PAC 6 CLICK ADL   How much help from another person does this patient currently need?   1 = Unable, Total/Dependent Assistance  2 = A lot, Maximum/Moderate Assistance  3 = A little, Minimum/Contact Guard/Supervision  4 = None, Modified  [place]/Independent          AM-PAC Raw Score CMS G-Code Modifier Level of Impairment Assistance   6 % Total / Unable   7 - 9 CM 80 - 100% Maximal Assist   10 - 14 CL 60 - 80% Moderate Assist   15 - 19 CK 40 - 60% Moderate Assist   20 - 22 CJ 20 - 40% Minimal Assist   23 CI 1-20% SBA / CGA   24 CH 0% Independent/ Mod I     Patient left right sidelying with all lines intact, call button in reach, bed alarm on and nurse present    ASSESSMENT:  Frederick J Lejeune is a 66 y.o. male with a medical diagnosis of Primary malignant neoplasm of right lung metastatic to other site and presents with debility, poor endurance, all related to current admitting diagnosis. Pt to benefit from continued skilled therapy services to address deficits and increase functional independence.    Rehab identified problem list/impairments: Rehab identified problem list/impairments: weakness, impaired endurance, impaired functional mobilty, gait instability, impaired balance, decreased safety awareness, pain    Rehab potential is good.    Activity tolerance: Poor    Discharge recommendations: Discharge Facility/Level Of Care Needs: nursing facility, skilled     Barriers to discharge: Barriers to Discharge: None    Equipment recommendations: bath bench     GOALS:   Occupational Therapy Goals        Problem: Occupational Therapy Goal    Goal Priority Disciplines Outcome Interventions   Occupational Therapy Goal     OT, PT/OT Ongoing (interventions implemented as appropriate)    Description:  Goals to be met by: 3/14/17     Patient will increase functional independence with ADLs by performing:    UE Dressing with Stand-by Assistance.  LE Dressing with Minimal Assistance.  Toileting from toilet with Minimal Assistance for hygiene and clothing management.   Toilet transfer to toilet with Contact Guard Assistance.  Upper extremity exercise program x10 reps per handout, with independence for  Minimal resistive ex to BUE.                 Plan:  Patient to be seen 3 x/week to address the above listed problems via self-care/home management, therapeutic activities, therapeutic exercises  Plan of Care expires: 03/14/17  Plan of Care reviewed with: patient         Tonya Alicia, OT  03/10/2017

## 2017-03-10 NOTE — PROGRESS NOTES
Progress Note  Pulmonology    Admit Date: 3/3/2017   LOS: 6 days     SUBJECTIVE:     Follow-up For: Dyspnea    Interval History: Seen and examined at bedside. Reports that he is feeling fair. No acute interval developments.      Continuous Infusions:   Amino acid 4.25% - dextrose 10% (CLINIMIX-E) solution with additives (1L provides 42.5 gm AA, 100 gm CHO (340 kcal/L dextrose), Na 35, K 30, Mg 5, Ca 4.5, Acetate 70, Cl 39, Phos 15) 80 mL/hr at 03/09/17 2214     Scheduled Meds:   albuterol-ipratropium 2.5mg-0.5mg/3mL  3 mL Nebulization Q4H    arformoterol  15 mcg Nebulization BID    budesonide  0.5 mg Nebulization BID    ceFEPime (MAXIPIME) IVPB  2 g Intravenous Q8H    famotidine  20 mg Intravenous BID    lidocaine HCL 10 mg/ml (1%)  1 mL Intradermal Once    methylPREDNISolone sodium succinate  40 mg Intravenous Q8H    moxifloxacin  400 mg Intravenous Q24H    ondansetron  4 mg Intravenous Q8H       Review of Systems:  Constitutional: no fever or chills  Respiratory: positive for dyspnea on exertion  Cardiovascular: no chest pain or palpitations    OBJECTIVE:     Vital Signs Range (Last 24H):  Temp:  [97.4 °F (36.3 °C)-98.6 °F (37 °C)]   Pulse:  []   Resp:  [11-44]   BP: ()/(48-80)   SpO2:  [94 %-99 %]     I & O (Last 24H):    Intake/Output Summary (Last 24 hours) at 03/10/17 1255  Last data filed at 03/10/17 1158   Gross per 24 hour   Intake           187.33 ml   Output             1100 ml   Net          -912.67 ml     Physical Exam:  General: no distress  Neck: no jugular venous distention  Lungs:  clear to auscultation bilaterally, normal respiratory effort and normal percussion bilaterally  Chest Wall: no tenderness  Heart: regular rate and rhythm and no murmur  Abdomen: soft, non-tender non-distended; bowel sounds normal  Extremities: no cyanosis or edema, or clubbing  Neurologic: alert, oriented, thought content appropriate    Laboratory:  CBC:     Recent Labs  Lab 03/10/17  0448   WBC  16.77*   RBC 3.21*   HGB 11.0*   HCT 35.3*      *   MCH 34.3*   MCHC 31.2*     CMP:     Recent Labs  Lab 03/10/17  0448   *   CALCIUM 9.7   ALBUMIN 2.4*   PROT 6.3   *   K 4.6   CO2 33*      BUN 31*   CREATININE 0.8   ALKPHOS 158*   ALT 51*   AST 21   BILITOT 0.9             ASSESSMENT/PLAN:     Active Hospital Problems    Diagnosis    *Primary malignant neoplasm of right lung metastatic to other site    Broncho-esophageal fistula    Pleural effusion, bilateral    Diffuse interstitial pulmonary fibrosis    Oropharyngeal dysphagia    Pneumonia due to infectious organism    Severe protein-calorie malnutrition    Cachexia    Chronic respiratory failure with hypoxia    Bone metastases    Palma's esophagus without dysplasia    Esophageal dysphagia    COPD, very severe       Plan: Continue Current.     Counseling/Consultation:I discussed the case with primary care team.  Thank you for allowing me to participate in this patients care  We will continue to follow with you.

## 2017-03-10 NOTE — ASSESSMENT & PLAN NOTE
Recent CTa of chest was reported as showing a lytic lesion in the left femoral head.  No need for intervention at this time

## 2017-03-10 NOTE — ASSESSMENT & PLAN NOTE
Dietitian recommended:  Isosource 1.5 at 10ml/hr advance as tolerated to 50ml/hr with 100ml flushes every 3-4 hours or as needed or bolus 5 cans daily.  Meets 100% of estimated needs (1800 calories, 81g, 934ml water) Provide 30ml flush before and after each can plus 100ml TID and as needed for hydration.    SLP evaluation for safety of pleasure feeding     Home on Isosurce 1.5 bolus 5 can daily with 30ml flush before and after plus 100ml TID and as needed for hydration. Pleasure feeds per SLP recommendations for consistency. (patient questioning whether he can still eat by mouth for pleasure)

## 2017-03-10 NOTE — PROGRESS NOTES
[facility] Hospital Medicine  Progress Note    Patient Name: Frederick J Lejeune  MRN: [id number]  Patient Class: IP- Inpatient   Admission Date: 3/3/2017  Length of Stay: 6 days  Attending Physician: Derek Bourgeois MD  Primary Care Provider: Kenrick Muñoz MD        Subjective:     Principal Problem:Primary malignant neoplasm of right lung metastatic to other site    HPI:  Frederick J Lejeune is a 66 y.o. male with PMHx of GERD, weight loss, dysphagia, odynophagia, RLL lung mass squamous cell carcinoma, who presented to ER with continued disphagia. He has been recently hospitalized from Dr. Espinosa's office with c/o dysphagia, weakness, dizziness, chest pain. He has had difficulty eating due to odynophagia for the past month. Patient has completed palliative radiation to relieve right mainstem bronchus obstruction. He then was treated with weekly Carbo-Taxol x 6-7 weeks, which was discontinued due to disease progression on imaging in early 1/2017. He is currently receiving Pembrolizumab every 3 weeks. He was recently evaluated by Dr. Cheng, at last hospitalization, who recommended further evaluation with esophagram, that was done on 2/24/17 with Esophageal stent placement. Per discharge summary, Dr. Cheng stated if patient continues to have severe dysphagia or chest pain then he could remove the stent and place PEG tube. He was discharged on 2/28/17.    Patient returns to the hospital 3 days after discharge with symptoms of inability to keep anything down. Getting dehydrated, He is agreeable for PEG placement.      Hospital Course:  GI, Dr. Anguiano, was consulted. Esophagogram showed patent stent, he showed signs of severe aspiration. Patient made NPO and IV Cefepime started. Patient continues with mild respiratory distress and placed on Oxygen, IV steroids, nebulizers, IS, acapella. Discussed code status twice with patient and he wants to remain a full code. He also requested more IV pain  "medicine. Discussed with Dr. Bourgeois and Dr. Anguiano. Patient states, "under NO circumstances can you call my family." Pulmonary was consulted for continued hypoxia requiring 4 liters O2. IV Diuretics started. CT chest showed enlarging bilateral pleural effusions, stable metastatic lung CA, and evidence of tracheoesophageal fistula. Dr. Anguiano spoke with the Radiologist explaining patient had esophagram with barium a few days ago. Radiologist said that could be what he saw and will revise his CT report, but addendum didn't r/o fistula. Dr. Marie performed thoracentesis on the right removing 560cc. No family at bedside. Scheduled multidisciplinary rounds 3/9/17.    Interval History:  Weak and tired.  PEG tube placed yesterday without complication.  Minimal discomfort at site.    Review of Systems   Constitutional: Positive for activity change, appetite change and fatigue. Negative for fever.   HENT: Negative.  Negative for congestion, nosebleeds and sore throat.    Eyes: Negative.  Negative for photophobia, redness and visual disturbance.   Respiratory: Negative for cough, shortness of breath and wheezing.    Cardiovascular: Negative.  Negative for chest pain, palpitations and leg swelling.   Gastrointestinal: Negative for abdominal pain, constipation, diarrhea, nausea (dysphagia) and vomiting.   Endocrine: Negative.  Negative for polydipsia, polyphagia and polyuria.   Genitourinary: Negative.  Negative for dysuria, flank pain, frequency and urgency.   Musculoskeletal: Negative.  Negative for arthralgias, back pain and joint swelling.   Skin: Negative.  Negative for color change, pallor and rash.   Allergic/Immunologic: Negative.  Negative for environmental allergies, food allergies and immunocompromised state.   Neurological: Negative.  Negative for dizziness, syncope, weakness, light-headedness, numbness and headaches.   Hematological: Negative.  Negative for adenopathy. Does not bruise/bleed easily. "   Psychiatric/Behavioral: Negative.  Negative for confusion and hallucinations. The patient is not nervous/anxious.    All other systems reviewed and are negative.    Objective:     Vital Signs (Most Recent):  Temp: 98.6 °F (37 °C) (03/10/17 1556)  Pulse: 100 (03/10/17 1556)  Resp: 18 (03/10/17 1556)  BP: 106/68 (03/10/17 1556)  SpO2: 96 % (03/10/17 1556) Vital Signs (24h Range):  Temp:  [97.4 °F (36.3 °C)-98.6 °F (37 °C)] 98.6 °F (37 °C)  Pulse:  [] 100  Resp:  [17-20] 18  SpO2:  [94 %-97 %] 96 %  BP: (106-128)/(48-80) 106/68     Weight: 49 kg (108 lb)  Body mass index is 19.75 kg/(m^2).    Intake/Output Summary (Last 24 hours) at 03/10/17 1725  Last data filed at 03/10/17 1631   Gross per 24 hour   Intake          1943.34 ml   Output             1150 ml   Net           793.34 ml      Physical Exam   Constitutional: He is oriented to person, place, and time. He appears well-developed.   Cachectic, ill appearing   HENT:   Head: Normocephalic.   Mouth/Throat: No oropharyngeal exudate.   Eyes: Pupils are equal, round, and reactive to light.   Neck: No thyromegaly present.   Cardiovascular: Normal rate, regular rhythm and normal heart sounds.  Exam reveals no gallop.    No murmur heard.  Pulmonary/Chest: Effort normal and breath sounds normal. No respiratory distress. He has no rales.   Abdominal: Soft. Bowel sounds are normal. He exhibits no distension and no mass. There is no rebound and no guarding.   Musculoskeletal: Normal range of motion. He exhibits no edema.   Lymphadenopathy:     He has no cervical adenopathy.   Neurological: He is alert and oriented to person, place, and time.   Skin: Skin is warm and dry.   Psychiatric:   Quiet mood       Significant Labs:   CBC:     Recent Labs  Lab 03/09/17  0330 03/10/17  0448   WBC 15.05* 16.77*   HGB 10.9* 11.0*   HCT 34.3* 35.3*    220     CMP:     Recent Labs  Lab 03/09/17  0817 03/10/17  0448   * 146*   K 4.1 4.6    106   CO2 35* 33*   GLU  157* 153*   BUN 31* 31*   CREATININE 0.7 0.8   CALCIUM 9.9 9.7   PROT 6.6 6.3   ALBUMIN 2.6* 2.4*   BILITOT 0.7 0.9   ALKPHOS 169* 158*   AST 20 21   ALT 58* 51*   ANIONGAP 7* 7*   EGFRNONAA >60 >60       Significant Imaging: I have reviewed all pertinent imaging results/findings within the past 24 hours.    Assessment/Plan:      * Primary malignant neoplasm of right lung metastatic to other site  Follow up with oncology as out-patient for treatment with Keytruda.  Discussed with DR Espinosa, patient's primary oncologist.    COPD, very severe  Pulmonary following    Esophageal dysphagia  GI consulted and has been following.  Esophagram showed patent stent and severe aspiration.  Receiving IV Dilaudid 1 mg q 2 hours.  PEG placed 09 March by GI.    Bone metastases  Lytic lesion, 2.1 cm, in the left humeral head.    Chronic respiratory failure with hypoxia  Nebulizers and IV steroids.  Right thoracentesis removed 560ml 3/8/2017.    Severe protein-calorie malnutrition  Dietitian recommended:  Isosource 1.5 at 10ml/hr advance as tolerated to 50ml/hr with 100ml flushes every 3-4 hours or as needed or bolus 5 cans daily.  Meets 100% of estimated needs (1800 calories, 81g, 934ml water) Provide 30ml flush before and after each can plus 100ml TID and as needed for hydration.    SLP evaluation for safety of pleasure feeding     Home on Isosurce 1.5 bolus 5 can daily with 30ml flush before and after plus 100ml TID and as needed for hydration. Pleasure feeds per SLP recommendations for consistency. (patient questioning whether he can still eat by mouth for pleasure)    Pleural effusion, bilateral  Right thoracentesis removed 560ml 3/8/17.    VTE Risk Mitigation         Ordered     Place sequential compression device  Until discontinued      03/04/17 0222     Medium Risk of VTE  Once      03/04/17 0217          Derek Bourgeois MD  Department of Hospital Medicine   [facility]

## 2017-03-10 NOTE — SUBJECTIVE & OBJECTIVE
Interval History:   PG tube placed ywsterday and should be starting PEg tibe feeding today.  The question of the fistula between the bronchus and either the esophagus and the mediastinum remains, but he  Wants to have some enteeric feeding before proceeding with work up or treatment ( if there is any).  Oncology Treatment Plan:   [facility]  3475 KEYNOTE 240    Medications:  Continuous Infusions:   Amino acid 4.25% - dextrose 10% (CLINIMIX-E) solution with additives (1L provides 42.5 gm AA, 100 gm CHO (340 kcal/L dextrose), Na 35, K 30, Mg 5, Ca 4.5, Acetate 70, Cl 39, Phos 15) 80 mL/hr at 03/09/17 2214     Scheduled Meds:   albuterol-ipratropium 2.5mg-0.5mg/3mL  3 mL Nebulization Q4H    arformoterol  15 mcg Nebulization BID    budesonide  0.5 mg Nebulization BID    ceFEPime (MAXIPIME) IVPB  2 g Intravenous Q8H    famotidine  20 mg Intravenous BID    lidocaine HCL 10 mg/ml (1%)  1 mL Intradermal Once    methylPREDNISolone sodium succinate  40 mg Intravenous Q8H    moxifloxacin  400 mg Intravenous Q24H    ondansetron  4 mg Intravenous Q8H     PRN Meds:albuterol-ipratropium 2.5mg-0.5mg/3mL, guaifenesin 100 mg/5 ml, hydrALAZINE, HYDROmorphone, lorazepam, promethazine (PHENERGAN) IVPB     Review of Systems   Constitutional: Positive for fatigue.   Respiratory: Positive for shortness of breath.    Cardiovascular:        Some chest discomfort     Objective:     Vital Signs (Most Recent):  Temp: 98.2 °F (36.8 °C) (03/10/17 0448)  Pulse: 98 (03/10/17 0508)  Resp: 18 (03/10/17 0508)  BP: (!) 123/48 (03/10/17 0448)  SpO2: (!) 94 % (03/10/17 0448) Vital Signs (24h Range):  Temp:  [97.8 °F (36.6 °C)-98.6 °F (37 °C)] 98.2 °F (36.8 °C)  Pulse:  [] 98  Resp:  [11-44] 18  SpO2:  [94 %-99 %] 94 %  BP: ()/(48-80) 123/48     Weight: 49 kg (108 lb)  Body mass index is 19.75 kg/(m^2).  Body surface area is 1.46 meters squared.      Intake/Output Summary (Last 24 hours) at 03/10/17 0600  Last data filed at 03/10/17  0448   Gross per 24 hour   Intake              890 ml   Output             1100 ml   Net             -210 ml       Physical Exam   Constitutional: He is oriented to person, place, and time. He appears well-developed.   cachetic   HENT:   Head: Normocephalic.   Mouth/Throat: No oropharyngeal exudate.   Eyes: Pupils are equal, round, and reactive to light.   Neck: No thyromegaly present.   Cardiovascular: Normal rate, regular rhythm and normal heart sounds.  Exam reveals no gallop.    No murmur heard.  Pulmonary/Chest: He has no wheezes. He has no rales.   Slightly tachypneic   Abdominal: Soft. He exhibits no distension and no mass. There is no rebound and no guarding.   PEg tube in place   Musculoskeletal: Normal range of motion. He exhibits no edema.   Lymphadenopathy:     He has no cervical adenopathy.   Neurological: He is alert and oriented to person, place, and time.   Skin: Skin is warm and dry.   Psychiatric: He has a normal mood and affect. His behavior is normal.       Significant Labs:   BMP:   Recent Labs  Lab 03/08/17  0950 03/09/17  0817   * 157*   NA  --  147*   K  --  4.1   CL  --  105   CO2  --  35*   BUN  --  31*   CREATININE  --  0.7   CALCIUM  --  9.9   , CBC:   Recent Labs  Lab 03/09/17  0330 03/10/17  0448   WBC 15.05* 16.77*   HGB 10.9* 11.0*   HCT 34.3* 35.3*    220    and CMP:   Recent Labs  Lab 03/08/17  0950 03/09/17  0817   NA  --  147*   K  --  4.1   CL  --  105   CO2  --  35*   * 157*   BUN  --  31*   CREATININE  --  0.7   CALCIUM  --  9.9   PROT 7.3  7.3 6.6   ALBUMIN  --  2.6*   BILITOT  --  0.7   ALKPHOS  --  169*   AST  --  20   ALT  --  58*   ANIONGAP  --  7*   EGFRNONAA  --  >60       Diagnostic Results:  I have reviewed and interpreted all pertinent imaging results/findings within the past 24 hours.

## 2017-03-11 LAB
ALBUMIN SERPL BCP-MCNC: 2.5 G/DL
ALP SERPL-CCNC: 153 U/L
ALT SERPL W/O P-5'-P-CCNC: 54 U/L
ANION GAP SERPL CALC-SCNC: 7 MMOL/L
AST SERPL-CCNC: 26 U/L
BASOPHILS # BLD AUTO: 0.02 K/UL
BASOPHILS NFR BLD: 0.1 %
BILIRUB SERPL-MCNC: 0.9 MG/DL
BUN SERPL-MCNC: 30 MG/DL
CALCIUM SERPL-MCNC: 9.7 MG/DL
CHLORIDE SERPL-SCNC: 108 MMOL/L
CHOLEST FLD-MCNC: 58 MG/DL
CO2 SERPL-SCNC: 30 MMOL/L
CREAT SERPL-MCNC: 0.8 MG/DL
DIFFERENTIAL METHOD: ABNORMAL
EOSINOPHIL # BLD AUTO: 0 K/UL
EOSINOPHIL NFR BLD: 0 %
ERYTHROCYTE [DISTWIDTH] IN BLOOD BY AUTOMATED COUNT: 14 %
EST. GFR  (AFRICAN AMERICAN): >60 ML/MIN/1.73 M^2
EST. GFR  (NON AFRICAN AMERICAN): >60 ML/MIN/1.73 M^2
GLUCOSE SERPL-MCNC: 148 MG/DL
HCT VFR BLD AUTO: 35.4 %
HGB BLD-MCNC: 11.2 G/DL
LYMPHOCYTES # BLD AUTO: 0.8 K/UL
LYMPHOCYTES NFR BLD: 5.4 %
MAGNESIUM SERPL-MCNC: 2.7 MG/DL
MCH RBC QN AUTO: 34.6 PG
MCHC RBC AUTO-ENTMCNC: 31.6 %
MCV RBC AUTO: 109 FL
MONOCYTES # BLD AUTO: 0.3 K/UL
MONOCYTES NFR BLD: 1.7 %
NEUTROPHILS # BLD AUTO: 13.7 K/UL
NEUTROPHILS NFR BLD: 92.8 %
PHOSPHATE SERPL-MCNC: 2.5 MG/DL
PLATELET # BLD AUTO: 193 K/UL
PMV BLD AUTO: 11.7 FL
POTASSIUM SERPL-SCNC: 4.5 MMOL/L
PREALB SERPL-MCNC: 18 MG/DL
PROT SERPL-MCNC: 6.3 G/DL
RBC # BLD AUTO: 3.24 M/UL
SODIUM SERPL-SCNC: 145 MMOL/L
SPECIMEN SOURCE: NORMAL
WBC # BLD AUTO: 14.8 K/UL

## 2017-03-11 PROCEDURE — 99233 SBSQ HOSP IP/OBS HIGH 50: CPT | Mod: ,,, | Performed by: INTERNAL MEDICINE

## 2017-03-11 PROCEDURE — 27000221 HC OXYGEN, UP TO 24 HOURS

## 2017-03-11 PROCEDURE — 94668 MNPJ CHEST WALL SBSQ: CPT

## 2017-03-11 PROCEDURE — 84134 ASSAY OF PREALBUMIN: CPT

## 2017-03-11 PROCEDURE — 80053 COMPREHEN METABOLIC PANEL: CPT

## 2017-03-11 PROCEDURE — 83735 ASSAY OF MAGNESIUM: CPT

## 2017-03-11 PROCEDURE — 92610 EVALUATE SWALLOWING FUNCTION: CPT

## 2017-03-11 PROCEDURE — 94761 N-INVAS EAR/PLS OXIMETRY MLT: CPT

## 2017-03-11 PROCEDURE — 25000242 PHARM REV CODE 250 ALT 637 W/ HCPCS: Performed by: NURSE PRACTITIONER

## 2017-03-11 PROCEDURE — 25000003 PHARM REV CODE 250: Performed by: INTERNAL MEDICINE

## 2017-03-11 PROCEDURE — G8997 SWALLOW GOAL STATUS: HCPCS | Mod: CM

## 2017-03-11 PROCEDURE — 99900035 HC TECH TIME PER 15 MIN (STAT)

## 2017-03-11 PROCEDURE — 94664 DEMO&/EVAL PT USE INHALER: CPT

## 2017-03-11 PROCEDURE — 63600175 PHARM REV CODE 636 W HCPCS: Performed by: INTERNAL MEDICINE

## 2017-03-11 PROCEDURE — 25000003 PHARM REV CODE 250: Performed by: NURSE PRACTITIONER

## 2017-03-11 PROCEDURE — 84100 ASSAY OF PHOSPHORUS: CPT

## 2017-03-11 PROCEDURE — 85025 COMPLETE CBC W/AUTO DIFF WBC: CPT

## 2017-03-11 PROCEDURE — 21400001 HC TELEMETRY ROOM

## 2017-03-11 PROCEDURE — G8996 SWALLOW CURRENT STATUS: HCPCS | Mod: CN

## 2017-03-11 PROCEDURE — 94640 AIRWAY INHALATION TREATMENT: CPT

## 2017-03-11 PROCEDURE — 94799 UNLISTED PULMONARY SVC/PX: CPT

## 2017-03-11 PROCEDURE — 63600175 PHARM REV CODE 636 W HCPCS: Performed by: NURSE PRACTITIONER

## 2017-03-11 RX ADMIN — ARFORMOTEROL TARTRATE 15 MCG: 15 SOLUTION RESPIRATORY (INHALATION) at 07:03

## 2017-03-11 RX ADMIN — FAMOTIDINE 20 MG: 20 INJECTION, SOLUTION INTRAVENOUS at 09:03

## 2017-03-11 RX ADMIN — HYDROMORPHONE HYDROCHLORIDE 1 MG: 1 INJECTION, SOLUTION INTRAMUSCULAR; INTRAVENOUS; SUBCUTANEOUS at 04:03

## 2017-03-11 RX ADMIN — HYDROMORPHONE HYDROCHLORIDE 1 MG: 1 INJECTION, SOLUTION INTRAMUSCULAR; INTRAVENOUS; SUBCUTANEOUS at 09:03

## 2017-03-11 RX ADMIN — CEFEPIME HYDROCHLORIDE 2 G: 2 INJECTION, SOLUTION INTRAVENOUS at 02:03

## 2017-03-11 RX ADMIN — HYDROMORPHONE HYDROCHLORIDE 1 MG: 1 INJECTION, SOLUTION INTRAMUSCULAR; INTRAVENOUS; SUBCUTANEOUS at 06:03

## 2017-03-11 RX ADMIN — MOXIFLOXACIN HYDROCHLORIDE 400 MG: 400 INJECTION, SOLUTION INTRAVENOUS at 12:03

## 2017-03-11 RX ADMIN — CEFEPIME HYDROCHLORIDE 2 G: 2 INJECTION, SOLUTION INTRAVENOUS at 10:03

## 2017-03-11 RX ADMIN — IPRATROPIUM BROMIDE AND ALBUTEROL SULFATE 3 ML: .5; 3 SOLUTION RESPIRATORY (INHALATION) at 11:03

## 2017-03-11 RX ADMIN — HYDROMORPHONE HYDROCHLORIDE 1 MG: 1 INJECTION, SOLUTION INTRAMUSCULAR; INTRAVENOUS; SUBCUTANEOUS at 11:03

## 2017-03-11 RX ADMIN — ONDANSETRON 4 MG: 2 INJECTION INTRAMUSCULAR; INTRAVENOUS at 03:03

## 2017-03-11 RX ADMIN — HYDROMORPHONE HYDROCHLORIDE 1 MG: 1 INJECTION, SOLUTION INTRAMUSCULAR; INTRAVENOUS; SUBCUTANEOUS at 10:03

## 2017-03-11 RX ADMIN — METHYLPREDNISOLONE SODIUM SUCCINATE 40 MG: 40 INJECTION, POWDER, FOR SOLUTION INTRAMUSCULAR; INTRAVENOUS at 09:03

## 2017-03-11 RX ADMIN — HYDROMORPHONE HYDROCHLORIDE 1 MG: 1 INJECTION, SOLUTION INTRAMUSCULAR; INTRAVENOUS; SUBCUTANEOUS at 02:03

## 2017-03-11 RX ADMIN — HYDROMORPHONE HYDROCHLORIDE 1 MG: 1 INJECTION, SOLUTION INTRAMUSCULAR; INTRAVENOUS; SUBCUTANEOUS at 08:03

## 2017-03-11 RX ADMIN — PROMETHAZINE HYDROCHLORIDE 12.5 MG: 25 INJECTION, SOLUTION INTRAMUSCULAR; INTRAVENOUS at 04:03

## 2017-03-11 RX ADMIN — FAMOTIDINE 20 MG: 20 INJECTION, SOLUTION INTRAVENOUS at 08:03

## 2017-03-11 RX ADMIN — ARFORMOTEROL TARTRATE 15 MCG: 15 SOLUTION RESPIRATORY (INHALATION) at 08:03

## 2017-03-11 RX ADMIN — BUDESONIDE 0.5 MG: 0.5 INHALANT RESPIRATORY (INHALATION) at 07:03

## 2017-03-11 RX ADMIN — HYDROMORPHONE HYDROCHLORIDE 1 MG: 1 INJECTION, SOLUTION INTRAMUSCULAR; INTRAVENOUS; SUBCUTANEOUS at 01:03

## 2017-03-11 RX ADMIN — ONDANSETRON 4 MG: 2 INJECTION INTRAMUSCULAR; INTRAVENOUS at 05:03

## 2017-03-11 RX ADMIN — IPRATROPIUM BROMIDE AND ALBUTEROL SULFATE 3 ML: .5; 3 SOLUTION RESPIRATORY (INHALATION) at 12:03

## 2017-03-11 RX ADMIN — METHYLPREDNISOLONE SODIUM SUCCINATE 40 MG: 40 INJECTION, POWDER, FOR SOLUTION INTRAMUSCULAR; INTRAVENOUS at 05:03

## 2017-03-11 RX ADMIN — CEFEPIME HYDROCHLORIDE 2 G: 2 INJECTION, SOLUTION INTRAVENOUS at 06:03

## 2017-03-11 RX ADMIN — IPRATROPIUM BROMIDE AND ALBUTEROL SULFATE 3 ML: .5; 3 SOLUTION RESPIRATORY (INHALATION) at 07:03

## 2017-03-11 RX ADMIN — IPRATROPIUM BROMIDE AND ALBUTEROL SULFATE 3 ML: .5; 3 SOLUTION RESPIRATORY (INHALATION) at 04:03

## 2017-03-11 RX ADMIN — ONDANSETRON 4 MG: 2 INJECTION INTRAMUSCULAR; INTRAVENOUS at 09:03

## 2017-03-11 RX ADMIN — METHYLPREDNISOLONE SODIUM SUCCINATE 40 MG: 40 INJECTION, POWDER, FOR SOLUTION INTRAMUSCULAR; INTRAVENOUS at 03:03

## 2017-03-11 RX ADMIN — BUDESONIDE 0.5 MG: 0.5 INHALANT RESPIRATORY (INHALATION) at 08:03

## 2017-03-11 NOTE — ASSESSMENT & PLAN NOTE
Dietitian recommended:  Isosource 1.5 was not available at this time so I substituted Peptamen 1.5 prebio.  Check with dietary again during the week for further recommendations.

## 2017-03-11 NOTE — PROGRESS NOTES
[facility] -   Hematology/Oncology  Progress Note    Patient Name: Frederick J Lejeune  Admission Date: 3/3/2017  Hospital Length of Stay: 7 days  Code Status: Full Code     Subjective:     HPI:  History of Present Illness:  Frederick J Lejeune is a 66 y.o. male with GERD has a diagnosis of squamous cell carcinoma, PDL-1 positive.  He initlaly presneted  1 month history of weight loss, chest pain, dysphagia, odynophagia found to have esophageal narrowing and RLL lung mass.He has had difficulty eating due to odynophagia for the past month. He also states he has L groin pain due to what he thought was a hernia, which has since been found to be firm lymphadenopathy. Patient underwent EGD 10/19, which was notable for extrinsic compression in middle third of the esophagus as well as findings suggestive of Palma's esophagus, which were biopsied. 10/20, patient underwent EUS notable for lymphadenopathy in the paratracheal, mediastinal, and subcarinal region. FNA performed. Patient underwent bronchoscopy on 10/21 which was notable for a mass in the right mainstem bronchus, biopsy revealed lung cancer, squamous cell carcinoma.   Patient also completed palliative radiation to relieve right mainstem bronchus obstruction. He then was treated with weekly Carbo-Taxol x 6-7 weeks, which was discontinued due to disease progression on imaging in early 1/2017. He is currently receiving Pembrolizumab every 3 weeks.     The patient recently had placement of an esophageal stent due to obstruction.  Since having the stent placed, he has continue with severe pain on swallowing as well as a choking sensation every time he eats.  He has had studies  that shows the stent to the patient. He has been having aspirations by radiographic swallowing studies.  It is planned for him to have an EGd and feeding tube lalced for nutrition.  Plan is to continue Keytruda treatment once discharged    He remains very SOB, more so in the last  few weeks             Past Medical History:  COPD, severe  GERD  Lumbar vertebral fracture s/p surgery in 1/2016 / Chronic low back pain  H/o intracranial hemorrhage     Social History   Marital status:       Spouse name: ganesh   Number of children: 2   Years of education: N/A     Occupational History     [employer]     Social History Main Topics   Smoking status: Former Smoker      Packs/day: 1.00      Years: 50.00      Types: Cigarettes      Quit date: 5/10/2016   Smokeless tobacco: Former User      Types: Snuff      Quit date: 5/10/2016   Alcohol use No   Drug use: No   Sexual activity: Yes      Partners: Female     Other Topics Concern   Not on file     Social History Narrative        Family History: family history includes Cancer in his father; Emphysema in his mother.           Interval History:   No significant change he continues to complain of shortness of breath and difficulty eating/dysphagia    Oncology Treatment Plan:   [facility] 3475 KEYNOTE 240    Medications:  Continuous Infusions:   Scheduled Meds:   albuterol-ipratropium 2.5mg-0.5mg/3mL  3 mL Nebulization Q4H    arformoterol  15 mcg Nebulization BID    budesonide  0.5 mg Nebulization BID    ceFEPime (MAXIPIME) IVPB  2 g Intravenous Q8H    famotidine  20 mg Intravenous BID    lidocaine HCL 10 mg/ml (1%)  1 mL Intradermal Once    methylPREDNISolone sodium succinate  40 mg Intravenous Q8H    moxifloxacin  400 mg Intravenous Q24H    ondansetron  4 mg Intravenous Q8H     PRN Meds:albuterol-ipratropium 2.5mg-0.5mg/3mL, guaifenesin 100 mg/5 ml, hydrALAZINE, HYDROmorphone, lorazepam, promethazine (PHENERGAN) IVPB     Review of Systems   Constitutional: Positive for fatigue.   Respiratory: Positive for shortness of breath.    Cardiovascular:        Some chest discomfort   All other systems reviewed and are negative.    Objective:     Vital Signs (Most Recent):  Temp: 98.4 °F (36.9 °C) (03/11/17 0842)  Pulse: 97 (03/11/17  0842)  Resp: 18 (03/11/17 0706)  BP: 105/77 (03/11/17 0842)  SpO2: 95 % (03/11/17 0842) Vital Signs (24h Range):  Temp:  [97.8 °F (36.6 °C)-98.6 °F (37 °C)] 98.4 °F (36.9 °C)  Pulse:  [] 97  Resp:  [18-19] 18  SpO2:  [95 %-98 %] 95 %  BP: (103-128)/(68-77) 105/77     Weight: 45.7 kg (100 lb 11.2 oz)  Body mass index is 18.42 kg/(m^2).  Body surface area is 1.41 meters squared.      Intake/Output Summary (Last 24 hours) at 03/11/17 1017  Last data filed at 03/11/17 0619   Gross per 24 hour   Intake          2263.34 ml   Output              775 ml   Net          1488.34 ml       Physical Exam   Constitutional: He is oriented to person, place, and time. He appears well-developed.   cachetic   HENT:   Head: Normocephalic.   Right Ear: External ear normal.   Left Ear: External ear normal.   Mouth/Throat: No oropharyngeal exudate.   Eyes: Pupils are equal, round, and reactive to light. Right eye exhibits no discharge. Left eye exhibits discharge.   Neck: No thyromegaly present.   Cardiovascular: Normal rate, regular rhythm and normal heart sounds.  Exam reveals no gallop.    No murmur heard.  Pulmonary/Chest: He has no wheezes. He has no rales.   Slightly tachypneic   Abdominal: Soft. He exhibits no distension and no mass. There is no rebound and no guarding.   PEg tube in place   Musculoskeletal: Normal range of motion. He exhibits no edema, tenderness or deformity.   Lymphadenopathy:     He has no cervical adenopathy.   Neurological: He is alert and oriented to person, place, and time.   Skin: Skin is warm and dry.   Psychiatric: He has a normal mood and affect. His behavior is normal.       Significant Labs:   CBC:   Recent Labs  Lab 03/10/17  0448 03/11/17  0400   WBC 16.77* 14.80*   HGB 11.0* 11.2*   HCT 35.3* 35.4*    193   , CMP:   Recent Labs  Lab 03/10/17  0448 03/11/17  0400   * 145   K 4.6 4.5    108   CO2 33* 30*   * 148*   BUN 31* 30*   CREATININE 0.8 0.8   CALCIUM 9.7 9.7    PROT 6.3 6.3   ALBUMIN 2.4* 2.5*   BILITOT 0.9 0.9   ALKPHOS 158* 153*   AST 21 26   ALT 51* 54*   ANIONGAP 7* 7*   EGFRNONAA >60 >60   , LDH: No results for input(s): LDHCSF, BFSOURCE in the last 48 hours. and Reticulocytes: No results for input(s): RETIC in the last 48 hours.    Diagnostic Results:  I have reviewed all pertinent imaging results/findings within the past 24 hours.    Assessment/Plan:     * Primary malignant neoplasm of right lung metastatic to other site  --unlikely to be a candidate for additional treatment  --considering hospice placement    Esophageal dysphagia  --PEG tube placed      Chronic respiratory failure with hypoxia   thoracentesis 2 days ago   Has chronic aspiration, a tracheo-esophageal fistula,active lung cancer, some degree of radiation pneumonitis and COPD all contributing    I          Thank you for your consult. I will follow-up with patient. Please contact us if you have any additional questions.     Eren Willett Jr, MD  Hematology/Oncology  [facility]

## 2017-03-11 NOTE — SUBJECTIVE & OBJECTIVE
Interval History:  Weak and tired.  PEG tube placed 09 March.  Difficulty with tube feedings yesterday.  He also asks for fewer interruptions if able.    Review of Systems   Constitutional: Positive for activity change, appetite change and fatigue. Negative for fever.   HENT: Negative.  Negative for congestion, nosebleeds and sore throat.    Eyes: Negative.  Negative for photophobia, redness and visual disturbance.   Respiratory: Negative for cough, shortness of breath and wheezing.    Cardiovascular: Negative.  Negative for chest pain, palpitations and leg swelling.   Gastrointestinal: Positive for abdominal pain. Negative for constipation, diarrhea, nausea (dysphagia) and vomiting.   Endocrine: Negative.  Negative for polydipsia, polyphagia and polyuria.   Genitourinary: Negative.  Negative for dysuria, flank pain, frequency and urgency.   Musculoskeletal: Negative.  Negative for arthralgias, back pain and joint swelling.   Skin: Negative.  Negative for color change, pallor and rash.   Allergic/Immunologic: Negative.  Negative for environmental allergies, food allergies and immunocompromised state.   Neurological: Negative.  Negative for dizziness, syncope, weakness, light-headedness, numbness and headaches.   Hematological: Negative.  Negative for adenopathy. Does not bruise/bleed easily.   Psychiatric/Behavioral: Negative.  Negative for confusion and hallucinations. The patient is not nervous/anxious.    All other systems reviewed and are negative.    Objective:     Vital Signs (Most Recent):  Temp: 98.4 °F (36.9 °C) (03/11/17 0842)  Pulse: 101 (03/11/17 1124)  Resp: 18 (03/11/17 1124)  BP: 105/77 (03/11/17 0842)  SpO2: 96 % (03/11/17 1124) Vital Signs (24h Range):  Temp:  [97.8 °F (36.6 °C)-98.6 °F (37 °C)] 98.4 °F (36.9 °C)  Pulse:  [] 101  Resp:  [18-19] 18  SpO2:  [95 %-98 %] 96 %  BP: (103-128)/(68-77) 105/77     Weight: 45.7 kg (100 lb 11.2 oz)  Body mass index is 18.42 kg/(m^2).    Intake/Output  Summary (Last 24 hours) at 03/11/17 1156  Last data filed at 03/11/17 1100   Gross per 24 hour   Intake          2263.34 ml   Output             1150 ml   Net          1113.34 ml      Physical Exam   Constitutional: He is oriented to person, place, and time. He appears well-developed.   Cachectic, ill appearing   HENT:   Head: Normocephalic.   Mouth/Throat: No oropharyngeal exudate.   Eyes: Pupils are equal, round, and reactive to light.   Neck: No thyromegaly present.   Cardiovascular: Normal rate, regular rhythm and normal heart sounds.  Exam reveals no gallop.    No murmur heard.  Pulmonary/Chest: Effort normal and breath sounds normal. No respiratory distress. He has no rales.   Abdominal: Soft. Bowel sounds are normal. He exhibits no distension and no mass. There is no rebound and no guarding.   Musculoskeletal: Normal range of motion. He exhibits no edema.   Lymphadenopathy:     He has no cervical adenopathy.   Neurological: He is alert and oriented to person, place, and time.   Skin: Skin is warm and dry.   Psychiatric:   Quiet mood       Significant Labs:   CBC:     Recent Labs  Lab 03/10/17  0448 03/11/17  0400   WBC 16.77* 14.80*   HGB 11.0* 11.2*   HCT 35.3* 35.4*    193     CMP:     Recent Labs  Lab 03/10/17  0448 03/11/17  0400   * 145   K 4.6 4.5    108   CO2 33* 30*   * 148*   BUN 31* 30*   CREATININE 0.8 0.8   CALCIUM 9.7 9.7   PROT 6.3 6.3   ALBUMIN 2.4* 2.5*   BILITOT 0.9 0.9   ALKPHOS 158* 153*   AST 21 26   ALT 51* 54*   ANIONGAP 7* 7*   EGFRNONAA >60 >60       Significant Imaging: I have reviewed all pertinent imaging results/findings within the past 24 hours.

## 2017-03-11 NOTE — ASSESSMENT & PLAN NOTE
thoracentesis 2 days ago   Has chronic aspiration, a tracheo-esophageal fistula,active lung cancer, some degree of radiation pneumonitis and COPD all contributing    I

## 2017-03-11 NOTE — PT/OT/SLP PROGRESS
Physical Therapy      Frederick J Lejeune  MRN: [id number]    Patient not seen today secondary to Patient unwilling to participate. Will follow-up tomorrow.    Cesar Condon, PTA

## 2017-03-11 NOTE — PROGRESS NOTES
[facility] Hospital Medicine  Progress Note    Patient Name: Frederick J Lejeune  MRN: [id number]  Patient Class: IP- Inpatient   Admission Date: 3/3/2017  Length of Stay: 7 days  Attending Physician: Derek Bourgeois MD  Primary Care Provider: Kenrick Muñoz MD        Subjective:     Principal Problem:Primary malignant neoplasm of right lung metastatic to other site    HPI:  Frederick J Lejeune is a 66 y.o. male with PMHx of GERD, weight loss, dysphagia, odynophagia, RLL lung mass squamous cell carcinoma, who presented to ER with continued disphagia. He has been recently hospitalized from Dr. Espinosa's office with c/o dysphagia, weakness, dizziness, chest pain. He has had difficulty eating due to odynophagia for the past month. Patient has completed palliative radiation to relieve right mainstem bronchus obstruction. He then was treated with weekly Carbo-Taxol x 6-7 weeks, which was discontinued due to disease progression on imaging in early 1/2017. He is currently receiving Pembrolizumab every 3 weeks. He was recently evaluated by Dr. Cheng, at last hospitalization, who recommended further evaluation with esophagram, that was done on 2/24/17 with Esophageal stent placement. Per discharge summary, Dr. Cheng stated if patient continues to have severe dysphagia or chest pain then he could remove the stent and place PEG tube. He was discharged on 2/28/17.    Patient returns to the hospital 3 days after discharge with symptoms of inability to keep anything down. Getting dehydrated, He is agreeable for PEG placement.      Hospital Course:  GI, Dr. Anguiano, was consulted. Esophagogram showed patent stent, he showed signs of severe aspiration. Patient made NPO and IV Cefepime started. Patient continues with mild respiratory distress and placed on Oxygen, IV steroids, nebulizers, IS, acapella. Discussed code status twice with patient and he wants to remain a full code. He also requested more IV pain  "medicine. Discussed with Dr. Bourgeois and Dr. Anguiano. Patient states, "under NO circumstances can you call my family." Pulmonary was consulted for continued hypoxia requiring 4 liters O2. IV Diuretics started. CT chest showed enlarging bilateral pleural effusions, stable metastatic lung CA, and evidence of tracheoesophageal fistula. Dr. Anguiano spoke with the Radiologist explaining patient had esophagram with barium a few days ago. Radiologist said that could be what he saw and will revise his CT report, but addendum didn't r/o fistula. Dr. Marie performed thoracentesis on the right removing 560cc. No family at bedside. Scheduled multidisciplinary rounds 3/9/17.    Interval History:  Weak and tired.  PEG tube placed 09 March.  Difficulty with tube feedings yesterday.  He also asks for fewer interruptions if able.    Review of Systems   Constitutional: Positive for activity change, appetite change and fatigue. Negative for fever.   HENT: Negative.  Negative for congestion, nosebleeds and sore throat.    Eyes: Negative.  Negative for photophobia, redness and visual disturbance.   Respiratory: Negative for cough, shortness of breath and wheezing.    Cardiovascular: Negative.  Negative for chest pain, palpitations and leg swelling.   Gastrointestinal: Positive for abdominal pain. Negative for constipation, diarrhea, nausea (dysphagia) and vomiting.   Endocrine: Negative.  Negative for polydipsia, polyphagia and polyuria.   Genitourinary: Negative.  Negative for dysuria, flank pain, frequency and urgency.   Musculoskeletal: Negative.  Negative for arthralgias, back pain and joint swelling.   Skin: Negative.  Negative for color change, pallor and rash.   Allergic/Immunologic: Negative.  Negative for environmental allergies, food allergies and immunocompromised state.   Neurological: Negative.  Negative for dizziness, syncope, weakness, light-headedness, numbness and headaches.   Hematological: Negative.  Negative for " adenopathy. Does not bruise/bleed easily.   Psychiatric/Behavioral: Negative.  Negative for confusion and hallucinations. The patient is not nervous/anxious.    All other systems reviewed and are negative.    Objective:     Vital Signs (Most Recent):  Temp: 98.4 °F (36.9 °C) (03/11/17 0842)  Pulse: 101 (03/11/17 1124)  Resp: 18 (03/11/17 1124)  BP: 105/77 (03/11/17 0842)  SpO2: 96 % (03/11/17 1124) Vital Signs (24h Range):  Temp:  [97.8 °F (36.6 °C)-98.6 °F (37 °C)] 98.4 °F (36.9 °C)  Pulse:  [] 101  Resp:  [18-19] 18  SpO2:  [95 %-98 %] 96 %  BP: (103-128)/(68-77) 105/77     Weight: 45.7 kg (100 lb 11.2 oz)  Body mass index is 18.42 kg/(m^2).    Intake/Output Summary (Last 24 hours) at 03/11/17 1156  Last data filed at 03/11/17 1100   Gross per 24 hour   Intake          2263.34 ml   Output             1150 ml   Net          1113.34 ml      Physical Exam   Constitutional: He is oriented to person, place, and time. He appears well-developed.   Cachectic, ill appearing   HENT:   Head: Normocephalic.   Mouth/Throat: No oropharyngeal exudate.   Eyes: Pupils are equal, round, and reactive to light.   Neck: No thyromegaly present.   Cardiovascular: Normal rate, regular rhythm and normal heart sounds.  Exam reveals no gallop.    No murmur heard.  Pulmonary/Chest: Effort normal and breath sounds normal. No respiratory distress. He has no rales.   Abdominal: Soft. Bowel sounds are normal. He exhibits no distension and no mass. There is no rebound and no guarding.   Musculoskeletal: Normal range of motion. He exhibits no edema.   Lymphadenopathy:     He has no cervical adenopathy.   Neurological: He is alert and oriented to person, place, and time.   Skin: Skin is warm and dry.   Psychiatric:   Quiet mood       Significant Labs:   CBC:     Recent Labs  Lab 03/10/17  0448 03/11/17  0400   WBC 16.77* 14.80*   HGB 11.0* 11.2*   HCT 35.3* 35.4*    193     CMP:     Recent Labs  Lab 03/10/17  0448 03/11/17  0400   NA  146* 145   K 4.6 4.5    108   CO2 33* 30*   * 148*   BUN 31* 30*   CREATININE 0.8 0.8   CALCIUM 9.7 9.7   PROT 6.3 6.3   ALBUMIN 2.4* 2.5*   BILITOT 0.9 0.9   ALKPHOS 158* 153*   AST 21 26   ALT 51* 54*   ANIONGAP 7* 7*   EGFRNONAA >60 >60       Significant Imaging: I have reviewed all pertinent imaging results/findings within the past 24 hours.    Assessment/Plan:      * Primary malignant neoplasm of right lung metastatic to other site  Follow up with oncology as out-patient for treatment with Keytruda.  Discussed with DR Espinosa, patient's primary oncologist.    COPD, very severe  Pulmonary following    Esophageal dysphagia  GI consulted and has been following.  Esophagram showed patent stent and severe aspiration.  Receiving IV Dilaudid 1 mg q 2 hours.  PEG placed 09 March by GI.    Bone metastases  Lytic lesion, 2.1 cm, in the left humeral head.    Chronic respiratory failure with hypoxia  Nebulizers and IV steroids.  Right thoracentesis removed 560ml 3/8/2017.    Cachexia  Starting tube feeds by PEG tube.    Severe protein-calorie malnutrition  Dietitian recommended:  Isosource 1.5 was not available at this time so I substituted Peptamen 1.5 prebio.  Check with dietary again during the week for further recommendations.    VTE Risk Mitigation         Ordered     Place sequential compression device  Until discontinued      03/04/17 0222     Medium Risk of VTE  Once      03/04/17 0217          Derek Bourgeois MD  Department of Hospital Medicine   [facility]

## 2017-03-11 NOTE — PROGRESS NOTES
Spoke with pt r/t attempting to try tube feeding again, pt agreed, 30 ml water bolus with 30 ml isosource tube feed given and flush with 30 ml water bolus.  PT stated he tolerated well, with no pain or discomfort.  SPOK Dr Bourgeois and informed of this event and reported feeding issues from evening shift.  RN requested option of continuous feedings, MD to see pt at this time.

## 2017-03-11 NOTE — PROGRESS NOTES
Progress Note  Pulmonology    Admit Date: 3/3/2017   LOS: 7 days     SUBJECTIVE:     Follow-up For: Dyspnea    Interval History: Seen and examined at bedside. Reports that he is feeling fair. PEG placed. Tube feeds started. No acute interval developments.      Continuous Infusions:     Scheduled Meds:   arformoterol  15 mcg Nebulization BID    budesonide  0.5 mg Nebulization BID    ceFEPime (MAXIPIME) IVPB  2 g Intravenous Q8H    famotidine  20 mg Intravenous BID    lidocaine HCL 10 mg/ml (1%)  1 mL Intradermal Once    methylPREDNISolone sodium succinate  40 mg Intravenous Q8H    moxifloxacin  400 mg Intravenous Q24H    ondansetron  4 mg Intravenous Q8H       Review of Systems:  Constitutional: no fever or chills  Respiratory: positive for dyspnea on exertion  Cardiovascular: no chest pain or palpitations    OBJECTIVE:     Vital Signs Range (Last 24H):  Temp:  [97.8 °F (36.6 °C)-98.6 °F (37 °C)]   Pulse:  []   Resp:  [18-19]   BP: (103-121)/(68-77)   SpO2:  [95 %-98 %]     I & O (Last 24H):    Intake/Output Summary (Last 24 hours) at 03/11/17 1417  Last data filed at 03/11/17 1400   Gross per 24 hour   Intake           633.67 ml   Output              950 ml   Net          -316.33 ml     Physical Exam:  General: no distress  Neck: no jugular venous distention  Lungs:  clear to auscultation bilaterally, normal respiratory effort and normal percussion bilaterally  Chest Wall: no tenderness  Heart: regular rate and rhythm and no murmur  Abdomen: soft, non-tender non-distended; bowel sounds normal  Extremities: no cyanosis or edema, or clubbing  Neurologic: alert, oriented, thought content appropriate    Laboratory:  CBC:     Recent Labs  Lab 03/11/17  0400   WBC 14.80*   RBC 3.24*   HGB 11.2*   HCT 35.4*      *   MCH 34.6*   MCHC 31.6*     CMP:     Recent Labs  Lab 03/11/17  0400   *   CALCIUM 9.7   ALBUMIN 2.5*   PROT 6.3      K 4.5   CO2 30*      BUN 30*   CREATININE 0.8    ALKPHOS 153*   ALT 54*   AST 26   BILITOT 0.9             ASSESSMENT/PLAN:     Active Hospital Problems    Diagnosis    *Primary malignant neoplasm of right lung metastatic to other site    Broncho-esophageal fistula    Pleural effusion, bilateral    Diffuse interstitial pulmonary fibrosis    Oropharyngeal dysphagia    Pneumonia due to infectious organism    Severe protein-calorie malnutrition    Cachexia    Chronic respiratory failure with hypoxia    Bone metastases    Palma's esophagus without dysplasia    Esophageal dysphagia    COPD, very severe       Plan: Continue Current. Wean steroids.     Counseling/Consultation:I discussed the case with primary care team.  Thank you for allowing me to participate in this patients care  We will continue to follow with you.

## 2017-03-11 NOTE — PLAN OF CARE
Problem: Patient Care Overview  Goal: Plan of Care Review  PT REQUIRES CGA FOR T/F TO CHAIR   Outcome: Ongoing (interventions implemented as appropriate)  Pt has remained free of injury for this shift, prn pain meds given upon request, bed alarm activated, family has remained at bedside.

## 2017-03-11 NOTE — PLAN OF CARE
Problem: Patient Care Overview  Goal: Plan of Care Review  PT REQUIRES CGA FOR T/F TO CHAIR   Outcome: Ongoing (interventions implemented as appropriate)  Patient AAO and VSS. Tachycardic on monitor. Encouraged repositioning every 2 hours. After first bolus of Isosource (170ml) per peg, patient reported feeling slightly dizzy and nauseated.  Requested to defer the second bolus until later in the day.   Remains free of injury. Fall precautions maintained with bed low and wheels locked.  IV solumedrol, zofran and cefepime administered as ordered. Pain not well managed with prn meds. Chart review for 24 hours completed. Will continue to monitor till discharge.

## 2017-03-11 NOTE — PROGRESS NOTES
Orders received for tube feeding continuous formula via pump, started feeding at 5ml/hr, pt agreed to plan, will increase to 10ml/hr after tolerating for 1 hour

## 2017-03-11 NOTE — SUBJECTIVE & OBJECTIVE
Interval History:   No significant change he continues to complain of shortness of breath and difficulty eating/dysphagia    Oncology Treatment Plan:   [facility] MK 3475 KEYNOTE 240    Medications:  Continuous Infusions:   Scheduled Meds:   albuterol-ipratropium 2.5mg-0.5mg/3mL  3 mL Nebulization Q4H    arformoterol  15 mcg Nebulization BID    budesonide  0.5 mg Nebulization BID    ceFEPime (MAXIPIME) IVPB  2 g Intravenous Q8H    famotidine  20 mg Intravenous BID    lidocaine HCL 10 mg/ml (1%)  1 mL Intradermal Once    methylPREDNISolone sodium succinate  40 mg Intravenous Q8H    moxifloxacin  400 mg Intravenous Q24H    ondansetron  4 mg Intravenous Q8H     PRN Meds:albuterol-ipratropium 2.5mg-0.5mg/3mL, guaifenesin 100 mg/5 ml, hydrALAZINE, HYDROmorphone, lorazepam, promethazine (PHENERGAN) IVPB     Review of Systems   Constitutional: Positive for fatigue.   Respiratory: Positive for shortness of breath.    Cardiovascular:        Some chest discomfort   All other systems reviewed and are negative.    Objective:     Vital Signs (Most Recent):  Temp: 98.4 °F (36.9 °C) (03/11/17 0842)  Pulse: 97 (03/11/17 0842)  Resp: 18 (03/11/17 0706)  BP: 105/77 (03/11/17 0842)  SpO2: 95 % (03/11/17 0842) Vital Signs (24h Range):  Temp:  [97.8 °F (36.6 °C)-98.6 °F (37 °C)] 98.4 °F (36.9 °C)  Pulse:  [] 97  Resp:  [18-19] 18  SpO2:  [95 %-98 %] 95 %  BP: (103-128)/(68-77) 105/77     Weight: 45.7 kg (100 lb 11.2 oz)  Body mass index is 18.42 kg/(m^2).  Body surface area is 1.41 meters squared.      Intake/Output Summary (Last 24 hours) at 03/11/17 1017  Last data filed at 03/11/17 0619   Gross per 24 hour   Intake          2263.34 ml   Output              775 ml   Net          1488.34 ml       Physical Exam   Constitutional: He is oriented to person, place, and time. He appears well-developed.   cachetic   HENT:   Head: Normocephalic.   Right Ear: External ear normal.   Left Ear: External ear normal.   Mouth/Throat: No  oropharyngeal exudate.   Eyes: Pupils are equal, round, and reactive to light. Right eye exhibits no discharge. Left eye exhibits discharge.   Neck: No thyromegaly present.   Cardiovascular: Normal rate, regular rhythm and normal heart sounds.  Exam reveals no gallop.    No murmur heard.  Pulmonary/Chest: He has no wheezes. He has no rales.   Slightly tachypneic   Abdominal: Soft. He exhibits no distension and no mass. There is no rebound and no guarding.   PEg tube in place   Musculoskeletal: Normal range of motion. He exhibits no edema, tenderness or deformity.   Lymphadenopathy:     He has no cervical adenopathy.   Neurological: He is alert and oriented to person, place, and time.   Skin: Skin is warm and dry.   Psychiatric: He has a normal mood and affect. His behavior is normal.       Significant Labs:   CBC:   Recent Labs  Lab 03/10/17  0448 03/11/17  0400   WBC 16.77* 14.80*   HGB 11.0* 11.2*   HCT 35.3* 35.4*    193   , CMP:   Recent Labs  Lab 03/10/17  0448 03/11/17  0400   * 145   K 4.6 4.5    108   CO2 33* 30*   * 148*   BUN 31* 30*   CREATININE 0.8 0.8   CALCIUM 9.7 9.7   PROT 6.3 6.3   ALBUMIN 2.4* 2.5*   BILITOT 0.9 0.9   ALKPHOS 158* 153*   AST 21 26   ALT 51* 54*   ANIONGAP 7* 7*   EGFRNONAA >60 >60   , LDH: No results for input(s): LDHCSF, BFSOURCE in the last 48 hours. and Reticulocytes: No results for input(s): RETIC in the last 48 hours.    Diagnostic Results:  I have reviewed all pertinent imaging results/findings within the past 24 hours.

## 2017-03-11 NOTE — PT/OT/SLP EVAL
Speech Language Pathology  Evaluation    Frederick J Lejeune   MRN: [id number]   Admitting Diagnosis: Primary malignant neoplasm of right lung metastatic to other site    Diet recommendations: Solid Diet Level: NPO  Liquid Diet Level: NPO     SLP Treatment Date: 17  Speech Start Time: 907     Speech Stop Time: 918     Speech Total (min): 11 min       TREATMENT BILLABLE MINUTES:  Eval Swallow and Oral Function 11    Diagnosis: Primary malignant neoplasm of right lung metastatic to other site  Severe pharyngeal dysphagia     Past Medical History:   Diagnosis Date    Carcinoma of right lung     Stage IV (bilateral pulmonary nodules, R supraclavic lad, R mainstem bronchus obstruction, LLL mass)    COPD (chronic obstructive pulmonary disease)     GERD (gastroesophageal reflux disease)     Lumbar vertebral fracture     Pulmonary fibrosis     severe     Past Surgical History:   Procedure Laterality Date    ABSCESS DRAINAGE      APPENDECTOMY      BACK SURGERY      ESOPHAGUS SURGERY      stent placement    HAND SURGERY Left        Has the patient been evaluated by SLP for swallowing? : Yes  Keep patient NPO?: Yes   General Precautions: Standard, aspiration, fall    Current Respiratory Status: nasal cannula     Social Hx: Patient lives with family.    Prior diet: NPO.    Occupational/hobbies/homemaking: not stated.    Subjective:  Pt alert and cooperative, seen at bedside.   Patient goals: improve strength.    Pain Ratin/10                   Objective:   Patient found with: oxygen, telemetry    Oral Musculature Evaluation  Oral Musculature: WNL  Dentition: present and adequate  Mucosal Quality: adequate  Mandibular Strength and Mobility: WNL  Oral Labial Strength and Mobility: WNL  Lingual Strength and Mobility: WNL  Buccal Strength and Mobility: WFL  Volitional Cough: non-productive   Voice Prior to PO Intake: hoarse     Bedside Swallow Eval:  Consistencies Assessed: Puree apple sauce via teaspoon  Oral  Phase: WFL  Pharyngeal Phase: decreased hyolaryngeal excursion, globus sensation, multiple spontaneous swallows and throat clearing    Additional Treatment:      FIM:                                 Assessment:  Frederick J Lejeune is a 66 y.o. male with a medical diagnosis of Primary malignant neoplasm of right lung metastatic to other site and presents with severe pharyngeal dysphagia.    Do you have any cultural, spiritual, Adventist conflicts, given your current situation?: none     Discharge recommendations: Discharge Facility/Level Of Care Needs: nursing facility, skilled     Goals:   SLP Goals        Problem: SLP Goal    Goal Priority Disciplines Outcome   SLP Goal     SLP    Description:  1. Continue to assess for PO readiness and tolerance of pleasure feedings.   2. Complete laryngeal elevation, mer maneuver, and mendelsohn maneuver x 10 repetitions each to improve pharyngeal swallow function.                Plan:   Patient to be seen Therapy Frequency: 3 x/week   Plan of Care expires: 03/17/17  Plan of Care reviewed with: patient  SLP Follow-up?: Yes         SLP G-Codes  Functional Assessment Tool Used: SURENDRA FCM NOMS  Functional Limitations: Swallowing  Swallow Current Status (): CN  Swallow Goal Status (): FLAVIO Day CCC-SLP  03/11/2017

## 2017-03-12 LAB
ALBUMIN SERPL BCP-MCNC: 2.4 G/DL
ALP SERPL-CCNC: 156 U/L
ALT SERPL W/O P-5'-P-CCNC: 123 U/L
ANION GAP SERPL CALC-SCNC: 7 MMOL/L
AST SERPL-CCNC: 68 U/L
BASOPHILS # BLD AUTO: 0.01 K/UL
BASOPHILS NFR BLD: 0.1 %
BILIRUB SERPL-MCNC: 0.8 MG/DL
BUN SERPL-MCNC: 25 MG/DL
CALCIUM SERPL-MCNC: 9.5 MG/DL
CHLORIDE SERPL-SCNC: 107 MMOL/L
CO2 SERPL-SCNC: 29 MMOL/L
CREAT SERPL-MCNC: 0.7 MG/DL
DIFFERENTIAL METHOD: ABNORMAL
EOSINOPHIL # BLD AUTO: 0 K/UL
EOSINOPHIL NFR BLD: 0 %
ERYTHROCYTE [DISTWIDTH] IN BLOOD BY AUTOMATED COUNT: 13.8 %
EST. GFR  (AFRICAN AMERICAN): >60 ML/MIN/1.73 M^2
EST. GFR  (NON AFRICAN AMERICAN): >60 ML/MIN/1.73 M^2
GLUCOSE SERPL-MCNC: 163 MG/DL
HCT VFR BLD AUTO: 34.5 %
HGB BLD-MCNC: 10.9 G/DL
LYMPHOCYTES # BLD AUTO: 0.8 K/UL
LYMPHOCYTES NFR BLD: 4.7 %
MCH RBC QN AUTO: 34.2 PG
MCHC RBC AUTO-ENTMCNC: 31.6 %
MCV RBC AUTO: 108 FL
MONOCYTES # BLD AUTO: 0.9 K/UL
MONOCYTES NFR BLD: 5 %
NEUTROPHILS # BLD AUTO: 15.6 K/UL
NEUTROPHILS NFR BLD: 90.2 %
PLATELET # BLD AUTO: 177 K/UL
PMV BLD AUTO: 11.7 FL
POTASSIUM SERPL-SCNC: 4.4 MMOL/L
PROT SERPL-MCNC: 6.1 G/DL
RBC # BLD AUTO: 3.19 M/UL
SODIUM SERPL-SCNC: 143 MMOL/L
WBC # BLD AUTO: 17.24 K/UL

## 2017-03-12 PROCEDURE — 94664 DEMO&/EVAL PT USE INHALER: CPT

## 2017-03-12 PROCEDURE — 21400001 HC TELEMETRY ROOM

## 2017-03-12 PROCEDURE — 99233 SBSQ HOSP IP/OBS HIGH 50: CPT | Mod: ,,, | Performed by: INTERNAL MEDICINE

## 2017-03-12 PROCEDURE — 85025 COMPLETE CBC W/AUTO DIFF WBC: CPT

## 2017-03-12 PROCEDURE — 63600175 PHARM REV CODE 636 W HCPCS: Performed by: INTERNAL MEDICINE

## 2017-03-12 PROCEDURE — 25000003 PHARM REV CODE 250: Performed by: INTERNAL MEDICINE

## 2017-03-12 PROCEDURE — 94640 AIRWAY INHALATION TREATMENT: CPT

## 2017-03-12 PROCEDURE — 94761 N-INVAS EAR/PLS OXIMETRY MLT: CPT

## 2017-03-12 PROCEDURE — 63600175 PHARM REV CODE 636 W HCPCS: Performed by: NURSE PRACTITIONER

## 2017-03-12 PROCEDURE — 99900035 HC TECH TIME PER 15 MIN (STAT)

## 2017-03-12 PROCEDURE — 80053 COMPREHEN METABOLIC PANEL: CPT

## 2017-03-12 PROCEDURE — 11000001 HC ACUTE MED/SURG PRIVATE ROOM

## 2017-03-12 PROCEDURE — 25000003 PHARM REV CODE 250: Performed by: NURSE PRACTITIONER

## 2017-03-12 PROCEDURE — 92526 ORAL FUNCTION THERAPY: CPT

## 2017-03-12 PROCEDURE — 27000221 HC OXYGEN, UP TO 24 HOURS

## 2017-03-12 RX ORDER — FENTANYL 25 UG/1
1 PATCH TRANSDERMAL
Status: DISCONTINUED | OUTPATIENT
Start: 2017-03-12 | End: 2017-03-14

## 2017-03-12 RX ADMIN — CEFEPIME HYDROCHLORIDE 2 G: 2 INJECTION, SOLUTION INTRAVENOUS at 06:03

## 2017-03-12 RX ADMIN — METHYLPREDNISOLONE SODIUM SUCCINATE 40 MG: 40 INJECTION, POWDER, FOR SOLUTION INTRAMUSCULAR; INTRAVENOUS at 05:03

## 2017-03-12 RX ADMIN — METHYLPREDNISOLONE SODIUM SUCCINATE 40 MG: 40 INJECTION, POWDER, FOR SOLUTION INTRAMUSCULAR; INTRAVENOUS at 01:03

## 2017-03-12 RX ADMIN — ARFORMOTEROL TARTRATE 15 MCG: 15 SOLUTION RESPIRATORY (INHALATION) at 07:03

## 2017-03-12 RX ADMIN — CEFEPIME HYDROCHLORIDE 2 G: 2 INJECTION, SOLUTION INTRAVENOUS at 10:03

## 2017-03-12 RX ADMIN — MOXIFLOXACIN HYDROCHLORIDE 400 MG: 400 INJECTION, SOLUTION INTRAVENOUS at 11:03

## 2017-03-12 RX ADMIN — HYDROMORPHONE HYDROCHLORIDE 1 MG: 1 INJECTION, SOLUTION INTRAMUSCULAR; INTRAVENOUS; SUBCUTANEOUS at 01:03

## 2017-03-12 RX ADMIN — HYDROMORPHONE HYDROCHLORIDE 1 MG: 1 INJECTION, SOLUTION INTRAMUSCULAR; INTRAVENOUS; SUBCUTANEOUS at 07:03

## 2017-03-12 RX ADMIN — HYDROMORPHONE HYDROCHLORIDE 1 MG: 1 INJECTION, SOLUTION INTRAMUSCULAR; INTRAVENOUS; SUBCUTANEOUS at 08:03

## 2017-03-12 RX ADMIN — CEFEPIME HYDROCHLORIDE 2 G: 2 INJECTION, SOLUTION INTRAVENOUS at 03:03

## 2017-03-12 RX ADMIN — HYDROMORPHONE HYDROCHLORIDE 1 MG: 1 INJECTION, SOLUTION INTRAMUSCULAR; INTRAVENOUS; SUBCUTANEOUS at 04:03

## 2017-03-12 RX ADMIN — ONDANSETRON 4 MG: 2 INJECTION INTRAMUSCULAR; INTRAVENOUS at 01:03

## 2017-03-12 RX ADMIN — HYDROMORPHONE HYDROCHLORIDE 1 MG: 1 INJECTION, SOLUTION INTRAMUSCULAR; INTRAVENOUS; SUBCUTANEOUS at 11:03

## 2017-03-12 RX ADMIN — PROMETHAZINE HYDROCHLORIDE 12.5 MG: 25 INJECTION, SOLUTION INTRAMUSCULAR; INTRAVENOUS at 04:03

## 2017-03-12 RX ADMIN — FENTANYL 1 PATCH: 25 PATCH TRANSDERMAL at 01:03

## 2017-03-12 RX ADMIN — FAMOTIDINE 20 MG: 20 INJECTION, SOLUTION INTRAVENOUS at 09:03

## 2017-03-12 RX ADMIN — BUDESONIDE 0.5 MG: 0.5 INHALANT RESPIRATORY (INHALATION) at 07:03

## 2017-03-12 RX ADMIN — HYDROMORPHONE HYDROCHLORIDE 1 MG: 1 INJECTION, SOLUTION INTRAMUSCULAR; INTRAVENOUS; SUBCUTANEOUS at 09:03

## 2017-03-12 RX ADMIN — HYDROMORPHONE HYDROCHLORIDE 1 MG: 1 INJECTION, SOLUTION INTRAMUSCULAR; INTRAVENOUS; SUBCUTANEOUS at 06:03

## 2017-03-12 RX ADMIN — FAMOTIDINE 20 MG: 20 INJECTION, SOLUTION INTRAVENOUS at 08:03

## 2017-03-12 RX ADMIN — METHYLPREDNISOLONE SODIUM SUCCINATE 40 MG: 40 INJECTION, POWDER, FOR SOLUTION INTRAMUSCULAR; INTRAVENOUS at 09:03

## 2017-03-12 RX ADMIN — ONDANSETRON 4 MG: 2 INJECTION INTRAMUSCULAR; INTRAVENOUS at 05:03

## 2017-03-12 RX ADMIN — HYDROMORPHONE HYDROCHLORIDE 1 MG: 1 INJECTION, SOLUTION INTRAMUSCULAR; INTRAVENOUS; SUBCUTANEOUS at 10:03

## 2017-03-12 RX ADMIN — ONDANSETRON 4 MG: 2 INJECTION INTRAMUSCULAR; INTRAVENOUS at 09:03

## 2017-03-12 NOTE — PLAN OF CARE
Problem: Patient Care Overview  Goal: Plan of Care Review  PT REQUIRES CGA FOR T/F TO CHAIR   Outcome: Ongoing (interventions implemented as appropriate)  Patient doing well this shift.  Antibiotics infusing as ordered.  Vital signs stable.  Pain controlled.  Tolerating tube feeds at 25 mL/hr st this time.  Will adjust to 30 mL/hr at 0600.  No acute distress noted.  Will continue to monitor. 24 hour chart check performed.

## 2017-03-12 NOTE — PROGRESS NOTES
[facility] -   Hematology/Oncology  Progress Note    Patient Name: Frederick J Lejeune  Admission Date: 3/3/2017  Hospital Length of Stay: 8 days  Code Status: Full Code     Subjective:     HPI:  History of Present Illness:  Frederick J Lejeune is a 66 y.o. male with GERD has a diagnosis of squamous cell carcinoma, PDL-1 positive.  He initlaly presneted  1 month history of weight loss, chest pain, dysphagia, odynophagia found to have esophageal narrowing and RLL lung mass.He has had difficulty eating due to odynophagia for the past month. He also states he has L groin pain due to what he thought was a hernia, which has since been found to be firm lymphadenopathy. Patient underwent EGD 10/19, which was notable for extrinsic compression in middle third of the esophagus as well as findings suggestive of Palma's esophagus, which were biopsied. 10/20, patient underwent EUS notable for lymphadenopathy in the paratracheal, mediastinal, and subcarinal region. FNA performed. Patient underwent bronchoscopy on 10/21 which was notable for a mass in the right mainstem bronchus, biopsy revealed lung cancer, squamous cell carcinoma.   Patient also completed palliative radiation to relieve right mainstem bronchus obstruction. He then was treated with weekly Carbo-Taxol x 6-7 weeks, which was discontinued due to disease progression on imaging in early 1/2017. He is currently receiving Pembrolizumab every 3 weeks.     The patient recently had placement of an esophageal stent due to obstruction.  Since having the stent placed, he has continue with severe pain on swallowing as well as a choking sensation every time he eats.  He has had studies  that shows the stent to the patient. He has been having aspirations by radiographic swallowing studies.  It is planned for him to have an EGd and feeding tube lalced for nutrition.  Plan is to continue Keytruda treatment once discharged    He remains very SOB, more so in the last  few weeks             Past Medical History:  COPD, severe  GERD  Lumbar vertebral fracture s/p surgery in 1/2016 / Chronic low back pain  H/o intracranial hemorrhage     Social History   Marital status:       Spouse name: ganesh   Number of children: 2   Years of education: N/A     Occupational History     [employer]     Social History Main Topics   Smoking status: Former Smoker      Packs/day: 1.00      Years: 50.00      Types: Cigarettes      Quit date: 5/10/2016   Smokeless tobacco: Former User      Types: Snuff      Quit date: 5/10/2016   Alcohol use No   Drug use: No   Sexual activity: Yes      Partners: Female     Other Topics Concern   Not on file     Social History Narrative        Family History: family history includes Cancer in his father; Emphysema in his mother.           Interval History:   Patient started to feeds and continues to report shortness of breath, chest/abdominal pain    Oncology Treatment Plan:   [facility] MK 3475 KEYNOTE 240    Medications:  Continuous Infusions:   Scheduled Meds:   arformoterol  15 mcg Nebulization BID    budesonide  0.5 mg Nebulization BID    ceFEPime (MAXIPIME) IVPB  2 g Intravenous Q8H    famotidine  20 mg Intravenous BID    lidocaine HCL 10 mg/ml (1%)  1 mL Intradermal Once    methylPREDNISolone sodium succinate  40 mg Intravenous Q8H    moxifloxacin  400 mg Intravenous Q24H    ondansetron  4 mg Intravenous Q8H     PRN Meds:albuterol-ipratropium 2.5mg-0.5mg/3mL, guaifenesin 100 mg/5 ml, hydrALAZINE, HYDROmorphone, lorazepam, promethazine (PHENERGAN) IVPB     Review of Systems   Constitutional: Positive for fatigue. Negative for activity change and appetite change.   HENT: Negative for congestion, dental problem, hearing loss and nosebleeds.    Eyes: Negative for discharge and itching.   Respiratory: Positive for shortness of breath.    Cardiovascular: Positive for chest pain.   Endocrine: Negative for cold intolerance, heat intolerance and  polydipsia.   Genitourinary: Negative for difficulty urinating, dysuria and enuresis.   Neurological: Negative for dizziness, tremors, facial asymmetry and headaches.   All other systems reviewed and are negative.    Objective:     Vital Signs (Most Recent):  Temp: 98.4 °F (36.9 °C) (03/12/17 0716)  Pulse: 100 (03/12/17 0729)  Resp: 18 (03/12/17 0729)  BP: 109/72 (03/12/17 0716)  SpO2: 98 % (03/12/17 0729) Vital Signs (24h Range):  Temp:  [97.4 °F (36.3 °C)-98.4 °F (36.9 °C)] 98.4 °F (36.9 °C)  Pulse:  [] 100  Resp:  [16-19] 18  SpO2:  [96 %-100 %] 98 %  BP: (101-109)/(62-72) 109/72     Weight: 45.7 kg (100 lb 11.2 oz)  Body mass index is 18.42 kg/(m^2).  Body surface area is 1.41 meters squared.      Intake/Output Summary (Last 24 hours) at 03/12/17 1007  Last data filed at 03/12/17 0553   Gross per 24 hour   Intake              941 ml   Output             1000 ml   Net              -59 ml       Physical Exam   Constitutional: He is oriented to person, place, and time. He appears cachectic.   cachetic   HENT:   Head: Normocephalic.   Right Ear: External ear normal.   Left Ear: External ear normal.   Mouth/Throat: No oropharyngeal exudate.   Eyes: Pupils are equal, round, and reactive to light. Right eye exhibits no discharge. Left eye exhibits discharge.   Neck: No tracheal deviation present. No thyromegaly present.   Cardiovascular: Normal rate, regular rhythm and normal heart sounds.  Exam reveals no gallop.    No murmur heard.  Pulmonary/Chest: No respiratory distress. He has no wheezes. He has no rales.   Abdominal: Soft. He exhibits no distension and no mass. There is no rebound and no guarding.   PEg tube in place   Musculoskeletal: Normal range of motion. He exhibits no edema, tenderness or deformity.   Lymphadenopathy:     He has no cervical adenopathy.   Neurological: He is alert and oriented to person, place, and time.   Skin: Skin is warm and dry.   Psychiatric: He has a normal mood and affect. His  behavior is normal.       Significant Labs:   CBC:   Recent Labs  Lab 03/11/17  0400 03/12/17  0448   WBC 14.80* 17.24*   HGB 11.2* 10.9*   HCT 35.4* 34.5*    177   , CMP:   Recent Labs  Lab 03/11/17  0400 03/12/17  0448    143   K 4.5 4.4    107   CO2 30* 29   * 163*   BUN 30* 25*   CREATININE 0.8 0.7   CALCIUM 9.7 9.5   PROT 6.3 6.1   ALBUMIN 2.5* 2.4*   BILITOT 0.9 0.8   ALKPHOS 153* 156*   AST 26 68*   ALT 54* 123*   ANIONGAP 7* 7*   EGFRNONAA >60 >60   , LDH: No results for input(s): LDHCSF, BFSOURCE in the last 48 hours. and Reticulocytes: No results for input(s): RETIC in the last 48 hours.    Diagnostic Results:  I have reviewed all pertinent imaging results/findings within the past 24 hours.    Assessment/Plan:     * Primary malignant neoplasm of right lung metastatic to other site  --unlikely to be a candidate for additional treatment due to poor PS  --considering hospice placement    Esophageal dysphagia  --PEG tube placed      Bone metastases  Recent CTa of chest was reported as showing a lytic lesion in the left femoral head.  --will address as outpatient     Cachexia  --started tube feeds      Thank you for your consult. I will follow-up with patient. Please contact us if you have any additional questions.     Eren Willett Jr, MD  Hematology/Oncology  [facility] [place]

## 2017-03-12 NOTE — PLAN OF CARE
Problem: Patient Care Overview  Goal: Plan of Care Review  PT REQUIRES CGA FOR T/F TO CHAIR   Fall precautions maintained, pt free from injuries/fall. Pt assisted/encouraged in repositioning every 2 hrs, pt is able to reposition self, mepilex replaced in sacral area. C/o generalized pain in abdomen, moderately relieved by splinting area during activity, rest, repositioning, and prn pain meds. Cont peg tube feeding started at 25ml/hr and gradually increased to 30ml/hr. Denies nausea/vomiting at this time. IVF and abx given as prescribed. NSR on tele. POC and meds reviewed w/ pt, pt verbalizes understanding. Chart check done. Will cont to monitor.

## 2017-03-12 NOTE — PT/OT/SLP PROGRESS
Speech Language Pathology  Treatment    Frederick J Lejeune   MRN: [id number]   Admitting Diagnosis: Primary malignant neoplasm of right lung metastatic to other site    Diet recommendations: Solid Diet Level: NPO  Liquid Diet Level: NPO     SLP Treatment Date: 17  Speech Start Time: 1117     Speech Stop Time: 1128     Speech Total (min): 11 min       TREATMENT BILLABLE MINUTES:  Treatment Swallowing Dysfunction 11    Has the patient been evaluated by SLP for swallowing? : Yes  Keep patient NPO?: Yes   General Precautions: Standard, aspiration, fall  Current Respiratory Status: nasal cannula       Subjective:  Pt alert and cooperative, seen at bedside.     Pain Ratin/10        Location: abdomen  Pain Addressed: Nurse notified  Pain Rating Post-Intervention: 8/10    Objective:   Patient found with: peripheral IV, oxygen, telemetry     Pt was instructed in mer maneuver and effortful swallow x 3 repetitions each given verbal and visual cues to improve pharyngeal swallow function. Pt tolerated trials of pureed consistency (pudding) via teaspoon utilizing swallowing strategies with s/s of dysphagia present.     FIM:                                 Assessment:  Frederick J Lejeune is a 66 y.o. male with a medical diagnosis of Primary malignant neoplasm of right lung metastatic to other site and presents with severe pharyngeal dysphagia.    Discharge recommendations: Discharge Facility/Level Of Care Needs: nursing facility, skilled     Goals:   SLP Goals        Problem: SLP Goal    Goal Priority Disciplines Outcome   SLP Goal     SLP    Description:  1. Continue to assess for PO readiness and tolerance of pleasure feedings.   2. Complete laryngeal elevation, mer maneuver, and mendelsohn maneuver x 10 repetitions each to improve pharyngeal swallow function.                Plan:   Patient to be seen Therapy Frequency: 3 x/week   Plan of Care expires: 17  Plan of Care reviewed with: patient  SLP Follow-up?:  Yes              Marli Day CCC-SLP  03/12/2017

## 2017-03-12 NOTE — PT/OT/SLP PROGRESS
Physical Therapy      Frederick J Lejeune  MRN: [id number]    Patient not seen today secondary to Patient unwilling to participate. Will follow-up tomorrow.    Cesar Condon, PTA

## 2017-03-12 NOTE — PT/OT/SLP PROGRESS
Occupational Therapy      Frederick J Lejeune  MRN: [id number]    Pt not seen secondary pt refused despite max encouragement. Will attempt tx session on later date.    Mildred Euceda, OT   0696  3/12/2017

## 2017-03-12 NOTE — ASSESSMENT & PLAN NOTE
--unlikely to be a candidate for additional treatment due to poor PS  --considering hospice placement

## 2017-03-12 NOTE — PROGRESS NOTES
Progress Note  Pulmonology    Admit Date: 3/3/2017   LOS: 8 days     SUBJECTIVE:     Follow-up For: Dyspnea    Interval History: Seen and examined at bedside. Tolerating tube feeds. No acute interval developments.      Continuous Infusions: n/a     Scheduled Meds:   arformoterol  15 mcg Nebulization BID    budesonide  0.5 mg Nebulization BID    ceFEPime (MAXIPIME) IVPB  2 g Intravenous Q8H    famotidine  20 mg Intravenous BID    fentaNYL  1 patch Transdermal Q72H    lidocaine HCL 10 mg/ml (1%)  1 mL Intradermal Once    methylPREDNISolone sodium succinate  40 mg Intravenous Q8H    moxifloxacin  400 mg Intravenous Q24H    ondansetron  4 mg Intravenous Q8H       Review of Systems:  Constitutional: no fever or chills  Respiratory: positive for dyspnea on exertion  Cardiovascular: no chest pain or palpitations    OBJECTIVE:     Vital Signs Range (Last 24H):  Temp:  [97.4 °F (36.3 °C)-98.4 °F (36.9 °C)]   Pulse:  []   Resp:  [16-19]   BP: (101-109)/(62-72)   SpO2:  [98 %-100 %]     I & O (Last 24H):    Intake/Output Summary (Last 24 hours) at 03/12/17 1502  Last data filed at 03/12/17 1454   Gross per 24 hour   Intake             1143 ml   Output             1525 ml   Net             -382 ml     Physical Exam:  General: no distress  Neck: no jugular venous distention  Lungs:  clear to auscultation bilaterally, normal respiratory effort and normal percussion bilaterally  Chest Wall: no tenderness  Heart: regular rate and rhythm and no murmur  Abdomen: soft, non-tender non-distended; bowel sounds normal  Extremities: no cyanosis or edema, or clubbing  Neurologic: alert, oriented, thought content appropriate    Laboratory:  CBC:     Recent Labs  Lab 03/12/17  0448   WBC 17.24*   RBC 3.19*   HGB 10.9*   HCT 34.5*      *   MCH 34.2*   MCHC 31.6*     CMP:     Recent Labs  Lab 03/12/17  0448   *   CALCIUM 9.5   ALBUMIN 2.4*   PROT 6.1      K 4.4   CO2 29      BUN 25*   CREATININE  0.7   ALKPHOS 156*   *   AST 68*   BILITOT 0.8             ASSESSMENT/PLAN:     Active Hospital Problems    Diagnosis    *Primary malignant neoplasm of right lung metastatic to other site    Broncho-esophageal fistula    Pleural effusion, bilateral    Diffuse interstitial pulmonary fibrosis    Oropharyngeal dysphagia    Pneumonia due to infectious organism    Severe protein-calorie malnutrition    Cachexia    Chronic respiratory failure with hypoxia    Bone metastases    Palma's esophagus without dysplasia    Esophageal dysphagia    COPD, very severe       Plan: Continue Current.      Counseling/Consultation:I discussed the case with primary care team. Ambulate patient. Wean steroids.    No further pulmonary recommendations.    Will sign off for now. Please feel free to re consult if further pulmonary issues arise.  Thank you for allowing me to participate in this patients care

## 2017-03-12 NOTE — ASSESSMENT & PLAN NOTE
Recent CTa of chest was reported as showing a lytic lesion in the left femoral head.  --will address as outpatient

## 2017-03-12 NOTE — SUBJECTIVE & OBJECTIVE
Interval History:   Patient started to feeds and continues to report shortness of breath, chest/abdominal pain    Oncology Treatment Plan:   [facility] MK 3475 KEYNOTE 240    Medications:  Continuous Infusions:   Scheduled Meds:   arformoterol  15 mcg Nebulization BID    budesonide  0.5 mg Nebulization BID    ceFEPime (MAXIPIME) IVPB  2 g Intravenous Q8H    famotidine  20 mg Intravenous BID    lidocaine HCL 10 mg/ml (1%)  1 mL Intradermal Once    methylPREDNISolone sodium succinate  40 mg Intravenous Q8H    moxifloxacin  400 mg Intravenous Q24H    ondansetron  4 mg Intravenous Q8H     PRN Meds:albuterol-ipratropium 2.5mg-0.5mg/3mL, guaifenesin 100 mg/5 ml, hydrALAZINE, HYDROmorphone, lorazepam, promethazine (PHENERGAN) IVPB     Review of Systems   Constitutional: Positive for fatigue. Negative for activity change and appetite change.   HENT: Negative for congestion, dental problem, hearing loss and nosebleeds.    Eyes: Negative for discharge and itching.   Respiratory: Positive for shortness of breath.    Cardiovascular: Positive for chest pain.   Endocrine: Negative for cold intolerance, heat intolerance and polydipsia.   Genitourinary: Negative for difficulty urinating, dysuria and enuresis.   Neurological: Negative for dizziness, tremors, facial asymmetry and headaches.   All other systems reviewed and are negative.    Objective:     Vital Signs (Most Recent):  Temp: 98.4 °F (36.9 °C) (03/12/17 0716)  Pulse: 100 (03/12/17 0729)  Resp: 18 (03/12/17 0729)  BP: 109/72 (03/12/17 0716)  SpO2: 98 % (03/12/17 0729) Vital Signs (24h Range):  Temp:  [97.4 °F (36.3 °C)-98.4 °F (36.9 °C)] 98.4 °F (36.9 °C)  Pulse:  [] 100  Resp:  [16-19] 18  SpO2:  [96 %-100 %] 98 %  BP: (101-109)/(62-72) 109/72     Weight: 45.7 kg (100 lb 11.2 oz)  Body mass index is 18.42 kg/(m^2).  Body surface area is 1.41 meters squared.      Intake/Output Summary (Last 24 hours) at 03/12/17 1007  Last data filed at 03/12/17 0553   Gross per 24  hour   Intake              941 ml   Output             1000 ml   Net              -59 ml       Physical Exam   Constitutional: He is oriented to person, place, and time. He appears cachectic.   cachetic   HENT:   Head: Normocephalic.   Right Ear: External ear normal.   Left Ear: External ear normal.   Mouth/Throat: No oropharyngeal exudate.   Eyes: Pupils are equal, round, and reactive to light. Right eye exhibits no discharge. Left eye exhibits discharge.   Neck: No tracheal deviation present. No thyromegaly present.   Cardiovascular: Normal rate, regular rhythm and normal heart sounds.  Exam reveals no gallop.    No murmur heard.  Pulmonary/Chest: No respiratory distress. He has no wheezes. He has no rales.   Abdominal: Soft. He exhibits no distension and no mass. There is no rebound and no guarding.   PEg tube in place   Musculoskeletal: Normal range of motion. He exhibits no edema, tenderness or deformity.   Lymphadenopathy:     He has no cervical adenopathy.   Neurological: He is alert and oriented to person, place, and time.   Skin: Skin is warm and dry.   Psychiatric: He has a normal mood and affect. His behavior is normal.       Significant Labs:   CBC:   Recent Labs  Lab 03/11/17  0400 03/12/17  0448   WBC 14.80* 17.24*   HGB 11.2* 10.9*   HCT 35.4* 34.5*    177   , CMP:   Recent Labs  Lab 03/11/17  0400 03/12/17  0448    143   K 4.5 4.4    107   CO2 30* 29   * 163*   BUN 30* 25*   CREATININE 0.8 0.7   CALCIUM 9.7 9.5   PROT 6.3 6.1   ALBUMIN 2.5* 2.4*   BILITOT 0.9 0.8   ALKPHOS 153* 156*   AST 26 68*   ALT 54* 123*   ANIONGAP 7* 7*   EGFRNONAA >60 >60   , LDH: No results for input(s): LDHCSF, BFSOURCE in the last 48 hours. and Reticulocytes: No results for input(s): RETIC in the last 48 hours.    Diagnostic Results:  I have reviewed all pertinent imaging results/findings within the past 24 hours.

## 2017-03-13 LAB
ALBUMIN SERPL BCP-MCNC: 2.4 G/DL
ALP SERPL-CCNC: 147 U/L
ALT SERPL W/O P-5'-P-CCNC: 144 U/L
ANION GAP SERPL CALC-SCNC: 9 MMOL/L
AST SERPL-CCNC: 51 U/L
BASOPHILS # BLD AUTO: 0.02 K/UL
BASOPHILS NFR BLD: 0.1 %
BILIRUB SERPL-MCNC: 0.5 MG/DL
BUN SERPL-MCNC: 23 MG/DL
CALCIUM SERPL-MCNC: 8.9 MG/DL
CHLORIDE SERPL-SCNC: 109 MMOL/L
CO2 SERPL-SCNC: 25 MMOL/L
CREAT SERPL-MCNC: 0.7 MG/DL
DIFFERENTIAL METHOD: ABNORMAL
EOSINOPHIL # BLD AUTO: 0 K/UL
EOSINOPHIL NFR BLD: 0 %
ERYTHROCYTE [DISTWIDTH] IN BLOOD BY AUTOMATED COUNT: 13.7 %
EST. GFR  (AFRICAN AMERICAN): >60 ML/MIN/1.73 M^2
EST. GFR  (NON AFRICAN AMERICAN): >60 ML/MIN/1.73 M^2
GLUCOSE SERPL-MCNC: 164 MG/DL
HCT VFR BLD AUTO: 36 %
HGB BLD-MCNC: 11.7 G/DL
LYMPHOCYTES # BLD AUTO: 0.8 K/UL
LYMPHOCYTES NFR BLD: 4.2 %
MCH RBC QN AUTO: 35 PG
MCHC RBC AUTO-ENTMCNC: 32.5 %
MCV RBC AUTO: 108 FL
MONOCYTES # BLD AUTO: 0.6 K/UL
MONOCYTES NFR BLD: 3 %
NEUTROPHILS # BLD AUTO: 17.8 K/UL
NEUTROPHILS NFR BLD: 93.8 %
PLATELET # BLD AUTO: 149 K/UL
PMV BLD AUTO: 12 FL
POTASSIUM SERPL-SCNC: 4.5 MMOL/L
PROT SERPL-MCNC: 5.9 G/DL
RBC # BLD AUTO: 3.34 M/UL
SODIUM SERPL-SCNC: 143 MMOL/L
WBC # BLD AUTO: 19.16 K/UL

## 2017-03-13 PROCEDURE — 97803 MED NUTRITION INDIV SUBSEQ: CPT

## 2017-03-13 PROCEDURE — 63600175 PHARM REV CODE 636 W HCPCS: Performed by: INTERNAL MEDICINE

## 2017-03-13 PROCEDURE — 80053 COMPREHEN METABOLIC PANEL: CPT

## 2017-03-13 PROCEDURE — 21400001 HC TELEMETRY ROOM

## 2017-03-13 PROCEDURE — 97535 SELF CARE MNGMENT TRAINING: CPT

## 2017-03-13 PROCEDURE — 97530 THERAPEUTIC ACTIVITIES: CPT

## 2017-03-13 PROCEDURE — 85025 COMPLETE CBC W/AUTO DIFF WBC: CPT

## 2017-03-13 PROCEDURE — 63600175 PHARM REV CODE 636 W HCPCS: Performed by: NURSE PRACTITIONER

## 2017-03-13 PROCEDURE — 25000003 PHARM REV CODE 250: Performed by: INTERNAL MEDICINE

## 2017-03-13 PROCEDURE — 94640 AIRWAY INHALATION TREATMENT: CPT

## 2017-03-13 PROCEDURE — 99232 SBSQ HOSP IP/OBS MODERATE 35: CPT | Mod: ,,, | Performed by: INTERNAL MEDICINE

## 2017-03-13 PROCEDURE — 27000221 HC OXYGEN, UP TO 24 HOURS

## 2017-03-13 PROCEDURE — 25000003 PHARM REV CODE 250: Performed by: NURSE PRACTITIONER

## 2017-03-13 PROCEDURE — 94799 UNLISTED PULMONARY SVC/PX: CPT

## 2017-03-13 PROCEDURE — 94664 DEMO&/EVAL PT USE INHALER: CPT

## 2017-03-13 PROCEDURE — 92526 ORAL FUNCTION THERAPY: CPT

## 2017-03-13 RX ORDER — SODIUM CHLORIDE 9 MG/ML
INJECTION, SOLUTION INTRAVENOUS CONTINUOUS
Status: DISCONTINUED | OUTPATIENT
Start: 2017-03-13 | End: 2017-03-16

## 2017-03-13 RX ADMIN — PROMETHAZINE HYDROCHLORIDE 12.5 MG: 25 INJECTION, SOLUTION INTRAMUSCULAR; INTRAVENOUS at 07:03

## 2017-03-13 RX ADMIN — HYDROMORPHONE HYDROCHLORIDE 1 MG: 1 INJECTION, SOLUTION INTRAMUSCULAR; INTRAVENOUS; SUBCUTANEOUS at 02:03

## 2017-03-13 RX ADMIN — METHYLPREDNISOLONE SODIUM SUCCINATE 20 MG: 40 INJECTION, POWDER, FOR SOLUTION INTRAMUSCULAR; INTRAVENOUS at 03:03

## 2017-03-13 RX ADMIN — BUDESONIDE 0.5 MG: 0.5 INHALANT RESPIRATORY (INHALATION) at 08:03

## 2017-03-13 RX ADMIN — METHYLPREDNISOLONE SODIUM SUCCINATE 20 MG: 40 INJECTION, POWDER, FOR SOLUTION INTRAMUSCULAR; INTRAVENOUS at 09:03

## 2017-03-13 RX ADMIN — SODIUM CHLORIDE 500 ML: 0.9 INJECTION, SOLUTION INTRAVENOUS at 07:03

## 2017-03-13 RX ADMIN — ONDANSETRON 4 MG: 2 INJECTION INTRAMUSCULAR; INTRAVENOUS at 09:03

## 2017-03-13 RX ADMIN — ONDANSETRON 4 MG: 2 INJECTION INTRAMUSCULAR; INTRAVENOUS at 03:03

## 2017-03-13 RX ADMIN — HYDROMORPHONE HYDROCHLORIDE 1 MG: 1 INJECTION, SOLUTION INTRAMUSCULAR; INTRAVENOUS; SUBCUTANEOUS at 05:03

## 2017-03-13 RX ADMIN — HYDROMORPHONE HYDROCHLORIDE 1 MG: 1 INJECTION, SOLUTION INTRAMUSCULAR; INTRAVENOUS; SUBCUTANEOUS at 10:03

## 2017-03-13 RX ADMIN — HYDROMORPHONE HYDROCHLORIDE 1 MG: 1 INJECTION, SOLUTION INTRAMUSCULAR; INTRAVENOUS; SUBCUTANEOUS at 03:03

## 2017-03-13 RX ADMIN — HYDROMORPHONE HYDROCHLORIDE 1 MG: 1 INJECTION, SOLUTION INTRAMUSCULAR; INTRAVENOUS; SUBCUTANEOUS at 12:03

## 2017-03-13 RX ADMIN — FAMOTIDINE 20 MG: 20 INJECTION, SOLUTION INTRAVENOUS at 08:03

## 2017-03-13 RX ADMIN — HYDROMORPHONE HYDROCHLORIDE 1 MG: 1 INJECTION, SOLUTION INTRAMUSCULAR; INTRAVENOUS; SUBCUTANEOUS at 07:03

## 2017-03-13 RX ADMIN — CEFEPIME: 2 INJECTION, POWDER, FOR SOLUTION INTRAVENOUS at 10:03

## 2017-03-13 RX ADMIN — FAMOTIDINE 20 MG: 20 INJECTION, SOLUTION INTRAVENOUS at 09:03

## 2017-03-13 RX ADMIN — SODIUM CHLORIDE: 0.9 INJECTION, SOLUTION INTRAVENOUS at 08:03

## 2017-03-13 RX ADMIN — ONDANSETRON 4 MG: 2 INJECTION INTRAMUSCULAR; INTRAVENOUS at 05:03

## 2017-03-13 RX ADMIN — ARFORMOTEROL TARTRATE 15 MCG: 15 SOLUTION RESPIRATORY (INHALATION) at 08:03

## 2017-03-13 RX ADMIN — ARFORMOTEROL TARTRATE 15 MCG: 15 SOLUTION RESPIRATORY (INHALATION) at 07:03

## 2017-03-13 RX ADMIN — METHYLPREDNISOLONE SODIUM SUCCINATE 40 MG: 40 INJECTION, POWDER, FOR SOLUTION INTRAMUSCULAR; INTRAVENOUS at 05:03

## 2017-03-13 RX ADMIN — HYDROMORPHONE HYDROCHLORIDE 1 MG: 1 INJECTION, SOLUTION INTRAMUSCULAR; INTRAVENOUS; SUBCUTANEOUS at 11:03

## 2017-03-13 RX ADMIN — CEFEPIME: 2 INJECTION, POWDER, FOR SOLUTION INTRAVENOUS at 05:03

## 2017-03-13 RX ADMIN — MOXIFLOXACIN HYDROCHLORIDE 400 MG: 400 INJECTION, SOLUTION INTRAVENOUS at 11:03

## 2017-03-13 RX ADMIN — BUDESONIDE 0.5 MG: 0.5 INHALANT RESPIRATORY (INHALATION) at 07:03

## 2017-03-13 RX ADMIN — CEFEPIME HYDROCHLORIDE 2 G: 2 INJECTION, SOLUTION INTRAVENOUS at 01:03

## 2017-03-13 NOTE — PT/OT/SLP PROGRESS
"Speech Language Pathology  Treatment    Frederick J Lejeune   MRN: [id number]   Admitting Diagnosis: Primary malignant neoplasm of right lung metastatic to other site    Diet recommendations: Solid Diet Level: NPO  Liquid Diet Level: NPO     SLP Treatment Date: 17  Speech Start Time: 142     Speech Stop Time: 1435     Speech Total (min): 10 min       TREATMENT BILLABLE MINUTES:  Treatment Swallowing Dysfunction 10    Has the patient been evaluated by SLP for swallowing? : Yes  Keep patient NPO?: Yes   General Precautions: Standard, fall, aspiration  Current Respiratory Status: nasal cannula       Subjective:  Pt was seen lying in bed with no family present.  "I'm too tired to even talk"    Pain Ratin/10      Pain Rating Post-Intervention: 0/10    Objective:   Patient found with: peripheral IV, telemetry  ST provided extensive education on dysphagia, aspiration, effects of radiation on swallowing musculature, importance of oral hygiene, free water protocol, and importance of continuing ST for dysphagia therapy.  Pt reported understanding.  He tolerated 2 ice chips with no overt signs of aspiration, however pt reports that he often chokes on ice chips throughout the day.  ST encouraged pt to perform dysphagia ex's, however pt stated he was too tired and requested for ST to come back tomorrow.     Assessment:  Frederick J Lejeune is a 66 y.o. male with a medical diagnosis of Primary malignant neoplasm of right lung metastatic to other site and presents with dysphagia.    Discharge recommendations: Discharge Facility/Level Of Care Needs: nursing facility, skilled     Goals:   SLP Goals        Problem: SLP Goal    Goal Priority Disciplines Outcome   SLP Goal     SLP Ongoing (interventions implemented as appropriate)   Description:  1. Continue to assess for PO readiness and tolerance of pleasure feedings.   2. Complete laryngeal elevation, mer maneuver, and mendelsohn maneuver x 10 repetitions each to improve " pharyngeal swallow function.                Plan:   Patient to be seen Therapy Frequency: 3 x/week   Plan of Care expires: 03/17/17  Plan of Care reviewed with: patient  SLP Follow-up?: Yes              Maritza Kirk CCC-SLP  03/13/2017

## 2017-03-13 NOTE — PROGRESS NOTES
[facility] -   Hematology/Oncology  Progress Note    Patient Name: Frederick J Lejeune  Admission Date: 3/3/2017  Hospital Length of Stay: 9 days  Code Status: Full Code     Subjective:     HPI:  History of Present Illness:  Frederick J Lejeune is a 66 y.o. male with GERD has a diagnosis of squamous cell carcinoma, PDL-1 positive.  He initlaly presneted  1 month history of weight loss, chest pain, dysphagia, odynophagia found to have esophageal narrowing and RLL lung mass.He has had difficulty eating due to odynophagia for the past month. He also states he has L groin pain due to what he thought was a hernia, which has since been found to be firm lymphadenopathy. Patient underwent EGD 10/19, which was notable for extrinsic compression in middle third of the esophagus as well as findings suggestive of Palma's esophagus, which were biopsied. 10/20, patient underwent EUS notable for lymphadenopathy in the paratracheal, mediastinal, and subcarinal region. FNA performed. Patient underwent bronchoscopy on 10/21 which was notable for a mass in the right mainstem bronchus, biopsy revealed lung cancer, squamous cell carcinoma.   Patient also completed palliative radiation to relieve right mainstem bronchus obstruction. He then was treated with weekly Carbo-Taxol x 6-7 weeks, which was discontinued due to disease progression on imaging in early 1/2017. He is currently receiving Pembrolizumab every 3 weeks.     The patient recently had placement of an esophageal stent due to obstruction.  Since having the stent placed, he has continue with severe pain on swallowing as well as a choking sensation every time he eats.  He has had studies  that shows the stent to the patient. He has been having aspirations by radiographic swallowing studies.  It is planned for him to have an EGd and feeding tube lalced for nutrition.  Plan is to continue Keytruda treatment once discharged    He remains very SOB, more so in the last  few weeks             Past Medical History:  COPD, severe  GERD  Lumbar vertebral fracture s/p surgery in 1/2016 / Chronic low back pain  H/o intracranial hemorrhage     Social History   Marital status:       Spouse name: ganesh   Number of children: 2   Years of education: N/A     Occupational History     [employer]     Social History Main Topics   Smoking status: Former Smoker      Packs/day: 1.00      Years: 50.00      Types: Cigarettes      Quit date: 5/10/2016   Smokeless tobacco: Former User      Types: Snuff      Quit date: 5/10/2016   Alcohol use No   Drug use: No   Sexual activity: Yes      Partners: Female     Other Topics Concern   Not on file     Social History Narrative        Family History: family history includes Cancer in his father; Emphysema in his mother.           Interval History:   Patient started to feeds and continues to report shortness of breath, chest/abdominal pain    Oncology Treatment Plan:   [facility] MK 3475 KEYNOTE 240    Medications:  Continuous Infusions:   Scheduled Meds:   arformoterol  15 mcg Nebulization BID    budesonide  0.5 mg Nebulization BID    custom IVPB builder   Intravenous Q8H    famotidine  20 mg Intravenous BID    fentaNYL  1 patch Transdermal Q72H    lidocaine HCL 10 mg/ml (1%)  1 mL Intradermal Once    methylPREDNISolone sodium succinate  40 mg Intravenous Q8H    moxifloxacin  400 mg Intravenous Q24H    ondansetron  4 mg Intravenous Q8H     PRN Meds:albuterol-ipratropium 2.5mg-0.5mg/3mL, guaifenesin 100 mg/5 ml, hydrALAZINE, HYDROmorphone, lorazepam, promethazine (PHENERGAN) IVPB     Review of Systems   Constitutional: Positive for fatigue. Negative for activity change and appetite change.   HENT: Negative for congestion, dental problem, hearing loss and nosebleeds.    Eyes: Negative for discharge and itching.   Respiratory: Positive for shortness of breath.    Cardiovascular: Positive for chest pain.   Gastrointestinal: Positive for  nausea. Negative for abdominal distention, abdominal pain and anal bleeding.   Endocrine: Negative for cold intolerance, heat intolerance and polydipsia.   Genitourinary: Negative for difficulty urinating, dysuria, enuresis and frequency.   Neurological: Negative for dizziness, tremors, facial asymmetry and headaches.   All other systems reviewed and are negative.  Objective:     Vital Signs (Most Recent):  Temp: 97.6 °F (36.4 °C) (03/13/17 0425)  Pulse: 90 (03/13/17 0732)  Resp: (!) 24 (03/13/17 0732)  BP: 103/68 (03/13/17 0425)  SpO2: 100 % (03/13/17 0732) Vital Signs (24h Range):  Temp:  [97.4 °F (36.3 °C)-98 °F (36.7 °C)] 97.6 °F (36.4 °C)  Pulse:  [89-97] 90  Resp:  [18-24] 24  SpO2:  [98 %-100 %] 100 %  BP: ()/(68-73) 103/68     Weight: 45.7 kg (100 lb 11.2 oz)  Body mass index is 18.42 kg/(m^2).  Body surface area is 1.41 meters squared.      Intake/Output Summary (Last 24 hours) at 03/13/17 0755  Last data filed at 03/13/17 0520   Gross per 24 hour   Intake             1115 ml   Output             2125 ml   Net            -1010 ml       Physical Exam   Constitutional: He is oriented to person, place, and time. He appears cachectic.   cachetic   HENT:   Head: Normocephalic.   Right Ear: External ear normal.   Left Ear: External ear normal.   Nose: Nose normal.   Mouth/Throat: Oropharynx is clear and moist. No oropharyngeal exudate.   Eyes: Pupils are equal, round, and reactive to light. Right eye exhibits no discharge. Left eye exhibits no discharge.   Neck: No tracheal deviation present. No thyromegaly present.   Cardiovascular: Normal rate, regular rhythm and normal heart sounds.  Exam reveals no gallop.    No murmur heard.  Pulmonary/Chest: No respiratory distress. He has no wheezes. He has no rales.   Abdominal: Soft. He exhibits no distension and no mass. There is no rebound and no guarding.   Peg tube in place   Musculoskeletal: Normal range of motion. He exhibits no edema, tenderness or  deformity.   Lymphadenopathy:     He has no cervical adenopathy.   Neurological: He is alert and oriented to person, place, and time.   Skin: Skin is warm and dry.   Psychiatric: He has a normal mood and affect. His behavior is normal.       Significant Labs:   CBC:     Recent Labs  Lab 03/12/17 0448 03/13/17  0300   WBC 17.24* 19.16*   HGB 10.9* 11.7*   HCT 34.5* 36.0*    149*   , CMP:     Recent Labs  Lab 03/12/17 0448 03/13/17  0300    143   K 4.4 4.5    109   CO2 29 25   * 164*   BUN 25* 23   CREATININE 0.7 0.7   CALCIUM 9.5 8.9   PROT 6.1 5.9*   ALBUMIN 2.4* 2.4*   BILITOT 0.8 0.5   ALKPHOS 156* 147*   AST 68* 51*   * 144*   ANIONGAP 7* 9   EGFRNONAA >60 >60   , LDH: No results for input(s): LDHCSF, BFSOURCE in the last 48 hours. and Reticulocytes: No results for input(s): RETIC in the last 48 hours.    Diagnostic Results:  I have reviewed all pertinent imaging results/findings within the past 24 hours.    Assessment/Plan:     * Primary malignant neoplasm of right lung metastatic to other site  --unlikely to be a candidate for additional treatment due to poor PS  --considering hospice placement    Chronic respiratory failure with hypoxia  Thoracentesis 2 days ago   Has chronic aspiration, a tracheo-esophageal fistula,active lung cancer, some degree of radiation pneumonitis and COPD all contributing  --abx  --solumedrol 40mg Q 8      I        Cachexia  --started tube feeds      Thank you for your consult. I will follow-up with patient. Please contact us if you have any additional questions.     Eren Willett Jr, MD  Hematology/Oncology  [facility] [place]

## 2017-03-13 NOTE — SUBJECTIVE & OBJECTIVE
Interval History:   Patient started to feeds and continues to report shortness of breath, chest/abdominal pain    Oncology Treatment Plan:   [facility] MK 3475 KEYNOTE 240    Medications:  Continuous Infusions:   Scheduled Meds:   arformoterol  15 mcg Nebulization BID    budesonide  0.5 mg Nebulization BID    custom IVPB builder   Intravenous Q8H    famotidine  20 mg Intravenous BID    fentaNYL  1 patch Transdermal Q72H    lidocaine HCL 10 mg/ml (1%)  1 mL Intradermal Once    methylPREDNISolone sodium succinate  40 mg Intravenous Q8H    moxifloxacin  400 mg Intravenous Q24H    ondansetron  4 mg Intravenous Q8H     PRN Meds:albuterol-ipratropium 2.5mg-0.5mg/3mL, guaifenesin 100 mg/5 ml, hydrALAZINE, HYDROmorphone, lorazepam, promethazine (PHENERGAN) IVPB     Review of Systems   Constitutional: Positive for fatigue. Negative for activity change and appetite change.   HENT: Negative for congestion, dental problem, hearing loss and nosebleeds.    Eyes: Negative for discharge and itching.   Respiratory: Positive for shortness of breath.    Cardiovascular: Positive for chest pain.   Gastrointestinal: Positive for nausea. Negative for abdominal distention, abdominal pain and anal bleeding.   Endocrine: Negative for cold intolerance, heat intolerance and polydipsia.   Genitourinary: Negative for difficulty urinating, dysuria, enuresis and frequency.   Neurological: Negative for dizziness, tremors, facial asymmetry and headaches.   All other systems reviewed and are negative.  Objective:     Vital Signs (Most Recent):  Temp: 97.6 °F (36.4 °C) (03/13/17 0425)  Pulse: 90 (03/13/17 0732)  Resp: (!) 24 (03/13/17 0732)  BP: 103/68 (03/13/17 0425)  SpO2: 100 % (03/13/17 0732) Vital Signs (24h Range):  Temp:  [97.4 °F (36.3 °C)-98 °F (36.7 °C)] 97.6 °F (36.4 °C)  Pulse:  [89-97] 90  Resp:  [18-24] 24  SpO2:  [98 %-100 %] 100 %  BP: ()/(68-73) 103/68     Weight: 45.7 kg (100 lb 11.2 oz)  Body mass index is 18.42  kg/(m^2).  Body surface area is 1.41 meters squared.      Intake/Output Summary (Last 24 hours) at 03/13/17 0755  Last data filed at 03/13/17 0520   Gross per 24 hour   Intake             1115 ml   Output             2125 ml   Net            -1010 ml       Physical Exam   Constitutional: He is oriented to person, place, and time. He appears cachectic.   cachetic   HENT:   Head: Normocephalic.   Right Ear: External ear normal.   Left Ear: External ear normal.   Nose: Nose normal.   Mouth/Throat: Oropharynx is clear and moist. No oropharyngeal exudate.   Eyes: Pupils are equal, round, and reactive to light. Right eye exhibits no discharge. Left eye exhibits no discharge.   Neck: No tracheal deviation present. No thyromegaly present.   Cardiovascular: Normal rate, regular rhythm and normal heart sounds.  Exam reveals no gallop.    No murmur heard.  Pulmonary/Chest: No respiratory distress. He has no wheezes. He has no rales.   Abdominal: Soft. He exhibits no distension and no mass. There is no rebound and no guarding.   Peg tube in place   Musculoskeletal: Normal range of motion. He exhibits no edema, tenderness or deformity.   Lymphadenopathy:     He has no cervical adenopathy.   Neurological: He is alert and oriented to person, place, and time.   Skin: Skin is warm and dry.   Psychiatric: He has a normal mood and affect. His behavior is normal.       Significant Labs:   CBC:     Recent Labs  Lab 03/12/17  0448 03/13/17  0300   WBC 17.24* 19.16*   HGB 10.9* 11.7*   HCT 34.5* 36.0*    149*   , CMP:     Recent Labs  Lab 03/12/17  0448 03/13/17  0300    143   K 4.4 4.5    109   CO2 29 25   * 164*   BUN 25* 23   CREATININE 0.7 0.7   CALCIUM 9.5 8.9   PROT 6.1 5.9*   ALBUMIN 2.4* 2.4*   BILITOT 0.8 0.5   ALKPHOS 156* 147*   AST 68* 51*   * 144*   ANIONGAP 7* 9   EGFRNONAA >60 >60   , LDH: No results for input(s): LDHCSF, BFSOURCE in the last 48 hours. and Reticulocytes: No results for  input(s): RETIC in the last 48 hours.    Diagnostic Results:  I have reviewed all pertinent imaging results/findings within the past 24 hours.

## 2017-03-13 NOTE — ASSESSMENT & PLAN NOTE
Thoracentesis 2 days ago   Has chronic aspiration, a tracheo-esophageal fistula,active lung cancer, some degree of radiation pneumonitis and COPD all contributing  --abx  --solumedrol 40mg Q 8      I

## 2017-03-13 NOTE — PLAN OF CARE
Problem: Patient Care Overview  Goal: Plan of Care Review  PT REQUIRES CGA FOR T/F TO CHAIR   Outcome: Ongoing (interventions implemented as appropriate)  Patient doing okay this shift.  Tube feeding increased to 35 mL/hr.  Antibiotics infused as ordered.  Pain somewhat controlled with medication ordered.  He has been turned q2h this shift due to sacral wound.  No acute distress.  Will continue to monitor.  24 hour chart check completed.

## 2017-03-13 NOTE — CONSULTS
"Mepilex border dressing removed from sacral area to reveal a stage 2 pressure injury to the sacrococcygeal area measuring 1.5cm x 0.5cm x 0.1cm with a pink, moist base. See below for wound care nurse recommendations.    Skin Care Precautions / Pressure Ulcer Prevention:  1. Follow "Guidelines for Prevention of Pressure Ulcers in at Risk Patients"  2. Do NOT elevate HOB greater than 30 degrees unless contraindicated.  3. Obtain foam wedge from materials management to assist with maintaining proper position changes at least q 2hours  4. Please elevate heels off mattress and document in the frequent checks section of DOC Flow Sheets every 2 hours.  5. Apply sween 24 moisturizer cream to all dry skin after daily bath and prn  6. Please refer to Jesenia's Procedure for "Skin Tear Prevention and Management (CIG)" for care / prevention of skin tears.  7. Avoid the use of adhesives / tape on skin as much as possible.  8. Limit the amount of linen/underpad between patient and mattress surface to ONE fitted sheet and ONE covidien underpad - NO draw sheet/briefs.    Sacrococcygeal Stage 2 Pressure Injury Wound Care :  1. Cleanse brennen wound skin with easi cleanse bath wipes  2. Pat dry  3. Irrigate wound base with NS  4. Pat dry  5. Paint brennen wound skin with cavilon barrier  6. Apply DuoDERM dressing over affected area  7. Relieve pressure from affected area as much as possible  8. Change dressing every 5 days (sooner if edges roll)    "

## 2017-03-13 NOTE — PLAN OF CARE
Problem: Patient Care Overview  Goal: Plan of Care Review  PT REQUIRES CGA FOR T/F TO CHAIR   Outcome: Ongoing (interventions implemented as appropriate)  Fall precautions maintained, pt free from injuries/fall, assisted in repositioning every 2 hours, heels off mattress, mepilex on sacrum and bilateral ear (top). C/o generalized abdominal pain, moderately relieved by quiet and calm environment, rest, and prn pain meds. Peg tube feeding advanced to 40 ml/hr, pt tolerating well, denies vomiting. NSR on tele. IVF and abx given as prescribed. POC and meds reviewed w/ pt, pt verbalizes understanding. Chart check done. Will cont to monitor.

## 2017-03-13 NOTE — SUBJECTIVE & OBJECTIVE
Interval History:  Very weak and tired.  PEG tube placed 09 March.  Difficulty with tube feedings initially but able to slowly advance.  35 mL/h today.    Review of Systems   Constitutional: Positive for activity change, appetite change and fatigue. Negative for fever.   HENT: Negative.  Negative for congestion, nosebleeds and sore throat.    Eyes: Negative.  Negative for photophobia, redness and visual disturbance.   Respiratory: Negative for cough, shortness of breath and wheezing.    Cardiovascular: Negative.  Negative for chest pain, palpitations and leg swelling.   Gastrointestinal: Positive for abdominal pain. Negative for constipation, diarrhea, nausea (dysphagia) and vomiting.   Endocrine: Negative.  Negative for polydipsia, polyphagia and polyuria.   Genitourinary: Negative.  Negative for dysuria, flank pain, frequency and urgency.   Musculoskeletal: Negative.  Negative for arthralgias, back pain and joint swelling.   Skin: Negative.  Negative for color change, pallor and rash.   Allergic/Immunologic: Negative.  Negative for environmental allergies, food allergies and immunocompromised state.   Neurological: Negative.  Negative for dizziness, syncope, weakness, light-headedness, numbness and headaches.   Hematological: Negative.  Negative for adenopathy. Does not bruise/bleed easily.   Psychiatric/Behavioral: Negative.  Negative for confusion and hallucinations. The patient is not nervous/anxious.    All other systems reviewed and are negative.    Objective:     Vital Signs (Most Recent):  Temp: 98 °F (36.7 °C) (03/13/17 1544)  Pulse: 92 (03/13/17 1544)  Resp: 18 (03/13/17 1544)  BP: (!) 88/57 (03/13/17 1746)  SpO2: 100 % (03/13/17 1544) Vital Signs (24h Range):  Temp:  [97.6 °F (36.4 °C)-98 °F (36.7 °C)] 98 °F (36.7 °C)  Pulse:  [89-97] 92  Resp:  [18-24] 18  SpO2:  [98 %-100 %] 100 %  BP: ()/(57-73) 88/57     Weight: 45.7 kg (100 lb 11.2 oz)  Body mass index is 18.42 kg/(m^2).    Intake/Output  Summary (Last 24 hours) at 03/13/17 1859  Last data filed at 03/13/17 1702   Gross per 24 hour   Intake              775 ml   Output             2525 ml   Net            -1750 ml      Physical Exam   Constitutional: He is oriented to person, place, and time. He appears well-developed.   Cachectic, ill appearing   HENT:   Head: Normocephalic.   Mouth/Throat: No oropharyngeal exudate.   Eyes: Pupils are equal, round, and reactive to light.   Neck: No thyromegaly present.   Cardiovascular: Normal rate, regular rhythm and normal heart sounds.  Exam reveals no gallop.    No murmur heard.  Pulmonary/Chest: Effort normal and breath sounds normal. No respiratory distress. He has no rales.   Abdominal: Soft. Bowel sounds are normal. He exhibits no distension and no mass. There is no rebound and no guarding.   Musculoskeletal: Normal range of motion. He exhibits no edema.   Lymphadenopathy:     He has no cervical adenopathy.   Neurological: He is alert and oriented to person, place, and time.   Skin: Skin is warm and dry.   Psychiatric:   Quiet mood       Significant Labs:   CBC:     Recent Labs  Lab 03/12/17  0448 03/13/17  0300   WBC 17.24* 19.16*   HGB 10.9* 11.7*   HCT 34.5* 36.0*    149*     CMP:     Recent Labs  Lab 03/12/17  0448 03/13/17  0300    143   K 4.4 4.5    109   CO2 29 25   * 164*   BUN 25* 23   CREATININE 0.7 0.7   CALCIUM 9.5 8.9   PROT 6.1 5.9*   ALBUMIN 2.4* 2.4*   BILITOT 0.8 0.5   ALKPHOS 156* 147*   AST 68* 51*   * 144*   ANIONGAP 7* 9   EGFRNONAA >60 >60       Significant Imaging: I have reviewed all pertinent imaging results/findings within the past 24 hours.

## 2017-03-13 NOTE — PLAN OF CARE
Problem: SLP Goal  Goal: SLP Goal  1. Continue to assess for PO readiness and tolerance of pleasure feedings.   2. Complete laryngeal elevation, mer maneuver, and mendelsohn maneuver x 10 repetitions each to improve pharyngeal swallow function.   Outcome: Ongoing (interventions implemented as appropriate)  Pt very receptive to dysphagia education.  He is tolerating small amounts of ice chips throughout the day.

## 2017-03-13 NOTE — PLAN OF CARE
Problem: Patient Care Overview  Goal: Plan of Care Review  PT REQUIRES CGA FOR T/F TO CHAIR   Outcome: Ongoing (interventions implemented as appropriate)  P.T. NOTE: SUPERVISION FOR BED MOBILITY AND CGA FOR TRANSFER TO CHAIR WITH FAIR BALANCE

## 2017-03-13 NOTE — NURSING
Spoke with Dr. Willett regarding patient's request to increase pain medication.  Dr. Willett had just started patient on fentanyl patch on 3/12 and it takes some time for that to begin to help.  He stated that we are to continue with current dosage of dilaudid.  If pain symptoms have not improved by pm, then we can contact him to see if other medications may be adjusted.

## 2017-03-13 NOTE — PT/OT/SLP PROGRESS
Occupational Therapy  Treatment    Frederick J Lejeune   MRN: [id number]   Admitting Diagnosis: Primary malignant neoplasm of right lung metastatic to other site    OT Date of Treatment: 17   OT Start Time: 1015  OT Stop Time: 1100  OT Total Time (min): 45 min    Billable Minutes:  Self Care/Home Management 30 MINUTES and Therapeutic Activity 10 MINUTES    General Precautions: Standard, fall  Orthopedic Precautions: N/A  Braces:           Subjective:  Communicated with NURSE RODRIGUEZ AND EPIC CHART REVIEW prior to session.      Pain Ratin/10                   Objective:  Patient found with: peripheral IV, telemetry (PEG FEEDING)     Functional Mobility:  Bed Mobility:  Rolling/Turning to Left: Stand by assistance  Scooting/Bridging: Stand by Assistance  Supine to Sit: Stand by Assistance    Transfers:   Sit <> Stand Assistance: Contact Guard Assistance  Sit <> Stand Assistive Device: No Assistive Device (HAND HELD ASSIST)  Bed <> Chair Technique: Stand Pivot  Bed <> Chair Transfer Assistance: Contact Guard Assistance    Functional Ambulation: NA    Activities of Daily Living:     Feeding adaptive equipment: NA  UE Dressing Level of Assistance: Minimum assistance  UE adaptive equipment:NA  LE Dressing Level of Assistance: Stand by assistance  LE adaptive equipment: NA        Toileting Where Assessed: Other (Comment) (SITTING IN BEDSIDE CHAIR)  Toileting Level of Assistance: Stand by assistance  Bathing Where Assessed: Sitting sinkside  Bathing Level of Assistance: Minimum assistance  Bathing adaptive equipment: NA    Balance:   Static Sit: FAIR+: Able to take MINIMAL challenges from all directions  Dynamic Sit: FAIR+: Maintains balance through MINIMAL excursions of active trunk motion  Static Stand: FAIR: Maintains without assist but unable to take challenges  Dynamic stand: FAIR: Needs CONTACT GUARD during gait    Therapeutic Activities and Exercises:  PT EDUCATED ON HEP     AM-PAC 6 CLICK ADL   How much help from  another person does this patient currently need?   1 = Unable, Total/Dependent Assistance  2 = A lot, Maximum/Moderate Assistance  3 = A little, Minimum/Contact Guard/Supervision  4 = None, Modified Coleman/Independent          AM-PAC Raw Score CMS G-Code Modifier Level of Impairment Assistance   6 % Total / Unable   7 - 9 CM 80 - 100% Maximal Assist   10 - 14 CL 60 - 80% Moderate Assist   15 - 19 CK 40 - 60% Moderate Assist   20 - 22 CJ 20 - 40% Minimal Assist   23 CI 1-20% SBA / CGA   24 CH 0% Independent/ Mod I     Patient left up in chair with all lines intact, call button in reach and NURSE MICHAEL notified    ASSESSMENT:  Frederick J Lejeune is a 66 y.o. male with a medical diagnosis of Primary malignant neoplasm of right lung metastatic to other site and presents with DEBILITY AND GENERALIZED WEAKNESS. PT MAY CONTINUE TO BENEFIT FROM SKILLED O.T..    Rehab identified problem list/impairments: Rehab identified problem list/impairments: impaired functional mobilty, weakness, impaired endurance, impaired self care skills, gait instability, impaired balance, decreased upper extremity function, decreased coordination, decreased lower extremity function, pain, decreased ROM    Rehab potential is good.    Activity tolerance: Good    Discharge recommendations: Discharge Facility/Level Of Care Needs: nursing facility, skilled     Barriers to discharge:      Equipment recommendations: bath bench     GOALS:   Occupational Therapy Goals        Problem: Occupational Therapy Goal    Goal Priority Disciplines Outcome Interventions   Occupational Therapy Goal     OT, PT/OT Ongoing (interventions implemented as appropriate)    Description:  Goals to be met by: 3/14/17     Patient will increase functional independence with ADLs by performing:    UE Dressing with Stand-by Assistance.  LE Dressing with Minimal Assistance.  Toileting from toilet with Minimal Assistance for hygiene and clothing management.   Toilet  transfer to toilet with Contact Guard Assistance.  Upper extremity exercise program x10 reps per handout, with independence for  Minimal resistive ex to BUE.                Plan:  Patient to be seen 3 x/week to address the above listed problems via self-care/home management, therapeutic exercises, therapeutic activities  Plan of Care expires: 03/14/17  Plan of Care reviewed with: patient         Mildred Ok, OT  03/13/2017

## 2017-03-13 NOTE — SUBJECTIVE & OBJECTIVE
Interval History:  Very weak and tired.  PEG tube placed 09 March.  Difficulty with tube feedings initially but able to slowly advance.    Review of Systems   Constitutional: Positive for activity change, appetite change and fatigue. Negative for fever.   HENT: Negative.  Negative for congestion, nosebleeds and sore throat.    Eyes: Negative.  Negative for photophobia, redness and visual disturbance.   Respiratory: Negative for cough, shortness of breath and wheezing.    Cardiovascular: Negative.  Negative for chest pain, palpitations and leg swelling.   Gastrointestinal: Positive for abdominal pain. Negative for constipation, diarrhea, nausea (dysphagia) and vomiting.   Endocrine: Negative.  Negative for polydipsia, polyphagia and polyuria.   Genitourinary: Negative.  Negative for dysuria, flank pain, frequency and urgency.   Musculoskeletal: Negative.  Negative for arthralgias, back pain and joint swelling.   Skin: Negative.  Negative for color change, pallor and rash.   Allergic/Immunologic: Negative.  Negative for environmental allergies, food allergies and immunocompromised state.   Neurological: Negative.  Negative for dizziness, syncope, weakness, light-headedness, numbness and headaches.   Hematological: Negative.  Negative for adenopathy. Does not bruise/bleed easily.   Psychiatric/Behavioral: Negative.  Negative for confusion and hallucinations. The patient is not nervous/anxious.    All other systems reviewed and are negative.    Objective:     Vital Signs (Most Recent):  Temp: 97.9 °F (36.6 °C) (03/12/17 1617)  Pulse: 91 (03/12/17 1911)  Resp: 20 (03/12/17 1911)  BP: 99/68 (03/12/17 1617)  SpO2: 98 % (03/12/17 1911) Vital Signs (24h Range):  Temp:  [97.4 °F (36.3 °C)-98.4 °F (36.9 °C)] 97.9 °F (36.6 °C)  Pulse:  [] 91  Resp:  [18-20] 20  SpO2:  [98 %-100 %] 98 %  BP: ()/(62-72) 99/68     Weight: 45.7 kg (100 lb 11.2 oz)  Body mass index is 18.42 kg/(m^2).    Intake/Output Summary (Last 24  hours) at 03/12/17 2157  Last data filed at 03/12/17 1813   Gross per 24 hour   Intake              945 ml   Output             1450 ml   Net             -505 ml      Physical Exam   Constitutional: He is oriented to person, place, and time. He appears well-developed.   Cachectic, ill appearing   HENT:   Head: Normocephalic.   Mouth/Throat: No oropharyngeal exudate.   Eyes: Pupils are equal, round, and reactive to light.   Neck: No thyromegaly present.   Cardiovascular: Normal rate, regular rhythm and normal heart sounds.  Exam reveals no gallop.    No murmur heard.  Pulmonary/Chest: Effort normal and breath sounds normal. No respiratory distress. He has no rales.   Abdominal: Soft. Bowel sounds are normal. He exhibits no distension and no mass. There is no rebound and no guarding.   Musculoskeletal: Normal range of motion. He exhibits no edema.   Lymphadenopathy:     He has no cervical adenopathy.   Neurological: He is alert and oriented to person, place, and time.   Skin: Skin is warm and dry.   Psychiatric:   Quiet mood       Significant Labs:   CBC:     Recent Labs  Lab 03/11/17  0400 03/12/17  0448   WBC 14.80* 17.24*   HGB 11.2* 10.9*   HCT 35.4* 34.5*    177     CMP:     Recent Labs  Lab 03/11/17  0400 03/12/17  0448    143   K 4.5 4.4    107   CO2 30* 29   * 163*   BUN 30* 25*   CREATININE 0.8 0.7   CALCIUM 9.7 9.5   PROT 6.3 6.1   ALBUMIN 2.5* 2.4*   BILITOT 0.9 0.8   ALKPHOS 153* 156*   AST 26 68*   ALT 54* 123*   ANIONGAP 7* 7*   EGFRNONAA >60 >60       Significant Imaging: I have reviewed all pertinent imaging results/findings within the past 24 hours.

## 2017-03-13 NOTE — PLAN OF CARE
Problem: Patient Care Overview  Goal: Plan of Care Review  PT REQUIRES CGA FOR T/F TO CHAIR   Outcome: Ongoing (interventions implemented as appropriate)  Recommendation/Intervention: 1. Continue current tubefeed Peptamen 1.5 with Previo at 50ml/hr. Flushes 100ml every 4 hours and as needed for hydration.   Goals: Tolerance of nutrition support  Nutrition Goal Status: new  Communication of RD Recs: other (comment) (sticky note)

## 2017-03-13 NOTE — CONSULTS
[facility] -   Adult Nutrition  Consult Note    SUMMARY     Recommendations  Recommendation/Intervention: 1. Continue current tubefeed Peptamen 1.5 with Previo at 50ml/hr. Flushes 100ml every 4 hours and as needed for hydration.   Goals: Tolerance of nutrition support  Nutrition Goal Status: new  Communication of RD Recs: other (comment) (sticky note)    Continuum of Care Plan  Home on Peptamen 1.5 with Prebio bolus 5 can daily with 30ml flush before and after plus 100ml TID and as needed for hydration. Pleasure feeds per SLP recommendations for consistency. (patient questioning whether he can still eat by mouth for pleasure)    Reason for Assessment  Reason for Assessment: RD follow-up  Diagnosis:  (dysphagia)  Relevent Medical History: Lung Ca; esophageal stent; GERD, COPD   Interdisciplinary Rounds: did not attend  General Information Comments: Patient was started on bolus feed Isosource this weekend per RD recommendations. Patient reported some intolerance following first feeding, dizziness and nasuea. Patient was switched to continuous of Peptamen 1.5 with Prebio for enhanced GI tolerance at goal rate 50ml/hr which meets 100% of estimated needs.    Nutrition Prescription Ordered  Current Diet Order: NPO  Current Nutrition Support Formula Ordered: Peptamen 1.5 Prebio  Current Nutrition Support Rate Ordered: 50 (ml)  Current Nutrition Support Frequency Ordered: ml/hr      Evaluation of Received Nutrients/Fluid Intake  Enteral Calories (kcal): 1800  Enteral Protein (gm): 81  Enteral (Free Water) Fluid (mL): 934     Nutrition Risk Screen  Nutrition Risk Screen: dysphagia or difficulty swallowing    Nutrition/Diet History  Typical Food/Fluid Intake: Patient with poor ital intake and weight loss over the last several months, very familiar to nutrition services from previous admissiona. Patient is finally agreeable to PEG placement for nutrition support  Food Preferences: none reported  Factors Affecting  Nutritional Intake: difficulty/impaired swallowing, NPO    Labs/Tests/Procedures/Meds  Pertinent Labs Reviewed: reviewed  Pertinent Labs Comments: Gluc 164, Alb 2.4, Prealbumin 18, AST 51,   Pertinent Medications Reviewed: reviewed    Physical Findings  Overall Physical Appearance:  (thin)  Tubes:  gastrostomy  Oral/Mouth Cavity: tooth/teeth missing  Skin: pressure ulcer(s) (Stage II Sacral Spine, R and L ear, see nursing notes)    Anthropometrics  Height (inches): 62.01 in  Weight Method: Bed Scale  Weight (kg): 45.7 kg  Ideal Body Weight (IBW), Male: 118.06 lb  % Ideal Body Weight, Male (lb): 85.34 lb  BMI (kg/m2): 18.42  BMI Grade: 17 - 18.4 protein-energy malnutrition grade I  Usual Body Weight (UBW), k kg  % Usual Body Weight: 71.41  % Weight Change:  (28% weight loss in 6 months)  Weight Loss: unintentional    Estimated/Assessed Needs  Weight Used For Calorie Calculations: 45.7 kg (100 lb 12 oz)   Height (cm): 157.5 cm  Energy Need Method: Kcal/kg (0251-2575 kcal (to promote weight gain))  35 kcal/kg (kcal): 1599.5 and 40 kcal/kg (kcal): 1828   Weight Used For Protein Calculations: 45.7 kg (100 lb 12 oz)  Protein Requirements: 60-70 g  1.2 gm Protein (gm): 54.95 and 1.5 gm Protein (gm): 68.69  Fluid Need Method: RDA Method (1ml/dee or as needed)    Monitor and Evaluation  Food and Nutrient Intake: energy intake, enteral nutrition intake  Food and Nutrient Adminstration: enteral and parenteral nutrition administration  Physical Activity and Function: nutrition-related ADLs and IADLs  Anthropometric Measurements: weight  Biochemical Data, Medical Tests and Procedures: electrolyte and renal panel, glucose/endocrine profile  Nutrition-Focused Physical Findings: skin    Nutrition Risk  Level of Risk:  (2 x week)    Nutrition Follow-Up  RD Follow-up?: Yes    Assessment and Plan  Esophageal dysphagia  Nutrition Problem:   Swallowing difficulty    Etiology/Related to:   Dysphagia    As evidenced  by:  Inability to consume adequate energy from oral diet with weight loss over the last 6 months    Treatment Strategy:   1. PEG with tubefeed for nutrition support  2. Clinimix until PEG placed    Nutrition Diagnosis Status:   New

## 2017-03-13 NOTE — PLAN OF CARE
Problem: Occupational Therapy Goal  Goal: Occupational Therapy Goal  Goals to be met by: 3/14/17     Patient will increase functional independence with ADLs by performing:    UE Dressing with Stand-by Assistance.  LE Dressing with Minimal Assistance.  Toileting from toilet with Minimal Assistance for hygiene and clothing management.   Toilet transfer to toilet with Contact Guard Assistance.  Upper extremity exercise program x10 reps per handout, with independence for Minimal resistive ex to BUE.   Outcome: Ongoing (interventions implemented as appropriate)  CONTINUES TO WORK TOWARD (I) WITH  ADL'S AND FUNCTIONAL T/F'S

## 2017-03-13 NOTE — PHYSICIAN QUERY
PT Name: Frederick J Lejeune  MR #: [id number]     Physician Query Form - Documentation Clarification    Reviewer  [email or web address]  This form is a permanent document in the medical record.     Query Date: March 13, 2017  By submitting this query, we are merely seeking further clarification of documentation to reflect the severity of illness of your patient. Please utilize your independent clinical judgment when addressing the question(s) below.    (The Medical record reflects the following:)      Supporting Clinical Findings Location in Medical Record   Pt. With severe PCM in active lung CA pt with chronic aspiration,  tracheo-esophageal fistula     Stage 2 sacral pressure ulcer    Rod score 20 on admit without mention of pressure ulcer. Sacral area reddened 310 with Rod score 16 > 14 on 3/12. No skin breakdown documented 3/4 per Nursing         H/P        Wound Care Nurse consult 3/13    Nursing skin assessment record                                                                            Doctor, Please specify diagnosis or diagnoses associated with above clinical findings.    Physician Use Only      [ X ] Stage 2 Sacral Pressure Ulcer not Present on Admit    [   ] Stage 2 Sacral Pressure Ulcer Present on Admit    [   ]  Other ______________                                                                                                             [  ] Unable to determine

## 2017-03-13 NOTE — PROGRESS NOTES
[facility] Hospital Medicine  Progress Note    Patient Name: Frederick J Lejeune  MRN: [id number]  Patient Class: IP- Inpatient   Admission Date: 3/3/2017  Length of Stay: 8 days  Attending Physician: Derek Bourgeois MD  Primary Care Provider: Kenrick Muñoz MD        Subjective:     Principal Problem:Primary malignant neoplasm of right lung metastatic to other site    HPI:  Frederick J Lejeune is a 66 y.o. male with PMHx of GERD, weight loss, dysphagia, odynophagia, RLL lung mass squamous cell carcinoma, who presented to ER with continued disphagia. He has been recently hospitalized from Dr. Espinosa's office with c/o dysphagia, weakness, dizziness, chest pain. He has had difficulty eating due to odynophagia for the past month. Patient has completed palliative radiation to relieve right mainstem bronchus obstruction. He then was treated with weekly Carbo-Taxol x 6-7 weeks, which was discontinued due to disease progression on imaging in early 1/2017. He is currently receiving Pembrolizumab every 3 weeks. He was recently evaluated by Dr. Cheng, at last hospitalization, who recommended further evaluation with esophagram, that was done on 2/24/17 with Esophageal stent placement. Per discharge summary, Dr. Cheng stated if patient continues to have severe dysphagia or chest pain then he could remove the stent and place PEG tube. He was discharged on 2/28/17.    Patient returns to the hospital 3 days after discharge with symptoms of inability to keep anything down. Getting dehydrated, He is agreeable for PEG placement.      Hospital Course:  GI, Dr. Anguiano, was consulted. Esophagogram showed patent stent, he showed signs of severe aspiration. Patient made NPO and IV Cefepime started. Patient continues with mild respiratory distress and placed on Oxygen, IV steroids, nebulizers, IS, acapella. Discussed code status twice with patient and he wants to remain a full code. He also requested more IV pain  "medicine. Discussed with Dr. Bourgeois and Dr. Anguiano. Patient states, "under NO circumstances can you call my family." Pulmonary was consulted for continued hypoxia requiring 4 liters O2. IV Diuretics started. CT chest showed enlarging bilateral pleural effusions, stable metastatic lung CA, and evidence of tracheoesophageal fistula. Dr. Anguiano spoke with the Radiologist explaining patient had esophagram with barium a few days ago. Radiologist said that could be what he saw and will revise his CT report, but addendum didn't r/o fistula. Dr. Marie performed thoracentesis on the right removing 560cc. No family at bedside. Scheduled multidisciplinary rounds 3/9/17.  Placed PEG tube 09 March.  Difficulty tolerating tube feedings at first but able to advance slowly.    Interval History:  Very weak and tired.  PEG tube placed 09 March.  Difficulty with tube feedings initially but able to slowly advance.    Review of Systems   Constitutional: Positive for activity change, appetite change and fatigue. Negative for fever.   HENT: Negative.  Negative for congestion, nosebleeds and sore throat.    Eyes: Negative.  Negative for photophobia, redness and visual disturbance.   Respiratory: Negative for cough, shortness of breath and wheezing.    Cardiovascular: Negative.  Negative for chest pain, palpitations and leg swelling.   Gastrointestinal: Positive for abdominal pain. Negative for constipation, diarrhea, nausea (dysphagia) and vomiting.   Endocrine: Negative.  Negative for polydipsia, polyphagia and polyuria.   Genitourinary: Negative.  Negative for dysuria, flank pain, frequency and urgency.   Musculoskeletal: Negative.  Negative for arthralgias, back pain and joint swelling.   Skin: Negative.  Negative for color change, pallor and rash.   Allergic/Immunologic: Negative.  Negative for environmental allergies, food allergies and immunocompromised state.   Neurological: Negative.  Negative for dizziness, syncope, weakness, " light-headedness, numbness and headaches.   Hematological: Negative.  Negative for adenopathy. Does not bruise/bleed easily.   Psychiatric/Behavioral: Negative.  Negative for confusion and hallucinations. The patient is not nervous/anxious.    All other systems reviewed and are negative.    Objective:     Vital Signs (Most Recent):  Temp: 97.9 °F (36.6 °C) (03/12/17 1617)  Pulse: 91 (03/12/17 1911)  Resp: 20 (03/12/17 1911)  BP: 99/68 (03/12/17 1617)  SpO2: 98 % (03/12/17 1911) Vital Signs (24h Range):  Temp:  [97.4 °F (36.3 °C)-98.4 °F (36.9 °C)] 97.9 °F (36.6 °C)  Pulse:  [] 91  Resp:  [18-20] 20  SpO2:  [98 %-100 %] 98 %  BP: ()/(62-72) 99/68     Weight: 45.7 kg (100 lb 11.2 oz)  Body mass index is 18.42 kg/(m^2).    Intake/Output Summary (Last 24 hours) at 03/12/17 2157  Last data filed at 03/12/17 1813   Gross per 24 hour   Intake              945 ml   Output             1450 ml   Net             -505 ml      Physical Exam   Constitutional: He is oriented to person, place, and time. He appears well-developed.   Cachectic, ill appearing   HENT:   Head: Normocephalic.   Mouth/Throat: No oropharyngeal exudate.   Eyes: Pupils are equal, round, and reactive to light.   Neck: No thyromegaly present.   Cardiovascular: Normal rate, regular rhythm and normal heart sounds.  Exam reveals no gallop.    No murmur heard.  Pulmonary/Chest: Effort normal and breath sounds normal. No respiratory distress. He has no rales.   Abdominal: Soft. Bowel sounds are normal. He exhibits no distension and no mass. There is no rebound and no guarding.   Musculoskeletal: Normal range of motion. He exhibits no edema.   Lymphadenopathy:     He has no cervical adenopathy.   Neurological: He is alert and oriented to person, place, and time.   Skin: Skin is warm and dry.   Psychiatric:   Quiet mood       Significant Labs:   CBC:     Recent Labs  Lab 03/11/17  0400 03/12/17  0448   WBC 14.80* 17.24*   HGB 11.2* 10.9*   HCT 35.4* 34.5*     177     CMP:     Recent Labs  Lab 03/11/17  0400 03/12/17  0448    143   K 4.5 4.4    107   CO2 30* 29   * 163*   BUN 30* 25*   CREATININE 0.8 0.7   CALCIUM 9.7 9.5   PROT 6.3 6.1   ALBUMIN 2.5* 2.4*   BILITOT 0.9 0.8   ALKPHOS 153* 156*   AST 26 68*   ALT 54* 123*   ANIONGAP 7* 7*   EGFRNONAA >60 >60       Significant Imaging: I have reviewed all pertinent imaging results/findings within the past 24 hours.    Assessment/Plan:      * Primary malignant neoplasm of right lung metastatic to other site  Follow up with oncology as out-patient for treatment with Keytruda.  Discussed with DR Espinosa, patient's primary oncologist.    COPD, very severe  Pulmonary following    Esophageal dysphagia  GI consulted and has been following.  Esophagram showed patent stent and severe aspiration.  Receiving IV Dilaudid 1 mg q 2 hours.  PEG placed 09 March by GI.    Bone metastases  Lytic lesion, 2.1 cm, in the left humeral head.    Chronic respiratory failure with hypoxia  Nebulizers and IV steroids.  Right thoracentesis removed 560ml 3/8/2017.    Cachexia  Starting tube feeds by PEG tube.    Severe protein-calorie malnutrition  Dietitian recommended:  Isosource 1.5 was not available at this time so I substituted Peptamen 1.5 prebio.  Check with dietary again during the week for further recommendations.    VTE Risk Mitigation         Ordered     Place sequential compression device  Until discontinued      03/04/17 0222     Medium Risk of VTE  Once      03/04/17 0217          Derek Bourgeois MD  Department of Hospital Medicine   [facility]

## 2017-03-13 NOTE — PT/OT/SLP PROGRESS
Physical Therapy  Treatment    Frederick J Lejeune   MRN: [id number]   Admitting Diagnosis: Primary malignant neoplasm of right lung metastatic to other site    PT Received On: 17  PT Start Time: 0945     PT Stop Time: 101    PT Total Time (min): 25 min       Billable Minutes:  Therapeutic Activity 25    Treatment Type: Treatment  PT/PTA: PT     PTA Visit Number: 1       General Precautions: Standard, fall  Orthopedic Precautions: N/A   Braces:           Subjective:  Communicated with EPIC AND NURSE RODRIGUEZ prior to session.  PT AGREES TO SIT UP IN CHAIR BUT DECLINES TO AMBULATE, STATING IT TAKES TOO MUCH TO WALK    Pain Ratin/10                   Objective:   Patient found with: peripheral IV, oxygen, telemetry (PORT R CHEST, PEG TUBE)    Functional Mobility:  Bed Mobility:   Rolling/Turning to Left: Supervision  Scooting/Bridging: Supervision  Supine to Sit: Supervision    Transfers:  Sit <> Stand Assistance: Contact Guard Assistance  Sit <> Stand Assistive Device: No Assistive Device  Bed <> Chair Technique: Stand Pivot  Bed <> Chair Assistance: Contact Guard Assistance  Bed <> Chair Assistive Device: No Assistive Device    Gait:   Gait Distance: PT TOOK 4 STEPS TO BEDSIDE CHAIR, DECLINED TO WALK SEC SOB WITH GAIT  Assistance 1: Contact Guard Assistance  Gait Assistive Device: No device    Balance:   Static Sit: FAIR+: Able to take MINIMAL challenges from all directions  Dynamic Sit: FAIR+: Maintains balance through MINIMAL excursions of active trunk motion  Static Stand: FAIR: Maintains without assist but unable to take challenges  Dynamic stand: FAIR: Needs CONTACT GUARD during gait     Therapeutic Activities and Exercises:  PT ASSISTED TO SITTING ON SIDE OF BED , SAT AND RESTED 5 MINS, THEN TRANSFERRED TO BEDSIDE CHAIR TO CLEAN UP.  PT SAT IN CHAIR 15 MINS, ASSISTED TO CHANGE GOWN.  INSTRUCTED IN PURSED LIP BREATHING DURING ACTIVITIES.  PT DOFFED DIRTY SOCKS AND DONNED CLEAN ONES     AM-PAC 6 CLICK  MOBILITY  How much help from another person does this patient currently need?   1 = Unable, Total/Dependent Assistance  2 = A lot, Maximum/Moderate Assistance  3 = A little, Minimum/Contact Guard/Supervision  4 = None, Modified [place]/Independent         AM-PAC Raw Score CMS G-Code Modifier Level of Impairment Assistance   6 % Total / Unable   7 - 9 CM 80 - 100% Maximal Assist   10 - 14 CL 60 - 80% Moderate Assist   15 - 19 CK 40 - 60% Moderate Assist   20 - 22 CJ 20 - 40% Minimal Assist   23 CI 1-20% SBA / CGA   24 CH 0% Independent/ Mod I     Patient left up in chair with all lines intact, call button in reach, MICHAEL notified and MICHAEL  present.    Assessment:  Frederick J Lejeune is a 66 y.o. male with a medical diagnosis of Primary malignant neoplasm of right lung metastatic to other site and presents with IMPAIRED MOBILITY AND WILL NEED CONT THERAPY FOR ENDURANCE ,MOBILITY.    Rehab identified problem list/impairments: Rehab identified problem list/impairments: weakness, impaired endurance, impaired balance, gait instability, impaired cardiopulmonary response to activity    Rehab potential is good.    Activity tolerance: Good FOR MOBILITY AND TRANSFERS, AND ADL'S    Discharge recommendations: Discharge Facility/Level Of Care Needs: nursing facility, skilled     Barriers to discharge: Barriers to Discharge: None    Equipment recommendations: Equipment Needed After Discharge: bath bench     GOALS:   Physical Therapy Goals        Problem: Physical Therapy Goal    Goal Priority Disciplines Outcome Goal Variances Interventions   Physical Therapy Goal     PT/OT, PT      Description:  PT WILL BE SEEN FOR P.T. FOR A MIN OF 5 OUT OF 7 DAYS A WEEK  LTG: 3/14/17  1. PT WILL COMPLETE BED MOBILITY IND  2. PT WILL STAND WITH RW FOR GT X 50' WITH CGA.  3. PT WILL T/F TO CHAIR WITH S.  4. PT WILL COMPLETE B LE TE X 20 REPS FOR STRENGTHENING.               PLAN:    Patient to be seen 5 x/week  to address the above  listed problems via therapeutic exercises, therapeutic activities, gait training  Plan of Care expires: 03/14/17  Plan of Care reviewed with: patient         Constanza Ibarra, PT  03/13/2017

## 2017-03-14 PROBLEM — G89.3 CANCER RELATED PAIN: Status: ACTIVE | Noted: 2017-03-14

## 2017-03-14 LAB
ALBUMIN SERPL BCP-MCNC: 2.3 G/DL
ALP SERPL-CCNC: 135 U/L
ALT SERPL W/O P-5'-P-CCNC: 109 U/L
ANION GAP SERPL CALC-SCNC: 7 MMOL/L
AST SERPL-CCNC: 28 U/L
BASOPHILS # BLD AUTO: 0.02 K/UL
BASOPHILS NFR BLD: 0.1 %
BILIRUB SERPL-MCNC: 0.4 MG/DL
BUN SERPL-MCNC: 20 MG/DL
CALCIUM SERPL-MCNC: 8.6 MG/DL
CHLORIDE SERPL-SCNC: 111 MMOL/L
CO2 SERPL-SCNC: 27 MMOL/L
CREAT SERPL-MCNC: 0.6 MG/DL
DIFFERENTIAL METHOD: ABNORMAL
EOSINOPHIL # BLD AUTO: 0 K/UL
EOSINOPHIL NFR BLD: 0.1 %
ERYTHROCYTE [DISTWIDTH] IN BLOOD BY AUTOMATED COUNT: 13.7 %
EST. GFR  (AFRICAN AMERICAN): >60 ML/MIN/1.73 M^2
EST. GFR  (NON AFRICAN AMERICAN): >60 ML/MIN/1.73 M^2
GLUCOSE SERPL-MCNC: 140 MG/DL
HCT VFR BLD AUTO: 34.3 %
HGB BLD-MCNC: 11.1 G/DL
LYMPHOCYTES # BLD AUTO: 0.6 K/UL
LYMPHOCYTES NFR BLD: 3.4 %
MCH RBC QN AUTO: 34.7 PG
MCHC RBC AUTO-ENTMCNC: 32.4 %
MCV RBC AUTO: 107 FL
MONOCYTES # BLD AUTO: 1 K/UL
MONOCYTES NFR BLD: 5.7 %
NEUTROPHILS # BLD AUTO: 16.1 K/UL
NEUTROPHILS NFR BLD: 90.7 %
PLATELET # BLD AUTO: 139 K/UL
PMV BLD AUTO: 11.9 FL
POTASSIUM SERPL-SCNC: 4.3 MMOL/L
PREALB SERPL-MCNC: 22 MG/DL
PROT SERPL-MCNC: 5.6 G/DL
RBC # BLD AUTO: 3.2 M/UL
SODIUM SERPL-SCNC: 145 MMOL/L
WBC # BLD AUTO: 17.78 K/UL

## 2017-03-14 PROCEDURE — 85025 COMPLETE CBC W/AUTO DIFF WBC: CPT

## 2017-03-14 PROCEDURE — 63600175 PHARM REV CODE 636 W HCPCS: Performed by: INTERNAL MEDICINE

## 2017-03-14 PROCEDURE — 25000003 PHARM REV CODE 250: Performed by: NURSE PRACTITIONER

## 2017-03-14 PROCEDURE — 63600175 PHARM REV CODE 636 W HCPCS: Performed by: NURSE PRACTITIONER

## 2017-03-14 PROCEDURE — 94664 DEMO&/EVAL PT USE INHALER: CPT

## 2017-03-14 PROCEDURE — 99233 SBSQ HOSP IP/OBS HIGH 50: CPT | Mod: ,,, | Performed by: INTERNAL MEDICINE

## 2017-03-14 PROCEDURE — 94799 UNLISTED PULMONARY SVC/PX: CPT

## 2017-03-14 PROCEDURE — 25000003 PHARM REV CODE 250: Performed by: INTERNAL MEDICINE

## 2017-03-14 PROCEDURE — 94640 AIRWAY INHALATION TREATMENT: CPT

## 2017-03-14 PROCEDURE — 21400001 HC TELEMETRY ROOM

## 2017-03-14 PROCEDURE — 27000221 HC OXYGEN, UP TO 24 HOURS

## 2017-03-14 PROCEDURE — 80053 COMPREHEN METABOLIC PANEL: CPT

## 2017-03-14 PROCEDURE — 84134 ASSAY OF PREALBUMIN: CPT

## 2017-03-14 RX ORDER — FENTANYL 25 UG/1
1 PATCH TRANSDERMAL
Status: DISCONTINUED | OUTPATIENT
Start: 2017-03-14 | End: 2017-03-17 | Stop reason: HOSPADM

## 2017-03-14 RX ORDER — FENTANYL 25 UG/1
2 PATCH TRANSDERMAL
Status: DISCONTINUED | OUTPATIENT
Start: 2017-03-14 | End: 2017-03-14

## 2017-03-14 RX ORDER — HYDROMORPHONE HYDROCHLORIDE 1 MG/ML
1 INJECTION, SOLUTION INTRAMUSCULAR; INTRAVENOUS; SUBCUTANEOUS
Status: DISCONTINUED | OUTPATIENT
Start: 2017-03-14 | End: 2017-03-17 | Stop reason: HOSPADM

## 2017-03-14 RX ADMIN — ONDANSETRON 4 MG: 2 INJECTION INTRAMUSCULAR; INTRAVENOUS at 09:03

## 2017-03-14 RX ADMIN — SODIUM CHLORIDE: 0.9 INJECTION, SOLUTION INTRAVENOUS at 05:03

## 2017-03-14 RX ADMIN — ARFORMOTEROL TARTRATE 15 MCG: 15 SOLUTION RESPIRATORY (INHALATION) at 08:03

## 2017-03-14 RX ADMIN — CEFEPIME: 2 INJECTION, POWDER, FOR SOLUTION INTRAVENOUS at 05:03

## 2017-03-14 RX ADMIN — MOXIFLOXACIN HYDROCHLORIDE 400 MG: 400 INJECTION, SOLUTION INTRAVENOUS at 11:03

## 2017-03-14 RX ADMIN — HYDROMORPHONE HYDROCHLORIDE 1 MG: 1 INJECTION, SOLUTION INTRAMUSCULAR; INTRAVENOUS; SUBCUTANEOUS at 07:03

## 2017-03-14 RX ADMIN — ONDANSETRON 4 MG: 2 INJECTION INTRAMUSCULAR; INTRAVENOUS at 05:03

## 2017-03-14 RX ADMIN — METHYLPREDNISOLONE SODIUM SUCCINATE 20 MG: 40 INJECTION, POWDER, FOR SOLUTION INTRAMUSCULAR; INTRAVENOUS at 09:03

## 2017-03-14 RX ADMIN — PROMETHAZINE HYDROCHLORIDE 12.5 MG: 25 INJECTION, SOLUTION INTRAMUSCULAR; INTRAVENOUS at 08:03

## 2017-03-14 RX ADMIN — HYDROMORPHONE HYDROCHLORIDE 1 MG: 1 INJECTION, SOLUTION INTRAMUSCULAR; INTRAVENOUS; SUBCUTANEOUS at 10:03

## 2017-03-14 RX ADMIN — BUDESONIDE 0.5 MG: 0.5 INHALANT RESPIRATORY (INHALATION) at 08:03

## 2017-03-14 RX ADMIN — BUDESONIDE 0.5 MG: 0.5 INHALANT RESPIRATORY (INHALATION) at 07:03

## 2017-03-14 RX ADMIN — HYDROMORPHONE HYDROCHLORIDE 1 MG: 1 INJECTION, SOLUTION INTRAMUSCULAR; INTRAVENOUS; SUBCUTANEOUS at 02:03

## 2017-03-14 RX ADMIN — HYDROMORPHONE HYDROCHLORIDE 1 MG: 1 INJECTION, SOLUTION INTRAMUSCULAR; INTRAVENOUS; SUBCUTANEOUS at 08:03

## 2017-03-14 RX ADMIN — HYDROMORPHONE HYDROCHLORIDE 1 MG: 1 INJECTION, SOLUTION INTRAMUSCULAR; INTRAVENOUS; SUBCUTANEOUS at 05:03

## 2017-03-14 RX ADMIN — ARFORMOTEROL TARTRATE 15 MCG: 15 SOLUTION RESPIRATORY (INHALATION) at 07:03

## 2017-03-14 RX ADMIN — FENTANYL 1 PATCH: 25 PATCH, EXTENDED RELEASE TRANSDERMAL at 10:03

## 2017-03-14 RX ADMIN — METHYLPREDNISOLONE SODIUM SUCCINATE 20 MG: 40 INJECTION, POWDER, FOR SOLUTION INTRAMUSCULAR; INTRAVENOUS at 02:03

## 2017-03-14 RX ADMIN — CEFEPIME: 2 INJECTION, POWDER, FOR SOLUTION INTRAVENOUS at 10:03

## 2017-03-14 RX ADMIN — ONDANSETRON 4 MG: 2 INJECTION INTRAMUSCULAR; INTRAVENOUS at 02:03

## 2017-03-14 RX ADMIN — FAMOTIDINE 20 MG: 20 INJECTION, SOLUTION INTRAVENOUS at 09:03

## 2017-03-14 RX ADMIN — CEFEPIME: 2 INJECTION, POWDER, FOR SOLUTION INTRAVENOUS at 01:03

## 2017-03-14 RX ADMIN — METHYLPREDNISOLONE SODIUM SUCCINATE 20 MG: 40 INJECTION, POWDER, FOR SOLUTION INTRAMUSCULAR; INTRAVENOUS at 05:03

## 2017-03-14 RX ADMIN — FAMOTIDINE 20 MG: 20 INJECTION, SOLUTION INTRAVENOUS at 08:03

## 2017-03-14 NOTE — PT/OT/SLP PROGRESS
Physical Therapy      Frederick J Lejeune  MRN: [id number]    P.T. ATTEMPTED TO TX PT @ 0901 AND 11:00. PT REFUSED X 2 ATTEMPTS.     Jojo Robledo, PT 3/14/2017

## 2017-03-14 NOTE — NURSING
Peg tube feeding increased to 45ml/hr around 0400, pt reports increasing nausea during morning assessment, phenergan given at 0835. Decreased nausea reported afterwards. Will cont to monitor.

## 2017-03-14 NOTE — PROGRESS NOTES
[facility] Hospital Medicine  Progress Note    Patient Name: Frederick J Lejeune  MRN: [id number]  Patient Class: IP- Inpatient   Admission Date: 3/3/2017  Length of Stay: 9 days  Attending Physician: Derek Bourgeois MD  Primary Care Provider: Kenrick Muñoz MD        Subjective:     Principal Problem:Primary malignant neoplasm of right lung metastatic to other site    HPI:  Frederick J Lejeune is a 66 y.o. male with PMHx of GERD, weight loss, dysphagia, odynophagia, RLL lung mass squamous cell carcinoma, who presented to ER with continued disphagia. He has been recently hospitalized from Dr. Espinosa's office with c/o dysphagia, weakness, dizziness, chest pain. He has had difficulty eating due to odynophagia for the past month. Patient has completed palliative radiation to relieve right mainstem bronchus obstruction. He then was treated with weekly Carbo-Taxol x 6-7 weeks, which was discontinued due to disease progression on imaging in early 1/2017. He is currently receiving Pembrolizumab every 3 weeks. He was recently evaluated by Dr. Cheng, at last hospitalization, who recommended further evaluation with esophagram, that was done on 2/24/17 with Esophageal stent placement. Per discharge summary, Dr. Cheng stated if patient continues to have severe dysphagia or chest pain then he could remove the stent and place PEG tube. He was discharged on 2/28/17.    Patient returns to the hospital 3 days after discharge with symptoms of inability to keep anything down. Getting dehydrated, He is agreeable for PEG placement.      Hospital Course:  GI, Dr. Anguiano, was consulted. Esophagogram showed patent stent, he showed signs of severe aspiration. Patient made NPO and IV Cefepime started. Patient continues with mild respiratory distress and placed on Oxygen, IV steroids, nebulizers, IS, acapella. Discussed code status twice with patient and he wants to remain a full code. He also requested more IV pain  "medicine. Discussed with Dr. Bourgeois and Dr. Anguiano. Patient states, "under NO circumstances can you call my family." Pulmonary was consulted for continued hypoxia requiring 4 liters O2. IV Diuretics started. CT chest showed enlarging bilateral pleural effusions, stable metastatic lung CA, and evidence of tracheoesophageal fistula. Dr. Anguiano spoke with the Radiologist explaining patient had esophagram with barium a few days ago. Radiologist said that could be what he saw and will revise his CT report, but addendum didn't r/o fistula. Dr. Marie performed thoracentesis on the right removing 560cc. No family at bedside. Scheduled multidisciplinary rounds 3/9/17.  Placed PEG tube 09 March.  Difficulty tolerating tube feedings at first but able to advance slowly.    Interval History:  Very weak and tired.  PEG tube placed 09 March.  Difficulty with tube feedings initially but able to slowly advance.  35 mL/h today.    Review of Systems   Constitutional: Positive for activity change, appetite change and fatigue. Negative for fever.   HENT: Negative.  Negative for congestion, nosebleeds and sore throat.    Eyes: Negative.  Negative for photophobia, redness and visual disturbance.   Respiratory: Negative for cough, shortness of breath and wheezing.    Cardiovascular: Negative.  Negative for chest pain, palpitations and leg swelling.   Gastrointestinal: Positive for abdominal pain. Negative for constipation, diarrhea, nausea (dysphagia) and vomiting.   Endocrine: Negative.  Negative for polydipsia, polyphagia and polyuria.   Genitourinary: Negative.  Negative for dysuria, flank pain, frequency and urgency.   Musculoskeletal: Negative.  Negative for arthralgias, back pain and joint swelling.   Skin: Negative.  Negative for color change, pallor and rash.   Allergic/Immunologic: Negative.  Negative for environmental allergies, food allergies and immunocompromised state.   Neurological: Negative.  Negative for dizziness, " syncope, weakness, light-headedness, numbness and headaches.   Hematological: Negative.  Negative for adenopathy. Does not bruise/bleed easily.   Psychiatric/Behavioral: Negative.  Negative for confusion and hallucinations. The patient is not nervous/anxious.    All other systems reviewed and are negative.    Objective:     Vital Signs (Most Recent):  Temp: 98 °F (36.7 °C) (03/13/17 1544)  Pulse: 92 (03/13/17 1544)  Resp: 18 (03/13/17 1544)  BP: (!) 88/57 (03/13/17 1746)  SpO2: 100 % (03/13/17 1544) Vital Signs (24h Range):  Temp:  [97.6 °F (36.4 °C)-98 °F (36.7 °C)] 98 °F (36.7 °C)  Pulse:  [89-97] 92  Resp:  [18-24] 18  SpO2:  [98 %-100 %] 100 %  BP: ()/(57-73) 88/57     Weight: 45.7 kg (100 lb 11.2 oz)  Body mass index is 18.42 kg/(m^2).    Intake/Output Summary (Last 24 hours) at 03/13/17 1859  Last data filed at 03/13/17 1702   Gross per 24 hour   Intake              775 ml   Output             2525 ml   Net            -1750 ml      Physical Exam   Constitutional: He is oriented to person, place, and time. He appears well-developed.   Cachectic, ill appearing   HENT:   Head: Normocephalic.   Mouth/Throat: No oropharyngeal exudate.   Eyes: Pupils are equal, round, and reactive to light.   Neck: No thyromegaly present.   Cardiovascular: Normal rate, regular rhythm and normal heart sounds.  Exam reveals no gallop.    No murmur heard.  Pulmonary/Chest: Effort normal and breath sounds normal. No respiratory distress. He has no rales.   Abdominal: Soft. Bowel sounds are normal. He exhibits no distension and no mass. There is no rebound and no guarding.   Musculoskeletal: Normal range of motion. He exhibits no edema.   Lymphadenopathy:     He has no cervical adenopathy.   Neurological: He is alert and oriented to person, place, and time.   Skin: Skin is warm and dry.   Psychiatric:   Quiet mood       Significant Labs:   CBC:     Recent Labs  Lab 03/12/17  7418 03/13/17  0300   WBC 17.24* 19.16*   HGB 10.9* 11.7*    HCT 34.5* 36.0*    149*     CMP:     Recent Labs  Lab 03/12/17  0448 03/13/17  0300    143   K 4.4 4.5    109   CO2 29 25   * 164*   BUN 25* 23   CREATININE 0.7 0.7   CALCIUM 9.5 8.9   PROT 6.1 5.9*   ALBUMIN 2.4* 2.4*   BILITOT 0.8 0.5   ALKPHOS 156* 147*   AST 68* 51*   * 144*   ANIONGAP 7* 9   EGFRNONAA >60 >60       Significant Imaging: I have reviewed all pertinent imaging results/findings within the past 24 hours.    Assessment/Plan:      * Primary malignant neoplasm of right lung metastatic to other site  Follow up with oncology as out-patient for treatment with Keytruda.  Discussed with DR Espinosa, patient's primary oncologist.    Esophageal dysphagia  GI consulted and has been following.  Esophagram showed patent stent and severe aspiration.  Receiving IV Dilaudid 1 mg q 2 hours.  PEG placed 09 March by GI.    Bone metastases  Lytic lesion, 2.1 cm, in the left humeral head.    Chronic respiratory failure with hypoxia  Nebulizers and IV steroids.  Right thoracentesis removed 560 mL 08 March.    Cachexia  Starting tube feeds by PEG tube.    Severe protein-calorie malnutrition  Dietitian recommended:  Isosource 1.5 was not available at this time so I substituted Peptamen 1.5 prebio.  Check with dietary again during the week for further recommendations.    VTE Risk Mitigation         Ordered     Place sequential compression device  Until discontinued      03/04/17 0222     Medium Risk of VTE  Once      03/04/17 0217          Derek Bourgeois MD  Department of Hospital Medicine   [facility]

## 2017-03-14 NOTE — ASSESSMENT & PLAN NOTE
S/p Thoracentesis;   Has chronic aspiration, a tracheo-esophageal fistula,active lung cancer, some degree of radiation pneumonitis and COPD all contributing  --abx  --solumedrol 40mg Q 8      I

## 2017-03-14 NOTE — PLAN OF CARE
"Problem: Patient Care Overview  Goal: Plan of Care Review  PT REQUIRES CGA FOR T/F TO CHAIR   Outcome: Ongoing (interventions implemented as appropriate)  Fall precautions maintained, pt free from injuries/fall, attempted to reposition pt every 2 hrs, pt does refuse at times, mepilex on sacrum. Encouraged to use IS, pt states "I don't feel like doing it right now." Peg tube feeding continued per pt's tolerance. C/o nausea earlier this morning, moderately relieved by phenergan. C/o generalized abdominal pain, prn pain med given as long as bp is stable, pt also has a fentanyl patch. NSR on tele. POC and meds reviewed w/ pt, pt verbalizes understanding. Chart check done. Will cont to monitor.                   "

## 2017-03-14 NOTE — PLAN OF CARE
Problem: Patient Care Overview  Goal: Plan of Care Review  PT REQUIRES CGA FOR T/F TO CHAIR   Outcome: Ongoing (interventions implemented as appropriate)  2l nc, respirations even and unlabored, no distress noted on assessment, pain, no nausea or shortness of breath, PEG tube, bed alarm

## 2017-03-14 NOTE — ASSESSMENT & PLAN NOTE
--unlikely to be a candidate for additional treatment due to poor PS  --considering hospice placement  --continue supportive care

## 2017-03-14 NOTE — PT/OT/SLP PROGRESS
Occupational Therapy      Frederick J Lejeune  MRN: [id number]    Patient not seen today secondary to PT REFUSED THERAPY ATTEMPTED TO TX PT @ 0900 AND 11:00. PT REFUSED X 2 ATTEMPTS. Will follow-up ON LATER DATE.    Mildred Euceda OT  3/14/2017

## 2017-03-14 NOTE — PROGRESS NOTES
[facility]   Hematology/Oncology  Progress Note    Patient Name: Frederick J Lejeune  Admission Date: 3/3/2017  Hospital Length of Stay: 10 days  Code Status: Full Code     Subjective:     HPI:  History of Present Illness:  Frederick J Lejeune is a 66 y.o. male with GERD has a diagnosis of squamous cell carcinoma, PDL-1 positive.  He initlaly presneted  1 month history of weight loss, chest pain, dysphagia, odynophagia found to have esophageal narrowing and RLL lung mass.He has had difficulty eating due to odynophagia for the past month. He also states he has L groin pain due to what he thought was a hernia, which has since been found to be firm lymphadenopathy. Patient underwent EGD 10/19, which was notable for extrinsic compression in middle third of the esophagus as well as findings suggestive of Palma's esophagus, which were biopsied. 10/20, patient underwent EUS notable for lymphadenopathy in the paratracheal, mediastinal, and subcarinal region. FNA performed. Patient underwent bronchoscopy on 10/21 which was notable for a mass in the right mainstem bronchus, biopsy revealed lung cancer, squamous cell carcinoma.   Patient also completed palliative radiation to relieve right mainstem bronchus obstruction. He then was treated with weekly Carbo-Taxol x 6-7 weeks, which was discontinued due to disease progression on imaging in early 1/2017. He is currently receiving Pembrolizumab every 3 weeks.     The patient recently had placement of an esophageal stent due to obstruction.  Since having the stent placed, he has continue with severe pain on swallowing as well as a choking sensation every time he eats.  He has had studies  that shows the stent to the patient. He has been having aspirations by radiographic swallowing studies.  It is planned for him to have an EGd and feeding tube lalced for nutrition.  Plan is to continue Keytruda treatment once discharged    He remains very SOB, more so in the  last few weeks             Past Medical History:  COPD, severe  GERD  Lumbar vertebral fracture s/p surgery in 1/2016 / Chronic low back pain  H/o intracranial hemorrhage     Social History   Marital status:       Spouse name: ganesh   Number of children: 2   Years of education: N/A     Occupational History     [employer]     Social History Main Topics   Smoking status: Former Smoker      Packs/day: 1.00      Years: 50.00      Types: Cigarettes      Quit date: 5/10/2016   Smokeless tobacco: Former User      Types: Snuff      Quit date: 5/10/2016   Alcohol use No   Drug use: No   Sexual activity: Yes      Partners: Female     Other Topics Concern   Not on file     Social History Narrative        Family History: family history includes Cancer in his father; Emphysema in his mother.           Interval History:   Pain is reported as slightly improved but still uncontrolled    Oncology Treatment Plan:   [facility] MK 3475 KEYNOTE 240    Medications:  Continuous Infusions:   sodium chloride 0.9% 125 mL/hr at 03/14/17 0509     Scheduled Meds:   arformoterol  15 mcg Nebulization BID    budesonide  0.5 mg Nebulization BID    custom IVPB builder   Intravenous Q8H    famotidine  20 mg Intravenous BID    fentaNYL  1 patch Transdermal Q72H    lidocaine HCL 10 mg/ml (1%)  1 mL Intradermal Once    methylPREDNISolone sodium succinate  20 mg Intravenous Q8H    moxifloxacin  400 mg Intravenous Q24H    ondansetron  4 mg Intravenous Q8H     PRN Meds:albuterol-ipratropium 2.5mg-0.5mg/3mL, guaifenesin 100 mg/5 ml, hydrALAZINE, HYDROmorphone, lorazepam, promethazine (PHENERGAN) IVPB     Review of Systems   Constitutional: Positive for fatigue. Negative for activity change and appetite change.   HENT: Negative for congestion, dental problem, hearing loss and nosebleeds.    Eyes: Negative for discharge and itching.   Respiratory: Positive for shortness of breath.    Cardiovascular: Positive for chest pain.    Gastrointestinal: Positive for nausea. Negative for abdominal distention, abdominal pain and anal bleeding.   Endocrine: Negative for cold intolerance, heat intolerance and polydipsia.   Genitourinary: Negative for difficulty urinating, dysuria, enuresis and frequency.   Neurological: Negative for dizziness, tremors, facial asymmetry and headaches.   All other systems reviewed and are negative.    Objective:     Vital Signs (Most Recent):  Temp: 97.7 °F (36.5 °C) (03/14/17 0644)  Pulse: 86 (03/14/17 0644)  Resp: 20 (03/14/17 0644)  BP: 107/71 (03/14/17 0644)  SpO2: 99 % (03/14/17 0644) Vital Signs (24h Range):  Temp:  [97.5 °F (36.4 °C)-98 °F (36.7 °C)] 97.7 °F (36.5 °C)  Pulse:  [73-96] 86  Resp:  [18-20] 20  SpO2:  [98 %-100 %] 99 %  BP: ()/(55-75) 107/71     Weight: 45.4 kg (100 lb)  Body mass index is 18.29 kg/(m^2).  Body surface area is 1.41 meters squared.      Intake/Output Summary (Last 24 hours) at 03/14/17 0738  Last data filed at 03/14/17 0509   Gross per 24 hour   Intake             2500 ml   Output             2515 ml   Net              -15 ml       Physical Exam   Constitutional: He is oriented to person, place, and time. He appears cachectic.   cachetic   HENT:   Head: Normocephalic.   Right Ear: External ear normal.   Left Ear: External ear normal.   Nose: Nose normal.   Mouth/Throat: Oropharynx is clear and moist. No oropharyngeal exudate.   Eyes: Pupils are equal, round, and reactive to light. Right eye exhibits no discharge. Left eye exhibits no discharge.   Neck: No tracheal deviation present. No thyromegaly present.   Cardiovascular: Normal rate, regular rhythm and normal heart sounds.  Exam reveals no gallop.    No murmur heard.  Pulmonary/Chest: No respiratory distress. He has no wheezes. He has no rales.   Abdominal: Soft. He exhibits no distension and no mass. There is no rebound and no guarding.   Peg tube in place   Musculoskeletal: Normal range of motion. He exhibits no edema,  tenderness or deformity.   Lymphadenopathy:     He has no cervical adenopathy.   Neurological: He is alert and oriented to person, place, and time.   Skin: Skin is warm and dry.   Psychiatric: He has a normal mood and affect. His behavior is normal.       Significant Labs:   CBC:   Recent Labs  Lab 03/13/17 0300 03/14/17 0232   WBC 19.16* 17.78*   HGB 11.7* 11.1*   HCT 36.0* 34.3*   * 139*   , CMP:   Recent Labs  Lab 03/13/17 0300 03/14/17 0232    145   K 4.5 4.3    111*   CO2 25 27   * 140*   BUN 23 20   CREATININE 0.7 0.6   CALCIUM 8.9 8.6*   PROT 5.9* 5.6*   ALBUMIN 2.4* 2.3*   BILITOT 0.5 0.4   ALKPHOS 147* 135   AST 51* 28   * 109*   ANIONGAP 9 7*   EGFRNONAA >60 >60   , LDH: No results for input(s): LDHCSF, BFSOURCE in the last 48 hours. and LFTs:   Recent Labs  Lab 03/13/17 0300 03/14/17 0232   * 109*   AST 51* 28   ALKPHOS 147* 135   BILITOT 0.5 0.4   PROT 5.9* 5.6*   ALBUMIN 2.4* 2.3*       Diagnostic Results:  I have reviewed all pertinent imaging results/findings within the past 24 hours.    Assessment/Plan:     * Primary malignant neoplasm of right lung metastatic to other site  --unlikely to be a candidate for additional treatment due to poor PS  --considering hospice placement  --continue supportive care    Bone metastases  Recent CTa of chest was reported as showing a lytic lesion in the left femoral head.  --will address as outpatient     Chronic respiratory failure with hypoxia  S/p Thoracentesis;   Has chronic aspiration, a tracheo-esophageal fistula,active lung cancer, some degree of radiation pneumonitis and COPD all contributing  --abx  --solumedrol 40mg Q 8      I        Severe protein-calorie malnutrition  --started tube feeds    Cancer related pain  --increase duragesic patch to 50mcg  --continue to monitor and adjust meds      Thank you for your consult. I will follow-up with patient. Please contact us if you have any additional questions.      Eren Willett Jr, MD  Hematology/Oncology  [facility]

## 2017-03-14 NOTE — PLAN OF CARE
Discussed patient with hospitalist.  Reviewed documentation in Epic.  Tube feedings occurring.  Discharge plan uncertain at this time dependent on patient's progress and patient's wants post-hospitalization (possible SNF vs. Hospice).      Met with patient briefly at bedside.  Patient states that he is currently feeling nauseated and not feeling well.    PLAN:  Will continue to follow       03/14/17 7288   Discharge Reassessment   Assessment Type Discharge Planning Reassessment   Can the patient answer the patient profile reliably? Yes, cognitively intact   How does the patient rate their overall health at the present time? Poor   Describe the patient's ability to walk at the present time. Minor restrictions or changes   How often would a person be available to care for the patient? Occasionally   Number of comorbid conditions (as recorded on the chart) Three   During the past month, has the patient often been bothered by feeling down, depressed or hopeless? No   During the past month, has the patient often been bothered by little interest or pleasure in doing things? No   Discharge plan remains the same: (Discharge Plan dependent on patient's progress and patient's wants post-hospitalization  )   Discharge Plan A Skilled Nursing Facility   Discharge Plan B Home with family;Home Health;Hospice/home   Change in patient condition or support system No   Patient choice form signed by patient/caregiver N/A

## 2017-03-14 NOTE — SUBJECTIVE & OBJECTIVE
Interval History:   Pain is reported as slightly improved but still uncontrolled    Oncology Treatment Plan:   [facility] 3475 KEYNOTE 240    Medications:  Continuous Infusions:   sodium chloride 0.9% 125 mL/hr at 03/14/17 0509     Scheduled Meds:   arformoterol  15 mcg Nebulization BID    budesonide  0.5 mg Nebulization BID    custom IVPB builder   Intravenous Q8H    famotidine  20 mg Intravenous BID    fentaNYL  1 patch Transdermal Q72H    lidocaine HCL 10 mg/ml (1%)  1 mL Intradermal Once    methylPREDNISolone sodium succinate  20 mg Intravenous Q8H    moxifloxacin  400 mg Intravenous Q24H    ondansetron  4 mg Intravenous Q8H     PRN Meds:albuterol-ipratropium 2.5mg-0.5mg/3mL, guaifenesin 100 mg/5 ml, hydrALAZINE, HYDROmorphone, lorazepam, promethazine (PHENERGAN) IVPB     Review of Systems   Constitutional: Positive for fatigue. Negative for activity change and appetite change.   HENT: Negative for congestion, dental problem, hearing loss and nosebleeds.    Eyes: Negative for discharge and itching.   Respiratory: Positive for shortness of breath.    Cardiovascular: Positive for chest pain.   Gastrointestinal: Positive for nausea. Negative for abdominal distention, abdominal pain and anal bleeding.   Endocrine: Negative for cold intolerance, heat intolerance and polydipsia.   Genitourinary: Negative for difficulty urinating, dysuria, enuresis and frequency.   Neurological: Negative for dizziness, tremors, facial asymmetry and headaches.   All other systems reviewed and are negative.    Objective:     Vital Signs (Most Recent):  Temp: 97.7 °F (36.5 °C) (03/14/17 0644)  Pulse: 86 (03/14/17 0644)  Resp: 20 (03/14/17 0644)  BP: 107/71 (03/14/17 0644)  SpO2: 99 % (03/14/17 0644) Vital Signs (24h Range):  Temp:  [97.5 °F (36.4 °C)-98 °F (36.7 °C)] 97.7 °F (36.5 °C)  Pulse:  [73-96] 86  Resp:  [18-20] 20  SpO2:  [98 %-100 %] 99 %  BP: ()/(55-75) 107/71     Weight: 45.4 kg (100 lb)  Body mass index is 18.29  kg/(m^2).  Body surface area is 1.41 meters squared.      Intake/Output Summary (Last 24 hours) at 03/14/17 0741  Last data filed at 03/14/17 0509   Gross per 24 hour   Intake             2500 ml   Output             2515 ml   Net              -15 ml       Physical Exam   Constitutional: He is oriented to person, place, and time. He appears cachectic.   cachetic   HENT:   Head: Normocephalic.   Right Ear: External ear normal.   Left Ear: External ear normal.   Nose: Nose normal.   Mouth/Throat: Oropharynx is clear and moist. No oropharyngeal exudate.   Eyes: Pupils are equal, round, and reactive to light. Right eye exhibits no discharge. Left eye exhibits no discharge.   Neck: No tracheal deviation present. No thyromegaly present.   Cardiovascular: Normal rate, regular rhythm and normal heart sounds.  Exam reveals no gallop.    No murmur heard.  Pulmonary/Chest: No respiratory distress. He has no wheezes. He has no rales.   Abdominal: Soft. He exhibits no distension and no mass. There is no rebound and no guarding.   Peg tube in place   Musculoskeletal: Normal range of motion. He exhibits no edema, tenderness or deformity.   Lymphadenopathy:     He has no cervical adenopathy.   Neurological: He is alert and oriented to person, place, and time.   Skin: Skin is warm and dry.   Psychiatric: He has a normal mood and affect. His behavior is normal.       Significant Labs:   CBC:   Recent Labs  Lab 03/13/17  0300 03/14/17  0232   WBC 19.16* 17.78*   HGB 11.7* 11.1*   HCT 36.0* 34.3*   * 139*   , CMP:   Recent Labs  Lab 03/13/17  0300 03/14/17  0232    145   K 4.5 4.3    111*   CO2 25 27   * 140*   BUN 23 20   CREATININE 0.7 0.6   CALCIUM 8.9 8.6*   PROT 5.9* 5.6*   ALBUMIN 2.4* 2.3*   BILITOT 0.5 0.4   ALKPHOS 147* 135   AST 51* 28   * 109*   ANIONGAP 9 7*   EGFRNONAA >60 >60   , LDH: No results for input(s): LDHCSF, BFSOURCE in the last 48 hours. and LFTs:   Recent Labs  Lab  03/13/17  0300 03/14/17  0232   * 109*   AST 51* 28   ALKPHOS 147* 135   BILITOT 0.5 0.4   PROT 5.9* 5.6*   ALBUMIN 2.4* 2.3*       Diagnostic Results:  I have reviewed all pertinent imaging results/findings within the past 24 hours.

## 2017-03-15 LAB
ALBUMIN SERPL BCP-MCNC: 2.2 G/DL
ALP SERPL-CCNC: 139 U/L
ALT SERPL W/O P-5'-P-CCNC: 96 U/L
ANION GAP SERPL CALC-SCNC: 7 MMOL/L
AST SERPL-CCNC: 23 U/L
BASOPHILS # BLD AUTO: 0.02 K/UL
BASOPHILS NFR BLD: 0.1 %
BILIRUB SERPL-MCNC: 0.3 MG/DL
BUN SERPL-MCNC: 18 MG/DL
CALCIUM SERPL-MCNC: 8.6 MG/DL
CHLORIDE SERPL-SCNC: 112 MMOL/L
CO2 SERPL-SCNC: 28 MMOL/L
CREAT SERPL-MCNC: 0.6 MG/DL
DIFFERENTIAL METHOD: ABNORMAL
EOSINOPHIL # BLD AUTO: 0 K/UL
EOSINOPHIL NFR BLD: 0.1 %
ERYTHROCYTE [DISTWIDTH] IN BLOOD BY AUTOMATED COUNT: 13.7 %
EST. GFR  (AFRICAN AMERICAN): >60 ML/MIN/1.73 M^2
EST. GFR  (NON AFRICAN AMERICAN): >60 ML/MIN/1.73 M^2
GLUCOSE SERPL-MCNC: 166 MG/DL
HCT VFR BLD AUTO: 34.4 %
HGB BLD-MCNC: 11.1 G/DL
LYMPHOCYTES # BLD AUTO: 0.6 K/UL
LYMPHOCYTES NFR BLD: 2.9 %
MCH RBC QN AUTO: 34.6 PG
MCHC RBC AUTO-ENTMCNC: 32.3 %
MCV RBC AUTO: 107 FL
MONOCYTES # BLD AUTO: 0.6 K/UL
MONOCYTES NFR BLD: 2.8 %
NEUTROPHILS # BLD AUTO: 18.5 K/UL
NEUTROPHILS NFR BLD: 94.1 %
PLATELET # BLD AUTO: 135 K/UL
PMV BLD AUTO: 11.9 FL
POTASSIUM SERPL-SCNC: 4.3 MMOL/L
PROT SERPL-MCNC: 5.6 G/DL
RBC # BLD AUTO: 3.21 M/UL
SODIUM SERPL-SCNC: 147 MMOL/L
WBC # BLD AUTO: 19.67 K/UL

## 2017-03-15 PROCEDURE — 63600175 PHARM REV CODE 636 W HCPCS: Performed by: NURSE PRACTITIONER

## 2017-03-15 PROCEDURE — 21400001 HC TELEMETRY ROOM

## 2017-03-15 PROCEDURE — 63600175 PHARM REV CODE 636 W HCPCS: Performed by: INTERNAL MEDICINE

## 2017-03-15 PROCEDURE — 94664 DEMO&/EVAL PT USE INHALER: CPT

## 2017-03-15 PROCEDURE — 80053 COMPREHEN METABOLIC PANEL: CPT

## 2017-03-15 PROCEDURE — 94761 N-INVAS EAR/PLS OXIMETRY MLT: CPT

## 2017-03-15 PROCEDURE — 99232 SBSQ HOSP IP/OBS MODERATE 35: CPT | Mod: ,,, | Performed by: INTERNAL MEDICINE

## 2017-03-15 PROCEDURE — 25000003 PHARM REV CODE 250: Performed by: INTERNAL MEDICINE

## 2017-03-15 PROCEDURE — 94668 MNPJ CHEST WALL SBSQ: CPT

## 2017-03-15 PROCEDURE — 85025 COMPLETE CBC W/AUTO DIFF WBC: CPT

## 2017-03-15 PROCEDURE — 94640 AIRWAY INHALATION TREATMENT: CPT

## 2017-03-15 PROCEDURE — 92526 ORAL FUNCTION THERAPY: CPT

## 2017-03-15 PROCEDURE — 96372 THER/PROPH/DIAG INJ SC/IM: CPT

## 2017-03-15 PROCEDURE — 25000242 PHARM REV CODE 250 ALT 637 W/ HCPCS: Performed by: INTERNAL MEDICINE

## 2017-03-15 PROCEDURE — 25000003 PHARM REV CODE 250: Performed by: NURSE PRACTITIONER

## 2017-03-15 PROCEDURE — 27000221 HC OXYGEN, UP TO 24 HOURS

## 2017-03-15 RX ADMIN — IPRATROPIUM BROMIDE AND ALBUTEROL SULFATE 3 ML: .5; 3 SOLUTION RESPIRATORY (INHALATION) at 09:03

## 2017-03-15 RX ADMIN — PROMETHAZINE HYDROCHLORIDE 12.5 MG: 25 INJECTION, SOLUTION INTRAMUSCULAR; INTRAVENOUS at 03:03

## 2017-03-15 RX ADMIN — HYDROMORPHONE HYDROCHLORIDE 1 MG: 1 INJECTION, SOLUTION INTRAMUSCULAR; INTRAVENOUS; SUBCUTANEOUS at 09:03

## 2017-03-15 RX ADMIN — HYDROMORPHONE HYDROCHLORIDE 1 MG: 1 INJECTION, SOLUTION INTRAMUSCULAR; INTRAVENOUS; SUBCUTANEOUS at 02:03

## 2017-03-15 RX ADMIN — METHYLPREDNISOLONE SODIUM SUCCINATE 20 MG: 40 INJECTION, POWDER, FOR SOLUTION INTRAMUSCULAR; INTRAVENOUS at 05:03

## 2017-03-15 RX ADMIN — ONDANSETRON 4 MG: 2 INJECTION INTRAMUSCULAR; INTRAVENOUS at 09:03

## 2017-03-15 RX ADMIN — SODIUM CHLORIDE: 0.9 INJECTION, SOLUTION INTRAVENOUS at 10:03

## 2017-03-15 RX ADMIN — ARFORMOTEROL TARTRATE 15 MCG: 15 SOLUTION RESPIRATORY (INHALATION) at 08:03

## 2017-03-15 RX ADMIN — HYDROMORPHONE HYDROCHLORIDE 1 MG: 1 INJECTION, SOLUTION INTRAMUSCULAR; INTRAVENOUS; SUBCUTANEOUS at 07:03

## 2017-03-15 RX ADMIN — MOXIFLOXACIN HYDROCHLORIDE 400 MG: 400 INJECTION, SOLUTION INTRAVENOUS at 11:03

## 2017-03-15 RX ADMIN — SODIUM CHLORIDE: 0.9 INJECTION, SOLUTION INTRAVENOUS at 01:03

## 2017-03-15 RX ADMIN — CEFEPIME HYDROCHLORIDE: 2 INJECTION, POWDER, FOR SOLUTION INTRAVENOUS at 11:03

## 2017-03-15 RX ADMIN — ONDANSETRON 4 MG: 2 INJECTION INTRAMUSCULAR; INTRAVENOUS at 01:03

## 2017-03-15 RX ADMIN — HYDROMORPHONE HYDROCHLORIDE 1 MG: 1 INJECTION, SOLUTION INTRAMUSCULAR; INTRAVENOUS; SUBCUTANEOUS at 08:03

## 2017-03-15 RX ADMIN — HYDROMORPHONE HYDROCHLORIDE 1 MG: 1 INJECTION, SOLUTION INTRAMUSCULAR; INTRAVENOUS; SUBCUTANEOUS at 04:03

## 2017-03-15 RX ADMIN — HYDROMORPHONE HYDROCHLORIDE 1 MG: 1 INJECTION, SOLUTION INTRAMUSCULAR; INTRAVENOUS; SUBCUTANEOUS at 03:03

## 2017-03-15 RX ADMIN — HYDROMORPHONE HYDROCHLORIDE 1 MG: 1 INJECTION, SOLUTION INTRAMUSCULAR; INTRAVENOUS; SUBCUTANEOUS at 11:03

## 2017-03-15 RX ADMIN — HYDROMORPHONE HYDROCHLORIDE 1 MG: 1 INJECTION, SOLUTION INTRAMUSCULAR; INTRAVENOUS; SUBCUTANEOUS at 01:03

## 2017-03-15 RX ADMIN — CEFEPIME HYDROCHLORIDE: 2 INJECTION, POWDER, FOR SOLUTION INTRAVENOUS at 09:03

## 2017-03-15 RX ADMIN — FAMOTIDINE 20 MG: 20 INJECTION, SOLUTION INTRAVENOUS at 08:03

## 2017-03-15 RX ADMIN — ONDANSETRON 4 MG: 2 INJECTION INTRAMUSCULAR; INTRAVENOUS at 05:03

## 2017-03-15 RX ADMIN — BUDESONIDE 0.5 MG: 0.5 INHALANT RESPIRATORY (INHALATION) at 07:03

## 2017-03-15 RX ADMIN — METHYLPREDNISOLONE SODIUM SUCCINATE 20 MG: 40 INJECTION, POWDER, FOR SOLUTION INTRAMUSCULAR; INTRAVENOUS at 01:03

## 2017-03-15 RX ADMIN — BUDESONIDE 0.5 MG: 0.5 INHALANT RESPIRATORY (INHALATION) at 09:03

## 2017-03-15 RX ADMIN — CEFEPIME HYDROCHLORIDE: 2 INJECTION, POWDER, FOR SOLUTION INTRAVENOUS at 03:03

## 2017-03-15 RX ADMIN — HYDROMORPHONE HYDROCHLORIDE 1 MG: 1 INJECTION, SOLUTION INTRAMUSCULAR; INTRAVENOUS; SUBCUTANEOUS at 10:03

## 2017-03-15 RX ADMIN — ARFORMOTEROL TARTRATE 15 MCG: 15 SOLUTION RESPIRATORY (INHALATION) at 07:03

## 2017-03-15 RX ADMIN — HYDROMORPHONE HYDROCHLORIDE 1 MG: 1 INJECTION, SOLUTION INTRAMUSCULAR; INTRAVENOUS; SUBCUTANEOUS at 12:03

## 2017-03-15 RX ADMIN — HYDROMORPHONE HYDROCHLORIDE 1 MG: 1 INJECTION, SOLUTION INTRAMUSCULAR; INTRAVENOUS; SUBCUTANEOUS at 05:03

## 2017-03-15 RX ADMIN — CEFEPIME: 2 INJECTION, POWDER, FOR SOLUTION INTRAVENOUS at 02:03

## 2017-03-15 RX ADMIN — METHYLPREDNISOLONE SODIUM SUCCINATE 20 MG: 40 INJECTION, POWDER, FOR SOLUTION INTRAMUSCULAR; INTRAVENOUS at 09:03

## 2017-03-15 RX ADMIN — SODIUM CHLORIDE: 0.9 INJECTION, SOLUTION INTRAVENOUS at 02:03

## 2017-03-15 RX ADMIN — FAMOTIDINE 20 MG: 20 INJECTION, SOLUTION INTRAVENOUS at 09:03

## 2017-03-15 NOTE — PLAN OF CARE
Problem: SLP Goal  Goal: SLP Goal  1. Continue to assess for PO readiness and tolerance of pleasure feedings.   2. Complete laryngeal elevation, mer maneuver, and mendelsohn maneuver x 10 repetitions each to improve pharyngeal swallow function.   Outcome: Ongoing (interventions implemented as appropriate)  Pt tolerated tsp trials of applesauce without s/s of aspiration.

## 2017-03-15 NOTE — PLAN OF CARE
Problem: Patient Care Overview  Goal: Plan of Care Review  PT REQUIRES CGA FOR T/F TO CHAIR   Outcome: Ongoing (interventions implemented as appropriate)  Pt remains free of injuries. Pain moderately controlled c PRN meds and relaxation techniques. IVF and TF per MD orders. Tolerating TF well. Residuals between 0-10 mL. Q2h weight shift asisitance provided. 12 hr chart check completed. Will continue to monitor.

## 2017-03-15 NOTE — PLAN OF CARE
Problem: Patient Care Overview  Goal: Plan of Care Review  PT REQUIRES CGA FOR T/F TO CHAIR   Outcome: Ongoing (interventions implemented as appropriate)  Fall prevention precautions maintained, pt remained free of falls throughout shift, call bell and personal items within reach, pt's pain adequately controlled by prn pain medication. Pt turned Q2 hours. 24 hour chart check completed. Will continue to monitor

## 2017-03-15 NOTE — SUBJECTIVE & OBJECTIVE
Interval History:   Pain much better control this morning    Oncology Treatment Plan:   [facility]I MK 3475 KEYNOTE 240    Medications:  Continuous Infusions:   sodium chloride 0.9% 125 mL/hr at 03/15/17 0234     Scheduled Meds:   arformoterol  15 mcg Nebulization BID    budesonide  0.5 mg Nebulization BID    custom IVPB builder   Intravenous Q8H    famotidine  20 mg Intravenous BID    fentaNYL  1 patch Transdermal Q72H    lidocaine HCL 10 mg/ml (1%)  1 mL Intradermal Once    methylPREDNISolone sodium succinate  20 mg Intravenous Q8H    moxifloxacin  400 mg Intravenous Q24H    ondansetron  4 mg Intravenous Q8H     PRN Meds:albuterol-ipratropium 2.5mg-0.5mg/3mL, guaifenesin 100 mg/5 ml, hydrALAZINE, HYDROmorphone, lorazepam, promethazine (PHENERGAN) IVPB     Review of Systems   Constitutional: Positive for activity change, appetite change and fatigue.   HENT: Negative for congestion, dental problem, ear pain, hearing loss and nosebleeds.    Eyes: Negative for discharge, redness and itching.   Respiratory: Positive for shortness of breath. Negative for wheezing and stridor.    Cardiovascular: Positive for chest pain. Negative for palpitations and leg swelling.   Gastrointestinal: Positive for nausea. Negative for abdominal distention, abdominal pain and anal bleeding.   Endocrine: Negative for cold intolerance, heat intolerance and polydipsia.   Genitourinary: Negative for difficulty urinating, dysuria, enuresis and frequency.   Neurological: Negative for dizziness, tremors, facial asymmetry and headaches.   All other systems reviewed and are negative.    Objective:     Vital Signs (Most Recent):  Temp: 97.9 °F (36.6 °C) (03/15/17 0436)  Pulse: 94 (03/15/17 0436)  Resp: 17 (03/15/17 0436)  BP: 101/67 (03/15/17 0436)  SpO2: 98 % (03/15/17 0436) Vital Signs (24h Range):  Temp:  [97.7 °F (36.5 °C)-98.4 °F (36.9 °C)] 97.9 °F (36.6 °C)  Pulse:  [79-96] 94  Resp:  [16-20] 17  SpO2:  [98 %-100 %] 98 %  BP: ()/(58-71)  101/67     Weight: 45.6 kg (100 lb 8.5 oz)  Body mass index is 18.39 kg/(m^2).  Body surface area is 1.41 meters squared.      Intake/Output Summary (Last 24 hours) at 03/15/17 0648  Last data filed at 03/15/17 0500   Gross per 24 hour   Intake          4671.01 ml   Output             2420 ml   Net          2251.01 ml       Physical Exam   Constitutional: He is oriented to person, place, and time. He appears cachectic.   HENT:   Head: Normocephalic.   Right Ear: External ear normal.   Left Ear: External ear normal.   Nose: Nose normal.   Mouth/Throat: No oropharyngeal exudate.   Eyes: EOM are normal. Pupils are equal, round, and reactive to light. Right eye exhibits no discharge. Left eye exhibits no discharge.   Neck: No tracheal deviation present. No thyromegaly present.   Cardiovascular: Normal rate, regular rhythm and normal heart sounds.  Exam reveals no gallop.    No murmur heard.  Pulmonary/Chest: No respiratory distress. He has no wheezes. He has no rales.   Abdominal: Soft. He exhibits no distension and no mass. There is no rebound and no guarding.   Peg tube in place   Musculoskeletal: Normal range of motion. He exhibits no edema, tenderness or deformity.   Lymphadenopathy:     He has no cervical adenopathy.   Neurological: He is alert and oriented to person, place, and time.   Skin: Skin is warm and dry.   Psychiatric: He has a normal mood and affect. His behavior is normal.       Significant Labs:   CBC:   Recent Labs  Lab 03/14/17  0232 03/15/17  0440   WBC 17.78* 19.67*   HGB 11.1* 11.1*   HCT 34.3* 34.4*   * 135*   , CMP:   Recent Labs  Lab 03/14/17  0232 03/15/17  0440    147*   K 4.3 4.3   * 112*   CO2 27 28   * 166*   BUN 20 18   CREATININE 0.6 0.6   CALCIUM 8.6* 8.6*   PROT 5.6* 5.6*   ALBUMIN 2.3* 2.2*   BILITOT 0.4 0.3   ALKPHOS 135 139*   AST 28 23   * 96*   ANIONGAP 7* 7*   EGFRNONAA >60 >60   , LDH: No results for input(s): LDHCSF, BFSOURCE in the last 48  hours. and LFTs:   Recent Labs  Lab 03/14/17  0232 03/15/17  0440   * 96*   AST 28 23   ALKPHOS 135 139*   BILITOT 0.4 0.3   PROT 5.6* 5.6*   ALBUMIN 2.3* 2.2*       Diagnostic Results:  I have reviewed all pertinent imaging results/findings within the past 24 hours.

## 2017-03-15 NOTE — PT/OT/SLP PROGRESS
Physical Therapy      Frederick J Lejeune  MRN: [id number]    PT REFUSED P.T. REPORTED INC PAIN AND UNABLE TO PARTICIPATE. PT CONT REFUSING P.T. TX AND WILL BE D/C D/T NONCOMPLIANCE WITH TX. PT WILL BE D/C TO MOBILITY PROGRAM FOR MAINTENANCE.    Jojo Robledo, PT 3/15/2017

## 2017-03-15 NOTE — ASSESSMENT & PLAN NOTE
S/p Thoracentesis;   Has chronic aspiration, a tracheo-esophageal fistula,active lung cancer, some degree of radiation pneumonitis and COPD all contributing  --abx  --solumedrol 20mg Q 8      I

## 2017-03-15 NOTE — PT/OT/SLP PROGRESS
Speech Language Pathology  Treatment    Frederick J Lejeune   MRN: [id number]   Admitting Diagnosis: Primary malignant neoplasm of right lung metastatic to other site    Diet recommendations: Solid Diet Level: NPO  Liquid Diet Level: NPO Small bites/sips and 1 bite/sip at a time    SLP Treatment Date: 03/15/17  Speech Start Time: 955     Speech Stop Time: 1019     Speech Total (min): 24 min       TREATMENT BILLABLE MINUTES:  Treatment Swallowing Dysfunction 24    Has the patient been evaluated by SLP for swallowing? : Yes  Keep patient NPO?: Yes   General Precautions: Standard, aspiration, fall  Current Respiratory Status: nasal cannula       Subjective:  Pt cooperative and alert.     Pain Ratin/10              Pain Rating Post-Intervention: 0/10    Objective:    Pt agreeable to po trials.he performed oral hygiene before trial feeding. He tolerated 1/2 tsp bites of applesauce via spoon without overt s/s of aspiration. Laryngeal elevation was WNL during trial. He stated that he felt slow emptying in esophagus with trials. Tongue base retraction ex were completed x 10 each with min cues to improve swallow function.     FIM:                                 Assessment:  Frederick J Lejeune is a 66 y.o. male with a medical diagnosis of Primary malignant neoplasm of right lung metastatic to other site and presents with dysphagia.    Discharge recommendations: Discharge Facility/Level Of Care Needs: nursing facility, skilled     Goals:   SLP Goals        Problem: SLP Goal    Goal Priority Disciplines Outcome   SLP Goal     SLP Ongoing (interventions implemented as appropriate)   Description:  1. Continue to assess for PO readiness and tolerance of pleasure feedings.   2. Complete laryngeal elevation, mer maneuver, and mendelsohn maneuver x 10 repetitions each to improve pharyngeal swallow function.                Plan:   Patient to be seen Therapy Frequency: 3 x/week   Plan of Care expires: 17  Plan of Care  reviewed with: patient  SLP Follow-up?: Yes              Carolina Castaneda CCC-SLP  03/15/2017

## 2017-03-15 NOTE — PLAN OF CARE
Consult received for SNF placement. Met with patient discussed options for receiving skilled placement. Sr Resource Guide Provided with listings of skilled facilities in the area. Patient stated that he would discuss with his wife and make a decision and call me. I explained that he could have his nurse or the charge nurse call me when they have made a decision. Update to Dr Bourgeois.    @ [phone] [address] called on to [facility]. Will place 142 in chart once available.

## 2017-03-15 NOTE — PROGRESS NOTES
[facility] -   Hematology/Oncology  Progress Note    Patient Name: Frederick J Lejeune  Admission Date: 3/3/2017  Hospital Length of Stay: 11 days  Code Status: Full Code     Subjective:     HPI:  History of Present Illness:  Frederick J Lejeune is a 66 y.o. male with GERD has a diagnosis of squamous cell carcinoma, PDL-1 positive.  He initlaly presneted  1 month history of weight loss, chest pain, dysphagia, odynophagia found to have esophageal narrowing and RLL lung mass.He has had difficulty eating due to odynophagia for the past month. He also states he has L groin pain due to what he thought was a hernia, which has since been found to be firm lymphadenopathy. Patient underwent EGD 10/19, which was notable for extrinsic compression in middle third of the esophagus as well as findings suggestive of Palma's esophagus, which were biopsied. 10/20, patient underwent EUS notable for lymphadenopathy in the paratracheal, mediastinal, and subcarinal region. FNA performed. Patient underwent bronchoscopy on 10/21 which was notable for a mass in the right mainstem bronchus, biopsy revealed lung cancer, squamous cell carcinoma.   Patient also completed palliative radiation to relieve right mainstem bronchus obstruction. He then was treated with weekly Carbo-Taxol x 6-7 weeks, which was discontinued due to disease progression on imaging in early 1/2017. He is currently receiving Pembrolizumab every 3 weeks.     The patient recently had placement of an esophageal stent due to obstruction.  Since having the stent placed, he has continue with severe pain on swallowing as well as a choking sensation every time he eats.  He has had studies  that shows the stent to the patient. He has been having aspirations by radiographic swallowing studies.  It is planned for him to have an EGd and feeding tube lalced for nutrition.  Plan is to continue Keytruda treatment once discharged    He remains very SOB, more so in the  last few weeks             Past Medical History:  COPD, severe  GERD  Lumbar vertebral fracture s/p surgery in 1/2016 / Chronic low back pain  H/o intracranial hemorrhage     Social History   Marital status:       Spouse name: ganesh   Number of children: 2   Years of education: N/A     Occupational History     [employer]     Social History Main Topics   Smoking status: Former Smoker      Packs/day: 1.00      Years: 50.00      Types: Cigarettes      Quit date: 5/10/2016   Smokeless tobacco: Former User      Types: Snuff      Quit date: 5/10/2016   Alcohol use No   Drug use: No   Sexual activity: Yes      Partners: Female     Other Topics Concern   Not on file     Social History Narrative        Family History: family history includes Cancer in his father; Emphysema in his mother.           Interval History:   Pain much better control this morning    Oncology Treatment Plan:   OCI MK 3475 KEYNOTE 240    Medications:  Continuous Infusions:   sodium chloride 0.9% 125 mL/hr at 03/15/17 0234     Scheduled Meds:   arformoterol  15 mcg Nebulization BID    budesonide  0.5 mg Nebulization BID    custom IVPB builder   Intravenous Q8H    famotidine  20 mg Intravenous BID    fentaNYL  1 patch Transdermal Q72H    lidocaine HCL 10 mg/ml (1%)  1 mL Intradermal Once    methylPREDNISolone sodium succinate  20 mg Intravenous Q8H    moxifloxacin  400 mg Intravenous Q24H    ondansetron  4 mg Intravenous Q8H     PRN Meds:albuterol-ipratropium 2.5mg-0.5mg/3mL, guaifenesin 100 mg/5 ml, hydrALAZINE, HYDROmorphone, lorazepam, promethazine (PHENERGAN) IVPB     Review of Systems   Constitutional: Positive for activity change, appetite change and fatigue.   HENT: Negative for congestion, dental problem, ear pain, hearing loss and nosebleeds.    Eyes: Negative for discharge, redness and itching.   Respiratory: Positive for shortness of breath. Negative for wheezing and stridor.    Cardiovascular: Positive for  chest pain. Negative for palpitations and leg swelling.   Gastrointestinal: Positive for nausea. Negative for abdominal distention, abdominal pain and anal bleeding.   Endocrine: Negative for cold intolerance, heat intolerance and polydipsia.   Genitourinary: Negative for difficulty urinating, dysuria, enuresis and frequency.   Neurological: Negative for dizziness, tremors, facial asymmetry and headaches.   All other systems reviewed and are negative.    Objective:     Vital Signs (Most Recent):  Temp: 97.9 °F (36.6 °C) (03/15/17 0436)  Pulse: 94 (03/15/17 0436)  Resp: 17 (03/15/17 0436)  BP: 101/67 (03/15/17 0436)  SpO2: 98 % (03/15/17 0436) Vital Signs (24h Range):  Temp:  [97.7 °F (36.5 °C)-98.4 °F (36.9 °C)] 97.9 °F (36.6 °C)  Pulse:  [79-96] 94  Resp:  [16-20] 17  SpO2:  [98 %-100 %] 98 %  BP: ()/(58-71) 101/67     Weight: 45.6 kg (100 lb 8.5 oz)  Body mass index is 18.39 kg/(m^2).  Body surface area is 1.41 meters squared.      Intake/Output Summary (Last 24 hours) at 03/15/17 0648  Last data filed at 03/15/17 0500   Gross per 24 hour   Intake          4671.01 ml   Output             2420 ml   Net          2251.01 ml       Physical Exam   Constitutional: He is oriented to person, place, and time. He appears cachectic.   HENT:   Head: Normocephalic.   Right Ear: External ear normal.   Left Ear: External ear normal.   Nose: Nose normal.   Mouth/Throat: No oropharyngeal exudate.   Eyes: EOM are normal. Pupils are equal, round, and reactive to light. Right eye exhibits no discharge. Left eye exhibits no discharge.   Neck: No tracheal deviation present. No thyromegaly present.   Cardiovascular: Normal rate, regular rhythm and normal heart sounds.  Exam reveals no gallop.    No murmur heard.  Pulmonary/Chest: No respiratory distress. He has no wheezes. He has no rales.   Abdominal: Soft. He exhibits no distension and no mass. There is no rebound and no guarding.   Peg tube in place   Musculoskeletal: Normal  range of motion. He exhibits no edema, tenderness or deformity.   Lymphadenopathy:     He has no cervical adenopathy.   Neurological: He is alert and oriented to person, place, and time.   Skin: Skin is warm and dry.   Psychiatric: He has a normal mood and affect. His behavior is normal.       Significant Labs:   CBC:   Recent Labs  Lab 03/14/17 0232 03/15/17  0440   WBC 17.78* 19.67*   HGB 11.1* 11.1*   HCT 34.3* 34.4*   * 135*   , CMP:   Recent Labs  Lab 03/14/17 0232 03/15/17  0440    147*   K 4.3 4.3   * 112*   CO2 27 28   * 166*   BUN 20 18   CREATININE 0.6 0.6   CALCIUM 8.6* 8.6*   PROT 5.6* 5.6*   ALBUMIN 2.3* 2.2*   BILITOT 0.4 0.3   ALKPHOS 135 139*   AST 28 23   * 96*   ANIONGAP 7* 7*   EGFRNONAA >60 >60   , LDH: No results for input(s): LDHCSF, BFSOURCE in the last 48 hours. and LFTs:   Recent Labs  Lab 03/14/17 0232 03/15/17  0440   * 96*   AST 28 23   ALKPHOS 135 139*   BILITOT 0.4 0.3   PROT 5.6* 5.6*   ALBUMIN 2.3* 2.2*       Diagnostic Results:  I have reviewed all pertinent imaging results/findings within the past 24 hours.    Assessment/Plan:     * Primary malignant neoplasm of right lung metastatic to other site  --unlikely to be a candidate for additional treatment due to poor PS  --considering hospice placement  --continue supportive care    Bone metastases  Recent CTa of chest was reported as showing a lytic lesion in the left femoral head.  --will address as outpatient     Chronic respiratory failure with hypoxia  S/p Thoracentesis;   Has chronic aspiration, a tracheo-esophageal fistula,active lung cancer, some degree of radiation pneumonitis and COPD all contributing  --abx  --solumedrol 20mg Q 8      I        Cachexia  --started tube feeds which should improve nutritional status    Cancer related pain  Pain well controlled this morning and was unable to increase Duragesic patch due to borderline blood pressure  --continue to monitor and adjust  meds      Thank you for your consult. I will follow-up with patient. Please contact us if you have any additional questions.     Eren Willett Jr, MD  Hematology/Oncology  [facility] [place]

## 2017-03-15 NOTE — PT/OT/SLP PROGRESS
Occupational Therapy      Fiugeroaderick J Lejeune  MRN: [id number]    PT REFUSED FROM THERAPY SERVICES. PT DISCHARGED FROM SKILLED O.T. AND PLACED ON PEOPLE 'S DUE TO NON COMPLIANCE    Mildred Euceda, RUDI   0955    3/15/2017

## 2017-03-15 NOTE — ASSESSMENT & PLAN NOTE
Pain well controlled this morning and was unable to increase Duragesic patch due to borderline blood pressure  --continue to monitor and adjust meds

## 2017-03-16 PROBLEM — F41.9 ANXIETY: Status: ACTIVE | Noted: 2017-03-16

## 2017-03-16 LAB
ALBUMIN SERPL BCP-MCNC: 2.2 G/DL
ALP SERPL-CCNC: 127 U/L
ALT SERPL W/O P-5'-P-CCNC: 88 U/L
ANION GAP SERPL CALC-SCNC: 7 MMOL/L
ANISOCYTOSIS BLD QL SMEAR: SLIGHT
AST SERPL-CCNC: 25 U/L
BASOPHILS # BLD AUTO: 0 K/UL
BASOPHILS NFR BLD: 0 %
BILIRUB SERPL-MCNC: 0.3 MG/DL
BUN SERPL-MCNC: 18 MG/DL
CALCIUM SERPL-MCNC: 8.4 MG/DL
CHLORIDE SERPL-SCNC: 112 MMOL/L
CO2 SERPL-SCNC: 28 MMOL/L
CREAT SERPL-MCNC: 0.5 MG/DL
DIFFERENTIAL METHOD: ABNORMAL
EOSINOPHIL # BLD AUTO: 0 K/UL
EOSINOPHIL NFR BLD: 0.1 %
ERYTHROCYTE [DISTWIDTH] IN BLOOD BY AUTOMATED COUNT: 14 %
EST. GFR  (AFRICAN AMERICAN): >60 ML/MIN/1.73 M^2
EST. GFR  (NON AFRICAN AMERICAN): >60 ML/MIN/1.73 M^2
GLUCOSE SERPL-MCNC: 154 MG/DL
HCT VFR BLD AUTO: 33.4 %
HGB BLD-MCNC: 10.9 G/DL
LYMPHOCYTES # BLD AUTO: 0.7 K/UL
LYMPHOCYTES NFR BLD: 3 %
MCH RBC QN AUTO: 35.2 PG
MCHC RBC AUTO-ENTMCNC: 32.6 %
MCV RBC AUTO: 108 FL
MONOCYTES # BLD AUTO: 0.7 K/UL
MONOCYTES NFR BLD: 3.3 %
NEUTROPHILS # BLD AUTO: 21 K/UL
NEUTROPHILS NFR BLD: 94.3 %
PLATELET # BLD AUTO: 145 K/UL
PMV BLD AUTO: 11.8 FL
POTASSIUM SERPL-SCNC: 3.9 MMOL/L
PROT SERPL-MCNC: 5.4 G/DL
RBC # BLD AUTO: 3.1 M/UL
SODIUM SERPL-SCNC: 147 MMOL/L
WBC # BLD AUTO: 22.43 K/UL

## 2017-03-16 PROCEDURE — 25000003 PHARM REV CODE 250: Performed by: NURSE PRACTITIONER

## 2017-03-16 PROCEDURE — 25000003 PHARM REV CODE 250: Performed by: INTERNAL MEDICINE

## 2017-03-16 PROCEDURE — 85025 COMPLETE CBC W/AUTO DIFF WBC: CPT

## 2017-03-16 PROCEDURE — 25000003 PHARM REV CODE 250: Performed by: HOSPITALIST

## 2017-03-16 PROCEDURE — 63600175 PHARM REV CODE 636 W HCPCS: Performed by: INTERNAL MEDICINE

## 2017-03-16 PROCEDURE — 63600175 PHARM REV CODE 636 W HCPCS: Performed by: NURSE PRACTITIONER

## 2017-03-16 PROCEDURE — 94761 N-INVAS EAR/PLS OXIMETRY MLT: CPT

## 2017-03-16 PROCEDURE — 94664 DEMO&/EVAL PT USE INHALER: CPT

## 2017-03-16 PROCEDURE — 27000221 HC OXYGEN, UP TO 24 HOURS

## 2017-03-16 PROCEDURE — 25000242 PHARM REV CODE 250 ALT 637 W/ HCPCS: Performed by: INTERNAL MEDICINE

## 2017-03-16 PROCEDURE — 99233 SBSQ HOSP IP/OBS HIGH 50: CPT | Mod: ,,, | Performed by: INTERNAL MEDICINE

## 2017-03-16 PROCEDURE — 97803 MED NUTRITION INDIV SUBSEQ: CPT

## 2017-03-16 PROCEDURE — 80053 COMPREHEN METABOLIC PANEL: CPT

## 2017-03-16 PROCEDURE — 21400001 HC TELEMETRY ROOM

## 2017-03-16 PROCEDURE — 94640 AIRWAY INHALATION TREATMENT: CPT

## 2017-03-16 RX ORDER — ALPRAZOLAM 1 MG/1
1 TABLET ORAL EVERY 8 HOURS PRN
Status: DISCONTINUED | OUTPATIENT
Start: 2017-03-16 | End: 2017-03-16

## 2017-03-16 RX ORDER — DEXTROSE MONOHYDRATE 50 MG/ML
INJECTION, SOLUTION INTRAVENOUS CONTINUOUS
Status: DISCONTINUED | OUTPATIENT
Start: 2017-03-16 | End: 2017-03-17 | Stop reason: HOSPADM

## 2017-03-16 RX ORDER — ALPRAZOLAM 0.5 MG/1
0.5 TABLET ORAL 2 TIMES DAILY PRN
Status: DISCONTINUED | OUTPATIENT
Start: 2017-03-16 | End: 2017-03-16

## 2017-03-16 RX ADMIN — HYDROMORPHONE HYDROCHLORIDE 1 MG: 1 INJECTION, SOLUTION INTRAMUSCULAR; INTRAVENOUS; SUBCUTANEOUS at 07:03

## 2017-03-16 RX ADMIN — ARFORMOTEROL TARTRATE 15 MCG: 15 SOLUTION RESPIRATORY (INHALATION) at 07:03

## 2017-03-16 RX ADMIN — HYDROMORPHONE HYDROCHLORIDE 1 MG: 1 INJECTION, SOLUTION INTRAMUSCULAR; INTRAVENOUS; SUBCUTANEOUS at 11:03

## 2017-03-16 RX ADMIN — METHYLPREDNISOLONE SODIUM SUCCINATE 20 MG: 40 INJECTION, POWDER, FOR SOLUTION INTRAMUSCULAR; INTRAVENOUS at 09:03

## 2017-03-16 RX ADMIN — FAMOTIDINE 20 MG: 20 INJECTION, SOLUTION INTRAVENOUS at 09:03

## 2017-03-16 RX ADMIN — MOXIFLOXACIN HYDROCHLORIDE 400 MG: 400 INJECTION, SOLUTION INTRAVENOUS at 11:03

## 2017-03-16 RX ADMIN — METHYLPREDNISOLONE SODIUM SUCCINATE 20 MG: 40 INJECTION, POWDER, FOR SOLUTION INTRAMUSCULAR; INTRAVENOUS at 05:03

## 2017-03-16 RX ADMIN — BUDESONIDE 0.5 MG: 0.5 INHALANT RESPIRATORY (INHALATION) at 07:03

## 2017-03-16 RX ADMIN — ONDANSETRON 4 MG: 2 INJECTION INTRAMUSCULAR; INTRAVENOUS at 04:03

## 2017-03-16 RX ADMIN — DEXTROSE: 5 SOLUTION INTRAVENOUS at 06:03

## 2017-03-16 RX ADMIN — CEFEPIME HYDROCHLORIDE: 2 INJECTION, POWDER, FOR SOLUTION INTRAVENOUS at 05:03

## 2017-03-16 RX ADMIN — METHYLPREDNISOLONE SODIUM SUCCINATE 20 MG: 40 INJECTION, POWDER, FOR SOLUTION INTRAMUSCULAR; INTRAVENOUS at 01:03

## 2017-03-16 RX ADMIN — SODIUM CHLORIDE: 0.9 INJECTION, SOLUTION INTRAVENOUS at 07:03

## 2017-03-16 RX ADMIN — ALPRAZOLAM 1 MG: 1 TABLET ORAL at 08:03

## 2017-03-16 RX ADMIN — HYDROMORPHONE HYDROCHLORIDE 1 MG: 1 INJECTION, SOLUTION INTRAMUSCULAR; INTRAVENOUS; SUBCUTANEOUS at 05:03

## 2017-03-16 RX ADMIN — HYDROMORPHONE HYDROCHLORIDE 1 MG: 1 INJECTION, SOLUTION INTRAMUSCULAR; INTRAVENOUS; SUBCUTANEOUS at 09:03

## 2017-03-16 RX ADMIN — CEFEPIME HYDROCHLORIDE: 2 INJECTION, POWDER, FOR SOLUTION INTRAVENOUS at 09:03

## 2017-03-16 RX ADMIN — ONDANSETRON 4 MG: 2 INJECTION INTRAMUSCULAR; INTRAVENOUS at 09:03

## 2017-03-16 RX ADMIN — SODIUM CHLORIDE 500 ML: 0.9 INJECTION, SOLUTION INTRAVENOUS at 05:03

## 2017-03-16 RX ADMIN — IPRATROPIUM BROMIDE AND ALBUTEROL SULFATE 3 ML: .5; 3 SOLUTION RESPIRATORY (INHALATION) at 07:03

## 2017-03-16 RX ADMIN — HYDROMORPHONE HYDROCHLORIDE 1 MG: 1 INJECTION, SOLUTION INTRAMUSCULAR; INTRAVENOUS; SUBCUTANEOUS at 12:03

## 2017-03-16 RX ADMIN — HYDROMORPHONE HYDROCHLORIDE 1 MG: 1 INJECTION, SOLUTION INTRAMUSCULAR; INTRAVENOUS; SUBCUTANEOUS at 03:03

## 2017-03-16 RX ADMIN — ONDANSETRON 4 MG: 2 INJECTION INTRAMUSCULAR; INTRAVENOUS at 01:03

## 2017-03-16 NOTE — PLAN OF CARE
Problem: Patient Care Overview  Goal: Plan of Care Review  PT REQUIRES [facility] FOR T/F TO CHAIR   Outcome: Ongoing (interventions implemented as appropriate)  Recommendation/Intervention: 1. Continue current tubefeed Peptamen 1.5 with Prebio as tolerated. 2. Consider increasing flushes to 100ml every 3 hours due to increase in Na and Cl.  Goals: Tolerance of nutrition support  Nutrition Goal Status: goal met  Communication of RD Recs: other (comment) (sticky note)

## 2017-03-16 NOTE — PROGRESS NOTES
[facility] -   Hematology/Oncology  Progress Note    Patient Name: Frederick J Lejeune  Admission Date: 3/3/2017  Hospital Length of Stay: 12 days  Code Status: Full Code     Subjective:     HPI:  History of Present Illness:  Frederick J Lejeune is a 66 y.o. male with GERD has a diagnosis of squamous cell carcinoma, PDL-1 positive.  He initlaly presneted  1 month history of weight loss, chest pain, dysphagia, odynophagia found to have esophageal narrowing and RLL lung mass.He has had difficulty eating due to odynophagia for the past month. He also states he has L groin pain due to what he thought was a hernia, which has since been found to be firm lymphadenopathy. Patient underwent EGD 10/19, which was notable for extrinsic compression in middle third of the esophagus as well as findings suggestive of Palma's esophagus, which were biopsied. 10/20, patient underwent EUS notable for lymphadenopathy in the paratracheal, mediastinal, and subcarinal region. FNA performed. Patient underwent bronchoscopy on 10/21 which was notable for a mass in the right mainstem bronchus, biopsy revealed lung cancer, squamous cell carcinoma.   Patient also completed palliative radiation to relieve right mainstem bronchus obstruction. He then was treated with weekly Carbo-Taxol x 6-7 weeks, which was discontinued due to disease progression on imaging in early 1/2017. He is currently receiving Pembrolizumab every 3 weeks.     The patient recently had placement of an esophageal stent due to obstruction.  Since having the stent placed, he has continue with severe pain on swallowing as well as a choking sensation every time he eats.  He has had studies  that shows the stent to the patient. He has been having aspirations by radiographic swallowing studies.  It is planned for him to have an EGd and feeding tube lalced for nutrition.  Plan is to continue Keytruda treatment once discharged    He remains very SOB, more so in the  last few weeks             Past Medical History:  COPD, severe  GERD  Lumbar vertebral fracture s/p surgery in 1/2016 / Chronic low back pain  H/o intracranial hemorrhage     Social History   Marital status:       Spouse name: ganesh   Number of children: 2   Years of education: N/A     Occupational History     [employer]     Social History Main Topics   Smoking status: Former Smoker      Packs/day: 1.00      Years: 50.00      Types: Cigarettes      Quit date: 5/10/2016   Smokeless tobacco: Former User      Types: Snuff      Quit date: 5/10/2016   Alcohol use No   Drug use: No   Sexual activity: Yes      Partners: Female     Other Topics Concern   Not on file     Social History Narrative        Family History: family history includes Cancer in his father; Emphysema in his mother.           Interval History:   Patient continues to have pain and increase anxiety.     Oncology Treatment Plan:   [facility] MK [id number] KEYNOTE 240    Medications:  Continuous Infusions:   sodium chloride 0.9% 125 mL/hr at 03/16/17 0718     Scheduled Meds:   arformoterol  15 mcg Nebulization BID    budesonide  0.5 mg Nebulization BID    custom IVPB builder   Intravenous Q8H    famotidine  20 mg Intravenous BID    fentaNYL  1 patch Transdermal Q72H    lidocaine HCL 10 mg/ml (1%)  1 mL Intradermal Once    methylPREDNISolone sodium succinate  20 mg Intravenous Q8H    moxifloxacin  400 mg Intravenous Q24H    ondansetron  4 mg Intravenous Q8H     PRN Meds:albuterol-ipratropium 2.5mg-0.5mg/3mL, guaifenesin 100 mg/5 ml, hydrALAZINE, HYDROmorphone, lorazepam, promethazine (PHENERGAN) IVPB     Review of Systems   Constitutional: Positive for activity change, appetite change and fatigue. Negative for fever and unexpected weight change.   HENT: Negative for congestion, dental problem, ear pain, hearing loss and nosebleeds.    Eyes: Negative for pain, discharge, redness and itching.   Respiratory: Positive for shortness of  breath. Negative for apnea, chest tightness, wheezing and stridor.    Cardiovascular: Positive for chest pain. Negative for palpitations and leg swelling.   Gastrointestinal: Positive for nausea. Negative for abdominal distention, abdominal pain and anal bleeding.   Endocrine: Negative for cold intolerance, heat intolerance and polydipsia.   Genitourinary: Negative for difficulty urinating, dysuria, enuresis and frequency.   Musculoskeletal: Positive for arthralgias, back pain, myalgias and neck pain.        Chest pain    Neurological: Negative for dizziness, tremors, facial asymmetry and headaches.   Psychiatric/Behavioral: Positive for agitation. The patient is nervous/anxious.    All other systems reviewed and are negative.    Objective:     Vital Signs (Most Recent):  Temp: 97.8 °F (36.6 °C) (03/16/17 0715)  Pulse: 90 (03/16/17 0716)  Resp: 18 (03/16/17 0716)  BP: 122/77 (03/16/17 0715)  SpO2: 98 % (03/16/17 0716) Vital Signs (24h Range):  Temp:  [96.5 °F (35.8 °C)-98 °F (36.7 °C)] 97.8 °F (36.6 °C)  Pulse:  [] 90  Resp:  [18-22] 18  SpO2:  [95 %-100 %] 98 %  BP: (104-124)/(63-77) 122/77     Weight: 45.9 kg (101 lb 3.2 oz)  Body mass index is 18.51 kg/(m^2).  Body surface area is 1.42 meters squared.      Intake/Output Summary (Last 24 hours) at 03/16/17 0834  Last data filed at 03/16/17 0513   Gross per 24 hour   Intake          4819.42 ml   Output             1825 ml   Net          2994.42 ml       Physical Exam   Constitutional: He is oriented to person, place, and time. He appears cachectic.   HENT:   Head: Normocephalic.   Right Ear: External ear normal.   Left Ear: External ear normal.   Nose: Nose normal.   Mouth/Throat: Oropharynx is clear and moist. No oropharyngeal exudate.   Eyes: EOM are normal. Pupils are equal, round, and reactive to light. Right eye exhibits no discharge. Left eye exhibits no discharge.   Neck: No tracheal deviation present. No thyromegaly present.   Cardiovascular: Normal  rate, regular rhythm and normal heart sounds.  Exam reveals no gallop.    No murmur heard.  Pulmonary/Chest: No respiratory distress. He has no wheezes. He has no rales.   Abdominal: Soft. He exhibits no distension and no mass. There is no rebound and no guarding.   Peg tube in place   Musculoskeletal: Normal range of motion. He exhibits no edema, tenderness or deformity.   Lymphadenopathy:     He has no cervical adenopathy.   Neurological: He is alert and oriented to person, place, and time.   Skin: Skin is warm and dry.   Psychiatric: He has a normal mood and affect. His behavior is normal.       Significant Labs:   CBC:   Recent Labs  Lab 03/15/17  0440 03/16/17  0247   WBC 19.67* 22.43*   HGB 11.1* 10.9*   HCT 34.4* 33.4*   * 145*   , CMP:   Recent Labs  Lab 03/15/17  0440 03/16/17  0247   * 147*   K 4.3 3.9   * 112*   CO2 28 28   * 154*   BUN 18 18   CREATININE 0.6 0.5   CALCIUM 8.6* 8.4*   PROT 5.6* 5.4*   ALBUMIN 2.2* 2.2*   BILITOT 0.3 0.3   ALKPHOS 139* 127   AST 23 25   ALT 96* 88*   ANIONGAP 7* 7*   EGFRNONAA >60 >60   , LFTs:   Recent Labs  Lab 03/15/17  0440 03/16/17  0247   ALT 96* 88*   AST 23 25   ALKPHOS 139* 127   BILITOT 0.3 0.3   PROT 5.6* 5.4*   ALBUMIN 2.2* 2.2*    and Reticulocytes: No results for input(s): RETIC in the last 48 hours.    Diagnostic Results:  I have reviewed all pertinent imaging results/findings within the past 24 hours.    Assessment/Plan:     * Primary malignant neoplasm of right lung metastatic to other site  --unlikely to be a candidate for additional treatment due to poor PS  --considering hospice placement  --continue supportive care    Bone metastases  Recent CTa of chest was reported as showing a lytic lesion in the left femoral head.  --will address as outpatient     Cachexia  --started tube feeds which should improve nutritional status    Severe protein-calorie malnutrition  --started tube feeds    Broncho-esophageal fistula  Ct report  showing an air tract between the esophagus and trachea suggesting a fistula.  Patient is not interested in surgical procedures at this time  Would address later on. I am unsure if there is anything that can be offered for yhis and Thoracic surgery input may be needed    Cancer related pain  Pain well controlled this morning and was unable to increase Duragesic patch due to borderline blood pressure  --continue to monitor and adjust meds    Anxiety  Anxiety related to diagnosis/pain  --xanax 1mg Q8 prn      Thank you for your consult. I will follow-up with patient. Please contact us if you have any additional questions.     Eren Willett Jr, MD  Hematology/Oncology  [facility]

## 2017-03-16 NOTE — SUBJECTIVE & OBJECTIVE
Interval History:  Very weak and tired.  PEG tube placed 09 March.  Difficulty with tube feedings initially but able to slowly advance.  At goal 50 mL/h with IV fluids.    Review of Systems   Constitutional: Positive for activity change, appetite change and fatigue. Negative for fever.   HENT: Negative.  Negative for congestion, nosebleeds and sore throat.    Eyes: Negative.  Negative for photophobia, redness and visual disturbance.   Respiratory: Negative for cough, shortness of breath and wheezing.    Cardiovascular: Negative.  Negative for chest pain, palpitations and leg swelling.   Gastrointestinal: Positive for abdominal pain. Negative for constipation, diarrhea, nausea (dysphagia) and vomiting.   Endocrine: Negative.  Negative for polydipsia, polyphagia and polyuria.   Genitourinary: Negative.  Negative for dysuria, flank pain, frequency and urgency.   Musculoskeletal: Negative.  Negative for arthralgias, back pain and joint swelling.   Skin: Negative.  Negative for color change, pallor and rash.   Allergic/Immunologic: Negative.  Negative for environmental allergies, food allergies and immunocompromised state.   Neurological: Negative.  Negative for dizziness, syncope, weakness, light-headedness, numbness and headaches.   Hematological: Negative.  Negative for adenopathy. Does not bruise/bleed easily.   Psychiatric/Behavioral: Negative.  Negative for confusion and hallucinations. The patient is not nervous/anxious.    All other systems reviewed and are negative.    Objective:     Vital Signs (Most Recent):  Temp: 97.7 °F (36.5 °C) (03/15/17 2002)  Pulse: 92 (03/15/17 2100)  Resp: (!) 22 (03/15/17 2100)  BP: 108/63 (03/15/17 2002)  SpO2: 96 % (03/15/17 2100) Vital Signs (24h Range):  Temp:  [96.5 °F (35.8 °C)-98.1 °F (36.7 °C)] 97.7 °F (36.5 °C)  Pulse:  [66-96] 92  Resp:  [17-22] 22  SpO2:  [95 %-99 %] 96 %  BP: (101-130)/(63-74) 108/63     Weight: 45.6 kg (100 lb 8.5 oz)  Body mass index is 18.39  kg/(m^2).    Intake/Output Summary (Last 24 hours) at 03/15/17 2338  Last data filed at 03/15/17 2118   Gross per 24 hour   Intake          4901.59 ml   Output             2475 ml   Net          2426.59 ml      Physical Exam   Constitutional: He is oriented to person, place, and time. He appears well-developed.   Cachectic, ill appearing   HENT:   Head: Normocephalic.   Mouth/Throat: No oropharyngeal exudate.   Eyes: Pupils are equal, round, and reactive to light.   Neck: No thyromegaly present.   Cardiovascular: Normal rate, regular rhythm and normal heart sounds.  Exam reveals no gallop.    No murmur heard.  Pulmonary/Chest: Effort normal and breath sounds normal. No respiratory distress. He has no rales.   Abdominal: Soft. Bowel sounds are normal. He exhibits no distension and no mass. There is no rebound and no guarding.   Musculoskeletal: Normal range of motion. He exhibits no edema.   Lymphadenopathy:     He has no cervical adenopathy.   Neurological: He is alert and oriented to person, place, and time.   Skin: Skin is warm and dry.   Psychiatric:   Quiet mood       Significant Labs:   CBC:     Recent Labs  Lab 03/14/17  0232 03/15/17  0440   WBC 17.78* 19.67*   HGB 11.1* 11.1*   HCT 34.3* 34.4*   * 135*     CMP:     Recent Labs  Lab 03/14/17  0232 03/15/17  0440    147*   K 4.3 4.3   * 112*   CO2 27 28   * 166*   BUN 20 18   CREATININE 0.6 0.6   CALCIUM 8.6* 8.6*   PROT 5.6* 5.6*   ALBUMIN 2.3* 2.2*   BILITOT 0.4 0.3   ALKPHOS 135 139*   AST 28 23   * 96*   ANIONGAP 7* 7*   EGFRNONAA >60 >60       Significant Imaging: I have reviewed all pertinent imaging results/findings within the past 24 hours.

## 2017-03-16 NOTE — PROGRESS NOTES
[facility] Hospital Medicine  Progress Note    Patient Name: Frederick J Lejeune  MRN: [id number]  Patient Class: IP- Inpatient   Admission Date: 3/3/2017  Length of Stay: 11 days  Attending Physician: Derek Bourgeois MD  Primary Care Provider: Kenrick Muñoz MD        Subjective:     Principal Problem:Primary malignant neoplasm of right lung metastatic to other site    HPI:  Frederick J Lejeune is a 66 y.o. male with PMHx of GERD, weight loss, dysphagia, odynophagia, RLL lung mass squamous cell carcinoma, who presented to ER with continued disphagia. He has been recently hospitalized from Dr. Espinosa's office with c/o dysphagia, weakness, dizziness, chest pain. He has had difficulty eating due to odynophagia for the past month. Patient has completed palliative radiation to relieve right mainstem bronchus obstruction. He then was treated with weekly Carbo-Taxol x 6-7 weeks, which was discontinued due to disease progression on imaging in early 1/2017. He is currently receiving Pembrolizumab every 3 weeks. He was recently evaluated by Dr. Cheng, at last hospitalization, who recommended further evaluation with esophagram, that was done on 2/24/17 with Esophageal stent placement. Per discharge summary, Dr. Cheng stated if patient continues to have severe dysphagia or chest pain then he could remove the stent and place PEG tube. He was discharged on 2/28/17.    Patient returns to the hospital 3 days after discharge with symptoms of inability to keep anything down. Getting dehydrated, He is agreeable for PEG placement.      Hospital Course:  GI, Dr. Anguiano, was consulted. Esophagogram showed patent stent, he showed signs of severe aspiration. Patient made NPO and IV Cefepime started. Patient continues with mild respiratory distress and placed on Oxygen, IV steroids, nebulizers, IS, acapella. Discussed code status twice with patient and he wants to remain a full code. He also requested more IV pain  "medicine. Discussed with Dr. Bourgeois and Dr. Anguiano. Patient states, "under NO circumstances can you call my family." Pulmonary was consulted for continued hypoxia requiring 4 liters O2. IV Diuretics started. CT chest showed enlarging bilateral pleural effusions, stable metastatic lung CA, and evidence of tracheoesophageal fistula. Dr. Anguiano spoke with the Radiologist explaining patient had esophagram with barium a few days ago. Radiologist said that could be what he saw and will revise his CT report, but addendum didn't r/o fistula. Dr. Marie performed thoracentesis on the right removing 560cc. No family at bedside. Scheduled multidisciplinary rounds 3/9/17.  Placed PEG tube 09 March.  Difficulty tolerating tube feedings at first but able to advance slowly.    Interval History:  Very weak and tired.  PEG tube placed 09 March.  Difficulty with tube feedings initially but able to slowly advance.  At goal 50 mL/h today.    Review of Systems   Constitutional: Positive for activity change, appetite change and fatigue. Negative for fever.   HENT: Negative.  Negative for congestion, nosebleeds and sore throat.    Eyes: Negative.  Negative for photophobia, redness and visual disturbance.   Respiratory: Negative for cough, shortness of breath and wheezing.    Cardiovascular: Negative.  Negative for chest pain, palpitations and leg swelling.   Gastrointestinal: Positive for abdominal pain. Negative for constipation, diarrhea, nausea (dysphagia) and vomiting.   Endocrine: Negative.  Negative for polydipsia, polyphagia and polyuria.   Genitourinary: Negative.  Negative for dysuria, flank pain, frequency and urgency.   Musculoskeletal: Negative.  Negative for arthralgias, back pain and joint swelling.   Skin: Negative.  Negative for color change, pallor and rash.   Allergic/Immunologic: Negative.  Negative for environmental allergies, food allergies and immunocompromised state.   Neurological: Negative.  Negative for " dizziness, syncope, weakness, light-headedness, numbness and headaches.   Hematological: Negative.  Negative for adenopathy. Does not bruise/bleed easily.   Psychiatric/Behavioral: Negative.  Negative for confusion and hallucinations. The patient is not nervous/anxious.    All other systems reviewed and are negative.    Objective:     Vital Signs (Most Recent):  Temp: 97.7 °F (36.5 °C) (03/15/17 2002)  Pulse: 92 (03/15/17 2100)  Resp: (!) 22 (03/15/17 2100)  BP: 108/63 (03/15/17 2002)  SpO2: 96 % (03/15/17 2100) Vital Signs (24h Range):  Temp:  [96.5 °F (35.8 °C)-98.1 °F (36.7 °C)] 97.7 °F (36.5 °C)  Pulse:  [66-96] 92  Resp:  [17-22] 22  SpO2:  [95 %-99 %] 96 %  BP: (101-130)/(63-74) 108/63     Weight: 45.6 kg (100 lb 8.5 oz)  Body mass index is 18.39 kg/(m^2).    Intake/Output Summary (Last 24 hours) at 03/15/17 2334  Last data filed at 03/15/17 2118   Gross per 24 hour   Intake          4901.59 ml   Output             2475 ml   Net          2426.59 ml      Physical Exam   Constitutional: He is oriented to person, place, and time. He appears well-developed.   Cachectic, ill appearing   HENT:   Head: Normocephalic.   Mouth/Throat: No oropharyngeal exudate.   Eyes: Pupils are equal, round, and reactive to light.   Neck: No thyromegaly present.   Cardiovascular: Normal rate, regular rhythm and normal heart sounds.  Exam reveals no gallop.    No murmur heard.  Pulmonary/Chest: Effort normal and breath sounds normal. No respiratory distress. He has no rales.   Abdominal: Soft. Bowel sounds are normal. He exhibits no distension and no mass. There is no rebound and no guarding.   Musculoskeletal: Normal range of motion. He exhibits no edema.   Lymphadenopathy:     He has no cervical adenopathy.   Neurological: He is alert and oriented to person, place, and time.   Skin: Skin is warm and dry.   Psychiatric:   Quiet mood       Significant Labs:   CBC:     Recent Labs  Lab 03/14/17  0232 03/15/17  0440   WBC 17.78* 19.67*    HGB 11.1* 11.1*   HCT 34.3* 34.4*   * 135*     CMP:     Recent Labs  Lab 03/14/17  0232 03/15/17  0440    147*   K 4.3 4.3   * 112*   CO2 27 28   * 166*   BUN 20 18   CREATININE 0.6 0.6   CALCIUM 8.6* 8.6*   PROT 5.6* 5.6*   ALBUMIN 2.3* 2.2*   BILITOT 0.4 0.3   ALKPHOS 135 139*   AST 28 23   * 96*   ANIONGAP 7* 7*   EGFRNONAA >60 >60       Significant Imaging: I have reviewed all pertinent imaging results/findings within the past 24 hours.    Assessment/Plan:      * Primary malignant neoplasm of right lung metastatic to other site  Follow up with oncology as out-patient for treatment with Keytruda.  Discussed with DR Espinosa, patient's primary oncologist.    COPD, very severe  Pulmonary following    Esophageal dysphagia  GI consulted and has been following.  Esophagram showed patent stent and severe aspiration.  Receiving IV Dilaudid 1 mg q 2 hours.  PEG placed 09 March by GI.    Bone metastases  Lytic lesion, 2.1 cm, in the left humeral head.    Cachexia  At goal with additional IV fluids tube feeds by PEG tube.    Severe protein-calorie malnutrition  Dietitian recommended:  Isosource 1.5 was not available at this time so I substituted Peptamen 1.5 prebio.  Check with dietary again during the week for further recommendations.  Needs additional free water to maintain volume status.    VTE Risk Mitigation         Ordered     Place sequential compression device  Until discontinued      03/04/17 0222     Medium Risk of VTE  Once      03/04/17 0217          Derek Bourgeois MD  Department of Hospital Medicine   [facility]

## 2017-03-16 NOTE — PROGRESS NOTES
Pt bed alarm going off. Entered room to find pt urinating in trash can. Assisted pt to use urinal. Pt disoriented to place. Helped back to bed and set alarm. Will continue to monitor.

## 2017-03-16 NOTE — SUBJECTIVE & OBJECTIVE
Interval History:   Patient continues to have pain and increase anxiety.     Oncology Treatment Plan:   [facility] MK [id number] KEYNOTE [id number]    Medications:  Continuous Infusions:   sodium chloride 0.9% 125 mL/hr at 03/16/17 0718     Scheduled Meds:   arformoterol  15 mcg Nebulization BID    budesonide  0.5 mg Nebulization BID    custom IVPB builder   Intravenous Q8H    famotidine  20 mg Intravenous BID    fentaNYL  1 patch Transdermal Q72H    lidocaine HCL 10 mg/ml (1%)  1 mL Intradermal Once    methylPREDNISolone sodium succinate  20 mg Intravenous Q8H    moxifloxacin  400 mg Intravenous Q24H    ondansetron  4 mg Intravenous Q8H     PRN Meds:albuterol-ipratropium 2.5mg-0.5mg/3mL, guaifenesin 100 mg/5 ml, hydrALAZINE, HYDROmorphone, lorazepam, promethazine (PHENERGAN) IVPB     Review of Systems   Constitutional: Positive for activity change, appetite change and fatigue. Negative for fever and unexpected weight change.   HENT: Negative for congestion, dental problem, ear pain, hearing loss and nosebleeds.    Eyes: Negative for pain, discharge, redness and itching.   Respiratory: Positive for shortness of breath. Negative for apnea, chest tightness, wheezing and stridor.    Cardiovascular: Positive for chest pain. Negative for palpitations and leg swelling.   Gastrointestinal: Positive for nausea. Negative for abdominal distention, abdominal pain and anal bleeding.   Endocrine: Negative for cold intolerance, heat intolerance and polydipsia.   Genitourinary: Negative for difficulty urinating, dysuria, enuresis and frequency.   Musculoskeletal: Positive for arthralgias, back pain, myalgias and neck pain.        Chest pain    Neurological: Negative for dizziness, tremors, facial asymmetry and headaches.   Psychiatric/Behavioral: Positive for agitation. The patient is nervous/anxious.    All other systems reviewed and are negative.    Objective:     Vital Signs (Most Recent):  Temp: 97.8 °F (36.6 °C) (03/16/17 0715)  Pulse:  90 (03/16/17 0716)  Resp: 18 (03/16/17 0716)  BP: 122/77 (03/16/17 0715)  SpO2: 98 % (03/16/17 0716) Vital Signs (24h Range):  Temp:  [96.5 °F (35.8 °C)-98 °F (36.7 °C)] 97.8 °F (36.6 °C)  Pulse:  [] 90  Resp:  [18-22] 18  SpO2:  [95 %-100 %] 98 %  BP: (104-124)/(63-77) 122/77     Weight: 45.9 kg (101 lb 3.2 oz)  Body mass index is 18.51 kg/(m^2).  Body surface area is 1.42 meters squared.      Intake/Output Summary (Last 24 hours) at 03/16/17 0834  Last data filed at 03/16/17 0513   Gross per 24 hour   Intake          4819.42 ml   Output             1825 ml   Net          2994.42 ml       Physical Exam   Constitutional: He is oriented to person, place, and time. He appears cachectic.   HENT:   Head: Normocephalic.   Right Ear: External ear normal.   Left Ear: External ear normal.   Nose: Nose normal.   Mouth/Throat: Oropharynx is clear and moist. No oropharyngeal exudate.   Eyes: EOM are normal. Pupils are equal, round, and reactive to light. Right eye exhibits no discharge. Left eye exhibits no discharge.   Neck: No tracheal deviation present. No thyromegaly present.   Cardiovascular: Normal rate, regular rhythm and normal heart sounds.  Exam reveals no gallop.    No murmur heard.  Pulmonary/Chest: No respiratory distress. He has no wheezes. He has no rales.   Abdominal: Soft. He exhibits no distension and no mass. There is no rebound and no guarding.   Peg tube in place   Musculoskeletal: Normal range of motion. He exhibits no edema, tenderness or deformity.   Lymphadenopathy:     He has no cervical adenopathy.   Neurological: He is alert and oriented to person, place, and time.   Skin: Skin is warm and dry.   Psychiatric: He has a normal mood and affect. His behavior is normal.       Significant Labs:   CBC:   Recent Labs  Lab 03/15/17  0440 03/16/17  0247   WBC 19.67* 22.43*   HGB 11.1* 10.9*   HCT 34.4* 33.4*   * 145*   , CMP:   Recent Labs  Lab 03/15/17  0440 03/16/17  0247   * 147*   K 4.3  3.9   * 112*   CO2 28 28   * 154*   BUN 18 18   CREATININE 0.6 0.5   CALCIUM 8.6* 8.4*   PROT 5.6* 5.4*   ALBUMIN 2.2* 2.2*   BILITOT 0.3 0.3   ALKPHOS 139* 127   AST 23 25   ALT 96* 88*   ANIONGAP 7* 7*   EGFRNONAA >60 >60   , LFTs:   Recent Labs  Lab 03/15/17  0440 03/16/17  0247   ALT 96* 88*   AST 23 25   ALKPHOS 139* 127   BILITOT 0.3 0.3   PROT 5.6* 5.4*   ALBUMIN 2.2* 2.2*    and Reticulocytes: No results for input(s): RETIC in the last 48 hours.    Diagnostic Results:  I have reviewed all pertinent imaging results/findings within the past 24 hours.

## 2017-03-16 NOTE — SUBJECTIVE & OBJECTIVE
Interval History:  Very weak and tired.  PEG tube placed 09 March.  Difficulty with tube feedings initially but able to slowly advance.  At goal 50 mL/h today.    Review of Systems   Constitutional: Positive for activity change, appetite change and fatigue. Negative for fever.   HENT: Negative.  Negative for congestion, nosebleeds and sore throat.    Eyes: Negative.  Negative for photophobia, redness and visual disturbance.   Respiratory: Negative for cough, shortness of breath and wheezing.    Cardiovascular: Negative.  Negative for chest pain, palpitations and leg swelling.   Gastrointestinal: Positive for abdominal pain. Negative for constipation, diarrhea, nausea (dysphagia) and vomiting.   Endocrine: Negative.  Negative for polydipsia, polyphagia and polyuria.   Genitourinary: Negative.  Negative for dysuria, flank pain, frequency and urgency.   Musculoskeletal: Negative.  Negative for arthralgias, back pain and joint swelling.   Skin: Negative.  Negative for color change, pallor and rash.   Allergic/Immunologic: Negative.  Negative for environmental allergies, food allergies and immunocompromised state.   Neurological: Negative.  Negative for dizziness, syncope, weakness, light-headedness, numbness and headaches.   Hematological: Negative.  Negative for adenopathy. Does not bruise/bleed easily.   Psychiatric/Behavioral: Negative.  Negative for confusion and hallucinations. The patient is not nervous/anxious.    All other systems reviewed and are negative.    Objective:     Vital Signs (Most Recent):  Temp: 97.7 °F (36.5 °C) (03/15/17 2002)  Pulse: 92 (03/15/17 2100)  Resp: (!) 22 (03/15/17 2100)  BP: 108/63 (03/15/17 2002)  SpO2: 96 % (03/15/17 2100) Vital Signs (24h Range):  Temp:  [96.5 °F (35.8 °C)-98.1 °F (36.7 °C)] 97.7 °F (36.5 °C)  Pulse:  [66-96] 92  Resp:  [17-22] 22  SpO2:  [95 %-99 %] 96 %  BP: (101-130)/(63-74) 108/63     Weight: 45.6 kg (100 lb 8.5 oz)  Body mass index is 18.39  kg/(m^2).    Intake/Output Summary (Last 24 hours) at 03/15/17 2334  Last data filed at 03/15/17 2118   Gross per 24 hour   Intake          4901.59 ml   Output             2475 ml   Net          2426.59 ml      Physical Exam   Constitutional: He is oriented to person, place, and time. He appears well-developed.   Cachectic, ill appearing   HENT:   Head: Normocephalic.   Mouth/Throat: No oropharyngeal exudate.   Eyes: Pupils are equal, round, and reactive to light.   Neck: No thyromegaly present.   Cardiovascular: Normal rate, regular rhythm and normal heart sounds.  Exam reveals no gallop.    No murmur heard.  Pulmonary/Chest: Effort normal and breath sounds normal. No respiratory distress. He has no rales.   Abdominal: Soft. Bowel sounds are normal. He exhibits no distension and no mass. There is no rebound and no guarding.   Musculoskeletal: Normal range of motion. He exhibits no edema.   Lymphadenopathy:     He has no cervical adenopathy.   Neurological: He is alert and oriented to person, place, and time.   Skin: Skin is warm and dry.   Psychiatric:   Quiet mood       Significant Labs:   CBC:     Recent Labs  Lab 03/14/17  0232 03/15/17  0440   WBC 17.78* 19.67*   HGB 11.1* 11.1*   HCT 34.3* 34.4*   * 135*     CMP:     Recent Labs  Lab 03/14/17  0232 03/15/17  0440    147*   K 4.3 4.3   * 112*   CO2 27 28   * 166*   BUN 20 18   CREATININE 0.6 0.6   CALCIUM 8.6* 8.6*   PROT 5.6* 5.6*   ALBUMIN 2.3* 2.2*   BILITOT 0.4 0.3   ALKPHOS 135 139*   AST 28 23   * 96*   ANIONGAP 7* 7*   EGFRNONAA >60 >60       Significant Imaging: I have reviewed all pertinent imaging results/findings within the past 24 hours.

## 2017-03-16 NOTE — ASSESSMENT & PLAN NOTE
Dietitian recommended:  Isosource 1.5 was not available at this time so I substituted Peptamen 1.5 prebio.  Check with dietary again during the week for further recommendations.  Needs additional free water to maintain volume status.

## 2017-03-16 NOTE — PLAN OF CARE
Problem: Patient Care Overview  Goal: Plan of Care Review  PT REQUIRES [facility] FOR T/F TO CHAIR   Outcome: Ongoing (interventions implemented as appropriate)  2l nc, respirations even and unlabored, no distress noted on assessment, PEG tube with tube feeding, duoderm to sacrum, scd's, bed alarm

## 2017-03-16 NOTE — PLAN OF CARE
Spoke with hospitalist about consult for possible hospice.  Hospitalist stating that patient may be receptive to informational visit from a hospice representative (following his (hospitalist's) and patient's discussion today).    Met with patient at bedside.  Patient states that he will need to talk with wife regarding informational visit from hospice representative.  Requested that patient contact wife while this  in room so that meeting regarding discharge planning can occur tomorrow with him, wife, hospitalist, and this .  Patient called wife and there was no answer.  This  also attempted to contact wife 3x today and there was no answer as well.  This  contacted patient's son, Zacarias, who states that he will attempt to call patient's wife and obtain a time that she can be present tomorrow to meet at hospital.  (Zacarias states that should wife be unavailable to meet tomorrow, he can come to hospital for family meeting on Saturday (03/18/17).

## 2017-03-16 NOTE — PROGRESS NOTES
[facility] Hospital Medicine  Progress Note    Patient Name: Frederick J Lejeune  MRN: [id number]  Patient Class: IP- Inpatient   Admission Date: 3/3/2017  Length of Stay: 11 days  Attending Physician: Derek Bourgeois MD  Primary Care Provider: Kenrick Muñoz MD        Subjective:     Principal Problem:Primary malignant neoplasm of right lung metastatic to other site    HPI:  Frederick J Lejeune is a 66 y.o. male with PMHx of GERD, weight loss, dysphagia, odynophagia, RLL lung mass squamous cell carcinoma, who presented to ER with continued disphagia. He has been recently hospitalized from Dr. Espinosa's office with c/o dysphagia, weakness, dizziness, chest pain. He has had difficulty eating due to odynophagia for the past month. Patient has completed palliative radiation to relieve right mainstem bronchus obstruction. He then was treated with weekly Carbo-Taxol x 6-7 weeks, which was discontinued due to disease progression on imaging in early 1/2017. He is currently receiving Pembrolizumab every 3 weeks. He was recently evaluated by Dr. Cheng, at last hospitalization, who recommended further evaluation with esophagram, that was done on 2/24/17 with Esophageal stent placement. Per discharge summary, Dr. Cheng stated if patient continues to have severe dysphagia or chest pain then he could remove the stent and place PEG tube. He was discharged on 2/28/17.    Patient returns to the hospital 3 days after discharge with symptoms of inability to keep anything down. Getting dehydrated, He is agreeable for PEG placement.      Hospital Course:  GI, Dr. Anguiano, was consulted. Esophagogram showed patent stent, he showed signs of severe aspiration. Patient made NPO and IV Cefepime started. Patient continues with mild respiratory distress and placed on Oxygen, IV steroids, nebulizers, IS, acapella. Discussed code status twice with patient and he wants to remain a full code. He also requested more IV pain  "medicine. Discussed with Dr. Bourgeois and Dr. Anguiano. Patient states, "under NO circumstances can you call my family." Pulmonary was consulted for continued hypoxia requiring 4 liters O2. IV Diuretics started. CT chest showed enlarging bilateral pleural effusions, stable metastatic lung CA, and evidence of tracheoesophageal fistula. Dr. Anguiano spoke with the Radiologist explaining patient had esophagram with barium a few days ago. Radiologist said that could be what he saw and will revise his CT report, but addendum didn't r/o fistula. Dr. Marie performed thoracentesis on the right removing 560cc. No family at bedside. Scheduled multidisciplinary rounds 3/9/17.  Placed PEG tube 09 March.  Difficulty tolerating tube feedings at first but able to advance slowly.    Interval History:  Very weak and tired.  PEG tube placed 09 March.  Difficulty with tube feedings initially but able to slowly advance.  At goal 50 mL/h with IV fluids.    Review of Systems   Constitutional: Positive for activity change, appetite change and fatigue. Negative for fever.   HENT: Negative.  Negative for congestion, nosebleeds and sore throat.    Eyes: Negative.  Negative for photophobia, redness and visual disturbance.   Respiratory: Negative for cough, shortness of breath and wheezing.    Cardiovascular: Negative.  Negative for chest pain, palpitations and leg swelling.   Gastrointestinal: Positive for abdominal pain. Negative for constipation, diarrhea, nausea (dysphagia) and vomiting.   Endocrine: Negative.  Negative for polydipsia, polyphagia and polyuria.   Genitourinary: Negative.  Negative for dysuria, flank pain, frequency and urgency.   Musculoskeletal: Negative.  Negative for arthralgias, back pain and joint swelling.   Skin: Negative.  Negative for color change, pallor and rash.   Allergic/Immunologic: Negative.  Negative for environmental allergies, food allergies and immunocompromised state.   Neurological: Negative.  Negative for " dizziness, syncope, weakness, light-headedness, numbness and headaches.   Hematological: Negative.  Negative for adenopathy. Does not bruise/bleed easily.   Psychiatric/Behavioral: Negative.  Negative for confusion and hallucinations. The patient is not nervous/anxious.    All other systems reviewed and are negative.    Objective:     Vital Signs (Most Recent):  Temp: 97.7 °F (36.5 °C) (03/15/17 2002)  Pulse: 92 (03/15/17 2100)  Resp: (!) 22 (03/15/17 2100)  BP: 108/63 (03/15/17 2002)  SpO2: 96 % (03/15/17 2100) Vital Signs (24h Range):  Temp:  [96.5 °F (35.8 °C)-98.1 °F (36.7 °C)] 97.7 °F (36.5 °C)  Pulse:  [66-96] 92  Resp:  [17-22] 22  SpO2:  [95 %-99 %] 96 %  BP: (101-130)/(63-74) 108/63     Weight: 45.6 kg (100 lb 8.5 oz)  Body mass index is 18.39 kg/(m^2).    Intake/Output Summary (Last 24 hours) at 03/15/17 2338  Last data filed at 03/15/17 2118   Gross per 24 hour   Intake          4901.59 ml   Output             2475 ml   Net          2426.59 ml      Physical Exam   Constitutional: He is oriented to person, place, and time. He appears well-developed.   Cachectic, ill appearing   HENT:   Head: Normocephalic.   Mouth/Throat: No oropharyngeal exudate.   Eyes: Pupils are equal, round, and reactive to light.   Neck: No thyromegaly present.   Cardiovascular: Normal rate, regular rhythm and normal heart sounds.  Exam reveals no gallop.    No murmur heard.  Pulmonary/Chest: Effort normal and breath sounds normal. No respiratory distress. He has no rales.   Abdominal: Soft. Bowel sounds are normal. He exhibits no distension and no mass. There is no rebound and no guarding.   Musculoskeletal: Normal range of motion. He exhibits no edema.   Lymphadenopathy:     He has no cervical adenopathy.   Neurological: He is alert and oriented to person, place, and time.   Skin: Skin is warm and dry.   Psychiatric:   Quiet mood       Significant Labs:   CBC:     Recent Labs  Lab 03/14/17  0232 03/15/17  0440   WBC 17.78* 19.67*    HGB 11.1* 11.1*   HCT 34.3* 34.4*   * 135*     CMP:     Recent Labs  Lab 03/14/17  0232 03/15/17  0440    147*   K 4.3 4.3   * 112*   CO2 27 28   * 166*   BUN 20 18   CREATININE 0.6 0.6   CALCIUM 8.6* 8.6*   PROT 5.6* 5.6*   ALBUMIN 2.3* 2.2*   BILITOT 0.4 0.3   ALKPHOS 135 139*   AST 28 23   * 96*   ANIONGAP 7* 7*   EGFRNONAA >60 >60       Significant Imaging: I have reviewed all pertinent imaging results/findings within the past 24 hours.    Assessment/Plan:      * Primary malignant neoplasm of right lung metastatic to other site  Follow up with oncology as out-patient for treatment with Keytruda.  Discussed with DR Espinosa, patient's primary oncologist.    COPD, very severe  Pulmonary following    Esophageal dysphagia  GI consulted and has been following.  Esophagram showed patent stent and severe aspiration.  Receiving IV Dilaudid 1 mg q 2 hours.  PEG placed 09 March by GI.    Bone metastases  Lytic lesion, 2.1 cm, in the left humeral head.    Cachexia  At goal with additional IV fluids tube feeds by PEG tube.    Severe protein-calorie malnutrition  Dietitian recommended:  Isosource 1.5 was not available at this time so I substituted Peptamen 1.5 prebio.  Check with dietary again during the week for further recommendations.  Needs additional free water to maintain volume status.    Pleural effusion, bilateral  Right thoracentesis removed 560ml 3/8/17.  Cytology was negative for malignant cells.    VTE Risk Mitigation         Ordered     Place sequential compression device  Until discontinued      03/04/17 0222     Medium Risk of VTE  Once      03/04/17 0217          Derek Bourgeois MD  Department of Hospital Medicine   [facility]

## 2017-03-16 NOTE — PROGRESS NOTES
Entered pt room again when alarm sounded. Found pt urinating in trash can again. Pt disoriented to situation. Reoriented and set alarm. Will continue to monitor.

## 2017-03-16 NOTE — SUBJECTIVE & OBJECTIVE
"Interval History: Pt seen and examined. Discussed discharge planning with patient hospice vs SNF. Patient wants more time to consider hospice. Pt states "I just want to get better so I can go hunting".     Review of Systems   Constitutional: Positive for activity change, appetite change and fatigue. Negative for fever.   HENT: Negative.  Negative for congestion, nosebleeds and sore throat.    Eyes: Negative.  Negative for photophobia, redness and visual disturbance.   Respiratory: Negative for cough, shortness of breath and wheezing.    Cardiovascular: Negative.  Negative for chest pain, palpitations and leg swelling.   Gastrointestinal: Positive for abdominal pain. Negative for constipation, diarrhea, nausea (dysphagia) and vomiting.   Endocrine: Negative.  Negative for polydipsia, polyphagia and polyuria.   Genitourinary: Negative.  Negative for dysuria, flank pain, frequency and urgency.   Musculoskeletal: Negative.  Negative for arthralgias, back pain and joint swelling.   Skin: Negative.  Negative for color change, pallor and rash.   Allergic/Immunologic: Negative.  Negative for environmental allergies, food allergies and immunocompromised state.   Neurological: Negative.  Negative for dizziness, syncope, weakness, light-headedness, numbness and headaches.   Hematological: Negative.  Negative for adenopathy. Does not bruise/bleed easily.   Psychiatric/Behavioral: Negative.  Negative for confusion and hallucinations. The patient is not nervous/anxious.    All other systems reviewed and are negative.    Objective:     Vital Signs (Most Recent):  Temp: 97.8 °F (36.6 °C) (03/16/17 0715)  Pulse: 90 (03/16/17 0716)  Resp: 18 (03/16/17 0716)  BP: 122/77 (03/16/17 0715)  SpO2: 98 % (03/16/17 0716) Vital Signs (24h Range):  Temp:  [97 °F (36.1 °C)-98 °F (36.7 °C)] 97.8 °F (36.6 °C)  Pulse:  [] 90  Resp:  [18-22] 18  SpO2:  [95 %-100 %] 98 %  BP: (104-122)/(63-77) 122/77     Weight: 45.9 kg (101 lb 3.2 oz)  Body " mass index is 18.51 kg/(m^2).    Intake/Output Summary (Last 24 hours) at 03/16/17 1227  Last data filed at 03/16/17 1123   Gross per 24 hour   Intake          4605.26 ml   Output             1900 ml   Net          2705.26 ml      Physical Exam   Constitutional: He is oriented to person, place, and time. He appears well-developed.   Cachectic, ill appearing   HENT:   Head: Normocephalic.   Mouth/Throat: No oropharyngeal exudate.   Eyes: Pupils are equal, round, and reactive to light.   Neck: No thyromegaly present.   Cardiovascular: Normal rate, regular rhythm and normal heart sounds.  Exam reveals no gallop.    No murmur heard.  Pulmonary/Chest: Effort normal and breath sounds normal. No respiratory distress. He has no rales.   Abdominal: Soft. Bowel sounds are normal. He exhibits no distension and no mass. There is no rebound and no guarding.   PEG tube noted   Musculoskeletal: Normal range of motion. He exhibits no edema.   Lymphadenopathy:     He has no cervical adenopathy.   Neurological: He is alert and oriented to person, place, and time.   Skin: Skin is warm and dry.   Psychiatric:   Quiet mood       Significant Labs: All pertinent labs within the past 24 hours have been reviewed.    Significant Imaging:   Imaging Results         X-Ray Chest 1 View (Final result) Result time:  03/08/17 11:50:29    Final result by Cathy Murray MD (03/08/17 11:50:29)    Impression:     As above.  No            Electronically signed by: CATHY MURRAY MD  Date:     03/08/17  Time:    11:50     Narrative:    Exam: XR CHEST 1 VIEW    Clinical History: Shortness of breath. Post thoracentesis    Findings:     No evidence of pneumothorax status post thoracentesis.  Decreased size of the bilateral pleural effusions.  Otherwise no significant change compared to the recent chest CT. The cardiac silhouette is within normal limits.            US Chest Mediastinum (Final result) Result time:  03/08/17 11:46:30    Final result by Abram GOOD  "MD Francine (03/08/17 11:46:30)    Impression:      Bilateral pleural effusion      Electronically signed by: BREEZY SORTO MD  Date:     03/08/17  Time:    11:46     Narrative:    Exam: US CHEST MEDIASTINUM,    Date:  03/08/17 10:20:00    History: Pleural effusions    Comparison:  No prior relevant studies available    Findings: Ultrasound of the chest reveals a moderate size right and small to moderate left pleural effusion.            CTA Chest Non Coronary (Edited Result - FINAL) Result time:  03/08/17 07:59:35    Addendum 1 of 1 by Cathy Delaney MD (03/08/17 07:59:35)    Addendum:    The following was dictated in error: "This was confirmed on an esophagram performed at March 5, 2017."  The esophagram performed on March 5, 2017 demonstrated aspiration of barium.  No evidence of tracheoesophageal fistula at that time.  While there is a fistulous tract between the posterior wall of the right mainstem bronchus and the posterior mediastinum on axial image 7 of series 3, the fistulous tract does not definitively communicate with the esophagus through the covered stent.  It is possible for there to be an ascending or descending tract from the distal or proximal margin of the covered esophageal stent to the right mainstem bronchus, given the degree of extraluminal air surrounding the covered stent at its margins.        Electronically signed by: CATHY DELANEY MD  Date:     03/08/17  Time:    07:59           Final result by Cathy Delaney MD (03/07/17 16:13:13)    Impression:        No evidence of pulmonary embolus.    Stable metastatic lung cancer.  Enlarging bilateral pleural effusions.    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      All CT scans at this facility use dose modulation, iterative reconstruction and/or weight based dosing when appropriate to reduce radiation dose to as low as reasonably " achievable.       Electronically signed by: CATHY DELANEY MD  Date:     03/07/17  Time:    16:13     Narrative:    Exam: CTA CHEST NON CORONARY    Clinical History:   Increasing shortness of breath and hypoxemia.  Active malignancy. Rule out pulmonary embolus.    Technique: Axial CTA images performed through the chest after the administration of 100 cc intravenous contrast. Maximum intensity projections were performed and interpreted.    Comparison: CT chest and PET/CT on February 22, 2017 and January 11, 2017.  CT chest on December 20, 2016.      Findings:    There is no evidence of pulmonary embolus. Pulmonary artery caliber is normal.     Since the previous study, there has been placement of an esophageal stent with evidence of extraluminal air along the right lateral margin of the stent.  There is a small air tract between the right anterior margin of the stent communicating with the right mainstem bronchus consistent with a tracheoesophageal fistula.  This was confirmed on a esophagram performed at March 5, 2017.  There is aspirated barium demonstrated throughout the lower lungs.      Enlargement of the large right and moderate left pleural effusions.  Stable low-density mass in the right lower lobe measuring approximately 6 centimeters in greatest dimension.  No significant change in the size of the bilateral pulmonary nodules.  Representative pulmonary nodules in the left lung at the lower lobe measures 2.3 x 1.9 cm and 2.1 x 1.9 cm.  Representative lesion in the right upper lobe measures 1.1 cm in greatest dimension.    Poorly defined soft tissue mass encases the right mainstem bronchus and bronchus intermedius.  There is a focal luminal narrowing of the right mainstem bronchus best demonstrated on axial image 94 of series 2.  Poorly defined soft tissue density mass also encases the left mainstem bronchus without narrowing.    Aortic atherosclerosis.  Coronary artery calcifications.    Severe  emphysema.    Reflux of contrast into the hepatic veins consistent with right heart dysfunction.    Bilateral supraclavicular lymphadenopathy measuring 4.0 x 3.5 cm on the right and 1.7 x 1.5 cm and the left.    Lytic metastatic lesion in the left humeral head measuring 2.1 cm.            FL Esophagram Complete (Final result) Result time:  03/05/17 09:02:30    Final result by Avni Rivers III, MD (03/05/17 09:02:30)    Impression:        1. The stent is widely patent. Positioning appears grossly satisfactory.    2. The patient's had severe aspiration during the examination which she states is what happens to him on a routine basis. The exam was terminated at this point due to the severe aspiration.      Electronically signed by: AVNI RIVERS MD  Date:     03/05/17  Time:    09:02     Narrative:    Esophagram.    Compared to patient's prior pre-stent study. Esophageal stent has been placed grossly in satisfactory position and appears widely patent. No distal esophageal stricture. Barium did pass the stent into the distal esophagus and stomach without difficulty.    Of note however, upon the first swallow, the patient aspirated a large volume of barium. Due to this the exam was cut short. The patient was given a small amount of additional barium to swallow again with evidence of aspiration. The exam was terminated at this point.            X-Ray Chest PA And Lateral (Final result) Result time:  03/04/17 07:41:51    Final result by Avni Rivers III, MD (03/04/17 07:41:51)    Impression:         1. Slight improvement since February 28. Slight decrease in the effusions. Suspect minimal/mild congestion superimposed on chronic change but diminished since February. Esophageal stent again noted. No new abnormalities.      Electronically signed by: AVNI RIVERS MD  Date:     03/04/17  Time:    07:41     Narrative:    Two-view chest x-ray.    Clinical indication: Chest Pain     Heart size is  unchanged compared to February.. Right-sided Mediport type catheter again noted in position as well as an esophageal stent. There is again moderate coarsening of the interstitial markings with small effusions suggested, best seen on the lateral image. These have decreased however compared to February. The interstitial markings are not quite as prominent suggesting decreased congestion superimposed on chronic change.

## 2017-03-16 NOTE — PLAN OF CARE
Pt remains free of injuries. Still slightly confused, but able to reorient to situation and place. IVF and TF per MD orders. Plan to be sent home c hospice tomorrow. Xanax discontinued. 12 hr chart check completed. Will continue to monitor.

## 2017-03-16 NOTE — CONSULTS
[facility] -   Adult Nutrition  Consult Note    SUMMARY     Recommendations  Recommendation/Intervention: 1. Continue current tubefeed Peptamen 1.5 with Prebio as tolerated.    2. Consider increasing flushes to 100ml every 3 hours due to increase in Na and Cl.  Goals: Tolerance of nutrition support  Nutrition Goal Status: goal met  Communication of RD Recs: other (comment) (sticky note)    Continuum of Care Plan  Home on Peptamen 1.5 with Prebio bolus 5 can daily with 30ml flush before and after plus 100ml TID and as needed for hydration. Pleasure feeds per SLP recommendations for consistency. (patient questioning whether he can still eat by mouth for pleasure)    Reason for Assessment  Reason for Assessment: RD follow-up  Diagnosis:  (dysphagia)  Relevent Medical History: Lung Ca; esophageal stent; GERD, COPD   Interdisciplinary Rounds: did not attend  General Information Comments: Patient on continuous of Peptamen 1.5 with Prebio tolerating well with no nausea. Patient was found to be sitting in bed sipping on melted ice with straw. He requested from RD flavored beverages. Explained that he was NPO and that depended on his swallow function but as of now he was NPO. Alerted charge nurse and patient's nurse to patient consuming liquids.    Nutrition Prescription Ordered  Current Diet Order: NPO  Current Nutrition Support Formula Ordered: Peptamen 1.5 Prebio  Current Nutrition Support Rate Ordered: 50 (ml)  Current Nutrition Support Frequency Ordered: ml/hr    Evaluation of Received Nutrients/Fluid Intake  Enteral Calories (kcal): 1800  Enteral Protein (gm): 81  Enteral (Free Water) Fluid (mL): 934     Nutrition Risk Screen  Nutrition Risk Screen: dysphagia or difficulty swallowing    Nutrition/Diet History  Typical Food/Fluid Intake: Patient with poor ital intake and weight loss over the last several months, very familiar to nutrition services from previous admissiona. Patient is finally agreeable to  PEG placement for nutrition support  Food Preferences: none reported  Factors Affecting Nutritional Intake: difficulty/impaired swallowing, NPO    Labs/Tests/Procedures/Meds  Pertinent Labs Reviewed: reviewed  Pertinent Labs Comments: Na 147, Cl 112, Gluc 154, Alb 2.2  Pertinent Medications Reviewed: reviewed    Physical Findings  Overall Physical Appearance:  (thin)  Tubes:  gastrostomy  Oral/Mouth Cavity: tooth/teeth missing  Skin: pressure ulcer(s) (Stage II Sacral Spine, R and L ear, see nursing notes)    Anthropometrics  Height (inches): 62.01 in  Weight Method: Bed Scale  Weight (kg): 45.9 kg  Ideal Body Weight (IBW), Male: 118.06 lb  % Ideal Body Weight, Male (lb): 85.71 lb  BMI (kg/m2): 18.5  BMI Grade: 18.5-24.9 - normal  Usual Body Weight (UBW), k kg  % Usual Body Weight: 71.41  % Weight Change:  (28% weight loss in 6 months)  Weight Loss: unintentional    Estimated/Assessed Needs  Weight Used For Calorie Calculations: 45.7 kg (100 lb 12 oz)   Height (cm): 157.5 cm  35 kcal/kg (kcal): 1599.5 and 40 kcal/kg (kcal): 1828   Weight Used For Protein Calculations: 45.7 kg (100 lb 12 oz)  1.2 gm Protein (gm): 54.95 and 1.5 gm Protein (gm): 68.69  Fluid Need Method: RDA Method (1ml/dee or as needed)    Monitor and Evaluation  Food and Nutrient Intake: energy intake, enteral nutrition intake  Food and Nutrient Adminstration: enteral and parenteral nutrition administration  Physical Activity and Function: nutrition-related ADLs and IADLs  Anthropometric Measurements: weight  Biochemical Data, Medical Tests and Procedures: electrolyte and renal panel, glucose/endocrine profile  Nutrition-Focused Physical Findings: skin    Nutrition Risk  Level of Risk:  (2 x week)    Nutrition Follow-Up  RD Follow-up?: Yes    Assessment and Plan  Esophageal dysphagia  Nutrition Problem:   Swallowing difficulty    Etiology/Related to:   Dysphagia    As evidenced by:  Inability to consume adequate energy from oral diet with weight  loss over the last 6 months    Treatment Strategy:   1. PEG with tubefeed for nutrition support    Nutrition Diagnosis Status:   New

## 2017-03-16 NOTE — PROGRESS NOTES
[facility] Hospital Medicine  Progress Note    Patient Name: Frederick J Lejeune  MRN: [id number]  Patient Class: IP- Inpatient   Admission Date: 3/3/2017  Length of Stay: 12 days  Attending Physician: Karli Vail, *  Primary Care Provider: Kenrick Muñoz MD        Subjective:     Principal Problem:Primary malignant neoplasm of right lung metastatic to other site    HPI:  Frederick J Lejeune is a 66 y.o. male with PMHx of GERD, weight loss, dysphagia, odynophagia, RLL lung mass squamous cell carcinoma, who presented to ER with continued disphagia. He has been recently hospitalized from Dr. Espinosa's office with c/o dysphagia, weakness, dizziness, chest pain. He has had difficulty eating due to odynophagia for the past month. Patient has completed palliative radiation to relieve right mainstem bronchus obstruction. He then was treated with weekly Carbo-Taxol x 6-7 weeks, which was discontinued due to disease progression on imaging in early 1/2017. He is currently receiving Pembrolizumab every 3 weeks. He was recently evaluated by Dr. Cheng, at last hospitalization, who recommended further evaluation with esophagram, that was done on 2/24/17 with Esophageal stent placement. Per discharge summary, Dr. Cheng stated if patient continues to have severe dysphagia or chest pain then he could remove the stent and place PEG tube. He was discharged on 2/28/17.    Patient returns to the hospital 3 days after discharge with symptoms of inability to keep anything down. Getting dehydrated, He is agreeable for PEG placement.      Hospital Course:  GI, Dr. Anguiano, was consulted. Esophagogram showed patent stent, he showed signs of severe aspiration. Patient made NPO and IV Cefepime started. Patient continues with mild respiratory distress and placed on Oxygen, IV steroids, nebulizers, IS, acapella. Discussed code status twice with patient and he wants to remain a full code. He also requested more IV pain  "medicine. Discussed with Dr. Bourgeois and Dr. Anguiano. Patient states, "under NO circumstances can you call my family." Pulmonary was consulted for continued hypoxia requiring 4 liters O2. IV Diuretics started. CT chest showed enlarging bilateral pleural effusions, stable metastatic lung CA, and evidence of tracheoesophageal fistula. Dr. Anguiano spoke with the Radiologist explaining patient had esophagram with barium a few days ago. Radiologist said that could be what he saw and will revise his CT report, but addendum didn't r/o fistula. Dr. Marie performed thoracentesis on the right removing 560cc. No family at bedside. Scheduled multidisciplinary rounds 3/9/17.  Placed PEG tube 09 March.  Difficulty tolerating tube feedings at first but able to advance slowly. 3/16/17- Pt tolerating tube feedings, at goal rate of 50ml/hr. Discussed with patient plans for discharge Hospice vs SNF. The patient states that he will consider hospice but wants to discuss with family tomorrow.     Interval History: Pt seen and examined. Discussed discharge planning with patient hospice vs SNF. Patient wants more time to consider hospice. Pt states "I just want to get better so I can go hunting".     Review of Systems   Constitutional: Positive for activity change, appetite change and fatigue. Negative for fever.   HENT: Negative.  Negative for congestion, nosebleeds and sore throat.    Eyes: Negative.  Negative for photophobia, redness and visual disturbance.   Respiratory: Negative for cough, shortness of breath and wheezing.    Cardiovascular: Negative.  Negative for chest pain, palpitations and leg swelling.   Gastrointestinal: Positive for abdominal pain. Negative for constipation, diarrhea, nausea (dysphagia) and vomiting.   Endocrine: Negative.  Negative for polydipsia, polyphagia and polyuria.   Genitourinary: Negative.  Negative for dysuria, flank pain, frequency and urgency.   Musculoskeletal: Negative.  Negative for arthralgias, " back pain and joint swelling.   Skin: Negative.  Negative for color change, pallor and rash.   Allergic/Immunologic: Negative.  Negative for environmental allergies, food allergies and immunocompromised state.   Neurological: Negative.  Negative for dizziness, syncope, weakness, light-headedness, numbness and headaches.   Hematological: Negative.  Negative for adenopathy. Does not bruise/bleed easily.   Psychiatric/Behavioral: Negative.  Negative for confusion and hallucinations. The patient is not nervous/anxious.    All other systems reviewed and are negative.    Objective:     Vital Signs (Most Recent):  Temp: 97.8 °F (36.6 °C) (03/16/17 0715)  Pulse: 90 (03/16/17 0716)  Resp: 18 (03/16/17 0716)  BP: 122/77 (03/16/17 0715)  SpO2: 98 % (03/16/17 0716) Vital Signs (24h Range):  Temp:  [97 °F (36.1 °C)-98 °F (36.7 °C)] 97.8 °F (36.6 °C)  Pulse:  [] 90  Resp:  [18-22] 18  SpO2:  [95 %-100 %] 98 %  BP: (104-122)/(63-77) 122/77     Weight: 45.9 kg (101 lb 3.2 oz)  Body mass index is 18.51 kg/(m^2).    Intake/Output Summary (Last 24 hours) at 03/16/17 1227  Last data filed at 03/16/17 1123   Gross per 24 hour   Intake          4605.26 ml   Output             1900 ml   Net          2705.26 ml      Physical Exam   Constitutional: He is oriented to person, place, and time. He appears well-developed.   Cachectic, ill appearing   HENT:   Head: Normocephalic.   Mouth/Throat: No oropharyngeal exudate.   Eyes: Pupils are equal, round, and reactive to light.   Neck: No thyromegaly present.   Cardiovascular: Normal rate, regular rhythm and normal heart sounds.  Exam reveals no gallop.    No murmur heard.  Pulmonary/Chest: Effort normal and breath sounds normal. No respiratory distress. He has no rales.   Abdominal: Soft. Bowel sounds are normal. He exhibits no distension and no mass. There is no rebound and no guarding.   PEG tube noted   Musculoskeletal: Normal range of motion. He exhibits no edema.   Lymphadenopathy:      "He has no cervical adenopathy.   Neurological: He is alert and oriented to person, place, and time.   Skin: Skin is warm and dry.   Psychiatric:   Quiet mood       Significant Labs: All pertinent labs within the past 24 hours have been reviewed.    Significant Imaging:   Imaging Results         X-Ray Chest 1 View (Final result) Result time:  03/08/17 11:50:29    Final result by Cathy Murray MD (03/08/17 11:50:29)    Impression:     As above.  No            Electronically signed by: CATHY MURRAY MD  Date:     03/08/17  Time:    11:50     Narrative:    Exam: XR CHEST 1 VIEW    Clinical History: Shortness of breath. Post thoracentesis    Findings:     No evidence of pneumothorax status post thoracentesis.  Decreased size of the bilateral pleural effusions.  Otherwise no significant change compared to the recent chest CT. The cardiac silhouette is within normal limits.            US Chest Mediastinum (Final result) Result time:  03/08/17 11:46:30    Final result by Abram Sorto MD (03/08/17 11:46:30)    Impression:      Bilateral pleural effusion      Electronically signed by: ABRAM SORTO MD  Date:     03/08/17  Time:    11:46     Narrative:    Exam: US CHEST MEDIASTINUM,    Date:  03/08/17 10:20:00    History: Pleural effusions    Comparison:  No prior relevant studies available    Findings: Ultrasound of the chest reveals a moderate size right and small to moderate left pleural effusion.            CTA Chest Non Coronary (Edited Result - FINAL) Result time:  03/08/17 07:59:35    Addendum 1 of 1 by Cathy Murray MD (03/08/17 07:59:35)    Addendum:    The following was dictated in error: "This was confirmed on an esophagram performed at March 5, 2017."  The esophagram performed on March 5, 2017 demonstrated aspiration of barium.  No evidence of tracheoesophageal fistula at that time.  While there is a fistulous tract between the posterior wall of the right mainstem bronchus and the posterior mediastinum on " axial image 7 of series 3, the fistulous tract does not definitively communicate with the esophagus through the covered stent.  It is possible for there to be an ascending or descending tract from the distal or proximal margin of the covered esophageal stent to the right mainstem bronchus, given the degree of extraluminal air surrounding the covered stent at its margins.        Electronically signed by: CATHY MURRAY MD  Date:     03/08/17  Time:    07:59           Final result by Cathy Murray MD (03/07/17 16:13:13)    Impression:        No evidence of pulmonary embolus.    Stable metastatic lung cancer.  Enlarging bilateral pleural effusions.    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      All CT scans at this facility use dose modulation, iterative reconstruction and/or weight based dosing when appropriate to reduce radiation dose to as low as reasonably achievable.       Electronically signed by: CATHY MURRAY MD  Date:     03/07/17  Time:    16:13     Narrative:    Exam: CTA CHEST NON CORONARY    Clinical History:   Increasing shortness of breath and hypoxemia.  Active malignancy. Rule out pulmonary embolus.    Technique: Axial CTA images performed through the chest after the administration of 100 cc intravenous contrast. Maximum intensity projections were performed and interpreted.    Comparison: CT chest and PET/CT on February 22, 2017 and January 11, 2017.  CT chest on December 20, 2016.      Findings:    There is no evidence of pulmonary embolus. Pulmonary artery caliber is normal.     Since the previous study, there has been placement of an esophageal stent with evidence of extraluminal air along the right lateral margin of the stent.  There is a small air tract between the right anterior margin of the stent communicating with the right mainstem bronchus consistent with a tracheoesophageal fistula.   This was confirmed on a esophagram performed at March 5, 2017.  There is aspirated barium demonstrated throughout the lower lungs.      Enlargement of the large right and moderate left pleural effusions.  Stable low-density mass in the right lower lobe measuring approximately 6 centimeters in greatest dimension.  No significant change in the size of the bilateral pulmonary nodules.  Representative pulmonary nodules in the left lung at the lower lobe measures 2.3 x 1.9 cm and 2.1 x 1.9 cm.  Representative lesion in the right upper lobe measures 1.1 cm in greatest dimension.    Poorly defined soft tissue mass encases the right mainstem bronchus and bronchus intermedius.  There is a focal luminal narrowing of the right mainstem bronchus best demonstrated on axial image 94 of series 2.  Poorly defined soft tissue density mass also encases the left mainstem bronchus without narrowing.    Aortic atherosclerosis.  Coronary artery calcifications.    Severe emphysema.    Reflux of contrast into the hepatic veins consistent with right heart dysfunction.    Bilateral supraclavicular lymphadenopathy measuring 4.0 x 3.5 cm on the right and 1.7 x 1.5 cm and the left.    Lytic metastatic lesion in the left humeral head measuring 2.1 cm.            FL Esophagram Complete (Final result) Result time:  03/05/17 09:02:30    Final result by Avni Rivers III, MD (03/05/17 09:02:30)    Impression:        1. The stent is widely patent. Positioning appears grossly satisfactory.    2. The patient's had severe aspiration during the examination which she states is what happens to him on a routine basis. The exam was terminated at this point due to the severe aspiration.      Electronically signed by: AVNI RIVERS MD  Date:     03/05/17  Time:    09:02     Narrative:    Esophagram.    Compared to patient's prior pre-stent study. Esophageal stent has been placed grossly in satisfactory position and appears widely patent. No distal  esophageal stricture. Barium did pass the stent into the distal esophagus and stomach without difficulty.    Of note however, upon the first swallow, the patient aspirated a large volume of barium. Due to this the exam was cut short. The patient was given a small amount of additional barium to swallow again with evidence of aspiration. The exam was terminated at this point.            X-Ray Chest PA And Lateral (Final result) Result time:  03/04/17 07:41:51    Final result by Avni Rivers III, MD (03/04/17 07:41:51)    Impression:         1. Slight improvement since February 28. Slight decrease in the effusions. Suspect minimal/mild congestion superimposed on chronic change but diminished since February. Esophageal stent again noted. No new abnormalities.      Electronically signed by: AVNI RIVERS MD  Date:     03/04/17  Time:    07:41     Narrative:    Two-view chest x-ray.    Clinical indication: Chest Pain     Heart size is unchanged compared to February.. Right-sided Mediport type catheter again noted in position as well as an esophageal stent. There is again moderate coarsening of the interstitial markings with small effusions suggested, best seen on the lateral image. These have decreased however compared to February. The interstitial markings are not quite as prominent suggesting decreased congestion superimposed on chronic change.                 Assessment/Plan:      * Primary malignant neoplasm of right lung metastatic to other site  Follow up with oncology as out-patient for treatment with Keytruda.  Discussed with DR sEpinosa, patient's primary oncologist. Pt considering hospice vs SNF, wants to discuss with family tomorrow before deciding.     COPD, very severe  Pulmonary following    Esophageal dysphagia  GI consulted and has been following.  Esophagram showed patent stent and severe aspiration.  Receiving IV Dilaudid 1 mg q 2 hours.  PEG placed 09 March by GI.    Palma's esophagus  without dysplasia  See above      Bone metastases  Lytic lesion, 2.1 cm, in the left humeral head.    Anxiety about health  - anxiolytics PRN      Chronic respiratory failure with hypoxia  Nebulizers and IV steroids.  Right thoracentesis removed 560 mL 08 March.    Cachexia  At goal with additional IV fluids tube feeds by PEG tube.    Severe protein-calorie malnutrition  Dietitian recommended:  Isosource 1.5 was not available at this time so I substituted Peptamen 1.5 prebio.  Check with dietary again during the week for further recommendations.  Needs additional free water to maintain volume status.    Oropharyngeal dysphagia  See above      Pneumonia due to infectious organism  Aspiration pneumonia on IV ABx.      Diffuse interstitial pulmonary fibrosis  Pulmonary following      Broncho-esophageal fistula  Patient doesn't want to go to [place] for eval.    Pleural effusion, bilateral  Right thoracentesis removed 560ml 3/8/17.  Cytology was negative for malignant cells.    Cancer related pain  - analgesia PRN      Anxiety  Anxiolytics PRN      VTE Risk Mitigation         Ordered     Place sequential compression device  Until discontinued      03/04/17 0222     Medium Risk of VTE  Once      03/04/17 0217          Reggie Jose NP  Department of Hospital Medicine   [facility]

## 2017-03-16 NOTE — PROGRESS NOTES
Wound care performed per MD orders. Mepilex applied to bridge of nose for nonblanchable redness. 500 mL bolus infused for hypotensive episode. Will continue to monitor.

## 2017-03-16 NOTE — PLAN OF CARE
"Spoke with NP for hospitalist regarding SNF consult;  attempting to discuss discharge plans with patient and patient indicating that he "does not know what my Drs. Are recommending, so I can't discuss with you; and Heme/Onc PN this week reflecting that hospice services may be appropriate for patient post-hospitalization.      NP for hospitalist met with patient and states that patient wants to have until tomorrow to discuss next steps with his family.        "

## 2017-03-16 NOTE — PROGRESS NOTES
Had long discussion with the patient, wife and the son Abhi about patient disease, prognosis, expectation and code status, patient and family on agreement of hospice, patient and the wife want him to be DNR. Will consult  to assist with hospice.    Time spent is 25 min

## 2017-03-16 NOTE — ASSESSMENT & PLAN NOTE
Follow up with oncology as out-patient for treatment with Keytruda.  Discussed with DR Espinosa, patient's primary oncologist. Pt considering hospice vs SNF, wants to discuss with family tomorrow before deciding.

## 2017-03-17 VITALS
OXYGEN SATURATION: 97 % | RESPIRATION RATE: 18 BRPM | SYSTOLIC BLOOD PRESSURE: 96 MMHG | DIASTOLIC BLOOD PRESSURE: 73 MMHG | HEIGHT: 62 IN | BODY MASS INDEX: 18.15 KG/M2 | WEIGHT: 98.63 LBS | TEMPERATURE: 99 F | HEART RATE: 108 BPM

## 2017-03-17 PROBLEM — J18.9 PNEUMONIA DUE TO INFECTIOUS ORGANISM: Status: RESOLVED | Noted: 2017-03-05 | Resolved: 2017-03-17

## 2017-03-17 LAB
ALBUMIN SERPL BCP-MCNC: 2.3 G/DL
ALP SERPL-CCNC: 124 U/L
ALT SERPL W/O P-5'-P-CCNC: 111 U/L
ANION GAP SERPL CALC-SCNC: 6 MMOL/L
AST SERPL-CCNC: 33 U/L
BASOPHILS # BLD AUTO: 0.01 K/UL
BASOPHILS NFR BLD: 0 %
BILIRUB SERPL-MCNC: 0.3 MG/DL
BUN SERPL-MCNC: 16 MG/DL
CALCIUM SERPL-MCNC: 8.8 MG/DL
CHLORIDE SERPL-SCNC: 111 MMOL/L
CO2 SERPL-SCNC: 30 MMOL/L
CREAT SERPL-MCNC: 0.5 MG/DL
DACRYOCYTES BLD QL SMEAR: ABNORMAL
DIFFERENTIAL METHOD: ABNORMAL
EOSINOPHIL # BLD AUTO: 0 K/UL
EOSINOPHIL NFR BLD: 0 %
ERYTHROCYTE [DISTWIDTH] IN BLOOD BY AUTOMATED COUNT: 14.1 %
EST. GFR  (AFRICAN AMERICAN): >60 ML/MIN/1.73 M^2
EST. GFR  (NON AFRICAN AMERICAN): >60 ML/MIN/1.73 M^2
GLUCOSE SERPL-MCNC: 114 MG/DL
HCT VFR BLD AUTO: 34.2 %
HGB BLD-MCNC: 11.1 G/DL
HYPOCHROMIA BLD QL SMEAR: ABNORMAL
LYMPHOCYTES # BLD AUTO: 0.7 K/UL
LYMPHOCYTES NFR BLD: 2.9 %
MCH RBC QN AUTO: 34.8 PG
MCHC RBC AUTO-ENTMCNC: 32.5 %
MCV RBC AUTO: 107 FL
MONOCYTES # BLD AUTO: 0.9 K/UL
MONOCYTES NFR BLD: 3.7 %
NEUTROPHILS # BLD AUTO: 23.8 K/UL
NEUTROPHILS NFR BLD: 93.9 %
OVALOCYTES BLD QL SMEAR: ABNORMAL
PLATELET # BLD AUTO: 162 K/UL
PLATELET BLD QL SMEAR: ABNORMAL
PMV BLD AUTO: 11.6 FL
POIKILOCYTOSIS BLD QL SMEAR: SLIGHT
POTASSIUM SERPL-SCNC: 4.1 MMOL/L
PROT SERPL-MCNC: 5.7 G/DL
RBC # BLD AUTO: 3.19 M/UL
SODIUM SERPL-SCNC: 147 MMOL/L
SPHEROCYTES BLD QL SMEAR: ABNORMAL
STOMATOCYTES BLD QL SMEAR: PRESENT
WBC # BLD AUTO: 25.51 K/UL

## 2017-03-17 PROCEDURE — 27000221 HC OXYGEN, UP TO 24 HOURS

## 2017-03-17 PROCEDURE — 25000003 PHARM REV CODE 250: Performed by: INTERNAL MEDICINE

## 2017-03-17 PROCEDURE — 92526 ORAL FUNCTION THERAPY: CPT

## 2017-03-17 PROCEDURE — 94640 AIRWAY INHALATION TREATMENT: CPT

## 2017-03-17 PROCEDURE — 63600175 PHARM REV CODE 636 W HCPCS: Performed by: NURSE PRACTITIONER

## 2017-03-17 PROCEDURE — 63600175 PHARM REV CODE 636 W HCPCS: Performed by: INTERNAL MEDICINE

## 2017-03-17 PROCEDURE — 85025 COMPLETE CBC W/AUTO DIFF WBC: CPT

## 2017-03-17 PROCEDURE — 99233 SBSQ HOSP IP/OBS HIGH 50: CPT | Mod: ,,, | Performed by: INTERNAL MEDICINE

## 2017-03-17 PROCEDURE — 80053 COMPREHEN METABOLIC PANEL: CPT

## 2017-03-17 PROCEDURE — 25000003 PHARM REV CODE 250: Performed by: HOSPITALIST

## 2017-03-17 PROCEDURE — 94664 DEMO&/EVAL PT USE INHALER: CPT

## 2017-03-17 PROCEDURE — 25000003 PHARM REV CODE 250: Performed by: NURSE PRACTITIONER

## 2017-03-17 RX ORDER — ALPRAZOLAM 0.25 MG/1
0.5 TABLET ORAL 2 TIMES DAILY PRN
Qty: 60 TABLET | Refills: 2 | Status: SHIPPED | OUTPATIENT
Start: 2017-03-17 | End: 2017-03-17 | Stop reason: HOSPADM

## 2017-03-17 RX ORDER — PREDNISONE 10 MG/1
10 TABLET ORAL DAILY
Qty: 40 TABLET | Refills: 0 | Status: SHIPPED | OUTPATIENT
Start: 2017-03-17 | End: 2017-03-27

## 2017-03-17 RX ORDER — AMOXICILLIN AND CLAVULANATE POTASSIUM 875; 125 MG/1; MG/1
1 TABLET, FILM COATED ORAL 2 TIMES DAILY
Qty: 14 TABLET | Refills: 0 | Status: SHIPPED | OUTPATIENT
Start: 2017-03-17 | End: 2017-03-24

## 2017-03-17 RX ORDER — HEPARIN 100 UNIT/ML
5 SYRINGE INTRAVENOUS ONCE AS NEEDED
Status: COMPLETED | OUTPATIENT
Start: 2017-03-17 | End: 2017-03-17

## 2017-03-17 RX ORDER — HYDROMORPHONE HYDROCHLORIDE 2 MG/1
4 TABLET ORAL EVERY 4 HOURS PRN
Qty: 20 TABLET | Refills: 0 | Status: SHIPPED | OUTPATIENT
Start: 2017-03-17 | End: 2017-03-17

## 2017-03-17 RX ORDER — HYDROMORPHONE HYDROCHLORIDE 2 MG/1
4 TABLET ORAL EVERY 4 HOURS PRN
Qty: 20 TABLET | Refills: 0 | Status: SHIPPED | OUTPATIENT
Start: 2017-03-17 | End: 2017-03-24

## 2017-03-17 RX ORDER — LORAZEPAM 0.5 MG/1
0.5 TABLET ORAL EVERY 6 HOURS PRN
Qty: 20 TABLET | Refills: 0 | Status: SHIPPED | OUTPATIENT
Start: 2017-03-17

## 2017-03-17 RX ADMIN — HYDROMORPHONE HYDROCHLORIDE 1 MG: 1 INJECTION, SOLUTION INTRAMUSCULAR; INTRAVENOUS; SUBCUTANEOUS at 01:03

## 2017-03-17 RX ADMIN — HYDROMORPHONE HYDROCHLORIDE 1 MG: 1 INJECTION, SOLUTION INTRAMUSCULAR; INTRAVENOUS; SUBCUTANEOUS at 08:03

## 2017-03-17 RX ADMIN — BUDESONIDE 0.5 MG: 0.5 INHALANT RESPIRATORY (INHALATION) at 07:03

## 2017-03-17 RX ADMIN — FENTANYL 1 PATCH: 25 PATCH, EXTENDED RELEASE TRANSDERMAL at 09:03

## 2017-03-17 RX ADMIN — METHYLPREDNISOLONE SODIUM SUCCINATE 20 MG: 40 INJECTION, POWDER, FOR SOLUTION INTRAMUSCULAR; INTRAVENOUS at 02:03

## 2017-03-17 RX ADMIN — PROMETHAZINE HYDROCHLORIDE 12.5 MG: 25 INJECTION, SOLUTION INTRAMUSCULAR; INTRAVENOUS at 12:03

## 2017-03-17 RX ADMIN — ONDANSETRON 4 MG: 2 INJECTION INTRAMUSCULAR; INTRAVENOUS at 05:03

## 2017-03-17 RX ADMIN — ONDANSETRON 4 MG: 2 INJECTION INTRAMUSCULAR; INTRAVENOUS at 02:03

## 2017-03-17 RX ADMIN — HYDROMORPHONE HYDROCHLORIDE 1 MG: 1 INJECTION, SOLUTION INTRAMUSCULAR; INTRAVENOUS; SUBCUTANEOUS at 03:03

## 2017-03-17 RX ADMIN — METHYLPREDNISOLONE SODIUM SUCCINATE 20 MG: 40 INJECTION, POWDER, FOR SOLUTION INTRAMUSCULAR; INTRAVENOUS at 05:03

## 2017-03-17 RX ADMIN — HYDROMORPHONE HYDROCHLORIDE 1 MG: 1 INJECTION, SOLUTION INTRAMUSCULAR; INTRAVENOUS; SUBCUTANEOUS at 05:03

## 2017-03-17 RX ADMIN — HEPARIN 500 UNITS: 100 SYRINGE at 04:03

## 2017-03-17 RX ADMIN — LORAZEPAM 0.5 MG: 2 INJECTION, SOLUTION INTRAMUSCULAR; INTRAVENOUS at 07:03

## 2017-03-17 RX ADMIN — ARFORMOTEROL TARTRATE 15 MCG: 15 SOLUTION RESPIRATORY (INHALATION) at 07:03

## 2017-03-17 RX ADMIN — CEFEPIME HYDROCHLORIDE: 2 INJECTION, POWDER, FOR SOLUTION INTRAVENOUS at 01:03

## 2017-03-17 RX ADMIN — CEFEPIME HYDROCHLORIDE: 2 INJECTION, POWDER, FOR SOLUTION INTRAVENOUS at 07:03

## 2017-03-17 RX ADMIN — MOXIFLOXACIN HYDROCHLORIDE 400 MG: 400 INJECTION, SOLUTION INTRAVENOUS at 11:03

## 2017-03-17 RX ADMIN — DEXTROSE: 5 SOLUTION INTRAVENOUS at 12:03

## 2017-03-17 RX ADMIN — FAMOTIDINE 20 MG: 20 INJECTION, SOLUTION INTRAVENOUS at 09:03

## 2017-03-17 NOTE — SUBJECTIVE & OBJECTIVE
Interval History:  Patient resting comfortably in this morning  Decided to proceed with home hospice yesterday    Oncology Treatment Plan:   [facility] MK [id number] KEYNOTE [id number]    Medications:  Continuous Infusions:   dextrose 5 % 50 mL/hr at 03/16/17 1851     Scheduled Meds:   arformoterol  15 mcg Nebulization BID    budesonide  0.5 mg Nebulization BID    custom IVPB builder   Intravenous Q8H    famotidine  20 mg Intravenous BID    fentaNYL  1 patch Transdermal Q72H    lidocaine HCL 10 mg/ml (1%)  1 mL Intradermal Once    methylPREDNISolone sodium succinate  20 mg Intravenous Q8H    moxifloxacin  400 mg Intravenous Q24H    ondansetron  4 mg Intravenous Q8H     PRN Meds:albuterol-ipratropium 2.5mg-0.5mg/3mL, guaifenesin 100 mg/5 ml, hydrALAZINE, HYDROmorphone, lorazepam, promethazine (PHENERGAN) IVPB     Review of Systems   Constitutional: Positive for activity change, appetite change and fatigue. Negative for chills, diaphoresis, fever and unexpected weight change.   HENT: Negative for congestion, dental problem, ear pain, hearing loss and nosebleeds.    Eyes: Negative for discharge and itching.   Respiratory: Positive for shortness of breath. Negative for apnea, cough, choking, chest tightness, wheezing and stridor.    Cardiovascular: Positive for chest pain. Negative for palpitations and leg swelling.   Gastrointestinal: Positive for nausea. Negative for abdominal distention, abdominal pain and anal bleeding.   Endocrine: Negative for cold intolerance and heat intolerance.   Genitourinary: Negative for difficulty urinating, dysuria, enuresis and frequency.   Musculoskeletal: Positive for arthralgias, back pain, myalgias and neck pain.        Chest pain    Allergic/Immunologic: Negative for environmental allergies and food allergies.   Neurological: Negative for dizziness, tremors, facial asymmetry and headaches.   Psychiatric/Behavioral: Positive for agitation. The patient is nervous/anxious.    All other systems  reviewed and are negative.    Objective:     Vital Signs (Most Recent):  Temp: 98 °F (36.7 °C) (03/17/17 0523)  Pulse: 91 (03/17/17 0523)  Resp: 18 (03/17/17 0523)  BP: 110/72 (03/17/17 0523)  SpO2: 95 % (03/17/17 0523) Vital Signs (24h Range):  Temp:  [97.5 °F (36.4 °C)-98.3 °F (36.8 °C)] 98 °F (36.7 °C)  Pulse:  [84-97] 91  Resp:  [18-20] 18  SpO2:  [95 %-98 %] 95 %  BP: ()/(55-77) 110/72     Weight: 45.9 kg (101 lb 3.2 oz)  Body mass index is 18.51 kg/(m^2).  Body surface area is 1.42 meters squared.      Intake/Output Summary (Last 24 hours) at 03/17/17 0639  Last data filed at 03/17/17 0524   Gross per 24 hour   Intake          2580.92 ml   Output             2150 ml   Net           430.92 ml       Physical Exam   Constitutional: He is oriented to person, place, and time. He appears cachectic.   HENT:   Head: Normocephalic.   Right Ear: External ear normal.   Left Ear: External ear normal.   Nose: Nose normal.   Mouth/Throat: Oropharynx is clear and moist. No oropharyngeal exudate.   Eyes: EOM are normal. Pupils are equal, round, and reactive to light. Right eye exhibits no discharge. Left eye exhibits no discharge.   Neck: No tracheal deviation present. No thyromegaly present.   Cardiovascular: Regular rhythm.  Tachycardia present.  Exam reveals no gallop.    Murmur heard.  Pulmonary/Chest: No respiratory distress. He has decreased breath sounds. He has no wheezes. He has no rales. He exhibits tenderness.   Abdominal: Soft. He exhibits no distension and no mass. There is no rebound and no guarding.   Peg tube in place   Musculoskeletal: Normal range of motion. He exhibits no edema, tenderness or deformity.   Lymphadenopathy:     He has no cervical adenopathy.   Neurological: He is alert and oriented to person, place, and time.   Skin: Skin is warm and dry.   Psychiatric: He has a normal mood and affect. His behavior is normal.   Vitals reviewed.      Significant Labs:   CBC:   Recent Labs  Lab  03/16/17 0247 03/17/17  0330   WBC 22.43* 25.51*   HGB 10.9* 11.1*   HCT 33.4* 34.2*   * 162   , CMP:   Recent Labs  Lab 03/16/17 0247 03/17/17  0330   * 147*   K 3.9 4.1   * 111*   CO2 28 30*   * 114*   BUN 18 16   CREATININE 0.5 0.5   CALCIUM 8.4* 8.8   PROT 5.4* 5.7*   ALBUMIN 2.2* 2.3*   BILITOT 0.3 0.3   ALKPHOS 127 124   AST 25 33   ALT 88* 111*   ANIONGAP 7* 6*   EGFRNONAA >60 >60   , LDH: No results for input(s): LDHCSF, BFSOURCE in the last 48 hours. and Reticulocytes: No results for input(s): RETIC in the last 48 hours.    Diagnostic Results:  I have reviewed all pertinent imaging results/findings within the past 24 hours.

## 2017-03-17 NOTE — PLAN OF CARE
Problem: Patient Care Overview  Goal: Plan of Care Review  PT REQUIRES [facility] FOR T/F TO CHAIR   Outcome: Ongoing (interventions implemented as appropriate)   Pt. forgetful at times. Peptiman 1.5 infusing at 50 ml/hr via peg tube. Patient reports tolerating well. Encouraged repositioning every 2 hours prn. Remains free of injury. Fall precautions maintained with bed low and wheels locked.  IVF administered as ordered. Pain mildly managed with prn meds with rating of 7-9/10. Chart review for 24 hours completed. Will continue to monitor till discharge.

## 2017-03-17 NOTE — PLAN OF CARE
Reviewed hospitalist's PN (dated 03/16/17) and followed up with patient at bedside and patient's wife via phone ([phone]) while in patient's room.  Patient reports that he is receptive to hospice services and states that he is electing to be discharged home with hospice services via [facility].  Patient's wife in agreement with home hospice services via [facility] as well.  Patient Preference Form signed reflecting this hospice preference; and signed form placed in blue folder.      Contacted Lexy Guerrero with [facility] and coordinated visit with [facility] representative, patient and patient's wife to occur in patient's hospital room today at 1:00 pm.  Faxed inpatient hospice order, along with patient information, to [facility] via [facility] to complete hospice referral.      D/C PLAN:  Once hospice consents signed and HME arranged, home with hospice services via [facility]       03/17/17 1105   [facility] Assessment   Can the patient answer the patient profile reliably? Yes, cognitively intact   Describe the patient's ability to walk at the present time. Major restrictions/daily assistance from another person   How does the patient rate their overall health at the present time? Poor   Number of comorbid conditions (as recorded on the chart) Three   During the past month, has the patient often been bothered by feeling down, depressed or hopeless? Yes   Have you felt down, depressed, or hopeless? 1   During the past month, has the patient often been bothered by little interest or pleasure in doing things? Yes   Have you felt little interest or pleasure in doing things? 1

## 2017-03-17 NOTE — PROGRESS NOTES
Unable to remove fentanyl patch that was placed on 3/14/17, as none was found on patient. Placed new Fentanyl patch to [facility]. Informed LEIGHANN Liu, of missing patch.

## 2017-03-17 NOTE — NURSING
Demonstrated how to give bolus tube feedings and medications through PEG tube for patient's wife. Patient was able to verbalize understanding and provide teachback. She also stated that her sister was coming this weekend and that she has experience with PEG tubes due to her  having one when he was in hospice. Provided 4 cans of Isosure and irrigation set for PEG tube feedings and medication administration.

## 2017-03-17 NOTE — PROGRESS NOTES
[facility] -   Hematology/Oncology  Progress Note    Patient Name: Frederick J Lejeune  Admission Date: 3/3/2017  Hospital Length of Stay: 13 days  Code Status: DNR     Subjective:     HPI:  History of Present Illness:  Frederick J Lejeune is a 66 y.o. male with GERD has a diagnosis of squamous cell carcinoma, PDL-1 positive.  He initlaly presneted  1 month history of weight loss, chest pain, dysphagia, odynophagia found to have esophageal narrowing and RLL lung mass.He has had difficulty eating due to odynophagia for the past month. He also states he has L groin pain due to what he thought was a hernia, which has since been found to be firm lymphadenopathy. Patient underwent EGD 10/19, which was notable for extrinsic compression in middle third of the esophagus as well as findings suggestive of Palma's esophagus, which were biopsied. 10/20, patient underwent EUS notable for lymphadenopathy in the paratracheal, mediastinal, and subcarinal region. FNA performed. Patient underwent bronchoscopy on 10/21 which was notable for a mass in the right mainstem bronchus, biopsy revealed lung cancer, squamous cell carcinoma.   Patient also completed palliative radiation to relieve right mainstem bronchus obstruction. He then was treated with weekly Carbo-Taxol x 6-7 weeks, which was discontinued due to disease progression on imaging in early 1/2017. He is currently receiving Pembrolizumab every 3 weeks.     The patient recently had placement of an esophageal stent due to obstruction.  Since having the stent placed, he has continue with severe pain on swallowing as well as a choking sensation every time he eats.  He has had studies  that shows the stent to the patient. He has been having aspirations by radiographic swallowing studies.  It is planned for him to have an EGd and feeding tube lalced for nutrition.  Plan is to continue Keytruda treatment once discharged    He remains very SOB, more so in the last few  weeks             Past Medical History:  COPD, severe  GERD  Lumbar vertebral fracture s/p surgery in 1/2016 / Chronic low back pain  H/o intracranial hemorrhage     Social History   Marital status:       Spouse name: ganesh   Number of children: 2   Years of education: N/A     Occupational History     [employer]     Social History Main Topics   Smoking status: Former Smoker      Packs/day: 1.00      Years: 50.00      Types: Cigarettes      Quit date: 5/10/2016   Smokeless tobacco: Former User      Types: Snuff      Quit date: 5/10/2016   Alcohol use No   Drug use: No   Sexual activity: Yes      Partners: Female     Other Topics Concern   Not on file     Social History Narrative        Family History: family history includes Cancer in his father; Emphysema in his mother.           Interval History:  Patient resting comfortably in this morning  Decided to proceed with home hospice yesterday    Oncology Treatment Plan:   [facility]  3475 KEYNOTE [id number]    Medications:  Continuous Infusions:   dextrose 5 % 50 mL/hr at 03/16/17 1851     Scheduled Meds:   arformoterol  15 mcg Nebulization BID    budesonide  0.5 mg Nebulization BID    custom IVPB builder   Intravenous Q8H    famotidine  20 mg Intravenous BID    fentaNYL  1 patch Transdermal Q72H    lidocaine HCL 10 mg/ml (1%)  1 mL Intradermal Once    methylPREDNISolone sodium succinate  20 mg Intravenous Q8H    moxifloxacin  400 mg Intravenous Q24H    ondansetron  4 mg Intravenous Q8H     PRN Meds:albuterol-ipratropium 2.5mg-0.5mg/3mL, guaifenesin 100 mg/5 ml, hydrALAZINE, HYDROmorphone, lorazepam, promethazine (PHENERGAN) IVPB     Review of Systems   Constitutional: Positive for activity change, appetite change and fatigue. Negative for chills, diaphoresis, fever and unexpected weight change.   HENT: Negative for congestion, dental problem, ear pain, hearing loss and nosebleeds.    Eyes: Negative for discharge and itching.   Respiratory:  Positive for shortness of breath. Negative for apnea, cough, choking, chest tightness, wheezing and stridor.    Cardiovascular: Positive for chest pain. Negative for palpitations and leg swelling.   Gastrointestinal: Positive for nausea. Negative for abdominal distention, abdominal pain and anal bleeding.   Endocrine: Negative for cold intolerance and heat intolerance.   Genitourinary: Negative for difficulty urinating, dysuria, enuresis and frequency.   Musculoskeletal: Positive for arthralgias, back pain, myalgias and neck pain.        Chest pain    Allergic/Immunologic: Negative for environmental allergies and food allergies.   Neurological: Negative for dizziness, tremors, facial asymmetry and headaches.   Psychiatric/Behavioral: Positive for agitation. The patient is nervous/anxious.    All other systems reviewed and are negative.    Objective:     Vital Signs (Most Recent):  Temp: 98 °F (36.7 °C) (03/17/17 0523)  Pulse: 91 (03/17/17 0523)  Resp: 18 (03/17/17 0523)  BP: 110/72 (03/17/17 0523)  SpO2: 95 % (03/17/17 0523) Vital Signs (24h Range):  Temp:  [97.5 °F (36.4 °C)-98.3 °F (36.8 °C)] 98 °F (36.7 °C)  Pulse:  [84-97] 91  Resp:  [18-20] 18  SpO2:  [95 %-98 %] 95 %  BP: ()/(55-77) 110/72     Weight: 45.9 kg (101 lb 3.2 oz)  Body mass index is 18.51 kg/(m^2).  Body surface area is 1.42 meters squared.      Intake/Output Summary (Last 24 hours) at 03/17/17 0639  Last data filed at 03/17/17 0524   Gross per 24 hour   Intake          2580.92 ml   Output             2150 ml   Net           430.92 ml       Physical Exam   Constitutional: He is oriented to person, place, and time. He appears cachectic.   HENT:   Head: Normocephalic.   Right Ear: External ear normal.   Left Ear: External ear normal.   Nose: Nose normal.   Mouth/Throat: Oropharynx is clear and moist. No oropharyngeal exudate.   Eyes: EOM are normal. Pupils are equal, round, and reactive to light. Right eye exhibits no discharge. Left eye  exhibits no discharge.   Neck: No tracheal deviation present. No thyromegaly present.   Cardiovascular: Regular rhythm.  Tachycardia present.  Exam reveals no gallop.    Murmur heard.  Pulmonary/Chest: No respiratory distress. He has decreased breath sounds. He has no wheezes. He has no rales. He exhibits tenderness.   Abdominal: Soft. He exhibits no distension and no mass. There is no rebound and no guarding.   Peg tube in place   Musculoskeletal: Normal range of motion. He exhibits no edema, tenderness or deformity.   Lymphadenopathy:     He has no cervical adenopathy.   Neurological: He is alert and oriented to person, place, and time.   Skin: Skin is warm and dry.   Psychiatric: He has a normal mood and affect. His behavior is normal.   Vitals reviewed.      Significant Labs:   CBC:   Recent Labs  Lab 03/16/17 0247 03/17/17  0330   WBC 22.43* 25.51*   HGB 10.9* 11.1*   HCT 33.4* 34.2*   * 162   , CMP:   Recent Labs  Lab 03/16/17 0247 03/17/17  0330   * 147*   K 3.9 4.1   * 111*   CO2 28 30*   * 114*   BUN 18 16   CREATININE 0.5 0.5   CALCIUM 8.4* 8.8   PROT 5.4* 5.7*   ALBUMIN 2.2* 2.3*   BILITOT 0.3 0.3   ALKPHOS 127 124   AST 25 33   ALT 88* 111*   ANIONGAP 7* 6*   EGFRNONAA >60 >60   , LDH: No results for input(s): LDHCSF, BFSOURCE in the last 48 hours. and Reticulocytes: No results for input(s): RETIC in the last 48 hours.    Diagnostic Results:  I have reviewed all pertinent imaging results/findings within the past 24 hours.    Assessment/Plan:     * Primary malignant neoplasm of right lung metastatic to other site  Poor candidate for additional treatment due to poor PS  --Patient has decided for home hospice; placement pending    Chronic respiratory failure with hypoxia  S/p Thoracentesis;   Has chronic aspiration, a tracheo-esophageal fistula,active lung cancer, some degree of radiation pneumonitis and COPD all contributing  --abx  --solumedrol 20mg Q 8         Cancer related  pain  Pain well controlled this morning   --continue to monitor and adjust meds    Anxiety  Anxiety related to diagnosis/pain  --ativan 0.5mg Q8 prn      Thank you for your consult. I will follow-up with patient. Please contact us if you have any additional questions.     Eren Willett Jr, MD  Hematology/Oncology  [facility]

## 2017-03-17 NOTE — PT/OT/SLP PROGRESS
Speech Language Pathology  Treatment    Frederick J Lejeune   MRN: [id number]   Admitting Diagnosis: Primary malignant neoplasm of right lung metastatic to other site    Diet recommendations: Solid Diet Level: NPO  Liquid Diet Level: NPO     SLP Treatment Date: 17  Speech Start Time: 1020     Speech Stop Time: 1045     Speech Total (min): 25 min       TREATMENT BILLABLE MINUTES:  Treatment Swallowing Dysfunction 25    Has the patient been evaluated by SLP for swallowing? : Yes  Keep patient NPO?: Yes   General Precautions: Standard, aspiration, fall  Current Respiratory Status: nasal cannula       Subjective:  Pt alert and cooperative.     Pain Ratin/10              Pain Rating Post-Intervention: 0/10    Objective:   Patient found with: peripheral IV, telemetry, oxygen  Pt tolerated 1/2 tsp bites of applesauce followed by ice chips with multiple swallows required and throat clearing after the swallow which is indicative of pharyngeal residue and places him at risk of aspiration. Laryngeal elevation was WFL during assessment.  Recommend he remain NPO at this time. He completed tongue base retraction ex x 20 each to improve swallow function. Kristie and effortful swallow was completed x 10 each with mod cues.   FIM:                                 Assessment:  Frederick J Lejeune is a 66 y.o. male with a medical diagnosis of Primary malignant neoplasm of right lung metastatic to other site and presents with dysphagia.    Discharge recommendations: Discharge Facility/Level Of Care Needs: nursing facility, skilled     Goals:   SLP Goals        Problem: SLP Goal    Goal Priority Disciplines Outcome   SLP Goal     SLP Ongoing (interventions implemented as appropriate)   Description:  1. Continue to assess for PO readiness and tolerance of pleasure feedings.   2. Complete laryngeal elevation, kristie maneuver, and mendelsohn maneuver x 10 repetitions each to improve pharyngeal swallow function.                Plan:    Patient to be seen Therapy Frequency: 3 x/week   Plan of Care expires: 03/17/17  Plan of Care reviewed with: patient  SLP Follow-up?: Yes              Carolina Castaneda CCC-SLP  03/17/2017

## 2017-03-17 NOTE — PROGRESS NOTES
Spoke with Sherlyn with [facility]. She has met with pt and wife to sign consents and will have her staff admit pt tonight. Pt and wife declined hospital bed at this time. All other HME will be delivered to home. Sherlyn has also spoken with charge nurse and physician regarding mediation needs at d/c. Update given to Gallito Burkett LCSW.

## 2017-03-17 NOTE — PLAN OF CARE
Patient discharging home today with hospice services via [facility].  D/C Orders written; and D/C documentation faxed to [facility] via [facility] to complete hospice referral.  Also, regarding outpatient follow-up appointments, spoke with [facility] rep, Sherlyn, who states that patient will not require outpatient follow-up appointments with PCP and recommends that patient's oncologist be contacted and notified of patient discharging home today with hospice services via [facility].      Contacted patient's [facility] oncologist's office and left message notifying [facility] oncologist's nurse of patient discharging home today with hospice services via [facility] and requested that oncology nurse contact patient to schedule a follow-up appointment if needed.      Also, determined from hospitalist that patient is able to be transported home via private vehicle upon discharge today and can be discharged following [facility] rep educating wife how to provide bolus feeds to patient.  [facility] Med-Surg Charge Nurse notified of this.    D/C PLAN:  Home with hospice services via [facility]       03/17/17 1533   Final Note   Assessment Type Final Discharge Note   Discharge Disposition [facility]   What phone number can be called within the next 1-3 days to see how you are doing after discharge? [phone]   Hospital Follow Up  Appt(s) scheduled? (N/A)   Referral to Outpatient Case Management complete? n/a   Referral to / orders for Home Health Complete? n/a   Did you assess the readiness or willingness of the family or caregiver to support self management of care? Yes   Right Care Referral Info   Post Acute Recommendation Other   Referral Type Home Hospice   Facility Name [address] [place]  [address]

## 2017-03-17 NOTE — PLAN OF CARE
Problem: Fall Risk (Adult)  Intervention: Patient Rounds  Reviewed POC, including indications and possible side effects of administered medications. Pt and spouse verbalized understanding and teach back.     12 hour chart check complete.          Problem: Pressure Ulcer Risk (Rod Scale) (Adult,Obstetrics,Pediatric)  Intervention: Maintain Head of Bed Elevation Less Than 30 Degrees as Tolerated  D/t tube feeding.

## 2017-03-17 NOTE — ASSESSMENT & PLAN NOTE
Poor candidate for additional treatment due to poor PS  --Patient has decided for home hospice; placement pending

## 2017-03-17 NOTE — ASSESSMENT & PLAN NOTE
S/p Thoracentesis;   Has chronic aspiration, a tracheo-esophageal fistula,active lung cancer, some degree of radiation pneumonitis and COPD all contributing  --abx  --solumedrol 20mg Q 8

## 2017-03-17 NOTE — PLAN OF CARE
Problem: SLP Goal  Goal: SLP Goal  1. Continue to assess for PO readiness and tolerance of pleasure feedings.   2. Complete laryngeal elevation, mer maneuver, and mendelsohn maneuver x 10 repetitions each to improve pharyngeal swallow function.   Outcome: Ongoing (interventions implemented as appropriate)  Pt tolerated trials of puree with s/s of pharyngeal residue placing him at risk of aspiration.

## 2017-03-17 NOTE — PROGRESS NOTES
Flushed RCW MP with 10ml NS and 5ml heparin flush 100units/ml. Gave DC instructions to pt and spouse and she verbalized understanding and teachback. DC'd per WC to private vehicle with spouse.

## 2017-03-17 NOTE — CONSULTS
Patient to discharge with [facility], requesting bolus feeds. Consider Isosurce 1.5 bolus 5 can daily with 30ml flush before and after plus 100ml TID and more as needed for thirst/hydration. If patient wishes to switch to continuous upon returning home 50ml/hr would meet needs with 100ml flushes every 3 to 4 hours.

## 2017-03-17 NOTE — DISCHARGE SUMMARY
[facility] Hospital Medicine  Discharge Summary      Patient Name: Frederick J Lejeune  MRN: [id number]  Admission Date: 3/3/2017  Hospital Length of Stay: 13 days  Discharge Date and Time:  03/17/2017 4:00 PM  Attending Physician: Karli Vail, *   Discharging Provider: Reggie Jose NP  Primary Care Provider: Kenrick Muñoz MD      HPI:   Frederick J Lejeune is a 66 y.o. male with PMHx of GERD, weight loss, dysphagia, odynophagia, RLL lung mass squamous cell carcinoma, who presented to ER with continued disphagia. He has been recently hospitalized from Dr. Espinosa's office with c/o dysphagia, weakness, dizziness, chest pain. He has had difficulty eating due to odynophagia for the past month. Patient has completed palliative radiation to relieve right mainstem bronchus obstruction. He then was treated with weekly Carbo-Taxol x 6-7 weeks, which was discontinued due to disease progression on imaging in early 1/2017. He is currently receiving Pembrolizumab every 3 weeks. He was recently evaluated by Dr. Cheng, at last hospitalization, who recommended further evaluation with esophagram, that was done on 2/24/17 with Esophageal stent placement. Per discharge summary, Dr. Cheng stated if patient continues to have severe dysphagia or chest pain then he could remove the stent and place PEG tube. He was discharged on 2/28/17.    Patient returns to the hospital 3 days after discharge with symptoms of inability to keep anything down. Getting dehydrated, He is agreeable for PEG placement.      Procedure(s) (LRB):  ESOPHAGOGASTRODUODENOSCOPY (EGD) (Left)      Indwelling Lines/Drains at time of discharge:   Lines/Drains/Airways     Central Venous Catheter Line                 Port A Cath Single Lumen right subclavian -- days          Drain                 Gastrostomy/Enterostomy Percutaneous endoscopic gastrostomy (PEG) LUQ feeding -- days          Pressure Ulcer                 Pressure Ulcer 03/09/17  "sacral spine Stage II 8 days         Pressure Ulcer 03/13/17 1038 Right ear, right Stage II 4 days         Pressure Ulcer 03/13/17 1039 Left ear, left Stage II 4 days              Hospital Course:   GI, Dr. Anguiano, was consulted. Esophagogram showed patent stent, he showed signs of severe aspiration. Patient made NPO and IV Cefepime started. Patient continues with mild respiratory distress and placed on Oxygen, IV steroids, nebulizers, IS, acapella. Discussed code status twice with patient and he wants to remain a full code. He also requested more IV pain medicine. Discussed with Dr. Bourgeois and Dr. Anguiano. Patient states, "under NO circumstances can you call my family." Pulmonary was consulted for continued hypoxia requiring 4 liters O2. IV Diuretics started. CT chest showed enlarging bilateral pleural effusions, stable metastatic lung CA, and evidence of tracheoesophageal fistula. Dr. Anguiano spoke with the Radiologist explaining patient had esophagram with barium a few days ago. Radiologist said that could be what he saw and will revise his CT report, but addendum didn't r/o fistula. Dr. Marie performed thoracentesis on the right removing 560cc. No family at bedside. Scheduled multidisciplinary rounds 3/9/17.  Placed PEG tube 09 March.  Difficulty tolerating tube feedings at first but able to advance slowly. 3/16/17- Pt tolerating tube feedings, at goal rate of 50ml/hr. Discussed with patient plans for discharge Hospice vs SNF. The patient states that he will consider hospice but wants to discuss with family tomorrow. The patient and family decided to go [facility]. The patient is being discharged home with on a coarse of augmentin and a steroid taper. The patient will receive bolus TF per family. Follow up with PCP and Hem/onc.      Consults:   Consults         Status Ordering Provider     Inpatient consult to Gastroenterology  Once     Provider:  Mario Anguiano MD    Completed KO GHOSH     " "Inpatient consult to Hematology/Oncology  Once     Provider:  Eve Olivarez MD    Acknowledged CHRISTOPHER SAWANT     Inpatient consult to Pulmonology  Once     Provider:  Andres Marie MD    Completed EVE OLIVAREZ     Inpatient consult to Social Work  Once     Provider:  (Not yet assigned)    Completed GERSON NAILS     IP consult to dietary  Once     Provider:  (Not yet assigned)    Completed KO GHOSH          Significant Diagnostic Studies:   Imaging Results         X-Ray Chest 1 View (Final result) Result time:  03/08/17 11:50:29    Final result by Cathy Delaney MD (03/08/17 11:50:29)    Impression:     As above.  No            Electronically signed by: CAHTY DELANEY MD  Date:     03/08/17  Time:    11:50     Narrative:    Exam: XR CHEST 1 VIEW    Clinical History: Shortness of breath. Post thoracentesis    Findings:     No evidence of pneumothorax status post thoracentesis.  Decreased size of the bilateral pleural effusions.  Otherwise no significant change compared to the recent chest CT. The cardiac silhouette is within normal limits.            US Chest Mediastinum (Final result) Result time:  03/08/17 11:46:30    Final result by Abram Sorto MD (03/08/17 11:46:30)    Impression:      Bilateral pleural effusion      Electronically signed by: ABRAM SORTO MD  Date:     03/08/17  Time:    11:46     Narrative:    Exam: US CHEST MEDIASTINUM,    Date:  03/08/17 10:20:00    History: Pleural effusions    Comparison:  No prior relevant studies available    Findings: Ultrasound of the chest reveals a moderate size right and small to moderate left pleural effusion.            CTA Chest Non Coronary (Edited Result - FINAL) Result time:  03/08/17 07:59:35    Addendum 1 of 1 by Cathy Delaney MD (03/08/17 07:59:35)    Addendum:    The following was dictated in error: "This was confirmed on an esophagram performed at March 5, 2017."  The esophagram performed on March 5, 2017 " demonstrated aspiration of barium.  No evidence of tracheoesophageal fistula at that time.  While there is a fistulous tract between the posterior wall of the right mainstem bronchus and the posterior mediastinum on axial image 7 of series 3, the fistulous tract does not definitively communicate with the esophagus through the covered stent.  It is possible for there to be an ascending or descending tract from the distal or proximal margin of the covered esophageal stent to the right mainstem bronchus, given the degree of extraluminal air surrounding the covered stent at its margins.        Electronically signed by: CATHY MURRAY MD  Date:     03/08/17  Time:    07:59           Final result by Cathy Murray MD (03/07/17 16:13:13)    Impression:        No evidence of pulmonary embolus.    Stable metastatic lung cancer.  Enlarging bilateral pleural effusions.    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      All CT scans at this facility use dose modulation, iterative reconstruction and/or weight based dosing when appropriate to reduce radiation dose to as low as reasonably achievable.       Electronically signed by: CATHY MURRAY MD  Date:     03/07/17  Time:    16:13     Narrative:    Exam: CTA CHEST NON CORONARY    Clinical History:   Increasing shortness of breath and hypoxemia.  Active malignancy. Rule out pulmonary embolus.    Technique: Axial CTA images performed through the chest after the administration of 100 cc intravenous contrast. Maximum intensity projections were performed and interpreted.    Comparison: CT chest and PET/CT on February 22, 2017 and January 11, 2017.  CT chest on December 20, 2016.      Findings:    There is no evidence of pulmonary embolus. Pulmonary artery caliber is normal.     Since the previous study, there has been placement of an esophageal stent with evidence of extraluminal  air along the right lateral margin of the stent.  There is a small air tract between the right anterior margin of the stent communicating with the right mainstem bronchus consistent with a tracheoesophageal fistula.  This was confirmed on a esophagram performed at March 5, 2017.  There is aspirated barium demonstrated throughout the lower lungs.      Enlargement of the large right and moderate left pleural effusions.  Stable low-density mass in the right lower lobe measuring approximately 6 centimeters in greatest dimension.  No significant change in the size of the bilateral pulmonary nodules.  Representative pulmonary nodules in the left lung at the lower lobe measures 2.3 x 1.9 cm and 2.1 x 1.9 cm.  Representative lesion in the right upper lobe measures 1.1 cm in greatest dimension.    Poorly defined soft tissue mass encases the right mainstem bronchus and bronchus intermedius.  There is a focal luminal narrowing of the right mainstem bronchus best demonstrated on axial image 94 of series 2.  Poorly defined soft tissue density mass also encases the left mainstem bronchus without narrowing.    Aortic atherosclerosis.  Coronary artery calcifications.    Severe emphysema.    Reflux of contrast into the hepatic veins consistent with right heart dysfunction.    Bilateral supraclavicular lymphadenopathy measuring 4.0 x 3.5 cm on the right and 1.7 x 1.5 cm and the left.    Lytic metastatic lesion in the left humeral head measuring 2.1 cm.            FL Esophagram Complete (Final result) Result time:  03/05/17 09:02:30    Final result by Avni Rivers III, MD (03/05/17 09:02:30)    Impression:        1. The stent is widely patent. Positioning appears grossly satisfactory.    2. The patient's had severe aspiration during the examination which she states is what happens to him on a routine basis. The exam was terminated at this point due to the severe aspiration.      Electronically signed by: AVNI RIVERS  MD  Date:     03/05/17  Time:    09:02     Narrative:    Esophagram.    Compared to patient's prior pre-stent study. Esophageal stent has been placed grossly in satisfactory position and appears widely patent. No distal esophageal stricture. Barium did pass the stent into the distal esophagus and stomach without difficulty.    Of note however, upon the first swallow, the patient aspirated a large volume of barium. Due to this the exam was cut short. The patient was given a small amount of additional barium to swallow again with evidence of aspiration. The exam was terminated at this point.            X-Ray Chest PA And Lateral (Final result) Result time:  03/04/17 07:41:51    Final result by Avni Rivers III, MD (03/04/17 07:41:51)    Impression:         1. Slight improvement since February 28. Slight decrease in the effusions. Suspect minimal/mild congestion superimposed on chronic change but diminished since February. Esophageal stent again noted. No new abnormalities.      Electronically signed by: AVNI RIVERS MD  Date:     03/04/17  Time:    07:41     Narrative:    Two-view chest x-ray.    Clinical indication: Chest Pain     Heart size is unchanged compared to February.. Right-sided Mediport type catheter again noted in position as well as an esophageal stent. There is again moderate coarsening of the interstitial markings with small effusions suggested, best seen on the lateral image. These have decreased however compared to February. The interstitial markings are not quite as prominent suggesting decreased congestion superimposed on chronic change.               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] 03/08/2017 Yes    Pleural effusion, bilateral [J90] 03/08/2017 Yes    Diffuse interstitial pulmonary fibrosis [J84.10] 03/07/2017 Yes    Oropharyngeal dysphagia [R13.12] 03/05/2017 Yes    Severe protein-calorie malnutrition [E43] 03/04/2017 Yes    Cachexia [R64] 11/08/2016 Yes    Chronic respiratory failure with hypoxia [J96.11] 11/05/2016 Yes     Chronic    Bone metastases [C79.51] 10/28/2016 Yes    Palma's esophagus without dysplasia [K22.70] 10/19/2016 Yes     Chronic    Esophageal dysphagia [R13.14] 10/19/2016 Yes     Chronic    COPD, very severe [J44.9] 04/08/2016 Yes     Chronic      Problems Resolved During this Admission:    Diagnosis Date Noted Date Resolved POA    Pneumonia due to infectious organism [J18.9] 03/05/2017 03/17/2017 Yes    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  Service: Hospital Medicine      Discharged Condition: stable    Disposition: Hospice/Medical Facility    Follow Up:  Follow-up Information     Follow up with Kenrick Muñoz MD.    Specialty:  Family Medicine    Contact information:    [address] [address] [address] [phone]          Follow up with Adam Barraza MD.    Specialty:  Hematology and Oncology    Contact information:    [address] [facility] [address] [phone]          Patient Instructions:     Diet general       Medications:  Reconciled Home Medications:   Current Discharge Medication List      START taking these medications    Details   amoxicillin-clavulanate 875-125mg (AUGMENTIN) 875-125 mg per tablet Take 1 tablet by mouth 2 (two) times daily.  Qty: 14 tablet, Refills: 0      lactose-reduced food with fibr (ISOSOURCE 1.5 HILARIO) Liqd Take 237 mLs by mouth 5 (five) times daily. 30 ml water flush before and after feeding. Give 100ml water flush three times daily between feedings.  May increase water flushes if patient is complaining of thirst.      predniSONE (DELTASONE) 10 MG tablet Take 1 tablet (10 mg total) by mouth once daily. Take 40 mg daily for 4 days, then 30 mg daily for 4 days, then 20 mg daily for 4 days, then 10 mg daily for 4 days then stop.  Qty: 40 tablet, Refills: 0         CONTINUE these medications which have CHANGED    Details   alprazolam (XANAX) 0.25 MG tablet Take 2 tablets (0.5 mg total) by mouth 2 (two) times daily as needed for Insomnia or Anxiety.  Qty: 60 tablet, Refills: 2    Associated Diagnoses: Insomnia, unspecified type      HYDROmorphone (DILAUDID) 2 MG tablet Take 2 tablets (4 mg total) by mouth every 4 (four) hours as needed for Pain.  Qty: 20 tablet, Refills: 0         CONTINUE these medications which have NOT CHANGED    Details   albuterol-ipratropium 2.5mg-0.5mg/3mL (DUO-NEB) 0.5 mg-3 mg(2.5 mg base)/3 mL nebulizer solution Take 3 mLs by nebulization every 6 (six) hours as needed for Wheezing or Shortness of Breath.  Qty: 30 vial, Refills: 0      atorvastatin (LIPITOR) 40 MG tablet Take 40 mg by mouth every evening.      benzonatate (TESSALON) 100 MG capsule Take 1 capsule (100 mg total) by mouth 3 (three) times daily as needed for Cough.  Qty: 90 capsule, Refills: 5    Associated Diagnoses: Malignant neoplasm of lower lobe of right lung      DICYCLOMINE HCL (GI COCKTAIL SIMPLE RECORD) Take 15 mLs by mouth daily as needed.  Qty: 1 Bottle, Refills: 3      docusate sodium (COLACE) 100 MG capsule Take 1 capsule (100 mg total) by mouth 2 (two) times daily.  Refills: 0      esomeprazole (NEXIUM) 40 MG capsule Take 1 capsule (40 mg total) by mouth 2 (two) times daily.  Qty: 60 capsule, Refills: 0      fish oil-omega-3 fatty acids 300-1,000 mg capsule Take 2 g by mouth once daily.      meclizine (ANTIVERT) 25 mg tablet Take 1 tablet (25 mg total) by mouth 3 (three) times daily as needed for Dizziness.  Qty: 15 tablet, Refills: 0      ondansetron (ZOFRAN-ODT) 4  MG TbDL Take 2 tablets (8 mg total) by mouth every 8 (eight) hours as needed.  Qty: 60 tablet, Refills: 1      OXYGEN-AIR DELIVERY SYSTEMS MISC by Misc.(Non-Drug; Combo Route) route.      polyethylene glycol (GLYCOLAX) 17 gram PwPk Take 17 g by mouth daily as needed (constipation).  Qty: 30 packet, Refills: 0      prochlorperazine (COMPAZINE) 10 MG tablet Take 1 tablet (10 mg total) by mouth every 6 (six) hours as needed.  Qty: 30 tablet, Refills: 1    Associated Diagnoses: Insomnia, unspecified type           Time spent on the discharge of patient: > 30 minutes    Reggie Jose NP  Department of Hospital Medicine  [facility]     I have reviewed the notes, assessments, and/or procedures performed by NP, I concur with her/his documentation of Frederick J Lejeune.    Patient is accepted to hospice, will dc patient today

## 2017-03-21 ENCOUNTER — TELEPHONE (OUTPATIENT)
Dept: INTERNAL MEDICINE | Facility: CLINIC | Age: 67
End: 2017-03-21

## 2017-04-11 LAB — FUNGUS SPEC CULT: NORMAL

## 2017-05-11 LAB
ACID FAST MOD KINY STN SPEC: NORMAL
MYCOBACTERIUM SPEC QL CULT: NORMAL

## 2020-09-01 NOTE — PROGRESS NOTES
Spoke with angela Smart to give 6am zofran now   Tried calling pt's mobile# on file. No pick-up. Mailbox full & unable to leave msg. Previous attempts as well by other staff in the effort to schedule ECHO prior to his upcoming appt on 9/23/2020 w [facility]. Will await c/b.

## 2022-02-15 NOTE — ED NOTES
Bed rails are up and call light is within patient reach.   Spironolactone Counseling: Patient advised regarding risks of diarrhea, abdominal pain, hyperkalemia, birth defects (for female patients), liver toxicity and renal toxicity. The patient may need blood work to monitor liver and kidney function and potassium levels while on therapy. The patient verbalized understanding of the proper use and possible adverse effects of spironolactone.  All of the patient's questions and concerns were addressed.

## 2023-10-02 NOTE — ASSESSMENT & PLAN NOTE
Right thoracentesis removed 560ml 3/8/17.  Cytology was negative for malignant cells.   You can access the FollowMyHealth Patient Portal offered by [facility] by registering at the following website: [email or web address]. By joining [employer]’s FollowMyHealth portal, you will also be able to view your health information using other applications (apps) compatible with our system.